# Patient Record
Sex: FEMALE | Race: WHITE | Employment: UNEMPLOYED | URBAN - NONMETROPOLITAN AREA
[De-identification: names, ages, dates, MRNs, and addresses within clinical notes are randomized per-mention and may not be internally consistent; named-entity substitution may affect disease eponyms.]

---

## 2017-01-10 ENCOUNTER — OFFICE VISIT (OUTPATIENT)
Dept: FAMILY MEDICINE CLINIC | Age: 67
End: 2017-01-10

## 2017-01-10 VITALS
BODY MASS INDEX: 24.38 KG/M2 | HEART RATE: 80 BPM | HEIGHT: 64 IN | RESPIRATION RATE: 16 BRPM | TEMPERATURE: 97.1 F | OXYGEN SATURATION: 99 % | WEIGHT: 142.8 LBS | SYSTOLIC BLOOD PRESSURE: 114 MMHG | DIASTOLIC BLOOD PRESSURE: 78 MMHG

## 2017-01-10 DIAGNOSIS — R10.11 RIGHT UPPER QUADRANT PAIN: Primary | ICD-10-CM

## 2017-01-10 PROCEDURE — 99214 OFFICE O/P EST MOD 30 MIN: CPT | Performed by: FAMILY MEDICINE

## 2017-01-10 RX ORDER — PROMETHAZINE HYDROCHLORIDE 25 MG/1
TABLET ORAL
Refills: 0 | Status: ON HOLD | COMMUNITY
Start: 2016-12-30 | End: 2017-01-17 | Stop reason: HOSPADM

## 2017-01-10 RX ORDER — OXYCODONE HYDROCHLORIDE 5 MG/1
TABLET ORAL
Status: ON HOLD | COMMUNITY
Start: 2016-12-21 | End: 2017-01-17 | Stop reason: HOSPADM

## 2017-01-12 ENCOUNTER — TELEPHONE (OUTPATIENT)
Dept: FAMILY MEDICINE CLINIC | Age: 67
End: 2017-01-12

## 2017-01-12 PROBLEM — K81.9 CHOLECYSTITIS: Status: ACTIVE | Noted: 2017-01-12

## 2017-01-19 ENCOUNTER — OFFICE VISIT (OUTPATIENT)
Dept: FAMILY MEDICINE CLINIC | Age: 67
End: 2017-01-19

## 2017-01-19 VITALS
RESPIRATION RATE: 14 BRPM | WEIGHT: 146 LBS | HEART RATE: 86 BPM | HEIGHT: 64 IN | DIASTOLIC BLOOD PRESSURE: 72 MMHG | TEMPERATURE: 96.9 F | BODY MASS INDEX: 24.92 KG/M2 | SYSTOLIC BLOOD PRESSURE: 116 MMHG

## 2017-01-19 DIAGNOSIS — I10 ESSENTIAL HYPERTENSION: ICD-10-CM

## 2017-01-19 DIAGNOSIS — C7B.00 METASTATIC CARCINOID TUMOR (HCC): ICD-10-CM

## 2017-01-19 DIAGNOSIS — K81.9 CHOLECYSTITIS: Primary | ICD-10-CM

## 2017-01-19 DIAGNOSIS — R73.09 ELEVATED GLUCOSE: ICD-10-CM

## 2017-01-19 PROCEDURE — 99214 OFFICE O/P EST MOD 30 MIN: CPT | Performed by: FAMILY MEDICINE

## 2017-01-27 ENCOUNTER — OFFICE VISIT (OUTPATIENT)
Dept: SURGERY | Age: 67
End: 2017-01-27

## 2017-01-27 VITALS
SYSTOLIC BLOOD PRESSURE: 118 MMHG | HEART RATE: 62 BPM | BODY MASS INDEX: 24.41 KG/M2 | HEIGHT: 64 IN | DIASTOLIC BLOOD PRESSURE: 78 MMHG | WEIGHT: 143 LBS | RESPIRATION RATE: 14 BRPM

## 2017-01-27 DIAGNOSIS — K81.9 CHOLECYSTITIS: Primary | ICD-10-CM

## 2017-01-27 PROCEDURE — 99024 POSTOP FOLLOW-UP VISIT: CPT | Performed by: SURGERY

## 2017-01-27 RX ORDER — ALLOPURINOL 300 MG/1
300 TABLET ORAL
COMMUNITY
Start: 2017-02-10 | End: 2017-02-15

## 2017-03-15 ENCOUNTER — TELEPHONE (OUTPATIENT)
Dept: FAMILY MEDICINE CLINIC | Age: 67
End: 2017-03-15

## 2017-03-16 ENCOUNTER — OFFICE VISIT (OUTPATIENT)
Dept: CARDIOLOGY | Age: 67
End: 2017-03-16
Payer: MEDICARE

## 2017-03-16 VITALS
HEART RATE: 70 BPM | BODY MASS INDEX: 23.73 KG/M2 | SYSTOLIC BLOOD PRESSURE: 132 MMHG | WEIGHT: 139 LBS | DIASTOLIC BLOOD PRESSURE: 88 MMHG | HEIGHT: 64 IN

## 2017-03-16 DIAGNOSIS — I47.1 SVT (SUPRAVENTRICULAR TACHYCARDIA) (HCC): Primary | ICD-10-CM

## 2017-03-16 PROCEDURE — G8420 CALC BMI NORM PARAMETERS: HCPCS | Performed by: INTERNAL MEDICINE

## 2017-03-16 PROCEDURE — 1123F ACP DISCUSS/DSCN MKR DOCD: CPT | Performed by: INTERNAL MEDICINE

## 2017-03-16 PROCEDURE — 4040F PNEUMOC VAC/ADMIN/RCVD: CPT | Performed by: INTERNAL MEDICINE

## 2017-03-16 PROCEDURE — 3017F COLORECTAL CA SCREEN DOC REV: CPT | Performed by: INTERNAL MEDICINE

## 2017-03-16 PROCEDURE — G8484 FLU IMMUNIZE NO ADMIN: HCPCS | Performed by: INTERNAL MEDICINE

## 2017-03-16 PROCEDURE — 99213 OFFICE O/P EST LOW 20 MIN: CPT | Performed by: INTERNAL MEDICINE

## 2017-03-16 PROCEDURE — 1036F TOBACCO NON-USER: CPT | Performed by: INTERNAL MEDICINE

## 2017-03-16 PROCEDURE — 3014F SCREEN MAMMO DOC REV: CPT | Performed by: INTERNAL MEDICINE

## 2017-03-16 PROCEDURE — G8399 PT W/DXA RESULTS DOCUMENT: HCPCS | Performed by: INTERNAL MEDICINE

## 2017-03-16 PROCEDURE — 1090F PRES/ABSN URINE INCON ASSESS: CPT | Performed by: INTERNAL MEDICINE

## 2017-03-16 PROCEDURE — G8427 DOCREV CUR MEDS BY ELIG CLIN: HCPCS | Performed by: INTERNAL MEDICINE

## 2017-05-24 ENCOUNTER — OFFICE VISIT (OUTPATIENT)
Dept: FAMILY MEDICINE CLINIC | Age: 67
End: 2017-05-24
Payer: MEDICARE

## 2017-05-24 VITALS
SYSTOLIC BLOOD PRESSURE: 120 MMHG | BODY MASS INDEX: 24.69 KG/M2 | HEIGHT: 64 IN | RESPIRATION RATE: 16 BRPM | HEART RATE: 60 BPM | DIASTOLIC BLOOD PRESSURE: 86 MMHG | WEIGHT: 144.6 LBS

## 2017-05-24 DIAGNOSIS — Z23 NEED FOR STREPTOCOCCUS PNEUMONIAE VACCINATION: ICD-10-CM

## 2017-05-24 DIAGNOSIS — R73.09 ELEVATED GLUCOSE: ICD-10-CM

## 2017-05-24 DIAGNOSIS — J31.0 RHINITIS, UNSPECIFIED TYPE: ICD-10-CM

## 2017-05-24 DIAGNOSIS — I10 ESSENTIAL HYPERTENSION: Primary | ICD-10-CM

## 2017-05-24 PROCEDURE — G8427 DOCREV CUR MEDS BY ELIG CLIN: HCPCS | Performed by: FAMILY MEDICINE

## 2017-05-24 PROCEDURE — 99214 OFFICE O/P EST MOD 30 MIN: CPT | Performed by: FAMILY MEDICINE

## 2017-05-24 PROCEDURE — 1036F TOBACCO NON-USER: CPT | Performed by: FAMILY MEDICINE

## 2017-05-24 PROCEDURE — 4040F PNEUMOC VAC/ADMIN/RCVD: CPT | Performed by: FAMILY MEDICINE

## 2017-05-24 PROCEDURE — 3017F COLORECTAL CA SCREEN DOC REV: CPT | Performed by: FAMILY MEDICINE

## 2017-05-24 PROCEDURE — G0009 ADMIN PNEUMOCOCCAL VACCINE: HCPCS | Performed by: FAMILY MEDICINE

## 2017-05-24 PROCEDURE — 3014F SCREEN MAMMO DOC REV: CPT | Performed by: FAMILY MEDICINE

## 2017-05-24 PROCEDURE — G8420 CALC BMI NORM PARAMETERS: HCPCS | Performed by: FAMILY MEDICINE

## 2017-05-24 PROCEDURE — 90670 PCV13 VACCINE IM: CPT | Performed by: FAMILY MEDICINE

## 2017-05-24 PROCEDURE — 1090F PRES/ABSN URINE INCON ASSESS: CPT | Performed by: FAMILY MEDICINE

## 2017-05-24 PROCEDURE — G8399 PT W/DXA RESULTS DOCUMENT: HCPCS | Performed by: FAMILY MEDICINE

## 2017-05-24 PROCEDURE — 1123F ACP DISCUSS/DSCN MKR DOCD: CPT | Performed by: FAMILY MEDICINE

## 2017-05-24 RX ORDER — FLUTICASONE PROPIONATE 50 MCG
1 SPRAY, SUSPENSION (ML) NASAL DAILY PRN
Qty: 3 BOTTLE | Refills: 1 | Status: SHIPPED | OUTPATIENT
Start: 2017-05-24 | End: 2020-07-09 | Stop reason: SDUPTHER

## 2017-05-24 RX ORDER — FLUTICASONE PROPIONATE 50 MCG
1 SPRAY, SUSPENSION (ML) NASAL DAILY PRN
COMMUNITY
End: 2017-05-24 | Stop reason: SDUPTHER

## 2017-09-21 ENCOUNTER — OFFICE VISIT (OUTPATIENT)
Dept: CARDIOLOGY | Age: 67
End: 2017-09-21
Payer: MEDICARE

## 2017-09-21 VITALS
SYSTOLIC BLOOD PRESSURE: 144 MMHG | DIASTOLIC BLOOD PRESSURE: 86 MMHG | BODY MASS INDEX: 25.47 KG/M2 | HEART RATE: 60 BPM | HEIGHT: 64 IN | WEIGHT: 149.2 LBS

## 2017-09-21 DIAGNOSIS — I10 ESSENTIAL HYPERTENSION: Primary | ICD-10-CM

## 2017-09-21 PROCEDURE — 3014F SCREEN MAMMO DOC REV: CPT | Performed by: INTERNAL MEDICINE

## 2017-09-21 PROCEDURE — G8427 DOCREV CUR MEDS BY ELIG CLIN: HCPCS | Performed by: INTERNAL MEDICINE

## 2017-09-21 PROCEDURE — 93000 ELECTROCARDIOGRAM COMPLETE: CPT | Performed by: INTERNAL MEDICINE

## 2017-09-21 PROCEDURE — G8419 CALC BMI OUT NRM PARAM NOF/U: HCPCS | Performed by: INTERNAL MEDICINE

## 2017-09-21 PROCEDURE — G8399 PT W/DXA RESULTS DOCUMENT: HCPCS | Performed by: INTERNAL MEDICINE

## 2017-09-21 PROCEDURE — 3017F COLORECTAL CA SCREEN DOC REV: CPT | Performed by: INTERNAL MEDICINE

## 2017-09-21 PROCEDURE — 1090F PRES/ABSN URINE INCON ASSESS: CPT | Performed by: INTERNAL MEDICINE

## 2017-09-21 PROCEDURE — 4040F PNEUMOC VAC/ADMIN/RCVD: CPT | Performed by: INTERNAL MEDICINE

## 2017-09-21 PROCEDURE — 1123F ACP DISCUSS/DSCN MKR DOCD: CPT | Performed by: INTERNAL MEDICINE

## 2017-09-21 PROCEDURE — 1036F TOBACCO NON-USER: CPT | Performed by: INTERNAL MEDICINE

## 2017-09-21 PROCEDURE — 99213 OFFICE O/P EST LOW 20 MIN: CPT | Performed by: INTERNAL MEDICINE

## 2017-09-21 RX ORDER — LISINOPRIL 20 MG/1
20 TABLET ORAL DAILY
COMMUNITY
End: 2017-11-29 | Stop reason: SDUPTHER

## 2017-11-17 ENCOUNTER — HOSPITAL ENCOUNTER (EMERGENCY)
Age: 67
Discharge: HOME | End: 2017-11-17
Attending: EMERGENCY MEDICINE
Payer: MEDICARE

## 2017-11-17 ENCOUNTER — APPOINTMENT (OUTPATIENT)
Dept: GENERAL RADIOLOGY | Age: 67
End: 2017-11-17
Payer: MEDICARE

## 2017-11-17 VITALS
SYSTOLIC BLOOD PRESSURE: 156 MMHG | BODY MASS INDEX: 27.32 KG/M2 | TEMPERATURE: 96.8 F | HEART RATE: 78 BPM | WEIGHT: 160 LBS | DIASTOLIC BLOOD PRESSURE: 96 MMHG | RESPIRATION RATE: 12 BRPM | OXYGEN SATURATION: 97 %

## 2017-11-17 DIAGNOSIS — S39.012A STRAIN OF LUMBAR REGION, INITIAL ENCOUNTER: Primary | ICD-10-CM

## 2017-11-17 PROCEDURE — 72100 X-RAY EXAM L-S SPINE 2/3 VWS: CPT

## 2017-11-17 PROCEDURE — 6370000000 HC RX 637 (ALT 250 FOR IP): Performed by: EMERGENCY MEDICINE

## 2017-11-17 PROCEDURE — 99283 EMERGENCY DEPT VISIT LOW MDM: CPT

## 2017-11-17 RX ORDER — CYCLOBENZAPRINE HCL 10 MG
10 TABLET ORAL ONCE
Status: COMPLETED | OUTPATIENT
Start: 2017-11-17 | End: 2017-11-17

## 2017-11-17 RX ORDER — HYDROCODONE BITARTRATE AND ACETAMINOPHEN 5; 325 MG/1; MG/1
1 TABLET ORAL EVERY 6 HOURS PRN
Qty: 15 TABLET | Refills: 0 | Status: SHIPPED | OUTPATIENT
Start: 2017-11-17 | End: 2017-11-24

## 2017-11-17 RX ORDER — CYCLOBENZAPRINE HCL 10 MG
10 TABLET ORAL 3 TIMES DAILY PRN
Qty: 30 TABLET | Refills: 0 | Status: SHIPPED | OUTPATIENT
Start: 2017-11-17 | End: 2018-03-27 | Stop reason: ALTCHOICE

## 2017-11-17 RX ADMIN — CYCLOBENZAPRINE HYDROCHLORIDE 10 MG: 10 TABLET, FILM COATED ORAL at 11:52

## 2017-11-17 ASSESSMENT — PAIN SCALES - GENERAL
PAINLEVEL_OUTOF10: 9
PAINLEVEL_OUTOF10: 7

## 2017-11-17 ASSESSMENT — PAIN DESCRIPTION - PROGRESSION: CLINICAL_PROGRESSION: NOT CHANGED

## 2017-11-17 ASSESSMENT — PAIN DESCRIPTION - ONSET: ONSET: PROGRESSIVE

## 2017-11-17 ASSESSMENT — PAIN DESCRIPTION - LOCATION: LOCATION: BACK

## 2017-11-17 ASSESSMENT — PAIN DESCRIPTION - PAIN TYPE: TYPE: ACUTE PAIN

## 2017-11-17 ASSESSMENT — PAIN DESCRIPTION - FREQUENCY: FREQUENCY: CONTINUOUS

## 2017-11-17 ASSESSMENT — PAIN DESCRIPTION - DESCRIPTORS: DESCRIPTORS: ACHING;CONSTANT

## 2017-11-17 ASSESSMENT — PAIN DESCRIPTION - ORIENTATION: ORIENTATION: LOWER

## 2017-11-17 NOTE — ED PROVIDER NOTES
bronchoscopy (2011); thoracotomy (Right, 2011); bronchoscopy (Right, 11/12/10); other surgical history (Right, 05); Breast cyst aspiration (Right, 05); Breast biopsy (Right, 05); Breast biopsy (Right, 05); knee surgery (Left, 2016); liver biopsy (2016); Cholecystectomy, laparoscopic (2017); and Upper gastrointestinal endoscopy (2017). CURRENT MEDICATIONS       Previous Medications    ACETAMINOPHEN (TYLENOL) 325 MG TABLET    Take 2 tablets by mouth every 4 hours as needed for Pain or Fever    FLUTICASONE (FLONASE) 50 MCG/ACT NASAL SPRAY    1 spray by Nasal route daily as needed for Rhinitis    LISINOPRIL (PRINIVIL;ZESTRIL) 20 MG TABLET    Take 20 mg by mouth daily       ALLERGIES     is allergic to effexor xr [venlafaxine hcl]. FAMILY HISTORY     indicated that her mother is alive. She indicated that her father is . She indicated that her sister is . She indicated that the status of her neg hx is unknown.      family history includes Cancer in her father and sister; Diabetes in her mother; Heart Disease in her mother; High Blood Pressure in her mother; High Cholesterol in her mother. SOCIAL HISTORY      reports that she has never smoked. She has never used smokeless tobacco. She reports that she does not drink alcohol or use drugs. PHYSICAL EXAM     INITIAL VITALS:  weight is 160 lb (72.6 kg). Her oral temperature is 96.8 °F (36 °C). Her blood pressure is 156/96 (abnormal) and her pulse is 78. Her respiration is 12 and oxygen saturation is 97%.      Constitutional: Alert, oriented x3, nontoxic, answering questions appropriately, acting properly for age, appears uncomfortable  HEENT: Extraocular muscles intact, mucus membranes moist,    Neck: Trachea midline,    Cardiovascular: Regular rhythm and rate no S3, S4, or murmurs   Respiratory: Clear to auscultation bilaterally no wheezes, rhonchi, rales, no respiratory distress Gastrointestinal: Soft, nontender, nondistended, positive bowel sounds. No rebound, rigidity, or guarding. Musculoskeletal: No extremity pain or swelling. Lumbar spine no midline tenderness to palpation there is moderate tenderness to the right sacroiliac joint. The right hip is higher and there is curvature of the spine secondary to muscle spasm along the paraspinal musculature. Neurologic: Moving all 4 extremities without difficulty there are no gross focal neurologic deficits   Skin: Warm and dry     DIFFERENTIAL DIAGNOSIS/ MDM:     No cauda equina syndrome. Suspect lumbar strain possible pinched nerve. X-ray. Flexeril. DIAGNOSTIC RESULTS     EKG: All EKG's are interpreted by the Emergency Department Physician who either signs or Co-signs this chart in the absence of a cardiologist.        Not indicated unless otherwise documented above    LABS:  No results found for this visit on 11/17/17. Not indicated unless otherwise documented above    RADIOLOGY:   I reviewed the radiologist interpretations:    XR LUMBAR SPINE (2-3 VIEWS)   Final Result   The nearly grade 2 anterolisthesis L5 on S1 with possible bilateral L5-S1   pars interarticularis fracture/ defect likely old. L4-L5 degenerative disc disease suspected. Large amount of fecal material in the transverse colon, suggestive of   constipation.                Not indicated unless otherwise documented above    EMERGENCY DEPARTMENT COURSE:     The patient was given the following medications:  Orders Placed This Encounter   Medications    cyclobenzaprine (FLEXERIL) tablet 10 mg    cyclobenzaprine (FLEXERIL) 10 MG tablet     Sig: Take 1 tablet by mouth 3 times daily as needed for Muscle spasms     Dispense:  30 tablet     Refill:  0        Vitals:    Vitals:    11/17/17 1120   BP: (!) 156/96   Pulse: 78   Resp: 12   Temp: 96.8 °F (36 °C)   TempSrc: Oral   SpO2: 97%   Weight: 160 lb (72.6 kg)     -------------------------  BP (!) 156/96 Pulse 78   Temp 96.8 °F (36 °C) (Oral)   Resp 12   Wt 160 lb (72.6 kg)   SpO2 97%   BMI 27.32 kg/m²     X-ray unremarkable there is some degenerative changes L4 and L5 And possible old fracture. No new compression fractures. Discharge with a prescription for Flexeril. Heat as needed follow-up with family physician for revaluation may need an MRI if deemed necessary. I have reviewed the disposition diagnosis with the patient and or their family/guardian. I have answered their questions and given discharge instructions. They voiced understanding of these instructions and did not have any further questions or complaints. CRITICAL CARE:    None    CONSULTS:    None    PROCEDURES:    None      OARRS Report if indicated             FINAL IMPRESSION      1.  Strain of lumbar region, initial encounter          DISPOSITION/PLAN   DISPOSITION Decision to Discharge      PATIENT REFERRED TO:  Alessia Kelley MD  [address] [place], [place] [phone]    Schedule an appointment as soon as possible for a visit in 1 week        DISCHARGE MEDICATIONS:  New Prescriptions    CYCLOBENZAPRINE (FLEXERIL) 10 MG TABLET    Take 1 tablet by mouth 3 times daily as needed for Muscle spasms       (Please note that portions of this note were completed with a voice recognition program.  Efforts were made to edit the dictations but occasionally words are mis-transcribed.)    Savanah Khalil, DO  Attending Emergency Physician       Savanah Khalil DO  11/17/17 [id number]

## 2017-11-29 ENCOUNTER — OFFICE VISIT (OUTPATIENT)
Dept: FAMILY MEDICINE CLINIC | Age: 67
End: 2017-11-29
Payer: MEDICARE

## 2017-11-29 VITALS
HEIGHT: 64 IN | RESPIRATION RATE: 16 BRPM | SYSTOLIC BLOOD PRESSURE: 124 MMHG | DIASTOLIC BLOOD PRESSURE: 78 MMHG | WEIGHT: 156 LBS | HEART RATE: 82 BPM | BODY MASS INDEX: 26.63 KG/M2

## 2017-11-29 DIAGNOSIS — Z23 NEED FOR INFLUENZA VACCINATION: ICD-10-CM

## 2017-11-29 DIAGNOSIS — R73.09 ELEVATED GLUCOSE: ICD-10-CM

## 2017-11-29 DIAGNOSIS — Z23 NEED FOR SHINGLES VACCINE: ICD-10-CM

## 2017-11-29 DIAGNOSIS — I10 ESSENTIAL HYPERTENSION: Primary | ICD-10-CM

## 2017-11-29 PROCEDURE — 3014F SCREEN MAMMO DOC REV: CPT | Performed by: FAMILY MEDICINE

## 2017-11-29 PROCEDURE — G8427 DOCREV CUR MEDS BY ELIG CLIN: HCPCS | Performed by: FAMILY MEDICINE

## 2017-11-29 PROCEDURE — G8419 CALC BMI OUT NRM PARAM NOF/U: HCPCS | Performed by: FAMILY MEDICINE

## 2017-11-29 PROCEDURE — 99214 OFFICE O/P EST MOD 30 MIN: CPT | Performed by: FAMILY MEDICINE

## 2017-11-29 PROCEDURE — 1090F PRES/ABSN URINE INCON ASSESS: CPT | Performed by: FAMILY MEDICINE

## 2017-11-29 PROCEDURE — 4040F PNEUMOC VAC/ADMIN/RCVD: CPT | Performed by: FAMILY MEDICINE

## 2017-11-29 PROCEDURE — G8399 PT W/DXA RESULTS DOCUMENT: HCPCS | Performed by: FAMILY MEDICINE

## 2017-11-29 PROCEDURE — 1123F ACP DISCUSS/DSCN MKR DOCD: CPT | Performed by: FAMILY MEDICINE

## 2017-11-29 PROCEDURE — 90662 IIV NO PRSV INCREASED AG IM: CPT | Performed by: FAMILY MEDICINE

## 2017-11-29 PROCEDURE — 1036F TOBACCO NON-USER: CPT | Performed by: FAMILY MEDICINE

## 2017-11-29 PROCEDURE — 3017F COLORECTAL CA SCREEN DOC REV: CPT | Performed by: FAMILY MEDICINE

## 2017-11-29 PROCEDURE — G0008 ADMIN INFLUENZA VIRUS VAC: HCPCS | Performed by: FAMILY MEDICINE

## 2017-11-29 PROCEDURE — G8484 FLU IMMUNIZE NO ADMIN: HCPCS | Performed by: FAMILY MEDICINE

## 2017-11-29 RX ORDER — LISINOPRIL 20 MG/1
20 TABLET ORAL DAILY
Qty: 90 TABLET | Refills: 1 | Status: SHIPPED | OUTPATIENT
Start: 2017-11-29 | End: 2018-06-22 | Stop reason: SDUPTHER

## 2017-11-29 RX ORDER — DILTIAZEM HYDROCHLORIDE 120 MG/1
120 CAPSULE, EXTENDED RELEASE ORAL DAILY
COMMUNITY
Start: 2017-11-14 | End: 2018-03-27 | Stop reason: SDUPTHER

## 2017-11-29 NOTE — PROGRESS NOTES
[address] [place], [address]                        Telephone [phone]             Fax [phone]     Chi Cuenca  1950  MRN:  [id number]  Date of visit:  11/29/17    Subjective:    Chi Cuenca is a 74 y.o.  female who presents to [facility] today (11/29/17) for follow up/evaluation of:  Hypertension (6 month follow up) and Hyperglycemia (6 month follow up)      She states that she has been feeling well overall. She was seen at [facility] [place] ER on 11/17/2017 for low back pain. She was prescribed Flexeril. She states that she continues to have some intermittent pain across the lower back, but the pain is not as severe as it was when she was at the ER. She denies radiation of the pain into her legs. She is scheduled for a visit at [place] for follow up of breast cancer and lung cancer. She admits that she is not exercising regularly.   She denies chest pain or shortness of breath with activity or at rest.       She has the following problem list:  Patient Active Problem List   Diagnosis    Essential hypertension    Osteoarthritis    Osteopenia    History of breast cancer    History of lung cancer    Elevated glucose    Primary osteoarthritis of left knee    Metastatic carcinoid tumor ([address]    Cholecystitis    Hepatic metastasis ([address]    RUQ pain        Current medications are:  Outpatient Prescriptions Marked as Taking for the 11/29/17 encounter (Office Visit) with Steve Stauffer MD   Medication Sig Dispense Refill    CARTIA  MG extended release capsule Take 120 mg by mouth daily       cyclobenzaprine (FLEXERIL) 10 MG tablet Take 1 tablet by mouth 3 times daily as needed for Muscle spasms 30 tablet 0    lisinopril (PRINIVIL;ZESTRIL) 20 MG tablet Take 20 mg by mouth daily      fluticasone (FLONASE) 50 MCG/ACT nasal spray 1 any changes. 4.  Routine health maintenance  Health maintenance was reviewed with the patient. Annual influenza vaccine was recommended and ordered. Pap test was recommended and declined. Shingrix was discussed. She was given a printed prescription for Shingrix. All other health maintenance, including Pneumovax, Prevnar-13, Tdap, and Zostavax, is up to date at this time.        (Please note that portions of this note were completed with a voice-recognition program. Efforts were made to edit the dictation but occasionally words are mis-transcribed.)

## 2017-11-29 NOTE — PROGRESS NOTES
Have you had an allergic reaction to the flu (influenza) shot? No  Are you allergic to eggs or any component of the flu vaccine? No  Do you have a history of Guillain-Hinkley Syndrome (GBS), which is paralysis after receiving the flu vaccine? No  Are you feeling well today? Yes  Flu vaccine given as ordered. Patient tolerated it well. No questions re: VIS information.

## 2018-03-27 ENCOUNTER — OFFICE VISIT (OUTPATIENT)
Dept: FAMILY MEDICINE CLINIC | Age: 68
End: 2018-03-27
Payer: MEDICARE

## 2018-03-27 VITALS
BODY MASS INDEX: 26.32 KG/M2 | DIASTOLIC BLOOD PRESSURE: 72 MMHG | OXYGEN SATURATION: 98 % | RESPIRATION RATE: 16 BRPM | HEART RATE: 66 BPM | WEIGHT: 154.2 LBS | HEIGHT: 64 IN | TEMPERATURE: 97.5 F | SYSTOLIC BLOOD PRESSURE: 124 MMHG

## 2018-03-27 DIAGNOSIS — J06.9 UPPER RESPIRATORY TRACT INFECTION, UNSPECIFIED TYPE: ICD-10-CM

## 2018-03-27 DIAGNOSIS — S90.852A FOREIGN BODY IN LEFT FOOT, INITIAL ENCOUNTER: ICD-10-CM

## 2018-03-27 DIAGNOSIS — R11.0 NAUSEA: Primary | ICD-10-CM

## 2018-03-27 PROCEDURE — 1090F PRES/ABSN URINE INCON ASSESS: CPT | Performed by: FAMILY MEDICINE

## 2018-03-27 PROCEDURE — 99213 OFFICE O/P EST LOW 20 MIN: CPT | Performed by: FAMILY MEDICINE

## 2018-03-27 PROCEDURE — 1123F ACP DISCUSS/DSCN MKR DOCD: CPT | Performed by: FAMILY MEDICINE

## 2018-03-27 PROCEDURE — 3017F COLORECTAL CA SCREEN DOC REV: CPT | Performed by: FAMILY MEDICINE

## 2018-03-27 PROCEDURE — G8399 PT W/DXA RESULTS DOCUMENT: HCPCS | Performed by: FAMILY MEDICINE

## 2018-03-27 PROCEDURE — G8482 FLU IMMUNIZE ORDER/ADMIN: HCPCS | Performed by: FAMILY MEDICINE

## 2018-03-27 PROCEDURE — 1036F TOBACCO NON-USER: CPT | Performed by: FAMILY MEDICINE

## 2018-03-27 PROCEDURE — 3014F SCREEN MAMMO DOC REV: CPT | Performed by: FAMILY MEDICINE

## 2018-03-27 PROCEDURE — 4040F PNEUMOC VAC/ADMIN/RCVD: CPT | Performed by: FAMILY MEDICINE

## 2018-03-27 PROCEDURE — G8419 CALC BMI OUT NRM PARAM NOF/U: HCPCS | Performed by: FAMILY MEDICINE

## 2018-03-27 PROCEDURE — G8427 DOCREV CUR MEDS BY ELIG CLIN: HCPCS | Performed by: FAMILY MEDICINE

## 2018-03-27 RX ORDER — ONDANSETRON 4 MG/1
TABLET, FILM COATED ORAL
Qty: 20 TABLET | Refills: 0 | Status: SHIPPED | OUTPATIENT
Start: 2018-03-27 | End: 2018-06-22 | Stop reason: ALTCHOICE

## 2018-03-27 RX ORDER — DILTIAZEM HYDROCHLORIDE 120 MG/1
120 CAPSULE, EXTENDED RELEASE ORAL DAILY
Qty: 90 CAPSULE | Refills: 3 | Status: SHIPPED | OUTPATIENT
Start: 2018-03-27 | End: 2019-02-01 | Stop reason: ALTCHOICE

## 2018-03-29 ENCOUNTER — OFFICE VISIT (OUTPATIENT)
Dept: PRIMARY CARE CLINIC | Age: 68
End: 2018-03-29
Payer: MEDICARE

## 2018-03-29 ENCOUNTER — OFFICE VISIT (OUTPATIENT)
Dept: CARDIOLOGY | Age: 68
End: 2018-03-29
Payer: MEDICARE

## 2018-03-29 VITALS
HEIGHT: 64 IN | WEIGHT: 156 LBS | RESPIRATION RATE: 18 BRPM | BODY MASS INDEX: 26.63 KG/M2 | DIASTOLIC BLOOD PRESSURE: 86 MMHG | HEART RATE: 71 BPM | SYSTOLIC BLOOD PRESSURE: 138 MMHG

## 2018-03-29 VITALS
BODY MASS INDEX: 28.56 KG/M2 | OXYGEN SATURATION: 98 % | SYSTOLIC BLOOD PRESSURE: 112 MMHG | HEIGHT: 62 IN | WEIGHT: 155.2 LBS | TEMPERATURE: 98 F | DIASTOLIC BLOOD PRESSURE: 74 MMHG | HEART RATE: 74 BPM

## 2018-03-29 DIAGNOSIS — I10 ESSENTIAL HYPERTENSION: Primary | ICD-10-CM

## 2018-03-29 DIAGNOSIS — J01.40 ACUTE PANSINUSITIS, RECURRENCE NOT SPECIFIED: Primary | ICD-10-CM

## 2018-03-29 DIAGNOSIS — I34.0 MITRAL VALVE INSUFFICIENCY, UNSPECIFIED ETIOLOGY: ICD-10-CM

## 2018-03-29 DIAGNOSIS — R01.1 SYSTOLIC MURMUR: ICD-10-CM

## 2018-03-29 PROCEDURE — G8482 FLU IMMUNIZE ORDER/ADMIN: HCPCS | Performed by: FAMILY MEDICINE

## 2018-03-29 PROCEDURE — G8399 PT W/DXA RESULTS DOCUMENT: HCPCS | Performed by: FAMILY MEDICINE

## 2018-03-29 PROCEDURE — G8427 DOCREV CUR MEDS BY ELIG CLIN: HCPCS | Performed by: FAMILY MEDICINE

## 2018-03-29 PROCEDURE — 1123F ACP DISCUSS/DSCN MKR DOCD: CPT | Performed by: INTERNAL MEDICINE

## 2018-03-29 PROCEDURE — G8427 DOCREV CUR MEDS BY ELIG CLIN: HCPCS | Performed by: INTERNAL MEDICINE

## 2018-03-29 PROCEDURE — 4040F PNEUMOC VAC/ADMIN/RCVD: CPT | Performed by: FAMILY MEDICINE

## 2018-03-29 PROCEDURE — 99213 OFFICE O/P EST LOW 20 MIN: CPT | Performed by: FAMILY MEDICINE

## 2018-03-29 PROCEDURE — G8419 CALC BMI OUT NRM PARAM NOF/U: HCPCS | Performed by: FAMILY MEDICINE

## 2018-03-29 PROCEDURE — G8399 PT W/DXA RESULTS DOCUMENT: HCPCS | Performed by: INTERNAL MEDICINE

## 2018-03-29 PROCEDURE — 1090F PRES/ABSN URINE INCON ASSESS: CPT | Performed by: INTERNAL MEDICINE

## 2018-03-29 PROCEDURE — 3014F SCREEN MAMMO DOC REV: CPT | Performed by: INTERNAL MEDICINE

## 2018-03-29 PROCEDURE — 4040F PNEUMOC VAC/ADMIN/RCVD: CPT | Performed by: INTERNAL MEDICINE

## 2018-03-29 PROCEDURE — G8482 FLU IMMUNIZE ORDER/ADMIN: HCPCS | Performed by: INTERNAL MEDICINE

## 2018-03-29 PROCEDURE — 3017F COLORECTAL CA SCREEN DOC REV: CPT | Performed by: INTERNAL MEDICINE

## 2018-03-29 PROCEDURE — 1090F PRES/ABSN URINE INCON ASSESS: CPT | Performed by: FAMILY MEDICINE

## 2018-03-29 PROCEDURE — 99213 OFFICE O/P EST LOW 20 MIN: CPT | Performed by: INTERNAL MEDICINE

## 2018-03-29 PROCEDURE — 93000 ELECTROCARDIOGRAM COMPLETE: CPT | Performed by: INTERNAL MEDICINE

## 2018-03-29 PROCEDURE — 1036F TOBACCO NON-USER: CPT | Performed by: FAMILY MEDICINE

## 2018-03-29 PROCEDURE — 1036F TOBACCO NON-USER: CPT | Performed by: INTERNAL MEDICINE

## 2018-03-29 PROCEDURE — 3014F SCREEN MAMMO DOC REV: CPT | Performed by: FAMILY MEDICINE

## 2018-03-29 PROCEDURE — G8419 CALC BMI OUT NRM PARAM NOF/U: HCPCS | Performed by: INTERNAL MEDICINE

## 2018-03-29 PROCEDURE — 1123F ACP DISCUSS/DSCN MKR DOCD: CPT | Performed by: FAMILY MEDICINE

## 2018-03-29 PROCEDURE — 3017F COLORECTAL CA SCREEN DOC REV: CPT | Performed by: FAMILY MEDICINE

## 2018-03-29 RX ORDER — AMOXICILLIN 875 MG/1
875 TABLET, COATED ORAL 2 TIMES DAILY
Qty: 20 TABLET | Refills: 0 | Status: SHIPPED | OUTPATIENT
Start: 2018-03-29 | End: 2018-04-08

## 2018-03-29 NOTE — PROGRESS NOTES
Cholecystitis    Hepatic metastasis ([address]    RUQ pain        She  reports that she has never smoked. She has never used smokeless tobacco.      Objective:    Vitals:    03/29/18 1033   BP: 112/74   Pulse: 74   Temp: 98 °F (36.7 °C)   SpO2: 98%     SpO2: 98 %       Body mass index is 28.39 kg/m². Overweight  female alert and cooperative. The tympanic membranes are clear bilaterally. Oropharynx has mild erythema. There is no exudate. There is moderate tenderness over the frontal and maxillary sinuses bilaterally. Neck is supple, with no lymphadenopathy. Chest is clear to auscultation, no wheezes, rales, or rhonchi. Heart sounds are regular rate and rhythm, no murmurs. The area on the left foot where the foreign body was removed on 3/27/2018 was examined. This was clean and dry. The erythema was decreased compared to the 3/27/2018 visit. Assessment & Plan:    Acute pansinusitis, recurrence not specified  - amoxicillin (AMOXIL) 875 MG tablet; Take 1 tablet by mouth 2 times daily for 10 days  Dispense: 20 tablet; Refill: 0    She was advised to follow up if symptoms worsen or do not resolve.        (Please note that portions of this note were completed with a voice-recognition program. Efforts were made to edit the dictation but occasionally words are mis-transcribed.)

## 2018-04-12 ENCOUNTER — HOSPITAL ENCOUNTER (OUTPATIENT)
Dept: NON INVASIVE DIAGNOSTICS | Age: 68
Discharge: HOME | End: 2018-04-12
Payer: MEDICARE

## 2018-04-12 DIAGNOSIS — I10 ESSENTIAL HYPERTENSION: ICD-10-CM

## 2018-04-12 DIAGNOSIS — R01.1 SYSTOLIC MURMUR: ICD-10-CM

## 2018-04-12 DIAGNOSIS — I34.0 MITRAL VALVE INSUFFICIENCY, UNSPECIFIED ETIOLOGY: ICD-10-CM

## 2018-04-12 LAB
LV EF: 55 %
LVEF MODALITY: NORMAL

## 2018-04-12 PROCEDURE — 93306 TTE W/DOPPLER COMPLETE: CPT

## 2018-05-22 ENCOUNTER — HOSPITAL ENCOUNTER (OUTPATIENT)
Dept: LAB | Age: 68
Setting detail: SPECIMEN
Discharge: HOME | End: 2018-05-22
Payer: MEDICARE

## 2018-05-22 ENCOUNTER — OFFICE VISIT (OUTPATIENT)
Dept: OPTOMETRY | Age: 68
End: 2018-05-22
Payer: COMMERCIAL

## 2018-05-22 DIAGNOSIS — I10 ESSENTIAL HYPERTENSION: ICD-10-CM

## 2018-05-22 DIAGNOSIS — R73.09 ELEVATED GLUCOSE: ICD-10-CM

## 2018-05-22 DIAGNOSIS — H52.4 ASTIGMATISM OF BOTH EYES WITH PRESBYOPIA: Primary | ICD-10-CM

## 2018-05-22 DIAGNOSIS — H52.203 ASTIGMATISM OF BOTH EYES WITH PRESBYOPIA: Primary | ICD-10-CM

## 2018-05-22 LAB
ALBUMIN SERPL-MCNC: 4.6 G/DL (ref 3.5–5.2)
ALBUMIN/GLOBULIN RATIO: 1.4 (ref 1–2.5)
ALP BLD-CCNC: 115 U/L (ref 35–104)
ALT SERPL-CCNC: 20 U/L (ref 5–33)
ANION GAP SERPL CALCULATED.3IONS-SCNC: 13 MMOL/L (ref 9–17)
AST SERPL-CCNC: 28 U/L
BILIRUB SERPL-MCNC: 0.6 MG/DL (ref 0.3–1.2)
BUN BLDV-MCNC: 15 MG/DL (ref 8–23)
BUN/CREAT BLD: 21 (ref 9–20)
CALCIUM SERPL-MCNC: 10.1 MG/DL (ref 8.6–10.4)
CHLORIDE BLD-SCNC: 103 MMOL/L (ref 98–107)
CHOLESTEROL/HDL RATIO: 2.2
CHOLESTEROL: 198 MG/DL
CO2: 27 MMOL/L (ref 20–31)
CREAT SERPL-MCNC: 0.71 MG/DL (ref 0.5–0.9)
ESTIMATED AVERAGE GLUCOSE: 88 MG/DL
GFR AFRICAN AMERICAN: >60 ML/MIN
GFR NON-AFRICAN AMERICAN: >60 ML/MIN
GFR SERPL CREATININE-BSD FRML MDRD: ABNORMAL ML/MIN/{1.73_M2}
GFR SERPL CREATININE-BSD FRML MDRD: ABNORMAL ML/MIN/{1.73_M2}
GLUCOSE BLD-MCNC: 98 MG/DL (ref 70–99)
HBA1C MFR BLD: 4.7 % (ref 4.8–5.9)
HDLC SERPL-MCNC: 90 MG/DL
LDL CHOLESTEROL: 84 MG/DL (ref 0–130)
POTASSIUM SERPL-SCNC: 4.2 MMOL/L (ref 3.7–5.3)
SODIUM BLD-SCNC: 143 MMOL/L (ref 135–144)
TOTAL PROTEIN: 7.9 G/DL (ref 6.4–8.3)
TRIGL SERPL-MCNC: 120 MG/DL
VLDLC SERPL CALC-MCNC: NORMAL MG/DL (ref 1–30)

## 2018-05-22 PROCEDURE — 36415 COLL VENOUS BLD VENIPUNCTURE: CPT

## 2018-05-22 PROCEDURE — 92014 COMPRE OPH EXAM EST PT 1/>: CPT | Performed by: OPTOMETRIST

## 2018-05-22 PROCEDURE — 83036 HEMOGLOBIN GLYCOSYLATED A1C: CPT

## 2018-05-22 PROCEDURE — 80061 LIPID PANEL: CPT

## 2018-05-22 PROCEDURE — 80053 COMPREHEN METABOLIC PANEL: CPT

## 2018-05-22 RX ORDER — BENOXINATE HCL/FLUORESCEIN SOD 0.4%-0.25%
1 DROPS OPHTHALMIC (EYE) ONCE
Status: COMPLETED | OUTPATIENT
Start: 2018-05-22 | End: 2018-05-22

## 2018-05-22 RX ADMIN — Medication 1 DROP: at 09:57

## 2018-05-22 ASSESSMENT — REFRACTION_WEARINGRX
OD_ADD: +2.25
OD_CYLINDER: -0.50
OD_SPHERE: -0.50
SPECS_TYPE: BIFOCAL
OD_AXIS: 104
OS_SPHERE: +0.75
OS_AXIS: 092
OS_ADD: +2.25
OS_CYLINDER: -0.75

## 2018-05-22 ASSESSMENT — REFRACTION_MANIFEST
OS_ADD: +2.25
OS_AXIS: 095
OD_ADD: +2.25
OD_SPHERE: PLANO
OS_CYLINDER: -0.75
OD_CYLINDER: -0.50
OD_AXIS: 095
OS_SPHERE: +1.00

## 2018-05-22 ASSESSMENT — TONOMETRY
IOP_METHOD: APPLANATION W FLURESS DROP
OS_IOP_MMHG: 16
OD_IOP_MMHG: 16

## 2018-05-22 ASSESSMENT — VISUAL ACUITY
OS_CC: 20/30
OD_CC+: -1
OD_CC: 20/25 OU
CORRECTION_TYPE: GLASSES
OS_CC+: -1
METHOD: SNELLEN - LINEAR

## 2018-05-22 ASSESSMENT — SLIT LAMP EXAM - LIDS
COMMENTS: NORMAL
COMMENTS: NORMAL

## 2018-06-05 ENCOUNTER — OFFICE VISIT (OUTPATIENT)
Dept: CARDIOLOGY | Age: 68
End: 2018-06-05
Payer: MEDICARE

## 2018-06-05 VITALS
SYSTOLIC BLOOD PRESSURE: 160 MMHG | HEIGHT: 64 IN | WEIGHT: 156 LBS | BODY MASS INDEX: 26.63 KG/M2 | DIASTOLIC BLOOD PRESSURE: 80 MMHG | HEART RATE: 68 BPM

## 2018-06-05 DIAGNOSIS — I10 ESSENTIAL HYPERTENSION: Primary | ICD-10-CM

## 2018-06-05 DIAGNOSIS — I47.1 SVT (SUPRAVENTRICULAR TACHYCARDIA) (HCC): ICD-10-CM

## 2018-06-05 DIAGNOSIS — R01.1 SYSTOLIC MURMUR: ICD-10-CM

## 2018-06-05 PROCEDURE — 99213 OFFICE O/P EST LOW 20 MIN: CPT | Performed by: INTERNAL MEDICINE

## 2018-06-22 ENCOUNTER — OFFICE VISIT (OUTPATIENT)
Dept: FAMILY MEDICINE CLINIC | Age: 68
End: 2018-06-22
Payer: MEDICARE

## 2018-06-22 VITALS
BODY MASS INDEX: 26.29 KG/M2 | HEIGHT: 64 IN | RESPIRATION RATE: 16 BRPM | WEIGHT: 154 LBS | SYSTOLIC BLOOD PRESSURE: 124 MMHG | DIASTOLIC BLOOD PRESSURE: 86 MMHG | HEART RATE: 82 BPM

## 2018-06-22 DIAGNOSIS — Z00.00 ROUTINE GENERAL MEDICAL EXAMINATION AT A HEALTH CARE FACILITY: Primary | ICD-10-CM

## 2018-06-22 DIAGNOSIS — I10 ESSENTIAL HYPERTENSION: ICD-10-CM

## 2018-06-22 DIAGNOSIS — Z23 NEED FOR VACCINATION AGAINST STREPTOCOCCUS PNEUMONIAE: ICD-10-CM

## 2018-06-22 DIAGNOSIS — Z23 NEED FOR SHINGLES VACCINE: ICD-10-CM

## 2018-06-22 PROCEDURE — 3017F COLORECTAL CA SCREEN DOC REV: CPT | Performed by: FAMILY MEDICINE

## 2018-06-22 PROCEDURE — 4040F PNEUMOC VAC/ADMIN/RCVD: CPT | Performed by: FAMILY MEDICINE

## 2018-06-22 PROCEDURE — G8419 CALC BMI OUT NRM PARAM NOF/U: HCPCS | Performed by: FAMILY MEDICINE

## 2018-06-22 PROCEDURE — 1123F ACP DISCUSS/DSCN MKR DOCD: CPT | Performed by: FAMILY MEDICINE

## 2018-06-22 PROCEDURE — 1090F PRES/ABSN URINE INCON ASSESS: CPT | Performed by: FAMILY MEDICINE

## 2018-06-22 PROCEDURE — G8427 DOCREV CUR MEDS BY ELIG CLIN: HCPCS | Performed by: FAMILY MEDICINE

## 2018-06-22 PROCEDURE — 1036F TOBACCO NON-USER: CPT | Performed by: FAMILY MEDICINE

## 2018-06-22 PROCEDURE — G8399 PT W/DXA RESULTS DOCUMENT: HCPCS | Performed by: FAMILY MEDICINE

## 2018-06-22 PROCEDURE — G0438 PPPS, INITIAL VISIT: HCPCS | Performed by: FAMILY MEDICINE

## 2018-06-22 PROCEDURE — 99213 OFFICE O/P EST LOW 20 MIN: CPT | Performed by: FAMILY MEDICINE

## 2018-06-22 RX ORDER — LISINOPRIL 20 MG/1
20 TABLET ORAL DAILY
Qty: 90 TABLET | Refills: 1 | Status: SHIPPED | OUTPATIENT
Start: 2018-06-22 | End: 2019-02-21 | Stop reason: DRUGHIGH

## 2018-06-22 ASSESSMENT — LIFESTYLE VARIABLES: HOW OFTEN DO YOU HAVE A DRINK CONTAINING ALCOHOL: 0

## 2018-06-22 ASSESSMENT — ANXIETY QUESTIONNAIRES: GAD7 TOTAL SCORE: 0

## 2018-06-22 ASSESSMENT — PATIENT HEALTH QUESTIONNAIRE - PHQ9: SUM OF ALL RESPONSES TO PHQ QUESTIONS 1-9: 0

## 2018-11-27 ENCOUNTER — APPOINTMENT (OUTPATIENT)
Dept: CT IMAGING | Age: 68
DRG: 436 | End: 2018-11-27
Payer: MEDICARE

## 2018-11-27 ENCOUNTER — HOSPITAL ENCOUNTER (INPATIENT)
Age: 68
LOS: 1 days | Discharge: HOME | DRG: 436 | End: 2018-11-28
Attending: SPECIALIST | Admitting: FAMILY MEDICINE
Payer: MEDICARE

## 2018-11-27 DIAGNOSIS — K85.80 OTHER ACUTE PANCREATITIS, UNSPECIFIED COMPLICATION STATUS: ICD-10-CM

## 2018-11-27 DIAGNOSIS — R10.12 LEFT UPPER QUADRANT PAIN: Primary | ICD-10-CM

## 2018-11-27 PROBLEM — R10.9 ABDOMINAL PAIN: Status: ACTIVE | Noted: 2018-11-27

## 2018-11-27 LAB
-: ABNORMAL
ABSOLUTE EOS #: 0.1 K/UL (ref 0–0.4)
ABSOLUTE IMMATURE GRANULOCYTE: NORMAL K/UL (ref 0–0.3)
ABSOLUTE LYMPH #: 1.1 K/UL (ref 1–4.8)
ABSOLUTE MONO #: 0.5 K/UL (ref 0.1–1.2)
ALBUMIN SERPL-MCNC: 4.7 G/DL (ref 3.5–5.2)
ALBUMIN/GLOBULIN RATIO: 1.4 (ref 1–2.5)
ALP BLD-CCNC: 108 U/L (ref 35–104)
ALT SERPL-CCNC: 9 U/L (ref 5–33)
AMORPHOUS: ABNORMAL
AMYLASE: 111 U/L (ref 28–100)
ANION GAP SERPL CALCULATED.3IONS-SCNC: 14 MMOL/L (ref 9–17)
AST SERPL-CCNC: 24 U/L
BACTERIA: ABNORMAL
BASOPHILS # BLD: 1 % (ref 0–1)
BASOPHILS ABSOLUTE: 0 K/UL (ref 0–0.2)
BILIRUB SERPL-MCNC: 0.75 MG/DL (ref 0.3–1.2)
BILIRUBIN URINE: NEGATIVE
BUN BLDV-MCNC: 16 MG/DL (ref 8–23)
BUN/CREAT BLD: 23 (ref 9–20)
CALCIUM SERPL-MCNC: 9.9 MG/DL (ref 8.6–10.4)
CASTS UA: ABNORMAL /LPF (ref 0–2)
CHLORIDE BLD-SCNC: 102 MMOL/L (ref 98–107)
CO2: 23 MMOL/L (ref 20–31)
COLOR: ABNORMAL
COMMENT UA: ABNORMAL
CREAT SERPL-MCNC: 0.7 MG/DL (ref 0.5–0.9)
CRYSTALS, UA: ABNORMAL /HPF
DIFFERENTIAL TYPE: NORMAL
EKG ATRIAL RATE: 81 BPM
EKG P AXIS: 30 DEGREES
EKG P-R INTERVAL: 160 MS
EKG Q-T INTERVAL: 398 MS
EKG QRS DURATION: 78 MS
EKG QTC CALCULATION (BAZETT): 462 MS
EKG R AXIS: -19 DEGREES
EKG T AXIS: 69 DEGREES
EKG VENTRICULAR RATE: 81 BPM
EOSINOPHILS RELATIVE PERCENT: 1 % (ref 1–7)
EPITHELIAL CELLS UA: ABNORMAL /HPF (ref 0–5)
GFR AFRICAN AMERICAN: >60 ML/MIN
GFR NON-AFRICAN AMERICAN: >60 ML/MIN
GFR SERPL CREATININE-BSD FRML MDRD: ABNORMAL ML/MIN/{1.73_M2}
GFR SERPL CREATININE-BSD FRML MDRD: ABNORMAL ML/MIN/{1.73_M2}
GLUCOSE BLD-MCNC: 106 MG/DL (ref 70–99)
GLUCOSE URINE: NEGATIVE
HCT VFR BLD CALC: 42.6 % (ref 36–46)
HEMOGLOBIN: 14.5 G/DL (ref 12–16)
IMMATURE GRANULOCYTES: NORMAL %
KETONES, URINE: ABNORMAL
LEUKOCYTE ESTERASE, URINE: ABNORMAL
LIPASE: 131 U/L (ref 13–60)
LYMPHOCYTES # BLD: 16 % (ref 16–46)
MCH RBC QN AUTO: 30.7 PG (ref 26–34)
MCHC RBC AUTO-ENTMCNC: 34 G/DL (ref 31–37)
MCV RBC AUTO: 90.4 FL (ref 80–100)
MONOCYTES # BLD: 8 % (ref 4–11)
MUCUS: ABNORMAL
NITRITE, URINE: POSITIVE
NRBC AUTOMATED: NORMAL PER 100 WBC
OTHER OBSERVATIONS UA: ABNORMAL
PDW BLD-RTO: 13.4 % (ref 11–14.5)
PH UA: 5.5 (ref 5–6)
PLATELET # BLD: 261 K/UL (ref 140–450)
PLATELET ESTIMATE: NORMAL
PMV BLD AUTO: 7.9 FL (ref 6–12)
POTASSIUM SERPL-SCNC: 3.8 MMOL/L (ref 3.7–5.3)
PROTEIN UA: NEGATIVE
RBC # BLD: 4.71 M/UL (ref 4–5.2)
RBC # BLD: NORMAL 10*6/UL
RBC UA: ABNORMAL /HPF (ref 0–4)
RENAL EPITHELIAL, UA: ABNORMAL /HPF
SEG NEUTROPHILS: 74 % (ref 43–77)
SEGMENTED NEUTROPHILS ABSOLUTE COUNT: 4.8 K/UL (ref 1.8–7.7)
SODIUM BLD-SCNC: 139 MMOL/L (ref 135–144)
SPECIFIC GRAVITY UA: 1.03 (ref 1.01–1.02)
TOTAL PROTEIN: 8.1 G/DL (ref 6.4–8.3)
TRICHOMONAS: ABNORMAL
TROPONIN INTERP: NORMAL
TROPONIN T: <0.03 NG/ML
TURBIDITY: ABNORMAL
URINE HGB: ABNORMAL
UROBILINOGEN, URINE: NORMAL
WBC # BLD: 6.5 K/UL (ref 3.5–11)
WBC # BLD: NORMAL 10*3/UL
WBC UA: ABNORMAL /HPF (ref 0–4)
YEAST: ABNORMAL

## 2018-11-27 PROCEDURE — 36415 COLL VENOUS BLD VENIPUNCTURE: CPT

## 2018-11-27 PROCEDURE — 82150 ASSAY OF AMYLASE: CPT

## 2018-11-27 PROCEDURE — 81001 URINALYSIS AUTO W/SCOPE: CPT

## 2018-11-27 PROCEDURE — 87086 URINE CULTURE/COLONY COUNT: CPT

## 2018-11-27 PROCEDURE — 87186 SC STD MICRODIL/AGAR DIL: CPT

## 2018-11-27 PROCEDURE — 94760 N-INVAS EAR/PLS OXIMETRY 1: CPT

## 2018-11-27 PROCEDURE — 80053 COMPREHEN METABOLIC PANEL: CPT

## 2018-11-27 PROCEDURE — S0028 INJECTION, FAMOTIDINE, 20 MG: HCPCS | Performed by: NURSE PRACTITIONER

## 2018-11-27 PROCEDURE — 96374 THER/PROPH/DIAG INJ IV PUSH: CPT

## 2018-11-27 PROCEDURE — 93005 ELECTROCARDIOGRAM TRACING: CPT

## 2018-11-27 PROCEDURE — 6360000002 HC RX W HCPCS: Performed by: NURSE PRACTITIONER

## 2018-11-27 PROCEDURE — 2500000003 HC RX 250 WO HCPCS: Performed by: NURSE PRACTITIONER

## 2018-11-27 PROCEDURE — 96375 TX/PRO/DX INJ NEW DRUG ADDON: CPT

## 2018-11-27 PROCEDURE — 2060000000 HC ICU INTERMEDIATE R&B

## 2018-11-27 PROCEDURE — 6360000002 HC RX W HCPCS: Performed by: EMERGENCY MEDICINE

## 2018-11-27 PROCEDURE — 96376 TX/PRO/DX INJ SAME DRUG ADON: CPT

## 2018-11-27 PROCEDURE — 83690 ASSAY OF LIPASE: CPT

## 2018-11-27 PROCEDURE — 6360000002 HC RX W HCPCS: Performed by: SPECIALIST

## 2018-11-27 PROCEDURE — 2580000003 HC RX 258: Performed by: NURSE PRACTITIONER

## 2018-11-27 PROCEDURE — 74177 CT ABD & PELVIS W/CONTRAST: CPT

## 2018-11-27 PROCEDURE — 84484 ASSAY OF TROPONIN QUANT: CPT

## 2018-11-27 PROCEDURE — 85025 COMPLETE CBC W/AUTO DIFF WBC: CPT

## 2018-11-27 PROCEDURE — 87088 URINE BACTERIA CULTURE: CPT

## 2018-11-27 PROCEDURE — 99285 EMERGENCY DEPT VISIT HI MDM: CPT

## 2018-11-27 PROCEDURE — 6360000004 HC RX CONTRAST MEDICATION: Performed by: SPECIALIST

## 2018-11-27 PROCEDURE — 2580000003 HC RX 258: Performed by: SPECIALIST

## 2018-11-27 RX ORDER — ONDANSETRON 2 MG/ML
4 INJECTION INTRAMUSCULAR; INTRAVENOUS ONCE
Status: COMPLETED | OUTPATIENT
Start: 2018-11-27 | End: 2018-11-27

## 2018-11-27 RX ORDER — 0.9 % SODIUM CHLORIDE 0.9 %
500 INTRAVENOUS SOLUTION INTRAVENOUS ONCE
Status: COMPLETED | OUTPATIENT
Start: 2018-11-27 | End: 2018-11-27

## 2018-11-27 RX ORDER — POTASSIUM CHLORIDE 7.45 MG/ML
10 INJECTION INTRAVENOUS PRN
Status: DISCONTINUED | OUTPATIENT
Start: 2018-11-27 | End: 2018-11-28 | Stop reason: HOSPADM

## 2018-11-27 RX ORDER — LISINOPRIL 20 MG/1
20 TABLET ORAL DAILY
Status: DISCONTINUED | OUTPATIENT
Start: 2018-11-28 | End: 2018-11-28 | Stop reason: HOSPADM

## 2018-11-27 RX ORDER — SODIUM CHLORIDE 9 MG/ML
INJECTION, SOLUTION INTRAVENOUS CONTINUOUS
Status: DISCONTINUED | OUTPATIENT
Start: 2018-11-27 | End: 2018-11-28 | Stop reason: HOSPADM

## 2018-11-27 RX ORDER — MAGNESIUM SULFATE 1 G/100ML
1 INJECTION INTRAVENOUS PRN
Status: DISCONTINUED | OUTPATIENT
Start: 2018-11-27 | End: 2018-11-28 | Stop reason: HOSPADM

## 2018-11-27 RX ORDER — ONDANSETRON 2 MG/ML
4 INJECTION INTRAMUSCULAR; INTRAVENOUS EVERY 6 HOURS PRN
Status: DISCONTINUED | OUTPATIENT
Start: 2018-11-27 | End: 2018-11-28 | Stop reason: HOSPADM

## 2018-11-27 RX ORDER — MORPHINE SULFATE 4 MG/ML
4 INJECTION, SOLUTION INTRAMUSCULAR; INTRAVENOUS ONCE
Status: COMPLETED | OUTPATIENT
Start: 2018-11-27 | End: 2018-11-27

## 2018-11-27 RX ORDER — SODIUM CHLORIDE 0.9 % (FLUSH) 0.9 %
10 SYRINGE (ML) INJECTION EVERY 12 HOURS SCHEDULED
Status: DISCONTINUED | OUTPATIENT
Start: 2018-11-27 | End: 2018-11-28 | Stop reason: HOSPADM

## 2018-11-27 RX ORDER — SODIUM CHLORIDE 9 MG/ML
INJECTION, SOLUTION INTRAVENOUS CONTINUOUS
Status: DISCONTINUED | OUTPATIENT
Start: 2018-11-27 | End: 2018-11-27

## 2018-11-27 RX ORDER — SODIUM CHLORIDE 0.9 % (FLUSH) 0.9 %
10 SYRINGE (ML) INJECTION PRN
Status: DISCONTINUED | OUTPATIENT
Start: 2018-11-27 | End: 2018-11-28 | Stop reason: HOSPADM

## 2018-11-27 RX ORDER — DILTIAZEM HYDROCHLORIDE 120 MG/1
120 CAPSULE, COATED, EXTENDED RELEASE ORAL DAILY
Status: DISCONTINUED | OUTPATIENT
Start: 2018-11-28 | End: 2018-11-28 | Stop reason: HOSPADM

## 2018-11-27 RX ORDER — FLUTICASONE PROPIONATE 50 MCG
1 SPRAY, SUSPENSION (ML) NASAL DAILY PRN
Status: DISCONTINUED | OUTPATIENT
Start: 2018-11-27 | End: 2018-11-28 | Stop reason: HOSPADM

## 2018-11-27 RX ORDER — ACETAMINOPHEN 325 MG/1
650 TABLET ORAL EVERY 4 HOURS PRN
Status: DISCONTINUED | OUTPATIENT
Start: 2018-11-27 | End: 2018-11-28 | Stop reason: HOSPADM

## 2018-11-27 RX ORDER — HYDROCODONE BITARTRATE AND ACETAMINOPHEN 5; 325 MG/1; MG/1
2 TABLET ORAL EVERY 4 HOURS PRN
Status: DISCONTINUED | OUTPATIENT
Start: 2018-11-27 | End: 2018-11-28 | Stop reason: HOSPADM

## 2018-11-27 RX ORDER — HYDROCODONE BITARTRATE AND ACETAMINOPHEN 5; 325 MG/1; MG/1
1 TABLET ORAL EVERY 4 HOURS PRN
Status: DISCONTINUED | OUTPATIENT
Start: 2018-11-27 | End: 2018-11-28 | Stop reason: HOSPADM

## 2018-11-27 RX ADMIN — MORPHINE SULFATE 4 MG: 4 INJECTION, SOLUTION INTRAMUSCULAR; INTRAVENOUS at 19:44

## 2018-11-27 RX ADMIN — SODIUM CHLORIDE: 9 INJECTION, SOLUTION INTRAVENOUS at 20:17

## 2018-11-27 RX ADMIN — IOPAMIDOL 100 ML: 755 INJECTION, SOLUTION INTRAVENOUS at 20:45

## 2018-11-27 RX ADMIN — FAMOTIDINE 20 MG: 10 INJECTION INTRAVENOUS at 23:01

## 2018-11-27 RX ADMIN — HYDROMORPHONE HYDROCHLORIDE 0.25 MG: 1 INJECTION, SOLUTION INTRAMUSCULAR; INTRAVENOUS; SUBCUTANEOUS at 23:06

## 2018-11-27 RX ADMIN — MORPHINE SULFATE 4 MG: 4 INJECTION, SOLUTION INTRAMUSCULAR; INTRAVENOUS at 20:22

## 2018-11-27 RX ADMIN — SODIUM CHLORIDE 500 ML: 9 INJECTION, SOLUTION INTRAVENOUS at 19:44

## 2018-11-27 RX ADMIN — ONDANSETRON 4 MG: 2 INJECTION INTRAMUSCULAR; INTRAVENOUS at 19:44

## 2018-11-27 RX ADMIN — SODIUM CHLORIDE: 9 INJECTION, SOLUTION INTRAVENOUS at 22:36

## 2018-11-27 ASSESSMENT — ENCOUNTER SYMPTOMS
WHEEZING: 0
BLOOD IN STOOL: 0
NAUSEA: 1
ABDOMINAL PAIN: 1
SHORTNESS OF BREATH: 0
COUGH: 0
VOMITING: 1

## 2018-11-27 ASSESSMENT — PAIN DESCRIPTION - DIRECTION: RADIATING_TOWARDS: MIDDLE OF BACK

## 2018-11-27 ASSESSMENT — PAIN DESCRIPTION - ONSET
ONSET: GRADUAL
ONSET: GRADUAL

## 2018-11-27 ASSESSMENT — PAIN SCALES - GENERAL
PAINLEVEL_OUTOF10: 6
PAINLEVEL_OUTOF10: 8
PAINLEVEL_OUTOF10: 4
PAINLEVEL_OUTOF10: 6

## 2018-11-27 ASSESSMENT — PAIN DESCRIPTION - PAIN TYPE
TYPE: ACUTE PAIN

## 2018-11-27 ASSESSMENT — PAIN DESCRIPTION - PROGRESSION
CLINICAL_PROGRESSION: GRADUALLY IMPROVING
CLINICAL_PROGRESSION: GRADUALLY IMPROVING
CLINICAL_PROGRESSION: NOT CHANGED

## 2018-11-27 ASSESSMENT — PAIN DESCRIPTION - LOCATION
LOCATION: ABDOMEN

## 2018-11-27 ASSESSMENT — PAIN DESCRIPTION - FREQUENCY
FREQUENCY: INTERMITTENT

## 2018-11-27 ASSESSMENT — PAIN DESCRIPTION - ORIENTATION
ORIENTATION: UPPER

## 2018-11-27 ASSESSMENT — PAIN DESCRIPTION - DESCRIPTORS
DESCRIPTORS: SHARP

## 2018-11-28 ENCOUNTER — TELEPHONE (OUTPATIENT)
Dept: FAMILY MEDICINE CLINIC | Age: 68
End: 2018-11-28

## 2018-11-28 VITALS
HEART RATE: 75 BPM | DIASTOLIC BLOOD PRESSURE: 85 MMHG | HEIGHT: 64 IN | WEIGHT: 156.6 LBS | SYSTOLIC BLOOD PRESSURE: 143 MMHG | BODY MASS INDEX: 26.73 KG/M2 | OXYGEN SATURATION: 94 % | TEMPERATURE: 97.8 F | RESPIRATION RATE: 14 BRPM

## 2018-11-28 LAB
ALBUMIN SERPL-MCNC: 4 G/DL (ref 3.5–5.2)
ALBUMIN/GLOBULIN RATIO: 1.4 (ref 1–2.5)
ALP BLD-CCNC: 90 U/L (ref 35–104)
ALT SERPL-CCNC: 15 U/L (ref 5–33)
AMYLASE: 113 U/L (ref 28–100)
ANION GAP SERPL CALCULATED.3IONS-SCNC: 14 MMOL/L (ref 9–17)
AST SERPL-CCNC: 24 U/L
BILIRUB SERPL-MCNC: 0.62 MG/DL (ref 0.3–1.2)
BUN BLDV-MCNC: 13 MG/DL (ref 8–23)
BUN/CREAT BLD: 22 (ref 9–20)
CALCIUM IONIZED: 1.24 MMOL/L (ref 1.13–1.33)
CALCIUM SERPL-MCNC: 8.9 MG/DL (ref 8.6–10.4)
CHLORIDE BLD-SCNC: 105 MMOL/L (ref 98–107)
CO2: 21 MMOL/L (ref 20–31)
CREAT SERPL-MCNC: 0.58 MG/DL (ref 0.5–0.9)
GFR AFRICAN AMERICAN: >60 ML/MIN
GFR NON-AFRICAN AMERICAN: >60 ML/MIN
GFR SERPL CREATININE-BSD FRML MDRD: ABNORMAL ML/MIN/{1.73_M2}
GFR SERPL CREATININE-BSD FRML MDRD: ABNORMAL ML/MIN/{1.73_M2}
GLUCOSE BLD-MCNC: 87 MG/DL (ref 70–99)
HCT VFR BLD CALC: 41.2 % (ref 36–46)
HEMOGLOBIN: 13.6 G/DL (ref 12–16)
INR BLD: 1
LIPASE: 158 U/L (ref 13–60)
MAGNESIUM: 2.2 MG/DL (ref 1.6–2.6)
MCH RBC QN AUTO: 30.3 PG (ref 26–34)
MCHC RBC AUTO-ENTMCNC: 33 G/DL (ref 31–37)
MCV RBC AUTO: 92 FL (ref 80–100)
NRBC AUTOMATED: NORMAL PER 100 WBC
PDW BLD-RTO: 13.5 % (ref 11–14.5)
PHOSPHORUS: 3.3 MG/DL (ref 2.6–4.5)
PLATELET # BLD: 220 K/UL (ref 140–450)
PMV BLD AUTO: 8.1 FL (ref 6–12)
POTASSIUM SERPL-SCNC: 3.8 MMOL/L (ref 3.7–5.3)
PROTHROMBIN TIME: 10.8 SEC (ref 9.4–11.3)
RBC # BLD: 4.48 M/UL (ref 4–5.2)
SODIUM BLD-SCNC: 140 MMOL/L (ref 135–144)
TOTAL PROTEIN: 6.8 G/DL (ref 6.4–8.3)
TRIGL SERPL-MCNC: 47 MG/DL
WBC # BLD: 6.1 K/UL (ref 3.5–11)

## 2018-11-28 PROCEDURE — 82150 ASSAY OF AMYLASE: CPT

## 2018-11-28 PROCEDURE — 6360000002 HC RX W HCPCS: Performed by: NURSE PRACTITIONER

## 2018-11-28 PROCEDURE — 36415 COLL VENOUS BLD VENIPUNCTURE: CPT

## 2018-11-28 PROCEDURE — 83735 ASSAY OF MAGNESIUM: CPT

## 2018-11-28 PROCEDURE — 84100 ASSAY OF PHOSPHORUS: CPT

## 2018-11-28 PROCEDURE — 83690 ASSAY OF LIPASE: CPT

## 2018-11-28 PROCEDURE — 80053 COMPREHEN METABOLIC PANEL: CPT

## 2018-11-28 PROCEDURE — 6370000000 HC RX 637 (ALT 250 FOR IP): Performed by: NURSE PRACTITIONER

## 2018-11-28 PROCEDURE — 94760 N-INVAS EAR/PLS OXIMETRY 1: CPT

## 2018-11-28 PROCEDURE — S0028 INJECTION, FAMOTIDINE, 20 MG: HCPCS | Performed by: NURSE PRACTITIONER

## 2018-11-28 PROCEDURE — 2500000003 HC RX 250 WO HCPCS: Performed by: NURSE PRACTITIONER

## 2018-11-28 PROCEDURE — 84478 ASSAY OF TRIGLYCERIDES: CPT

## 2018-11-28 PROCEDURE — 2580000003 HC RX 258: Performed by: NURSE PRACTITIONER

## 2018-11-28 PROCEDURE — 99238 HOSP IP/OBS DSCHRG MGMT 30/<: CPT | Performed by: INTERNAL MEDICINE

## 2018-11-28 PROCEDURE — 85610 PROTHROMBIN TIME: CPT

## 2018-11-28 PROCEDURE — 85027 COMPLETE CBC AUTOMATED: CPT

## 2018-11-28 PROCEDURE — 82330 ASSAY OF CALCIUM: CPT

## 2018-11-28 RX ORDER — ONDANSETRON 4 MG/1
4 TABLET, ORALLY DISINTEGRATING ORAL EVERY 4 HOURS PRN
Qty: 15 TABLET | Refills: 0 | Status: SHIPPED | OUTPATIENT
Start: 2018-11-28 | End: 2019-02-20

## 2018-11-28 RX ORDER — HYDROCODONE BITARTRATE AND ACETAMINOPHEN 5; 325 MG/1; MG/1
2 TABLET ORAL EVERY 6 HOURS PRN
Qty: 10 TABLET | Refills: 0 | Status: SHIPPED | OUTPATIENT
Start: 2018-11-28 | End: 2018-12-01

## 2018-11-28 RX ADMIN — ENOXAPARIN SODIUM 40 MG: 40 INJECTION SUBCUTANEOUS at 09:23

## 2018-11-28 RX ADMIN — DILTIAZEM HYDROCHLORIDE 120 MG: 120 CAPSULE, COATED, EXTENDED RELEASE ORAL at 09:23

## 2018-11-28 RX ADMIN — LISINOPRIL 20 MG: 20 TABLET ORAL at 09:23

## 2018-11-28 RX ADMIN — Medication 10 ML: at 09:24

## 2018-11-28 RX ADMIN — HYDROMORPHONE HYDROCHLORIDE 0.25 MG: 1 INJECTION, SOLUTION INTRAMUSCULAR; INTRAVENOUS; SUBCUTANEOUS at 05:35

## 2018-11-28 RX ADMIN — FAMOTIDINE 20 MG: 10 INJECTION INTRAVENOUS at 09:23

## 2018-11-28 RX ADMIN — ONDANSETRON 4 MG: 2 INJECTION INTRAMUSCULAR; INTRAVENOUS at 05:40

## 2018-11-28 ASSESSMENT — PAIN DESCRIPTION - PROGRESSION
CLINICAL_PROGRESSION: GRADUALLY IMPROVING
CLINICAL_PROGRESSION: GRADUALLY IMPROVING

## 2018-11-28 ASSESSMENT — PAIN SCALES - GENERAL
PAINLEVEL_OUTOF10: 0
PAINLEVEL_OUTOF10: 4

## 2018-11-28 NOTE — ED PROVIDER NOTES
Genitourinary: Negative for dysuria, flank pain, frequency and hematuria. All other systems reviewed and are negative. PAST MEDICAL HISTORY    has a past medical history of Atypical carcinoid lung tumor ([facility] Breast cancer ([facility] Hypertension; Left knee pain; Liver cancer ([facility] Lung cancer ([facility] Myopia with astigmatism and presbyopia; Osteoarthritis; Osteopenia; and Pancreatitis. SURGICAL HISTORY      has a past surgical history that includes Mastectomy (Right, ); Breast reconstruction (Right, ); Knee arthroscopy (Right, 2008); other surgical history (); Hysterectomy (2004); Dilation and curettage of uterus; Tubal ligation; lobectomy (Right, 2011); Total knee arthroplasty (Right, 3/17/2010); Breast reduction surgery (Left, ); bronchoscopy (2011); thoracotomy (Right, 2011); bronchoscopy (Right, 11/12/10); other surgical history (Right, 05); Breast cyst aspiration (Right, 05); Breast biopsy (Right, 05); Breast biopsy (Right, 05); knee surgery (Left, 2016); liver biopsy (2016); Cholecystectomy, laparoscopic (2017); and Upper gastrointestinal endoscopy (2017). CURRENT MEDICATIONS       Previous Medications    ACETAMINOPHEN (TYLENOL) 325 MG TABLET    Take 2 tablets by mouth every 4 hours as needed for Pain or Fever    CARTIA  MG EXTENDED RELEASE CAPSULE    Take 1 capsule by mouth daily    FLUTICASONE (FLONASE) 50 MCG/ACT NASAL SPRAY    1 spray by Nasal route daily as needed for Rhinitis    LISINOPRIL (PRINIVIL;ZESTRIL) 20 MG TABLET    Take 1 tablet by mouth daily       ALLERGIES     is allergic to effexor xr [venlafaxine hcl]. FAMILY HISTORY     indicated that her mother is alive. She indicated that her father is . She indicated that one of her two sisters is .       family history includes Cancer in her father and sister; Cataracts in her mother; Diabetes in her mother; Glaucoma in her sister;

## 2018-11-28 NOTE — DISCHARGE INSTR - DIET

## 2018-11-28 NOTE — PROGRESS NOTES
breast invasive ductal carcinoma--2005--ER-[+]--                SD-[10%]--HER-2/chey negative--4/26 LN--[+]---right                modified radical mastectomy--chemotherapy--                Adriamycin--Cytoxan--then Taxol--no XRT  PMH:  pancreatitis, osteoarthritis, osteopenia, left knee pain,              myopia-astigmatism  PSH:   see above, right breast reconstruction--nipple-areolar             reconstruction, right knee arthroscopy--2008, Bowman              procedure--2004, hysterectomy--supracervical--BSO--             2002, D&C--uterus, tubal ligation, left breast reduction--             2006, bronchoscopy--2010--right wedge resection,              right breast tissue expander--2005, right breast cyst              aspiration--2005, right breast biopsy--LN mapping--             biopsy--2005, left unicompartmental knee replacement--             6.20.2016, liver biopsy--11.4.2016--metastatic atypical              carcinoid, laparoscopic cholecystectomy---1.16.2017---Vargas    Allergies:        NKDA   Intolerances:  Effexor XR--venlafaxine--hypertension                    Plan:  1. Home--11.28.2018  2. New medications---[place]--Zofran  3. Home medications reviewed  4. Follow up Dr. Sonja Miles, [facility]  5. Follow up [facility]--[facility]--Vitaly ALVES  6.   See orders    Electronically signed by Irene Villatoro on 11/28/2018 at 12:07 PM    Hospitalist

## 2018-11-29 ENCOUNTER — CARE COORDINATION (OUTPATIENT)
Dept: CASE MANAGEMENT | Age: 68
End: 2018-11-29

## 2018-11-29 LAB
CULTURE: ABNORMAL
Lab: ABNORMAL
ORGANISM: ABNORMAL
SPECIMEN DESCRIPTION: ABNORMAL
STATUS: ABNORMAL

## 2018-11-29 NOTE — DISCHARGE SUMMARY
those set forth in the discharge  orders and no other medications should be taken. Any prior medications  not on the discharge orders are specifically discontinued. The patient has been advised of the financial relationship and ownership  interests between [address] [place] [address] physicians and  employees of [facility] and [address] [place] [address].         Daja Ramirez    D: 11/28/2018 17:14:30       T: 11/29/2018 2:03:43     [id number]  Job#: [id number]     Doc#: [id number]    CC:

## 2018-12-03 ENCOUNTER — OFFICE VISIT (OUTPATIENT)
Dept: FAMILY MEDICINE CLINIC | Age: 68
End: 2018-12-03
Payer: MEDICARE

## 2018-12-03 ENCOUNTER — CARE COORDINATION (OUTPATIENT)
Dept: CASE MANAGEMENT | Age: 68
End: 2018-12-03

## 2018-12-03 VITALS
HEART RATE: 82 BPM | RESPIRATION RATE: 16 BRPM | TEMPERATURE: 98 F | WEIGHT: 157 LBS | HEIGHT: 64 IN | BODY MASS INDEX: 26.8 KG/M2 | SYSTOLIC BLOOD PRESSURE: 118 MMHG | DIASTOLIC BLOOD PRESSURE: 84 MMHG

## 2018-12-03 DIAGNOSIS — R10.13 EPIGASTRIC PAIN: Primary | ICD-10-CM

## 2018-12-03 DIAGNOSIS — N39.0 BACTERIAL URINARY TRACT INFECTION: ICD-10-CM

## 2018-12-03 DIAGNOSIS — Z23 NEED FOR INFLUENZA VACCINATION: ICD-10-CM

## 2018-12-03 DIAGNOSIS — A49.9 BACTERIAL URINARY TRACT INFECTION: ICD-10-CM

## 2018-12-03 PROCEDURE — 1111F DSCHRG MED/CURRENT MED MERGE: CPT | Performed by: FAMILY MEDICINE

## 2018-12-03 PROCEDURE — 90662 IIV NO PRSV INCREASED AG IM: CPT | Performed by: FAMILY MEDICINE

## 2018-12-03 PROCEDURE — 99495 TRANSJ CARE MGMT MOD F2F 14D: CPT | Performed by: FAMILY MEDICINE

## 2018-12-03 PROCEDURE — G0008 ADMIN INFLUENZA VIRUS VAC: HCPCS | Performed by: FAMILY MEDICINE

## 2018-12-03 RX ORDER — CEFUROXIME AXETIL 500 MG/1
500 TABLET ORAL 2 TIMES DAILY
Refills: 0 | COMMUNITY
Start: 2018-11-30 | End: 2019-01-25

## 2018-12-03 RX ORDER — HYDROCODONE BITARTRATE AND ACETAMINOPHEN 5; 325 MG/1; MG/1
1 TABLET ORAL EVERY 4 HOURS PRN
Qty: 18 TABLET | Refills: 0 | Status: SHIPPED | OUTPATIENT
Start: 2018-12-03 | End: 2018-12-06

## 2018-12-03 ASSESSMENT — PATIENT HEALTH QUESTIONNAIRE - PHQ9
2. FEELING DOWN, DEPRESSED OR HOPELESS: 0
1. LITTLE INTEREST OR PLEASURE IN DOING THINGS: 0
SUM OF ALL RESPONSES TO PHQ QUESTIONS 1-9: 0
SUM OF ALL RESPONSES TO PHQ9 QUESTIONS 1 & 2: 0
SUM OF ALL RESPONSES TO PHQ QUESTIONS 1-9: 0

## 2018-12-03 NOTE — PROGRESS NOTES
Patient has script for shingles vaccine.
Transitional Care Office Visit    Date of Face-to-Face: 12/3/2018    Here for follow after hospitalization for: epigastric pain    Persons at visit: self    Medication changes during hospitalization:none. Procedures during hospitalization: CT scan abdomen and pelvis 11/27/18    Activity: activity as tolerated. Any medication changes since post-hospitalization phone call? Yes,was started on Ceftin 500mg bid for UTI 11/28/18. Finished script of Norco.    Any treatment changes since post-hospitalization phone call? No.    Other follow-up appointments scheduled:  None.
CURRENT OUTPATIENT MED LIST    2. Bacterial urinary tract infection  The urine culture grew E. coli that was susceptible to Cephalosporins. She was advised to finish the course of Ceftin. 3. Need for influenza vaccination  Influenza vaccine were recommended and ordered:  - INFLUENZA, HIGH DOSE, 65 YRS +, IM, PF, PREFILL SYR, 0.5ML (FLUZONE HD)        This document was produced, in part, using voice-recognition software. While efforts are made to avoid release of the document with an error, the software occasionally misinterprets dictation, which leads to errors that can alter the intended meaning. If such an error is found, please contact my office at [phone].        Medical Decision Making: moderate complexity

## 2019-01-04 ENCOUNTER — HOSPITAL ENCOUNTER (OUTPATIENT)
Dept: LAB | Age: 69
Discharge: HOME | End: 2019-01-04
Payer: MEDICARE

## 2019-01-04 LAB
INR BLD: 1
PARTIAL THROMBOPLASTIN TIME: 34.4 SEC (ref 27–35)
PROTHROMBIN TIME: 10.5 SEC (ref 9.4–11.3)

## 2019-01-04 PROCEDURE — 85610 PROTHROMBIN TIME: CPT

## 2019-01-04 PROCEDURE — 85730 THROMBOPLASTIN TIME PARTIAL: CPT

## 2019-01-04 PROCEDURE — 36415 COLL VENOUS BLD VENIPUNCTURE: CPT

## 2019-01-17 LAB
CREATININE: 0.6 MG/DL
POTASSIUM (K+): 4

## 2019-01-21 ENCOUNTER — TELEPHONE (OUTPATIENT)
Dept: FAMILY MEDICINE CLINIC | Age: 69
End: 2019-01-21

## 2019-01-24 RX ORDER — DOCUSATE SODIUM 100 MG/1
100 CAPSULE, LIQUID FILLED ORAL 3 TIMES DAILY
COMMUNITY
End: 2019-07-03

## 2019-01-24 RX ORDER — TRAMADOL HYDROCHLORIDE 50 MG/1
50 TABLET ORAL EVERY 4 HOURS PRN
COMMUNITY
End: 2019-02-20

## 2019-02-01 ENCOUNTER — TELEPHONE (OUTPATIENT)
Dept: FAMILY MEDICINE CLINIC | Age: 69
End: 2019-02-01
Payer: MEDICARE

## 2019-02-01 DIAGNOSIS — S22.42XD MULTIPLE FRACTURES OF RIBS OF LEFT SIDE WITH ROUTINE HEALING: Primary | ICD-10-CM

## 2019-02-01 DIAGNOSIS — S22.41XD MULTIPLE FRACTURES OF RIBS OF RIGHT SIDE WITH ROUTINE HEALING: ICD-10-CM

## 2019-02-01 DIAGNOSIS — S32.020D CLOSED WEDGE COMPRESSION FRACTURE OF SECOND LUMBAR VERTEBRA WITH ROUTINE HEALING, SUBSEQUENT ENCOUNTER: ICD-10-CM

## 2019-02-01 DIAGNOSIS — I82.401 ACUTE EMBOLISM AND THROMBOSIS OF DEEP VEIN OF RIGHT LOWER EXTREMITY (HCC): ICD-10-CM

## 2019-02-01 PROCEDURE — G0180 MD CERTIFICATION HHA PATIENT: HCPCS | Performed by: FAMILY MEDICINE

## 2019-02-01 RX ORDER — DILTIAZEM HYDROCHLORIDE 240 MG/1
240 CAPSULE, EXTENDED RELEASE ORAL DAILY
COMMUNITY
End: 2019-02-20 | Stop reason: SDUPTHER

## 2019-02-04 ENCOUNTER — TELEPHONE (OUTPATIENT)
Dept: CARDIOLOGY | Age: 69
End: 2019-02-04

## 2019-02-10 ENCOUNTER — APPOINTMENT (OUTPATIENT)
Dept: CT IMAGING | Age: 69
End: 2019-02-10
Payer: MEDICARE

## 2019-02-10 ENCOUNTER — HOSPITAL ENCOUNTER (EMERGENCY)
Age: 69
Discharge: HOME | End: 2019-02-10
Attending: EMERGENCY MEDICINE
Payer: MEDICARE

## 2019-02-10 VITALS
RESPIRATION RATE: 14 BRPM | HEART RATE: 98 BPM | OXYGEN SATURATION: 99 % | BODY MASS INDEX: 26.46 KG/M2 | WEIGHT: 155 LBS | HEIGHT: 64 IN | DIASTOLIC BLOOD PRESSURE: 82 MMHG | TEMPERATURE: 97.7 F | SYSTOLIC BLOOD PRESSURE: 134 MMHG

## 2019-02-10 DIAGNOSIS — R10.9 ABDOMINAL PAIN, UNSPECIFIED ABDOMINAL LOCATION: Primary | ICD-10-CM

## 2019-02-10 LAB
ABSOLUTE EOS #: 0 K/UL (ref 0–0.4)
ABSOLUTE IMMATURE GRANULOCYTE: ABNORMAL K/UL (ref 0–0.3)
ABSOLUTE LYMPH #: 1.1 K/UL (ref 1–4.8)
ABSOLUTE MONO #: 0.7 K/UL (ref 0.1–1.2)
ALBUMIN SERPL-MCNC: 4.2 G/DL (ref 3.5–5.2)
ALBUMIN/GLOBULIN RATIO: 1.3 (ref 1–2.5)
ALP BLD-CCNC: 161 U/L (ref 35–104)
ALT SERPL-CCNC: 13 U/L (ref 5–33)
AMYLASE: 83 U/L (ref 28–100)
ANION GAP SERPL CALCULATED.3IONS-SCNC: 17 MMOL/L (ref 9–17)
AST SERPL-CCNC: 31 U/L
BASOPHILS # BLD: 1 % (ref 0–1)
BASOPHILS ABSOLUTE: 0 K/UL (ref 0–0.2)
BILIRUB SERPL-MCNC: 0.56 MG/DL (ref 0.3–1.2)
BILIRUBIN DIRECT: 0.1 MG/DL
BILIRUBIN, INDIRECT: 0.46 MG/DL (ref 0–1)
BUN BLDV-MCNC: 18 MG/DL (ref 8–23)
BUN/CREAT BLD: 40 (ref 9–20)
CALCIUM SERPL-MCNC: 9.9 MG/DL (ref 8.6–10.4)
CHLORIDE BLD-SCNC: 105 MMOL/L (ref 98–107)
CO2: 20 MMOL/L (ref 20–31)
CREAT SERPL-MCNC: 0.45 MG/DL (ref 0.5–0.9)
DIFFERENTIAL TYPE: ABNORMAL
EOSINOPHILS RELATIVE PERCENT: 0 % (ref 1–7)
GFR AFRICAN AMERICAN: >60 ML/MIN
GFR NON-AFRICAN AMERICAN: >60 ML/MIN
GFR SERPL CREATININE-BSD FRML MDRD: ABNORMAL ML/MIN/{1.73_M2}
GFR SERPL CREATININE-BSD FRML MDRD: ABNORMAL ML/MIN/{1.73_M2}
GLOBULIN: 3.2 G/DL (ref 1.5–3.8)
GLUCOSE BLD-MCNC: 98 MG/DL (ref 70–99)
HCT VFR BLD CALC: 41.6 % (ref 36–46)
HEMOGLOBIN: 13.2 G/DL (ref 12–16)
IMMATURE GRANULOCYTES: ABNORMAL %
LACTIC ACID: 1.3 MMOL/L (ref 0.5–2.2)
LIPASE: 50 U/L (ref 13–60)
LYMPHOCYTES # BLD: 12 % (ref 16–46)
MCH RBC QN AUTO: 27.8 PG (ref 26–34)
MCHC RBC AUTO-ENTMCNC: 31.7 G/DL (ref 31–37)
MCV RBC AUTO: 87.5 FL (ref 80–100)
MONOCYTES # BLD: 8 % (ref 4–11)
NRBC AUTOMATED: ABNORMAL PER 100 WBC
PDW BLD-RTO: 16.1 % (ref 11–14.5)
PLATELET # BLD: 249 K/UL (ref 140–450)
PLATELET ESTIMATE: ABNORMAL
PMV BLD AUTO: 8.1 FL (ref 6–12)
POTASSIUM SERPL-SCNC: 4.7 MMOL/L (ref 3.7–5.3)
RBC # BLD: 4.76 M/UL (ref 4–5.2)
RBC # BLD: ABNORMAL 10*6/UL
SEG NEUTROPHILS: 79 % (ref 43–77)
SEGMENTED NEUTROPHILS ABSOLUTE COUNT: 7.1 K/UL (ref 1.8–7.7)
SODIUM BLD-SCNC: 142 MMOL/L (ref 135–144)
TOTAL PROTEIN: 7.4 G/DL (ref 6.4–8.3)
WBC # BLD: 9 K/UL (ref 3.5–11)
WBC # BLD: ABNORMAL 10*3/UL

## 2019-02-10 PROCEDURE — 6370000000 HC RX 637 (ALT 250 FOR IP)

## 2019-02-10 PROCEDURE — 80048 BASIC METABOLIC PNL TOTAL CA: CPT

## 2019-02-10 PROCEDURE — 96374 THER/PROPH/DIAG INJ IV PUSH: CPT

## 2019-02-10 PROCEDURE — 83690 ASSAY OF LIPASE: CPT

## 2019-02-10 PROCEDURE — 82150 ASSAY OF AMYLASE: CPT

## 2019-02-10 PROCEDURE — 80076 HEPATIC FUNCTION PANEL: CPT

## 2019-02-10 PROCEDURE — 6360000004 HC RX CONTRAST MEDICATION: Performed by: EMERGENCY MEDICINE

## 2019-02-10 PROCEDURE — 96375 TX/PRO/DX INJ NEW DRUG ADDON: CPT

## 2019-02-10 PROCEDURE — 6360000002 HC RX W HCPCS: Performed by: EMERGENCY MEDICINE

## 2019-02-10 PROCEDURE — 2580000003 HC RX 258: Performed by: EMERGENCY MEDICINE

## 2019-02-10 PROCEDURE — 93005 ELECTROCARDIOGRAM TRACING: CPT

## 2019-02-10 PROCEDURE — 85025 COMPLETE CBC W/AUTO DIFF WBC: CPT

## 2019-02-10 PROCEDURE — 74177 CT ABD & PELVIS W/CONTRAST: CPT

## 2019-02-10 PROCEDURE — 99284 EMERGENCY DEPT VISIT MOD MDM: CPT

## 2019-02-10 PROCEDURE — 83605 ASSAY OF LACTIC ACID: CPT

## 2019-02-10 RX ORDER — ONDANSETRON 2 MG/ML
4 INJECTION INTRAMUSCULAR; INTRAVENOUS ONCE
Status: COMPLETED | OUTPATIENT
Start: 2019-02-10 | End: 2019-02-10

## 2019-02-10 RX ORDER — MORPHINE SULFATE 4 MG/ML
4 INJECTION, SOLUTION INTRAMUSCULAR; INTRAVENOUS ONCE
Status: COMPLETED | OUTPATIENT
Start: 2019-02-10 | End: 2019-02-10

## 2019-02-10 RX ORDER — ONDANSETRON 4 MG/1
4 TABLET, FILM COATED ORAL EVERY 8 HOURS PRN
Qty: 20 TABLET | Refills: 0 | Status: SHIPPED | OUTPATIENT
Start: 2019-02-10

## 2019-02-10 RX ORDER — HYDROCODONE BITARTRATE AND ACETAMINOPHEN 5; 325 MG/1; MG/1
TABLET ORAL
Status: COMPLETED
Start: 2019-02-10 | End: 2019-02-10

## 2019-02-10 RX ORDER — 0.9 % SODIUM CHLORIDE 0.9 %
500 INTRAVENOUS SOLUTION INTRAVENOUS ONCE
Status: COMPLETED | OUTPATIENT
Start: 2019-02-10 | End: 2019-02-10

## 2019-02-10 RX ORDER — HYDROCODONE BITARTRATE AND ACETAMINOPHEN 5; 325 MG/1; MG/1
1 TABLET ORAL EVERY 6 HOURS PRN
Qty: 15 TABLET | Refills: 0 | Status: SHIPPED | OUTPATIENT
Start: 2019-02-10 | End: 2019-02-17

## 2019-02-10 RX ADMIN — IOPAMIDOL 100 ML: 755 INJECTION, SOLUTION INTRAVENOUS at 18:13

## 2019-02-10 RX ADMIN — MORPHINE SULFATE 4 MG: 4 INJECTION INTRAVENOUS at 16:05

## 2019-02-10 RX ADMIN — ONDANSETRON 4 MG: 2 INJECTION INTRAMUSCULAR; INTRAVENOUS at 16:05

## 2019-02-10 RX ADMIN — SODIUM CHLORIDE 500 ML: 9 INJECTION, SOLUTION INTRAVENOUS at 16:02

## 2019-02-10 RX ADMIN — IOHEXOL 50 ML: 240 INJECTION, SOLUTION INTRATHECAL; INTRAVASCULAR; INTRAVENOUS; ORAL at 18:13

## 2019-02-10 RX ADMIN — HYDROCODONE BITARTRATE AND ACETAMINOPHEN: 5; 325 TABLET ORAL at 20:05

## 2019-02-10 ASSESSMENT — PAIN DESCRIPTION - ORIENTATION: ORIENTATION: LOWER

## 2019-02-10 ASSESSMENT — PAIN SCALES - GENERAL
PAINLEVEL_OUTOF10: 4
PAINLEVEL_OUTOF10: 7

## 2019-02-10 ASSESSMENT — PAIN DESCRIPTION - LOCATION: LOCATION: ABDOMEN

## 2019-02-10 ASSESSMENT — PAIN DESCRIPTION - PAIN TYPE: TYPE: ACUTE PAIN

## 2019-02-10 ASSESSMENT — PAIN DESCRIPTION - DESCRIPTORS: DESCRIPTORS: CRAMPING

## 2019-02-11 LAB
EKG ATRIAL RATE: 92 BPM
EKG P AXIS: 31 DEGREES
EKG P-R INTERVAL: 148 MS
EKG Q-T INTERVAL: 368 MS
EKG QRS DURATION: 82 MS
EKG QTC CALCULATION (BAZETT): 455 MS
EKG R AXIS: -31 DEGREES
EKG T AXIS: 66 DEGREES
EKG VENTRICULAR RATE: 92 BPM

## 2019-02-20 ENCOUNTER — OFFICE VISIT (OUTPATIENT)
Dept: FAMILY MEDICINE CLINIC | Age: 69
End: 2019-02-20
Payer: MEDICARE

## 2019-02-20 VITALS
HEIGHT: 64 IN | DIASTOLIC BLOOD PRESSURE: 80 MMHG | WEIGHT: 140.6 LBS | BODY MASS INDEX: 24.01 KG/M2 | RESPIRATION RATE: 16 BRPM | HEART RATE: 86 BPM | TEMPERATURE: 97.4 F | OXYGEN SATURATION: 99 % | SYSTOLIC BLOOD PRESSURE: 124 MMHG

## 2019-02-20 DIAGNOSIS — S32.009D CLOSED FRACTURE OF LUMBAR VERTEBRA WITH ROUTINE HEALING, UNSPECIFIED FRACTURE MORPHOLOGY, UNSPECIFIED LUMBAR VERTEBRAL LEVEL, SUBSEQUENT ENCOUNTER: ICD-10-CM

## 2019-02-20 DIAGNOSIS — I48.91 ATRIAL FIBRILLATION, UNSPECIFIED TYPE (HCC): ICD-10-CM

## 2019-02-20 DIAGNOSIS — Z87.828 HISTORY OF MOTOR VEHICLE ACCIDENT: Primary | ICD-10-CM

## 2019-02-20 DIAGNOSIS — S36.113D LACERATION OF LIVER, SUBSEQUENT ENCOUNTER: ICD-10-CM

## 2019-02-20 DIAGNOSIS — S27.0XXD TRAUMATIC PNEUMOTHORAX, SUBSEQUENT ENCOUNTER: ICD-10-CM

## 2019-02-20 PROCEDURE — 4040F PNEUMOC VAC/ADMIN/RCVD: CPT | Performed by: FAMILY MEDICINE

## 2019-02-20 PROCEDURE — 3017F COLORECTAL CA SCREEN DOC REV: CPT | Performed by: FAMILY MEDICINE

## 2019-02-20 PROCEDURE — 1090F PRES/ABSN URINE INCON ASSESS: CPT | Performed by: FAMILY MEDICINE

## 2019-02-20 PROCEDURE — 1123F ACP DISCUSS/DSCN MKR DOCD: CPT | Performed by: FAMILY MEDICINE

## 2019-02-20 PROCEDURE — G8482 FLU IMMUNIZE ORDER/ADMIN: HCPCS | Performed by: FAMILY MEDICINE

## 2019-02-20 PROCEDURE — 99214 OFFICE O/P EST MOD 30 MIN: CPT | Performed by: FAMILY MEDICINE

## 2019-02-20 PROCEDURE — 1036F TOBACCO NON-USER: CPT | Performed by: FAMILY MEDICINE

## 2019-02-20 PROCEDURE — 3014F SCREEN MAMMO DOC REV: CPT | Performed by: FAMILY MEDICINE

## 2019-02-20 PROCEDURE — G8427 DOCREV CUR MEDS BY ELIG CLIN: HCPCS | Performed by: FAMILY MEDICINE

## 2019-02-20 PROCEDURE — G8420 CALC BMI NORM PARAMETERS: HCPCS | Performed by: FAMILY MEDICINE

## 2019-02-20 PROCEDURE — G8399 PT W/DXA RESULTS DOCUMENT: HCPCS | Performed by: FAMILY MEDICINE

## 2019-02-20 PROCEDURE — 1101F PT FALLS ASSESS-DOCD LE1/YR: CPT | Performed by: FAMILY MEDICINE

## 2019-02-20 ASSESSMENT — PATIENT HEALTH QUESTIONNAIRE - PHQ9
SUM OF ALL RESPONSES TO PHQ QUESTIONS 1-9: 0
SUM OF ALL RESPONSES TO PHQ QUESTIONS 1-9: 0
1. LITTLE INTEREST OR PLEASURE IN DOING THINGS: 0
SUM OF ALL RESPONSES TO PHQ9 QUESTIONS 1 & 2: 0
2. FEELING DOWN, DEPRESSED OR HOPELESS: 0

## 2019-02-21 ENCOUNTER — TELEPHONE (OUTPATIENT)
Dept: FAMILY MEDICINE CLINIC | Age: 69
End: 2019-02-21

## 2019-02-21 DIAGNOSIS — I10 ESSENTIAL HYPERTENSION: Primary | ICD-10-CM

## 2019-02-21 RX ORDER — DILTIAZEM HYDROCHLORIDE 240 MG/1
240 CAPSULE, EXTENDED RELEASE ORAL DAILY
Qty: 90 CAPSULE | Refills: 3 | Status: SHIPPED | OUTPATIENT
Start: 2019-02-21 | End: 2020-01-09 | Stop reason: SDUPTHER

## 2019-02-21 RX ORDER — LISINOPRIL 30 MG/1
30 TABLET ORAL DAILY
COMMUNITY
End: 2019-02-21 | Stop reason: SDUPTHER

## 2019-02-21 RX ORDER — LISINOPRIL 30 MG/1
30 TABLET ORAL DAILY
Qty: 30 TABLET | Refills: 3 | Status: SHIPPED | OUTPATIENT
Start: 2019-02-21 | End: 2019-06-20 | Stop reason: SDUPTHER

## 2019-02-25 ENCOUNTER — OFFICE VISIT (OUTPATIENT)
Dept: CARDIOLOGY | Age: 69
End: 2019-02-25
Payer: MEDICARE

## 2019-02-25 VITALS
SYSTOLIC BLOOD PRESSURE: 124 MMHG | DIASTOLIC BLOOD PRESSURE: 76 MMHG | BODY MASS INDEX: 23.9 KG/M2 | HEIGHT: 64 IN | HEART RATE: 72 BPM | WEIGHT: 140 LBS

## 2019-02-25 DIAGNOSIS — I47.1 PAROXYSMAL SUPRAVENTRICULAR TACHYCARDIA (HCC): ICD-10-CM

## 2019-02-25 DIAGNOSIS — I10 ESSENTIAL HYPERTENSION: Primary | ICD-10-CM

## 2019-02-25 DIAGNOSIS — R07.9 CHEST PAIN, UNSPECIFIED TYPE: ICD-10-CM

## 2019-02-25 PROBLEM — I47.10 PAROXYSMAL SUPRAVENTRICULAR TACHYCARDIA: Status: ACTIVE | Noted: 2019-02-25

## 2019-02-25 PROCEDURE — 4040F PNEUMOC VAC/ADMIN/RCVD: CPT | Performed by: INTERNAL MEDICINE

## 2019-02-25 PROCEDURE — G8482 FLU IMMUNIZE ORDER/ADMIN: HCPCS | Performed by: INTERNAL MEDICINE

## 2019-02-25 PROCEDURE — 1036F TOBACCO NON-USER: CPT | Performed by: INTERNAL MEDICINE

## 2019-02-25 PROCEDURE — 93000 ELECTROCARDIOGRAM COMPLETE: CPT | Performed by: INTERNAL MEDICINE

## 2019-02-25 PROCEDURE — 3017F COLORECTAL CA SCREEN DOC REV: CPT | Performed by: INTERNAL MEDICINE

## 2019-02-25 PROCEDURE — 3014F SCREEN MAMMO DOC REV: CPT | Performed by: INTERNAL MEDICINE

## 2019-02-25 PROCEDURE — 1101F PT FALLS ASSESS-DOCD LE1/YR: CPT | Performed by: INTERNAL MEDICINE

## 2019-02-25 PROCEDURE — 1090F PRES/ABSN URINE INCON ASSESS: CPT | Performed by: INTERNAL MEDICINE

## 2019-02-25 PROCEDURE — G8420 CALC BMI NORM PARAMETERS: HCPCS | Performed by: INTERNAL MEDICINE

## 2019-02-25 PROCEDURE — 1123F ACP DISCUSS/DSCN MKR DOCD: CPT | Performed by: INTERNAL MEDICINE

## 2019-02-25 PROCEDURE — 99214 OFFICE O/P EST MOD 30 MIN: CPT | Performed by: INTERNAL MEDICINE

## 2019-02-25 PROCEDURE — G8399 PT W/DXA RESULTS DOCUMENT: HCPCS | Performed by: INTERNAL MEDICINE

## 2019-02-25 PROCEDURE — G8427 DOCREV CUR MEDS BY ELIG CLIN: HCPCS | Performed by: INTERNAL MEDICINE

## 2019-03-04 ENCOUNTER — HOSPITAL ENCOUNTER (OUTPATIENT)
Dept: NON INVASIVE DIAGNOSTICS | Age: 69
Discharge: HOME | End: 2019-03-04
Payer: MEDICARE

## 2019-03-04 DIAGNOSIS — I10 ESSENTIAL HYPERTENSION: ICD-10-CM

## 2019-03-04 DIAGNOSIS — I47.1 PAROXYSMAL SUPRAVENTRICULAR TACHYCARDIA (HCC): ICD-10-CM

## 2019-03-04 DIAGNOSIS — R07.9 CHEST PAIN, UNSPECIFIED TYPE: ICD-10-CM

## 2019-03-04 LAB
LV EF: 55 %
LVEF MODALITY: NORMAL

## 2019-03-04 PROCEDURE — 93306 TTE W/DOPPLER COMPLETE: CPT

## 2019-03-12 ENCOUNTER — TELEPHONE (OUTPATIENT)
Dept: CARDIOLOGY | Age: 69
End: 2019-03-12

## 2019-03-20 ENCOUNTER — TELEPHONE (OUTPATIENT)
Dept: CARDIOLOGY | Age: 69
End: 2019-03-20

## 2019-04-08 ENCOUNTER — HOSPITAL ENCOUNTER (OUTPATIENT)
Dept: INFUSION THERAPY | Age: 69
Discharge: HOME | End: 2019-04-08
Payer: MEDICARE

## 2019-04-08 ENCOUNTER — HOSPITAL ENCOUNTER (OUTPATIENT)
Dept: LAB | Age: 69
Discharge: HOME | End: 2019-04-08
Payer: MEDICARE

## 2019-04-08 DIAGNOSIS — C7B.00 METASTATIC CARCINOID TUMOR (HCC): Primary | ICD-10-CM

## 2019-04-08 LAB
ABSOLUTE EOS #: 0.1 K/UL (ref 0–0.4)
ABSOLUTE IMMATURE GRANULOCYTE: ABNORMAL K/UL (ref 0–0.3)
ABSOLUTE LYMPH #: 0.9 K/UL (ref 1–4.8)
ABSOLUTE MONO #: 0.5 K/UL (ref 0.1–1.2)
ALBUMIN SERPL-MCNC: 4.5 G/DL (ref 3.5–5.2)
ALBUMIN/GLOBULIN RATIO: 1.4 (ref 1–2.5)
ALP BLD-CCNC: 110 U/L (ref 35–104)
ALT SERPL-CCNC: 17 U/L (ref 5–33)
AMYLASE: 84 U/L (ref 28–100)
ANION GAP SERPL CALCULATED.3IONS-SCNC: 11 MMOL/L (ref 9–17)
AST SERPL-CCNC: 28 U/L
BASOPHILS # BLD: 1 % (ref 0–1)
BASOPHILS ABSOLUTE: 0 K/UL (ref 0–0.2)
BILIRUB SERPL-MCNC: 0.72 MG/DL (ref 0.3–1.2)
BUN BLDV-MCNC: 17 MG/DL (ref 8–23)
BUN/CREAT BLD: 24 (ref 9–20)
CALCIUM SERPL-MCNC: 10 MG/DL (ref 8.6–10.4)
CHLORIDE BLD-SCNC: 102 MMOL/L (ref 98–107)
CO2: 26 MMOL/L (ref 20–31)
CREAT SERPL-MCNC: 0.71 MG/DL (ref 0.5–0.9)
DIFFERENTIAL TYPE: ABNORMAL
EOSINOPHILS RELATIVE PERCENT: 2 % (ref 1–7)
GFR AFRICAN AMERICAN: >60 ML/MIN
GFR NON-AFRICAN AMERICAN: >60 ML/MIN
GFR SERPL CREATININE-BSD FRML MDRD: ABNORMAL ML/MIN/{1.73_M2}
GFR SERPL CREATININE-BSD FRML MDRD: ABNORMAL ML/MIN/{1.73_M2}
GLUCOSE BLD-MCNC: 99 MG/DL (ref 70–99)
HCT VFR BLD CALC: 40.5 % (ref 36–46)
HEMOGLOBIN: 13.2 G/DL (ref 12–16)
IMMATURE GRANULOCYTES: ABNORMAL %
LACTATE DEHYDROGENASE: 174 U/L (ref 135–214)
LIPASE: 64 U/L (ref 13–60)
LYMPHOCYTES # BLD: 18 % (ref 16–46)
MCH RBC QN AUTO: 26.9 PG (ref 26–34)
MCHC RBC AUTO-ENTMCNC: 32.7 G/DL (ref 31–37)
MCV RBC AUTO: 82.2 FL (ref 80–100)
MONOCYTES # BLD: 10 % (ref 4–11)
NRBC AUTOMATED: ABNORMAL PER 100 WBC
PDW BLD-RTO: 15.5 % (ref 11–14.5)
PLATELET # BLD: 278 K/UL (ref 140–450)
PLATELET ESTIMATE: ABNORMAL
PMV BLD AUTO: 7.7 FL (ref 6–12)
POTASSIUM SERPL-SCNC: 4 MMOL/L (ref 3.7–5.3)
RBC # BLD: 4.93 M/UL (ref 4–5.2)
RBC # BLD: ABNORMAL 10*6/UL
SEG NEUTROPHILS: 69 % (ref 43–77)
SEGMENTED NEUTROPHILS ABSOLUTE COUNT: 3.6 K/UL (ref 1.8–7.7)
SODIUM BLD-SCNC: 139 MMOL/L (ref 135–144)
TOTAL PROTEIN: 7.8 G/DL (ref 6.4–8.3)
WBC # BLD: 5.3 K/UL (ref 3.5–11)
WBC # BLD: ABNORMAL 10*3/UL

## 2019-04-08 PROCEDURE — 2580000003 HC RX 258: Performed by: INTERNAL MEDICINE

## 2019-04-08 PROCEDURE — 96523 IRRIG DRUG DELIVERY DEVICE: CPT

## 2019-04-08 PROCEDURE — 83615 LACTATE (LD) (LDH) ENZYME: CPT

## 2019-04-08 PROCEDURE — 85025 COMPLETE CBC W/AUTO DIFF WBC: CPT

## 2019-04-08 PROCEDURE — 80053 COMPREHEN METABOLIC PANEL: CPT

## 2019-04-08 PROCEDURE — 82150 ASSAY OF AMYLASE: CPT

## 2019-04-08 PROCEDURE — 83690 ASSAY OF LIPASE: CPT

## 2019-04-08 PROCEDURE — 6360000002 HC RX W HCPCS: Performed by: INTERNAL MEDICINE

## 2019-04-08 RX ORDER — HEPARIN SODIUM (PORCINE) LOCK FLUSH IV SOLN 100 UNIT/ML 100 UNIT/ML
500 SOLUTION INTRAVENOUS PRN
Status: DISCONTINUED | OUTPATIENT
Start: 2019-04-08 | End: 2019-04-09 | Stop reason: HOSPADM

## 2019-04-08 RX ORDER — HEPARIN SODIUM (PORCINE) LOCK FLUSH IV SOLN 100 UNIT/ML 100 UNIT/ML
500 SOLUTION INTRAVENOUS PRN
Status: CANCELLED | OUTPATIENT
Start: 2019-04-08

## 2019-04-08 RX ORDER — SODIUM CHLORIDE 0.9 % (FLUSH) 0.9 %
10 SYRINGE (ML) INJECTION PRN
Status: DISCONTINUED | OUTPATIENT
Start: 2019-04-08 | End: 2019-04-09 | Stop reason: HOSPADM

## 2019-04-08 RX ORDER — SODIUM CHLORIDE 0.9 % (FLUSH) 0.9 %
10 SYRINGE (ML) INJECTION PRN
Status: CANCELLED | OUTPATIENT
Start: 2019-04-08

## 2019-04-08 RX ADMIN — HEPARIN SODIUM (PORCINE) LOCK FLUSH IV SOLN 100 UNIT/ML 500 UNITS: 100 SOLUTION at 16:04

## 2019-04-08 RX ADMIN — Medication 10 ML: at 16:04

## 2019-04-08 NOTE — PROGRESS NOTES
Patient ambulatory with steady gait to chair. VAD accessed with good blood return. Labs drawn and sent to lab. VAD flushed with saline and Heparin and DC'd. No bleeding at site, band aide placed over site. Patient [address] home.

## 2019-04-24 ENCOUNTER — HOSPITAL ENCOUNTER (OUTPATIENT)
Age: 69
Setting detail: SPECIMEN
Discharge: HOME | End: 2019-04-24
Payer: MEDICARE

## 2019-04-24 ENCOUNTER — HOSPITAL ENCOUNTER (OUTPATIENT)
Dept: INFUSION THERAPY | Age: 69
Discharge: HOME | End: 2019-04-24
Payer: MEDICARE

## 2019-04-24 DIAGNOSIS — C7B.00 METASTATIC CARCINOID TUMOR (HCC): Primary | ICD-10-CM

## 2019-04-24 LAB
ABSOLUTE EOS #: 0.14 K/UL (ref 0–0.4)
ABSOLUTE IMMATURE GRANULOCYTE: ABNORMAL K/UL (ref 0–0.3)
ABSOLUTE LYMPH #: 0.92 K/UL (ref 1–4.8)
ABSOLUTE MONO #: 0.17 K/UL (ref 0.1–1.2)
BASOPHILS # BLD: 0 % (ref 0–1)
BASOPHILS ABSOLUTE: 0 K/UL (ref 0–0.2)
DIFFERENTIAL TYPE: ABNORMAL
EOSINOPHILS RELATIVE PERCENT: 4 % (ref 1–7)
HCT VFR BLD CALC: 33.1 % (ref 36–46)
HEMOGLOBIN: 11 G/DL (ref 12–16)
IMMATURE GRANULOCYTES: ABNORMAL %
LYMPHOCYTES # BLD: 27 % (ref 16–46)
MCH RBC QN AUTO: 27.3 PG (ref 26–34)
MCHC RBC AUTO-ENTMCNC: 33.2 G/DL (ref 31–37)
MCV RBC AUTO: 82.1 FL (ref 80–100)
MONOCYTES # BLD: 5 % (ref 4–11)
MORPHOLOGY: ABNORMAL
MORPHOLOGY: ABNORMAL
NRBC AUTOMATED: ABNORMAL PER 100 WBC
PDW BLD-RTO: 15.8 % (ref 11–14.5)
PLATELET # BLD: 38 K/UL (ref 140–450)
PLATELET ESTIMATE: ABNORMAL
PMV BLD AUTO: 7.8 FL (ref 6–12)
RBC # BLD: 4.03 M/UL (ref 4–5.2)
RBC # BLD: ABNORMAL 10*6/UL
SEG NEUTROPHILS: 64 % (ref 43–77)
SEGMENTED NEUTROPHILS ABSOLUTE COUNT: 2.17 K/UL (ref 1.8–7.7)
WBC # BLD: 3.4 K/UL (ref 3.5–11)
WBC # BLD: ABNORMAL 10*3/UL

## 2019-04-24 PROCEDURE — 6360000002 HC RX W HCPCS: Performed by: INTERNAL MEDICINE

## 2019-04-24 PROCEDURE — 2580000003 HC RX 258: Performed by: INTERNAL MEDICINE

## 2019-04-24 PROCEDURE — 36591 DRAW BLOOD OFF VENOUS DEVICE: CPT

## 2019-04-24 PROCEDURE — 85025 COMPLETE CBC W/AUTO DIFF WBC: CPT

## 2019-04-24 RX ORDER — SODIUM CHLORIDE 0.9 % (FLUSH) 0.9 %
10 SYRINGE (ML) INJECTION PRN
Status: CANCELLED | OUTPATIENT
Start: 2019-04-24

## 2019-04-24 RX ORDER — HEPARIN SODIUM (PORCINE) LOCK FLUSH IV SOLN 100 UNIT/ML 100 UNIT/ML
500 SOLUTION INTRAVENOUS PRN
Status: DISCONTINUED | OUTPATIENT
Start: 2019-04-24 | End: 2019-04-25 | Stop reason: HOSPADM

## 2019-04-24 RX ORDER — HEPARIN SODIUM (PORCINE) LOCK FLUSH IV SOLN 100 UNIT/ML 100 UNIT/ML
500 SOLUTION INTRAVENOUS PRN
Status: CANCELLED | OUTPATIENT
Start: 2019-04-24

## 2019-04-24 RX ORDER — SODIUM CHLORIDE 0.9 % (FLUSH) 0.9 %
10 SYRINGE (ML) INJECTION PRN
Status: DISCONTINUED | OUTPATIENT
Start: 2019-04-24 | End: 2019-04-25 | Stop reason: HOSPADM

## 2019-04-24 RX ADMIN — Medication 10 ML: at 14:21

## 2019-04-24 RX ADMIN — Medication 10 ML: at 14:20

## 2019-04-24 RX ADMIN — HEPARIN SODIUM (PORCINE) LOCK FLUSH IV SOLN 100 UNIT/ML 500 UNITS: 100 SOLUTION at 14:21

## 2019-04-24 NOTE — PROGRESS NOTES
VAD accessed per protocol using 20 gauge 3/4\" hong needle. Flushes easily. Labs drawn. Flushed with 10 ml normal saline and 5 ml Heplock solution. Hong needle dc'd. Bandaid to site. Patient tolerated procedure with out difficulty. Patient discharged to home.

## 2019-04-29 ENCOUNTER — HOSPITAL ENCOUNTER (OUTPATIENT)
Dept: INFUSION THERAPY | Age: 69
Discharge: HOME | End: 2019-04-29
Payer: MEDICARE

## 2019-04-29 ENCOUNTER — HOSPITAL ENCOUNTER (OUTPATIENT)
Age: 69
Setting detail: SPECIMEN
Discharge: HOME | End: 2019-04-29
Payer: MEDICARE

## 2019-04-29 DIAGNOSIS — C7B.00 METASTATIC CARCINOID TUMOR (HCC): Primary | ICD-10-CM

## 2019-04-29 LAB
ABSOLUTE EOS #: 0 K/UL (ref 0–0.4)
ABSOLUTE IMMATURE GRANULOCYTE: ABNORMAL K/UL (ref 0–0.3)
ABSOLUTE LYMPH #: 0.7 K/UL (ref 1–4.8)
ABSOLUTE MONO #: 0.1 K/UL (ref 0.1–1.2)
BASOPHILS # BLD: 0 % (ref 0–1)
BASOPHILS ABSOLUTE: 0 K/UL (ref 0–0.2)
DIFFERENTIAL TYPE: ABNORMAL
EOSINOPHILS RELATIVE PERCENT: 3 % (ref 1–7)
HCT VFR BLD CALC: 29.7 % (ref 36–46)
HEMOGLOBIN: 9.8 G/DL (ref 12–16)
IMMATURE GRANULOCYTES: ABNORMAL %
LYMPHOCYTES # BLD: 51 % (ref 16–46)
MCH RBC QN AUTO: 27.1 PG (ref 26–34)
MCHC RBC AUTO-ENTMCNC: 33 G/DL (ref 31–37)
MCV RBC AUTO: 82.3 FL (ref 80–100)
MONOCYTES # BLD: 5 % (ref 4–11)
NRBC AUTOMATED: ABNORMAL PER 100 WBC
PDW BLD-RTO: 15.5 % (ref 11–14.5)
PLATELET # BLD: 28 K/UL (ref 140–450)
PLATELET ESTIMATE: ABNORMAL
PMV BLD AUTO: 8.5 FL (ref 6–12)
RBC # BLD: 3.61 M/UL (ref 4–5.2)
RBC # BLD: ABNORMAL 10*6/UL
SEG NEUTROPHILS: 41 % (ref 43–77)
SEGMENTED NEUTROPHILS ABSOLUTE COUNT: 0.6 K/UL (ref 1.8–7.7)
WBC # BLD: 1.4 K/UL (ref 3.5–11)
WBC # BLD: ABNORMAL 10*3/UL

## 2019-04-29 PROCEDURE — 36591 DRAW BLOOD OFF VENOUS DEVICE: CPT

## 2019-04-29 PROCEDURE — 85025 COMPLETE CBC W/AUTO DIFF WBC: CPT

## 2019-04-29 PROCEDURE — 2580000003 HC RX 258: Performed by: INTERNAL MEDICINE

## 2019-04-29 PROCEDURE — 6360000002 HC RX W HCPCS: Performed by: INTERNAL MEDICINE

## 2019-04-29 RX ORDER — HEPARIN SODIUM (PORCINE) LOCK FLUSH IV SOLN 100 UNIT/ML 100 UNIT/ML
500 SOLUTION INTRAVENOUS PRN
Status: CANCELLED | OUTPATIENT
Start: 2019-04-29

## 2019-04-29 RX ORDER — SODIUM CHLORIDE 0.9 % (FLUSH) 0.9 %
10 SYRINGE (ML) INJECTION PRN
Status: DISCONTINUED | OUTPATIENT
Start: 2019-04-29 | End: 2019-04-30 | Stop reason: HOSPADM

## 2019-04-29 RX ORDER — SODIUM CHLORIDE 0.9 % (FLUSH) 0.9 %
10 SYRINGE (ML) INJECTION PRN
Status: CANCELLED | OUTPATIENT
Start: 2019-04-29

## 2019-04-29 RX ORDER — HEPARIN SODIUM (PORCINE) LOCK FLUSH IV SOLN 100 UNIT/ML 100 UNIT/ML
500 SOLUTION INTRAVENOUS PRN
Status: DISCONTINUED | OUTPATIENT
Start: 2019-04-29 | End: 2019-04-30 | Stop reason: HOSPADM

## 2019-04-29 RX ADMIN — HEPARIN SODIUM (PORCINE) LOCK FLUSH IV SOLN 100 UNIT/ML 500 UNITS: 100 SOLUTION at 10:30

## 2019-04-29 RX ADMIN — Medication 10 ML: at 10:30

## 2019-05-06 ENCOUNTER — HOSPITAL ENCOUNTER (OUTPATIENT)
Dept: INFUSION THERAPY | Age: 69
Discharge: HOME | End: 2019-05-06
Payer: MEDICARE

## 2019-05-06 ENCOUNTER — HOSPITAL ENCOUNTER (OUTPATIENT)
Age: 69
Setting detail: SPECIMEN
Discharge: HOME | End: 2019-05-06
Payer: MEDICARE

## 2019-05-06 DIAGNOSIS — C7B.00 METASTATIC CARCINOID TUMOR (HCC): Primary | ICD-10-CM

## 2019-05-06 LAB
ABSOLUTE EOS #: 0.03 K/UL (ref 0–0.4)
ABSOLUTE IMMATURE GRANULOCYTE: ABNORMAL K/UL (ref 0–0.3)
ABSOLUTE LYMPH #: 0.57 K/UL (ref 1–4.8)
ABSOLUTE MONO #: 0.36 K/UL (ref 0.1–1.2)
BASOPHILS # BLD: 0 % (ref 0–1)
BASOPHILS ABSOLUTE: 0 K/UL (ref 0–0.2)
CELLS COUNTED: 50
DIFFERENTIAL TYPE: ABNORMAL
EOSINOPHILS RELATIVE PERCENT: 2 % (ref 1–7)
HCT VFR BLD CALC: 32.2 % (ref 36–46)
HEMOGLOBIN: 10.5 G/DL (ref 12–16)
IMMATURE GRANULOCYTES: ABNORMAL %
LYMPHOCYTES # BLD: 44 % (ref 16–46)
MCH RBC QN AUTO: 27.5 PG (ref 26–34)
MCHC RBC AUTO-ENTMCNC: 32.5 G/DL (ref 31–37)
MCV RBC AUTO: 84.5 FL (ref 80–100)
MONOCYTES # BLD: 28 % (ref 4–11)
MORPHOLOGY: ABNORMAL
NRBC AUTOMATED: ABNORMAL PER 100 WBC
PDW BLD-RTO: 16 % (ref 11–14.5)
PLATELET # BLD: 301 K/UL (ref 140–450)
PLATELET ESTIMATE: ABNORMAL
PMV BLD AUTO: 6.7 FL (ref 6–12)
RBC # BLD: 3.81 M/UL (ref 4–5.2)
RBC # BLD: ABNORMAL 10*6/UL
SEG NEUTROPHILS: 26 % (ref 43–77)
SEGMENTED NEUTROPHILS ABSOLUTE COUNT: 0.34 K/UL (ref 1.8–7.7)
WBC # BLD: 1.3 K/UL (ref 3.5–11)
WBC # BLD: ABNORMAL 10*3/UL

## 2019-05-06 PROCEDURE — 6360000002 HC RX W HCPCS: Performed by: INTERNAL MEDICINE

## 2019-05-06 PROCEDURE — 85025 COMPLETE CBC W/AUTO DIFF WBC: CPT

## 2019-05-06 PROCEDURE — 96523 IRRIG DRUG DELIVERY DEVICE: CPT

## 2019-05-06 PROCEDURE — 2580000003 HC RX 258: Performed by: INTERNAL MEDICINE

## 2019-05-06 RX ORDER — HEPARIN SODIUM (PORCINE) LOCK FLUSH IV SOLN 100 UNIT/ML 100 UNIT/ML
500 SOLUTION INTRAVENOUS PRN
Status: CANCELLED | OUTPATIENT
Start: 2019-05-06

## 2019-05-06 RX ORDER — HEPARIN SODIUM (PORCINE) LOCK FLUSH IV SOLN 100 UNIT/ML 100 UNIT/ML
500 SOLUTION INTRAVENOUS PRN
Status: DISCONTINUED | OUTPATIENT
Start: 2019-05-06 | End: 2019-05-07 | Stop reason: HOSPADM

## 2019-05-06 RX ORDER — SODIUM CHLORIDE 0.9 % (FLUSH) 0.9 %
10 SYRINGE (ML) INJECTION PRN
Status: CANCELLED | OUTPATIENT
Start: 2019-05-06

## 2019-05-06 RX ORDER — SODIUM CHLORIDE 0.9 % (FLUSH) 0.9 %
10 SYRINGE (ML) INJECTION PRN
Status: DISCONTINUED | OUTPATIENT
Start: 2019-05-06 | End: 2019-05-07 | Stop reason: HOSPADM

## 2019-05-06 RX ADMIN — Medication 10 ML: at 08:58

## 2019-05-06 RX ADMIN — HEPARIN SODIUM (PORCINE) LOCK FLUSH IV SOLN 100 UNIT/ML 500 UNITS: 100 SOLUTION at 08:58

## 2019-05-06 NOTE — PROGRESS NOTES
VAD accessed per protocol with 3/4 inch 20 gauge hong needle. Flushed easily with saline 10 ml. Good blood return. Labs drawn. Flushed port with Saline 10 ml and 5 ml of heparin flush. D/C'd hong needle and band aide applied. Patient stable and discharged to home.

## 2019-05-09 ENCOUNTER — HOSPITAL ENCOUNTER (OUTPATIENT)
Age: 69
Setting detail: SPECIMEN
Discharge: HOME | End: 2019-05-09
Payer: MEDICARE

## 2019-05-09 ENCOUNTER — HOSPITAL ENCOUNTER (OUTPATIENT)
Dept: INFUSION THERAPY | Age: 69
Discharge: HOME | End: 2019-05-09
Payer: MEDICARE

## 2019-05-09 DIAGNOSIS — C7B.00 METASTATIC CARCINOID TUMOR (HCC): Primary | ICD-10-CM

## 2019-05-09 LAB
ABSOLUTE EOS #: 0 K/UL (ref 0–0.4)
ABSOLUTE IMMATURE GRANULOCYTE: ABNORMAL K/UL (ref 0–0.3)
ABSOLUTE LYMPH #: 0.73 K/UL (ref 1–4.8)
ABSOLUTE MONO #: 0.42 K/UL (ref 0.1–1.2)
BASOPHILS # BLD: 1 % (ref 0–1)
BASOPHILS ABSOLUTE: 0.02 K/UL (ref 0–0.2)
DIFFERENTIAL TYPE: ABNORMAL
EOSINOPHILS RELATIVE PERCENT: 0 % (ref 1–7)
HCT VFR BLD CALC: 31.5 % (ref 36–46)
HEMOGLOBIN: 10.5 G/DL (ref 12–16)
IMMATURE GRANULOCYTES: ABNORMAL %
LYMPHOCYTES # BLD: 46 % (ref 16–46)
MCH RBC QN AUTO: 28.2 PG (ref 26–34)
MCHC RBC AUTO-ENTMCNC: 33.3 G/DL (ref 31–37)
MCV RBC AUTO: 84.6 FL (ref 80–100)
MONOCYTES # BLD: 26 % (ref 4–11)
MORPHOLOGY: ABNORMAL
NRBC AUTOMATED: ABNORMAL PER 100 WBC
PDW BLD-RTO: 19.6 % (ref 11–14.5)
PLATELET # BLD: 326 K/UL (ref 140–450)
PLATELET ESTIMATE: ABNORMAL
PMV BLD AUTO: 6.4 FL (ref 6–12)
RBC # BLD: 3.72 M/UL (ref 4–5.2)
RBC # BLD: ABNORMAL 10*6/UL
SEG NEUTROPHILS: 27 % (ref 43–77)
SEGMENTED NEUTROPHILS ABSOLUTE COUNT: 0.43 K/UL (ref 1.8–7.7)
WBC # BLD: 1.6 K/UL (ref 3.5–11)
WBC # BLD: ABNORMAL 10*3/UL

## 2019-05-09 PROCEDURE — 2580000003 HC RX 258: Performed by: INTERNAL MEDICINE

## 2019-05-09 PROCEDURE — 6360000002 HC RX W HCPCS: Performed by: INTERNAL MEDICINE

## 2019-05-09 PROCEDURE — 85025 COMPLETE CBC W/AUTO DIFF WBC: CPT

## 2019-05-09 PROCEDURE — 36591 DRAW BLOOD OFF VENOUS DEVICE: CPT

## 2019-05-09 RX ORDER — SODIUM CHLORIDE 0.9 % (FLUSH) 0.9 %
10 SYRINGE (ML) INJECTION PRN
Status: CANCELLED | OUTPATIENT
Start: 2019-05-09

## 2019-05-09 RX ORDER — HEPARIN SODIUM (PORCINE) LOCK FLUSH IV SOLN 100 UNIT/ML 100 UNIT/ML
500 SOLUTION INTRAVENOUS PRN
Status: DISCONTINUED | OUTPATIENT
Start: 2019-05-09 | End: 2019-05-10 | Stop reason: HOSPADM

## 2019-05-09 RX ORDER — SODIUM CHLORIDE 0.9 % (FLUSH) 0.9 %
10 SYRINGE (ML) INJECTION PRN
Status: DISCONTINUED | OUTPATIENT
Start: 2019-05-09 | End: 2019-05-10 | Stop reason: HOSPADM

## 2019-05-09 RX ORDER — HEPARIN SODIUM (PORCINE) LOCK FLUSH IV SOLN 100 UNIT/ML 100 UNIT/ML
500 SOLUTION INTRAVENOUS PRN
Status: CANCELLED | OUTPATIENT
Start: 2019-05-09

## 2019-05-09 RX ADMIN — HEPARIN SODIUM (PORCINE) LOCK FLUSH IV SOLN 100 UNIT/ML 500 UNITS: 100 SOLUTION at 09:02

## 2019-05-09 RX ADMIN — Medication 10 ML: at 09:02

## 2019-05-20 ENCOUNTER — HOSPITAL ENCOUNTER (OUTPATIENT)
Dept: INFUSION THERAPY | Age: 69
Discharge: HOME | End: 2019-05-20
Payer: MEDICARE

## 2019-05-20 ENCOUNTER — HOSPITAL ENCOUNTER (OUTPATIENT)
Age: 69
Setting detail: SPECIMEN
Discharge: HOME | End: 2019-05-20
Payer: MEDICARE

## 2019-05-20 DIAGNOSIS — C7B.00 METASTATIC CARCINOID TUMOR (HCC): Primary | ICD-10-CM

## 2019-05-20 LAB
ABSOLUTE EOS #: 0 K/UL (ref 0–0.4)
ABSOLUTE IMMATURE GRANULOCYTE: ABNORMAL K/UL (ref 0–0.3)
ABSOLUTE LYMPH #: 1 K/UL (ref 1–4.8)
ABSOLUTE MONO #: 0.5 K/UL (ref 0.1–1.2)
BASOPHILS # BLD: 1 % (ref 0–1)
BASOPHILS ABSOLUTE: 0 K/UL (ref 0–0.2)
DIFFERENTIAL TYPE: ABNORMAL
EOSINOPHILS RELATIVE PERCENT: 1 % (ref 1–7)
HCT VFR BLD CALC: 34.8 % (ref 36–46)
HEMOGLOBIN: 11.3 G/DL (ref 12–16)
IMMATURE GRANULOCYTES: ABNORMAL %
LYMPHOCYTES # BLD: 21 % (ref 16–46)
MCH RBC QN AUTO: 28.1 PG (ref 26–34)
MCHC RBC AUTO-ENTMCNC: 32.4 G/DL (ref 31–37)
MCV RBC AUTO: 86.7 FL (ref 80–100)
MONOCYTES # BLD: 12 % (ref 4–11)
NRBC AUTOMATED: ABNORMAL PER 100 WBC
PDW BLD-RTO: 21.5 % (ref 11–14.5)
PLATELET # BLD: 259 K/UL (ref 140–450)
PLATELET ESTIMATE: ABNORMAL
PMV BLD AUTO: 7.3 FL (ref 6–12)
RBC # BLD: 4.01 M/UL (ref 4–5.2)
RBC # BLD: ABNORMAL 10*6/UL
SEG NEUTROPHILS: 65 % (ref 43–77)
SEGMENTED NEUTROPHILS ABSOLUTE COUNT: 3.1 K/UL (ref 1.8–7.7)
WBC # BLD: 4.7 K/UL (ref 3.5–11)
WBC # BLD: ABNORMAL 10*3/UL

## 2019-05-20 PROCEDURE — 6360000002 HC RX W HCPCS: Performed by: INTERNAL MEDICINE

## 2019-05-20 PROCEDURE — 36591 DRAW BLOOD OFF VENOUS DEVICE: CPT

## 2019-05-20 PROCEDURE — 2580000003 HC RX 258: Performed by: INTERNAL MEDICINE

## 2019-05-20 PROCEDURE — 85025 COMPLETE CBC W/AUTO DIFF WBC: CPT

## 2019-05-20 RX ORDER — HEPARIN SODIUM (PORCINE) LOCK FLUSH IV SOLN 100 UNIT/ML 100 UNIT/ML
500 SOLUTION INTRAVENOUS PRN
Status: CANCELLED | OUTPATIENT
Start: 2019-05-20

## 2019-05-20 RX ORDER — SODIUM CHLORIDE 0.9 % (FLUSH) 0.9 %
10 SYRINGE (ML) INJECTION PRN
Status: DISCONTINUED | OUTPATIENT
Start: 2019-05-20 | End: 2019-05-21 | Stop reason: HOSPADM

## 2019-05-20 RX ORDER — SODIUM CHLORIDE 0.9 % (FLUSH) 0.9 %
10 SYRINGE (ML) INJECTION PRN
Status: CANCELLED | OUTPATIENT
Start: 2019-05-20

## 2019-05-20 RX ORDER — HEPARIN SODIUM (PORCINE) LOCK FLUSH IV SOLN 100 UNIT/ML 100 UNIT/ML
500 SOLUTION INTRAVENOUS PRN
Status: DISCONTINUED | OUTPATIENT
Start: 2019-05-20 | End: 2019-05-21 | Stop reason: HOSPADM

## 2019-05-20 RX ADMIN — HEPARIN SODIUM (PORCINE) LOCK FLUSH IV SOLN 100 UNIT/ML 500 UNITS: 100 SOLUTION at 09:11

## 2019-05-20 RX ADMIN — Medication 10 ML: at 09:11

## 2019-06-05 ENCOUNTER — HOSPITAL ENCOUNTER (OUTPATIENT)
Age: 69
Setting detail: SPECIMEN
Discharge: HOME | End: 2019-06-05
Payer: MEDICARE

## 2019-06-05 ENCOUNTER — HOSPITAL ENCOUNTER (OUTPATIENT)
Dept: INFUSION THERAPY | Age: 69
Discharge: HOME | End: 2019-06-05
Payer: MEDICARE

## 2019-06-05 DIAGNOSIS — C7B.00 METASTATIC CARCINOID TUMOR (HCC): Primary | ICD-10-CM

## 2019-06-05 LAB
ABSOLUTE EOS #: 0.4 K/UL (ref 0–0.4)
ABSOLUTE IMMATURE GRANULOCYTE: ABNORMAL K/UL (ref 0–0.3)
ABSOLUTE LYMPH #: 1 K/UL (ref 1–4.8)
ABSOLUTE MONO #: 0.5 K/UL (ref 0.1–1.2)
BASOPHILS # BLD: 0 % (ref 0–1)
BASOPHILS ABSOLUTE: 0 K/UL (ref 0–0.2)
DIFFERENTIAL TYPE: ABNORMAL
EOSINOPHILS RELATIVE PERCENT: 7 % (ref 1–7)
HCT VFR BLD CALC: 35.5 % (ref 36–46)
HEMOGLOBIN: 11.8 G/DL (ref 12–16)
IMMATURE GRANULOCYTES: ABNORMAL %
LYMPHOCYTES # BLD: 17 % (ref 16–46)
MCH RBC QN AUTO: 29.9 PG (ref 26–34)
MCHC RBC AUTO-ENTMCNC: 33.2 G/DL (ref 31–37)
MCV RBC AUTO: 90.1 FL (ref 80–100)
MONOCYTES # BLD: 9 % (ref 4–11)
NRBC AUTOMATED: ABNORMAL PER 100 WBC
PDW BLD-RTO: 20.3 % (ref 11–14.5)
PLATELET # BLD: 177 K/UL (ref 140–450)
PLATELET ESTIMATE: ABNORMAL
PMV BLD AUTO: 7.2 FL (ref 6–12)
RBC # BLD: 3.94 M/UL (ref 4–5.2)
RBC # BLD: ABNORMAL 10*6/UL
SEG NEUTROPHILS: 67 % (ref 43–77)
SEGMENTED NEUTROPHILS ABSOLUTE COUNT: 3.9 K/UL (ref 1.8–7.7)
WBC # BLD: 5.9 K/UL (ref 3.5–11)
WBC # BLD: ABNORMAL 10*3/UL

## 2019-06-05 PROCEDURE — 96523 IRRIG DRUG DELIVERY DEVICE: CPT

## 2019-06-05 PROCEDURE — 36591 DRAW BLOOD OFF VENOUS DEVICE: CPT

## 2019-06-05 PROCEDURE — 85025 COMPLETE CBC W/AUTO DIFF WBC: CPT

## 2019-06-05 PROCEDURE — 6360000002 HC RX W HCPCS: Performed by: INTERNAL MEDICINE

## 2019-06-05 PROCEDURE — 2580000003 HC RX 258: Performed by: INTERNAL MEDICINE

## 2019-06-05 RX ORDER — HEPARIN SODIUM (PORCINE) LOCK FLUSH IV SOLN 100 UNIT/ML 100 UNIT/ML
500 SOLUTION INTRAVENOUS PRN
Status: DISCONTINUED | OUTPATIENT
Start: 2019-06-05 | End: 2019-06-06 | Stop reason: HOSPADM

## 2019-06-05 RX ORDER — SODIUM CHLORIDE 0.9 % (FLUSH) 0.9 %
10 SYRINGE (ML) INJECTION PRN
Status: CANCELLED | OUTPATIENT
Start: 2019-06-05

## 2019-06-05 RX ORDER — SODIUM CHLORIDE 0.9 % (FLUSH) 0.9 %
10 SYRINGE (ML) INJECTION PRN
Status: DISCONTINUED | OUTPATIENT
Start: 2019-06-05 | End: 2019-06-06 | Stop reason: HOSPADM

## 2019-06-05 RX ORDER — HEPARIN SODIUM (PORCINE) LOCK FLUSH IV SOLN 100 UNIT/ML 100 UNIT/ML
500 SOLUTION INTRAVENOUS PRN
Status: CANCELLED | OUTPATIENT
Start: 2019-06-05

## 2019-06-05 RX ADMIN — HEPARIN SODIUM (PORCINE) LOCK FLUSH IV SOLN 100 UNIT/ML 500 UNITS: 100 SOLUTION at 12:27

## 2019-06-05 RX ADMIN — Medication 10 ML: at 12:27

## 2019-06-06 ENCOUNTER — OFFICE VISIT (OUTPATIENT)
Dept: OPTOMETRY | Age: 69
End: 2019-06-06
Payer: COMMERCIAL

## 2019-06-06 DIAGNOSIS — H52.4 ASTIGMATISM OF BOTH EYES WITH PRESBYOPIA: Primary | ICD-10-CM

## 2019-06-06 DIAGNOSIS — H52.203 ASTIGMATISM OF BOTH EYES WITH PRESBYOPIA: Primary | ICD-10-CM

## 2019-06-06 DIAGNOSIS — H25.13 NUCLEAR SCLEROSIS OF BOTH EYES: ICD-10-CM

## 2019-06-06 PROCEDURE — 92014 COMPRE OPH EXAM EST PT 1/>: CPT | Performed by: OPTOMETRIST

## 2019-06-06 ASSESSMENT — REFRACTION_MANIFEST
OD_SPHERE: PLANO
OS_SPHERE: +1.00
OD_CYLINDER: -0.50
OS_CYLINDER: -0.50
OS_AXIS: 090
OD_ADD: +2.25
OS_ADD: +2.25
OD_AXIS: 094

## 2019-06-06 ASSESSMENT — VISUAL ACUITY
OD_CC: 20/20 OU
OS_CC: 20/25
OD_CC+: -2
METHOD: SNELLEN - LINEAR
CORRECTION_TYPE: GLASSES

## 2019-06-06 ASSESSMENT — REFRACTION_WEARINGRX
OS_SPHERE: +1.00
OD_SPHERE: PLANO
OS_ADD: +2.25
OD_AXIS: 095
OD_ADD: +2.25
OS_AXIS: 095
OS_CYLINDER: -0.75
SPECS_TYPE: BIFOCAL
OD_CYLINDER: -0.50

## 2019-06-06 ASSESSMENT — KERATOMETRY
OD_K1POWER_DIOPTERS: 45.00
OS_K1POWER_DIOPTERS: 45.25
OS_K2POWER_DIOPTERS: 45.75
OD_K2POWER_DIOPTERS: 45.50
OS_AXISANGLE2_DEGREES: 171
OD_AXISANGLE2_DEGREES: 169
OS_AXISANGLE_DEGREES: 081
OD_AXISANGLE_DEGREES: 079

## 2019-06-06 ASSESSMENT — SLIT LAMP EXAM - LIDS
COMMENTS: NORMAL
COMMENTS: NORMAL

## 2019-06-06 NOTE — PROGRESS NOTES
strain: None    Food insecurity:     Worry: None     Inability: None    Transportation needs:     Medical: None     Non-medical: None   Tobacco Use    Smoking status: Never Smoker    Smokeless tobacco: Never Used   Substance and Sexual Activity    Alcohol use: No     Alcohol/week: 0.0 oz    Drug use: No    Sexual activity: None   Lifestyle    Physical activity:     Days per week: None     Minutes per session: None    Stress: None   Relationships    Social connections:     Talks on phone: None     Gets together: None     Attends [employer] service: None     Active member of club or organization: None     Attends meetings of clubs or organizations: None     Relationship status: None    Intimate partner violence:     Fear of current or ex partner: None     Emotionally abused: None     Physically abused: None     Forced sexual activity: None   Other Topics Concern    None   Social History Narrative    None     Past Medical History:   Diagnosis Date    [facility] 2/2011    right upper lobe, resected at the [address] [place] [address] Breast cancer [facility]) 2005    s/p right mastectomy and chemotherapy    Hypertension     Left knee pain     Internal derangement versus degenerative changes. (70% better after Celestone injection on 3-). Davie Waite PA-C.     Liver cancer ([facility]     Lung cancer ([facility]     Myopia with astigmatism and presbyopia     Osteoarthritis     Osteopenia     took Fosamax from [id number]    Pancreatitis            Main Ophthalmology Exam     External Exam       Right Left    External Normal Normal          Slit Lamp Exam       Right Left    Lids/Lashes Normal Normal    Conjunctiva/Sclera White and quiet White and quiet    Cornea Clear Clear    Anterior Chamber Deep and quiet Deep and quiet    Iris Round and reactive Round and reactive    Lens Trace Nuclear sclerosis Trace Nuclear sclerosis    Vitreous Normal Central vitreous floaters Fundus Exam       Right Left    Disc Normal Normal    C/D Ratio 0.2 0.2    Macula Normal Normal    Vessels Normal Normal                    Not recorded         Not recorded         Not recorded          Visual Acuity (Snellen - Linear)       Right Left    Dist cc 20/30 -2 20/25    Near cc 20/20 OU     Correction:  Glasses          Pupils     Pupils       Pupils    Right PERRL    Left PERRL              Neuro/Psych     Neuro/Psych     Oriented x3:  Yes    Mood/Affect:  Normal              Keratometry     Keratometry       K1 Axis K2 Axis    Right 45.00 169 45.50 079    Left 45.25 171 45.75 081                  Ophthalmology Exam     Wearing Rx       Sphere Cylinder Axis Add    Right [place] -0.50 095 +2.25    Left +1.00 -0.75 095 +2.25    Age:  1yr    Type:  Bifocal              Manifest Refraction     Manifest Refraction       Sphere Cylinder [place] Dist [facility] Add [facility]    Right [place] -0.50 094 20/20 +2.25     Left +1.00 -0.50 090 20/20 +2.25 20/20ou          Manifest Refraction #2 (Auto)       Sphere Cylinder [place] Dist VA Add [facility]    Right +0.00 -0.25 094       Left +1.00 -0.50 096                  Final Rx       Sphere Cylinder Axis Add    Right [place] -0.50 094 +2.25    Left +1.00 -0.50 090 +2.25    Type:  Bifocal    Expiration Date:  6/6/2021            1. Astigmatism of both eyes with presbyopia    2.  Nuclear sclerosis of both eyes; mild            Patient Instructions   New glasses recommended      Return in about 1 year (around 6/6/2020) for complete eye exam.

## 2019-06-17 ENCOUNTER — HOSPITAL ENCOUNTER (OUTPATIENT)
Dept: INFUSION THERAPY | Age: 69
Discharge: HOME | End: 2019-06-17
Payer: MEDICARE

## 2019-06-17 ENCOUNTER — HOSPITAL ENCOUNTER (OUTPATIENT)
Age: 69
Setting detail: SPECIMEN
Discharge: HOME | End: 2019-06-17
Payer: MEDICARE

## 2019-06-17 DIAGNOSIS — C7B.00 METASTATIC CARCINOID TUMOR (HCC): Primary | ICD-10-CM

## 2019-06-17 LAB
ABSOLUTE EOS #: 0.3 K/UL (ref 0–0.4)
ABSOLUTE IMMATURE GRANULOCYTE: ABNORMAL K/UL (ref 0–0.3)
ABSOLUTE LYMPH #: 1.2 K/UL (ref 1–4.8)
ABSOLUTE MONO #: 0.6 K/UL (ref 0.1–1.2)
BASOPHILS # BLD: 1 % (ref 0–1)
BASOPHILS ABSOLUTE: 0 K/UL (ref 0–0.2)
DIFFERENTIAL TYPE: ABNORMAL
EOSINOPHILS RELATIVE PERCENT: 6 % (ref 1–7)
HCT VFR BLD CALC: 35 % (ref 36–46)
HEMOGLOBIN: 11.9 G/DL (ref 12–16)
IMMATURE GRANULOCYTES: ABNORMAL %
LYMPHOCYTES # BLD: 22 % (ref 16–46)
MCH RBC QN AUTO: 30.9 PG (ref 26–34)
MCHC RBC AUTO-ENTMCNC: 34 G/DL (ref 31–37)
MCV RBC AUTO: 90.7 FL (ref 80–100)
MONOCYTES # BLD: 10 % (ref 4–11)
NRBC AUTOMATED: ABNORMAL PER 100 WBC
PDW BLD-RTO: 19.7 % (ref 11–14.5)
PLATELET # BLD: 231 K/UL (ref 140–450)
PLATELET ESTIMATE: ABNORMAL
PMV BLD AUTO: 6.5 FL (ref 6–12)
RBC # BLD: 3.86 M/UL (ref 4–5.2)
RBC # BLD: ABNORMAL 10*6/UL
SEG NEUTROPHILS: 61 % (ref 43–77)
SEGMENTED NEUTROPHILS ABSOLUTE COUNT: 3.5 K/UL (ref 1.8–7.7)
WBC # BLD: 5.6 K/UL (ref 3.5–11)
WBC # BLD: ABNORMAL 10*3/UL

## 2019-06-17 PROCEDURE — 85025 COMPLETE CBC W/AUTO DIFF WBC: CPT

## 2019-06-17 PROCEDURE — 36591 DRAW BLOOD OFF VENOUS DEVICE: CPT

## 2019-06-17 PROCEDURE — 2580000003 HC RX 258: Performed by: INTERNAL MEDICINE

## 2019-06-17 PROCEDURE — 6360000002 HC RX W HCPCS: Performed by: INTERNAL MEDICINE

## 2019-06-17 RX ORDER — HEPARIN SODIUM (PORCINE) LOCK FLUSH IV SOLN 100 UNIT/ML 100 UNIT/ML
500 SOLUTION INTRAVENOUS PRN
Status: CANCELLED | OUTPATIENT
Start: 2019-06-17

## 2019-06-17 RX ORDER — HEPARIN SODIUM (PORCINE) LOCK FLUSH IV SOLN 100 UNIT/ML 100 UNIT/ML
500 SOLUTION INTRAVENOUS PRN
Status: DISCONTINUED | OUTPATIENT
Start: 2019-06-17 | End: 2019-06-18 | Stop reason: HOSPADM

## 2019-06-17 RX ORDER — SODIUM CHLORIDE 0.9 % (FLUSH) 0.9 %
10 SYRINGE (ML) INJECTION PRN
Status: DISCONTINUED | OUTPATIENT
Start: 2019-06-17 | End: 2019-06-18 | Stop reason: HOSPADM

## 2019-06-17 RX ORDER — SODIUM CHLORIDE 0.9 % (FLUSH) 0.9 %
10 SYRINGE (ML) INJECTION PRN
Status: CANCELLED | OUTPATIENT
Start: 2019-06-17

## 2019-06-17 RX ADMIN — HEPARIN SODIUM (PORCINE) LOCK FLUSH IV SOLN 100 UNIT/ML 500 UNITS: 100 SOLUTION at 08:08

## 2019-06-17 RX ADMIN — Medication 10 ML: at 08:08

## 2019-06-17 NOTE — PROGRESS NOTES
VAD accessed per protocol with 3/4 \" hong needle. Flushed easily with saline. Blood return noted. Blood drawn. Flushed with 10 ml of NSS and 5 ml of Heparin flush. VAD D/C'd and Band-Aid to site. Patient stable and discharged to home.

## 2019-06-20 DIAGNOSIS — I10 ESSENTIAL HYPERTENSION: ICD-10-CM

## 2019-06-21 RX ORDER — LISINOPRIL 30 MG/1
TABLET ORAL
Qty: 30 TABLET | Refills: 0 | Status: SHIPPED | OUTPATIENT
Start: 2019-06-21 | End: 2019-07-03 | Stop reason: SDUPTHER

## 2019-06-24 ENCOUNTER — HOSPITAL ENCOUNTER (OUTPATIENT)
Age: 69
Setting detail: SPECIMEN
Discharge: HOME | End: 2019-06-24
Payer: MEDICARE

## 2019-06-24 ENCOUNTER — HOSPITAL ENCOUNTER (OUTPATIENT)
Dept: INFUSION THERAPY | Age: 69
Discharge: HOME | End: 2019-06-24
Payer: MEDICARE

## 2019-06-24 DIAGNOSIS — C7B.00 METASTATIC CARCINOID TUMOR (HCC): Primary | ICD-10-CM

## 2019-06-24 LAB
ABSOLUTE EOS #: 0.1 K/UL (ref 0–0.4)
ABSOLUTE IMMATURE GRANULOCYTE: ABNORMAL K/UL (ref 0–0.3)
ABSOLUTE LYMPH #: 0.8 K/UL (ref 1–4.8)
ABSOLUTE MONO #: 0.6 K/UL (ref 0.1–1.2)
BASOPHILS # BLD: 1 % (ref 0–1)
BASOPHILS ABSOLUTE: 0 K/UL (ref 0–0.2)
DIFFERENTIAL TYPE: ABNORMAL
EOSINOPHILS RELATIVE PERCENT: 1 % (ref 1–7)
HCT VFR BLD CALC: 37.9 % (ref 36–46)
HEMOGLOBIN: 12.6 G/DL (ref 12–16)
IMMATURE GRANULOCYTES: ABNORMAL %
LYMPHOCYTES # BLD: 11 % (ref 16–46)
MCH RBC QN AUTO: 30.5 PG (ref 26–34)
MCHC RBC AUTO-ENTMCNC: 33.2 G/DL (ref 31–37)
MCV RBC AUTO: 91.9 FL (ref 80–100)
MONOCYTES # BLD: 8 % (ref 4–11)
NRBC AUTOMATED: ABNORMAL PER 100 WBC
PDW BLD-RTO: 18.1 % (ref 11–14.5)
PLATELET # BLD: 367 K/UL (ref 140–450)
PLATELET ESTIMATE: ABNORMAL
PMV BLD AUTO: 7.2 FL (ref 6–12)
RBC # BLD: 4.12 M/UL (ref 4–5.2)
RBC # BLD: ABNORMAL 10*6/UL
SEG NEUTROPHILS: 79 % (ref 43–77)
SEGMENTED NEUTROPHILS ABSOLUTE COUNT: 5.8 K/UL (ref 1.8–7.7)
WBC # BLD: 7.3 K/UL (ref 3.5–11)
WBC # BLD: ABNORMAL 10*3/UL

## 2019-06-24 PROCEDURE — 36591 DRAW BLOOD OFF VENOUS DEVICE: CPT

## 2019-06-24 PROCEDURE — 96523 IRRIG DRUG DELIVERY DEVICE: CPT

## 2019-06-24 PROCEDURE — 85025 COMPLETE CBC W/AUTO DIFF WBC: CPT

## 2019-06-24 PROCEDURE — 6360000002 HC RX W HCPCS: Performed by: INTERNAL MEDICINE

## 2019-06-24 PROCEDURE — 2580000003 HC RX 258: Performed by: INTERNAL MEDICINE

## 2019-06-24 RX ORDER — SODIUM CHLORIDE 0.9 % (FLUSH) 0.9 %
10 SYRINGE (ML) INJECTION PRN
Status: CANCELLED | OUTPATIENT
Start: 2019-06-24

## 2019-06-24 RX ORDER — HEPARIN SODIUM (PORCINE) LOCK FLUSH IV SOLN 100 UNIT/ML 100 UNIT/ML
500 SOLUTION INTRAVENOUS PRN
Status: DISCONTINUED | OUTPATIENT
Start: 2019-06-24 | End: 2019-06-25 | Stop reason: HOSPADM

## 2019-06-24 RX ORDER — SODIUM CHLORIDE 0.9 % (FLUSH) 0.9 %
10 SYRINGE (ML) INJECTION PRN
Status: DISCONTINUED | OUTPATIENT
Start: 2019-06-24 | End: 2019-06-25 | Stop reason: HOSPADM

## 2019-06-24 RX ORDER — HEPARIN SODIUM (PORCINE) LOCK FLUSH IV SOLN 100 UNIT/ML 100 UNIT/ML
500 SOLUTION INTRAVENOUS PRN
Status: CANCELLED | OUTPATIENT
Start: 2019-06-24

## 2019-06-24 RX ADMIN — HEPARIN SODIUM (PORCINE) LOCK FLUSH IV SOLN 100 UNIT/ML 500 UNITS: 100 SOLUTION at 10:38

## 2019-06-24 RX ADMIN — Medication 10 ML: at 10:38

## 2019-07-01 ENCOUNTER — HOSPITAL ENCOUNTER (OUTPATIENT)
Dept: INFUSION THERAPY | Age: 69
Discharge: HOME | End: 2019-07-01
Payer: MEDICARE

## 2019-07-01 ENCOUNTER — HOSPITAL ENCOUNTER (OUTPATIENT)
Age: 69
Setting detail: SPECIMEN
Discharge: HOME | End: 2019-07-01
Payer: MEDICARE

## 2019-07-01 DIAGNOSIS — C7B.00 METASTATIC CARCINOID TUMOR (HCC): Primary | ICD-10-CM

## 2019-07-01 LAB
ABSOLUTE EOS #: 0.1 K/UL (ref 0–0.4)
ABSOLUTE IMMATURE GRANULOCYTE: ABNORMAL K/UL (ref 0–0.3)
ABSOLUTE LYMPH #: 1 K/UL (ref 1–4.8)
ABSOLUTE MONO #: 0.5 K/UL (ref 0.1–1.2)
BASOPHILS # BLD: 0 % (ref 0–1)
BASOPHILS ABSOLUTE: 0 K/UL (ref 0–0.2)
DIFFERENTIAL TYPE: ABNORMAL
EOSINOPHILS RELATIVE PERCENT: 3 % (ref 1–7)
HCT VFR BLD CALC: 34.7 % (ref 36–46)
HEMOGLOBIN: 11.7 G/DL (ref 12–16)
IMMATURE GRANULOCYTES: ABNORMAL %
LYMPHOCYTES # BLD: 22 % (ref 16–46)
MCH RBC QN AUTO: 31.1 PG (ref 26–34)
MCHC RBC AUTO-ENTMCNC: 33.8 G/DL (ref 31–37)
MCV RBC AUTO: 92.1 FL (ref 80–100)
MONOCYTES # BLD: 10 % (ref 4–11)
NRBC AUTOMATED: ABNORMAL PER 100 WBC
PDW BLD-RTO: 17.2 % (ref 11–14.5)
PLATELET # BLD: 251 K/UL (ref 140–450)
PLATELET ESTIMATE: ABNORMAL
PMV BLD AUTO: 7.4 FL (ref 6–12)
RBC # BLD: 3.76 M/UL (ref 4–5.2)
RBC # BLD: ABNORMAL 10*6/UL
SEG NEUTROPHILS: 65 % (ref 43–77)
SEGMENTED NEUTROPHILS ABSOLUTE COUNT: 3 K/UL (ref 1.8–7.7)
WBC # BLD: 4.6 K/UL (ref 3.5–11)
WBC # BLD: ABNORMAL 10*3/UL

## 2019-07-01 PROCEDURE — 2580000003 HC RX 258: Performed by: INTERNAL MEDICINE

## 2019-07-01 PROCEDURE — 6360000002 HC RX W HCPCS: Performed by: INTERNAL MEDICINE

## 2019-07-01 PROCEDURE — 85025 COMPLETE CBC W/AUTO DIFF WBC: CPT

## 2019-07-01 PROCEDURE — 36591 DRAW BLOOD OFF VENOUS DEVICE: CPT

## 2019-07-01 RX ORDER — HEPARIN SODIUM (PORCINE) LOCK FLUSH IV SOLN 100 UNIT/ML 100 UNIT/ML
500 SOLUTION INTRAVENOUS PRN
Status: DISCONTINUED | OUTPATIENT
Start: 2019-07-01 | End: 2019-07-02 | Stop reason: HOSPADM

## 2019-07-01 RX ORDER — SODIUM CHLORIDE 0.9 % (FLUSH) 0.9 %
10 SYRINGE (ML) INJECTION PRN
Status: DISCONTINUED | OUTPATIENT
Start: 2019-07-01 | End: 2019-07-02 | Stop reason: HOSPADM

## 2019-07-01 RX ORDER — SODIUM CHLORIDE 0.9 % (FLUSH) 0.9 %
10 SYRINGE (ML) INJECTION PRN
Status: CANCELLED | OUTPATIENT
Start: 2019-07-01

## 2019-07-01 RX ORDER — HEPARIN SODIUM (PORCINE) LOCK FLUSH IV SOLN 100 UNIT/ML 100 UNIT/ML
500 SOLUTION INTRAVENOUS PRN
Status: CANCELLED | OUTPATIENT
Start: 2019-07-01

## 2019-07-01 RX ADMIN — Medication 10 ML: at 07:59

## 2019-07-01 RX ADMIN — HEPARIN SODIUM (PORCINE) LOCK FLUSH IV SOLN 100 UNIT/ML 500 UNITS: 100 SOLUTION at 07:59

## 2019-07-03 ENCOUNTER — OFFICE VISIT (OUTPATIENT)
Dept: FAMILY MEDICINE CLINIC | Age: 69
End: 2019-07-03
Payer: MEDICARE

## 2019-07-03 VITALS
RESPIRATION RATE: 16 BRPM | HEART RATE: 80 BPM | WEIGHT: 134.4 LBS | SYSTOLIC BLOOD PRESSURE: 128 MMHG | BODY MASS INDEX: 22.94 KG/M2 | DIASTOLIC BLOOD PRESSURE: 78 MMHG | HEIGHT: 64 IN

## 2019-07-03 DIAGNOSIS — Z23 NEED FOR PNEUMOCOCCAL VACCINATION: ICD-10-CM

## 2019-07-03 DIAGNOSIS — Z23 NEED FOR SHINGLES VACCINE: ICD-10-CM

## 2019-07-03 DIAGNOSIS — Z00.00 ROUTINE GENERAL MEDICAL EXAMINATION AT A HEALTH CARE FACILITY: Primary | ICD-10-CM

## 2019-07-03 DIAGNOSIS — I10 ESSENTIAL HYPERTENSION: ICD-10-CM

## 2019-07-03 DIAGNOSIS — M70.32 BURSITIS OF BOTH ELBOWS, UNSPECIFIED BURSA: ICD-10-CM

## 2019-07-03 DIAGNOSIS — R73.09 ELEVATED GLUCOSE: ICD-10-CM

## 2019-07-03 DIAGNOSIS — M85.80 OSTEOPENIA, UNSPECIFIED LOCATION: ICD-10-CM

## 2019-07-03 DIAGNOSIS — M70.31 BURSITIS OF BOTH ELBOWS, UNSPECIFIED BURSA: ICD-10-CM

## 2019-07-03 PROCEDURE — 1123F ACP DISCUSS/DSCN MKR DOCD: CPT | Performed by: FAMILY MEDICINE

## 2019-07-03 PROCEDURE — 3014F SCREEN MAMMO DOC REV: CPT | Performed by: FAMILY MEDICINE

## 2019-07-03 PROCEDURE — G8420 CALC BMI NORM PARAMETERS: HCPCS | Performed by: FAMILY MEDICINE

## 2019-07-03 PROCEDURE — 4040F PNEUMOC VAC/ADMIN/RCVD: CPT | Performed by: FAMILY MEDICINE

## 2019-07-03 PROCEDURE — 1090F PRES/ABSN URINE INCON ASSESS: CPT | Performed by: FAMILY MEDICINE

## 2019-07-03 PROCEDURE — 3017F COLORECTAL CA SCREEN DOC REV: CPT | Performed by: FAMILY MEDICINE

## 2019-07-03 PROCEDURE — 90732 PPSV23 VACC 2 YRS+ SUBQ/IM: CPT | Performed by: FAMILY MEDICINE

## 2019-07-03 PROCEDURE — 99214 OFFICE O/P EST MOD 30 MIN: CPT | Performed by: FAMILY MEDICINE

## 2019-07-03 PROCEDURE — G0439 PPPS, SUBSEQ VISIT: HCPCS | Performed by: FAMILY MEDICINE

## 2019-07-03 PROCEDURE — G0009 ADMIN PNEUMOCOCCAL VACCINE: HCPCS | Performed by: FAMILY MEDICINE

## 2019-07-03 PROCEDURE — G8399 PT W/DXA RESULTS DOCUMENT: HCPCS | Performed by: FAMILY MEDICINE

## 2019-07-03 PROCEDURE — G8427 DOCREV CUR MEDS BY ELIG CLIN: HCPCS | Performed by: FAMILY MEDICINE

## 2019-07-03 PROCEDURE — 1036F TOBACCO NON-USER: CPT | Performed by: FAMILY MEDICINE

## 2019-07-03 RX ORDER — LISINOPRIL 30 MG/1
TABLET ORAL
Qty: 90 TABLET | Refills: 1 | Status: SHIPPED | OUTPATIENT
Start: 2019-07-03 | End: 2020-01-07 | Stop reason: SDUPTHER

## 2019-07-03 ASSESSMENT — LIFESTYLE VARIABLES: HOW OFTEN DO YOU HAVE A DRINK CONTAINING ALCOHOL: 0

## 2019-07-03 ASSESSMENT — ANXIETY QUESTIONNAIRES: GAD7 TOTAL SCORE: 0

## 2019-07-03 ASSESSMENT — PATIENT HEALTH QUESTIONNAIRE - PHQ9
SUM OF ALL RESPONSES TO PHQ QUESTIONS 1-9: 0
SUM OF ALL RESPONSES TO PHQ QUESTIONS 1-9: 0

## 2019-07-03 NOTE — PROGRESS NOTES
with astigmatism and presbyopia     Osteoarthritis     Osteopenia     took Fosamax from [id number]    Pancreatitis      Past Surgical History:   Procedure Laterality Date    ABDOMINAL EXPLORATION SURGERY  01/07/2019    resection and anastomosis of small bowel repair mesenteric tear with Dr Lee Ann Partida at [address] Right 7/25/05    lymph node mapping and biopsy     BREAST BIOPSY Right 7/7/05    excisional biopsy of mass of right breast     BREAST CYST ASPIRATION Right 7/25/05    BREAST RECONSTRUCTION Right 2006    nipple areolar reconstruction right breast    BREAST REDUCTION SURGERY Left 2006    BRONCHOSCOPY  02/11/2011    BRONCHOSCOPY Right 11/12/10    video assisted thoracoscopic surgery right wedge resection    CHOLECYSTECTOMY, LAPAROSCOPIC  01/16/2017    Dr. Jose Elias Alfredo, [address]  01/12/2004    supracervical, with bilateral salpingo-oophorectomy, Dr. Twan Salvador, [facility] ARTHROSCOPY Right 7/1/2008    KNEE SURGERY Left 06/20/2016    unicompartmental knee replacement, Dr. Og Mendoza, [address]  11/04/2016    ultrasound-guided liver biopsy (metastatic atypical carcinoid), [facility] Right 2/11/2011    right upper lobectomy, mediastinal lymph node dissection for atypical carcinoid of the right upper lobe    MASTECTOMY Right 2005    breast cancer    OTHER SURGICAL HISTORY  2004    Bowman procedure    OTHER SURGICAL HISTORY Right 7/25/05    placement of tissue expander    SMALL INTESTINE SURGERY      THORACOTOMY Right 02/11/2011    redo    TOTAL KNEE ARTHROPLASTY Right 3/17/2010    Dr ANTONIO Nguyen Sol ENDOSCOPY  01/14/2017    mild esophagitis, biopsy normal, Dr. Jose Elias Alfredo, [facility]     Family History   Problem Relation Age of Onset    Heart Disease Mother    Heriberto Platt

## 2019-07-03 NOTE — PROGRESS NOTES
[address], [address] [place] [address] Drive                        Telephone [phone]             Fax [phone]     Linda Abreu  1950  MRN:  [id number]  Date of visit:  7/3/2019    Subjective:    Linda Abreu is a 81 y.o.  female who presents to [facility] today (7/3/2019) for follow up/evaluation of:  Medicare AWV; Hypertension (follow up visit); and Other (swelling of both elbows,first noticed 1 month ago,non tender)      She states that she has been feeling well. She has been tolerating her medications well. She continues to see her oncologists at [facility] at [facility]. She has been getting chemotherapy here at [facility].  She has had some swelling of her elbows for the past couple of weeks. She denies pain. She denies injury. She denies redness.        She has the following problem list:  Patient Active Problem List   Diagnosis    Essential hypertension    Osteoarthritis    Osteopenia    History of breast cancer    History of lung cancer    Elevated glucose    Primary osteoarthritis of left knee    Metastatic carcinoid tumor ([address]    Cholecystitis    Hepatic metastasis ([address]    RUQ pain    Abdominal pain    Paroxysmal supraventricular tachycardia ([address]        Current medications are:  Outpatient Medications Marked as Taking for the 7/3/19 encounter (Office Visit) with Gildardo Obrien MD   Medication Sig Dispense Refill    lisinopril (PRINIVIL;ZESTRIL) 30 MG tablet take 1 tablet by mouth once daily 30 tablet 0    apixaban (ELIQUIS) 5 MG TABS tablet Take 1 tablet by mouth 2 times daily 90 tablet 3    diltiazem (TIAZAC) 240 MG extended release capsule Take 1 capsule by mouth daily 90 capsule 3    ondansetron (ZOFRAN) 4 MG tablet Take 1 tablet by mouth every 8 hours as needed for Nausea 20 tablet 0    fluticasone (FLONASE) 50  Absolute Immature Granulocyte 07/01/2019 NOT REPORTED  0.00 - 0.30 k/uL Final    WBC Morphology 07/01/2019 NOT REPORTED   Final    RBC Morphology 07/01/2019 NOT REPORTED   Final    Platelet Estimate 87/88/4115 NOT REPORTED   Final    Seg Neutrophils 07/01/2019 65  43 - 77 % Final    Lymphocytes 07/01/2019 22  16 - 46 % Final    Monocytes 07/01/2019 10  4 - 11 % Final    Eosinophils % 07/01/2019 3  1 - 7 % Final    Basophils 07/01/2019 0  0 - 1 % Final    Segs Absolute 07/01/2019 3.00  1.8 - 7.7 k/uL Final    Absolute Lymph # 07/01/2019 1.00  1.0 - 4.8 k/uL Final    Absolute Mono # 07/01/2019 0.50  0.1 - 1.2 k/uL Final    Absolute Eos # 07/01/2019 0.10  0.0 - 0.4 k/uL Final    Basophils # 07/01/2019 0.00  0.0 - 0.2 k/uL Final          Assessment and Plan:    1. Routine general medical examination at a health care facility  See separate progress note for Medicare Wellness Visit. 2. Essential hypertension  Her blood pressure is well-controlled. (BP: 128/78)   She was advised to continue current medications. Lisinopril was refilled:   - lisinopril (PRINIVIL;ZESTRIL) 30 MG tablet; take 1 tablet by mouth once daily  Dispense: 90 tablet; Refill: 1    Labs were ordered to be done next week:  - Lipid, Fasting; Future  - Comprehensive Metabolic Panel; Future    3. Elevated glucose  - Hemoglobin A1C; Future was ordered to be done next week. 4. Osteopenia, unspecified location  She is due for a DEXA scan:  - DEXA AXIAL SKELETON W VERTEBRAL FX ASST; Future    5. Bursitis of both elbows  She was advised to use a cushion under her elbows. She was advised to report increased swelling or pain. 6.  Routine health maintenance  Health maintenance was reviewed with the patient. Shingrix was recommended, and a printed prescription for Shingrix was given to the patient. Pneumovax was recommended and ordered. All other health maintenance, including Tdap and Prevnar-13, is up to date at this time.

## 2019-07-05 ENCOUNTER — TELEPHONE (OUTPATIENT)
Dept: BONE DENSITY | Age: 69
End: 2019-07-05

## 2019-07-05 NOTE — TELEPHONE ENCOUNTER
I called the patient to schedule her dexa scan. It is scheduled for 7/11. . The diagnoses code it throwing and ABN.  Please place new order with a new diagnoses code please

## 2019-07-08 DIAGNOSIS — M89.9 DISORDER OF BONE: Primary | ICD-10-CM

## 2019-07-08 DIAGNOSIS — M85.80 OSTEOPENIA, UNSPECIFIED LOCATION: ICD-10-CM

## 2019-07-10 ENCOUNTER — HOSPITAL ENCOUNTER (OUTPATIENT)
Dept: INFUSION THERAPY | Age: 69
Discharge: HOME | End: 2019-07-10
Payer: MEDICARE

## 2019-07-10 ENCOUNTER — HOSPITAL ENCOUNTER (OUTPATIENT)
Age: 69
Setting detail: SPECIMEN
Discharge: HOME | End: 2019-07-10
Payer: MEDICARE

## 2019-07-10 DIAGNOSIS — I10 ESSENTIAL HYPERTENSION: ICD-10-CM

## 2019-07-10 DIAGNOSIS — C7B.00 METASTATIC CARCINOID TUMOR (HCC): Primary | ICD-10-CM

## 2019-07-10 DIAGNOSIS — R73.09 ELEVATED GLUCOSE: ICD-10-CM

## 2019-07-10 LAB
ABSOLUTE EOS #: 0.18 K/UL (ref 0–0.4)
ABSOLUTE IMMATURE GRANULOCYTE: ABNORMAL K/UL (ref 0–0.3)
ABSOLUTE LYMPH #: 1.04 K/UL (ref 1–4.8)
ABSOLUTE MONO #: 0.79 K/UL (ref 0.1–1.2)
ALBUMIN SERPL-MCNC: 4.1 G/DL (ref 3.5–5.2)
ALBUMIN/GLOBULIN RATIO: 1.3 (ref 1–2.5)
ALP BLD-CCNC: 101 U/L (ref 35–104)
ALT SERPL-CCNC: 11 U/L (ref 5–33)
ANION GAP SERPL CALCULATED.3IONS-SCNC: 11 MMOL/L (ref 9–17)
AST SERPL-CCNC: 17 U/L
BASOPHILS # BLD: 0 % (ref 0–1)
BASOPHILS ABSOLUTE: 0 K/UL (ref 0–0.2)
BILIRUB SERPL-MCNC: 0.38 MG/DL (ref 0.3–1.2)
BUN BLDV-MCNC: 19 MG/DL (ref 8–23)
BUN/CREAT BLD: 31 (ref 9–20)
CALCIUM SERPL-MCNC: 9.8 MG/DL (ref 8.6–10.4)
CHLORIDE BLD-SCNC: 107 MMOL/L (ref 98–107)
CHOLESTEROL, FASTING: 142 MG/DL
CHOLESTEROL/HDL RATIO: 2.4
CO2: 25 MMOL/L (ref 20–31)
CREAT SERPL-MCNC: 0.62 MG/DL (ref 0.5–0.9)
DIFFERENTIAL TYPE: ABNORMAL
EOSINOPHILS RELATIVE PERCENT: 3 % (ref 1–7)
ESTIMATED AVERAGE GLUCOSE: 91 MG/DL
GFR AFRICAN AMERICAN: >60 ML/MIN
GFR NON-AFRICAN AMERICAN: >60 ML/MIN
GFR SERPL CREATININE-BSD FRML MDRD: ABNORMAL ML/MIN/{1.73_M2}
GFR SERPL CREATININE-BSD FRML MDRD: ABNORMAL ML/MIN/{1.73_M2}
GLUCOSE BLD-MCNC: 100 MG/DL (ref 70–99)
HBA1C MFR BLD: 4.8 % (ref 4.8–5.9)
HCT VFR BLD CALC: 35.4 % (ref 36–46)
HDLC SERPL-MCNC: 59 MG/DL
HEMOGLOBIN: 11.8 G/DL (ref 12–16)
IMMATURE GRANULOCYTES: ABNORMAL %
LDL CHOLESTEROL: 72 MG/DL (ref 0–130)
LYMPHOCYTES # BLD: 17 % (ref 16–46)
MCH RBC QN AUTO: 30.4 PG (ref 26–34)
MCHC RBC AUTO-ENTMCNC: 33.4 G/DL (ref 31–37)
MCV RBC AUTO: 90.9 FL (ref 80–100)
MONOCYTES # BLD: 13 % (ref 4–11)
MORPHOLOGY: ABNORMAL
MORPHOLOGY: ABNORMAL
NRBC AUTOMATED: ABNORMAL PER 100 WBC
PDW BLD-RTO: 16.1 % (ref 11–14.5)
PLATELET # BLD: 140 K/UL (ref 140–450)
PLATELET ESTIMATE: ABNORMAL
PMV BLD AUTO: 7.3 FL (ref 6–12)
POTASSIUM SERPL-SCNC: 4.4 MMOL/L (ref 3.7–5.3)
RBC # BLD: 3.89 M/UL (ref 4–5.2)
RBC # BLD: ABNORMAL 10*6/UL
SEG NEUTROPHILS: 67 % (ref 43–77)
SEGMENTED NEUTROPHILS ABSOLUTE COUNT: 4.09 K/UL (ref 1.8–7.7)
SODIUM BLD-SCNC: 143 MMOL/L (ref 135–144)
TOTAL PROTEIN: 7.2 G/DL (ref 6.4–8.3)
TRIGLYCERIDE, FASTING: 55 MG/DL
VLDLC SERPL CALC-MCNC: NORMAL MG/DL (ref 1–30)
WBC # BLD: 6.1 K/UL (ref 3.5–11)
WBC # BLD: ABNORMAL 10*3/UL

## 2019-07-10 PROCEDURE — 36591 DRAW BLOOD OFF VENOUS DEVICE: CPT

## 2019-07-10 PROCEDURE — 85025 COMPLETE CBC W/AUTO DIFF WBC: CPT

## 2019-07-10 PROCEDURE — 83036 HEMOGLOBIN GLYCOSYLATED A1C: CPT

## 2019-07-10 PROCEDURE — 2580000003 HC RX 258: Performed by: INTERNAL MEDICINE

## 2019-07-10 PROCEDURE — 80061 LIPID PANEL: CPT

## 2019-07-10 PROCEDURE — 6360000002 HC RX W HCPCS: Performed by: INTERNAL MEDICINE

## 2019-07-10 PROCEDURE — 80053 COMPREHEN METABOLIC PANEL: CPT

## 2019-07-10 RX ORDER — SODIUM CHLORIDE 0.9 % (FLUSH) 0.9 %
10 SYRINGE (ML) INJECTION PRN
Status: DISCONTINUED | OUTPATIENT
Start: 2019-07-10 | End: 2019-07-11 | Stop reason: HOSPADM

## 2019-07-10 RX ORDER — HEPARIN SODIUM (PORCINE) LOCK FLUSH IV SOLN 100 UNIT/ML 100 UNIT/ML
500 SOLUTION INTRAVENOUS PRN
Status: DISCONTINUED | OUTPATIENT
Start: 2019-07-10 | End: 2019-07-11 | Stop reason: HOSPADM

## 2019-07-10 RX ORDER — HEPARIN SODIUM (PORCINE) LOCK FLUSH IV SOLN 100 UNIT/ML 100 UNIT/ML
500 SOLUTION INTRAVENOUS PRN
Status: CANCELLED | OUTPATIENT
Start: 2019-07-10

## 2019-07-10 RX ORDER — SODIUM CHLORIDE 0.9 % (FLUSH) 0.9 %
10 SYRINGE (ML) INJECTION PRN
Status: CANCELLED | OUTPATIENT
Start: 2019-07-10

## 2019-07-10 RX ADMIN — Medication 10 ML: at 08:15

## 2019-07-10 RX ADMIN — HEPARIN SODIUM (PORCINE) LOCK FLUSH IV SOLN 100 UNIT/ML 500 UNITS: 100 SOLUTION at 08:15

## 2019-07-10 NOTE — PROGRESS NOTES
VAD accessed per protocol with 3/4 inch 20 gauge hong needle. Flushes easy with good blood return. Labs Drawn. Flushed with 10 ml of normal saline and 5 ml of heparin flush. D/C'd 3/4 inch hong needle and band aide applied. Patient stable and discharged to home.

## 2019-07-11 ENCOUNTER — HOSPITAL ENCOUNTER (OUTPATIENT)
Dept: BONE DENSITY | Age: 69
Discharge: HOME | End: 2019-07-13
Payer: MEDICARE

## 2019-07-11 DIAGNOSIS — I10 ESSENTIAL HYPERTENSION: Primary | ICD-10-CM

## 2019-07-11 DIAGNOSIS — R73.09 ELEVATED GLUCOSE: ICD-10-CM

## 2019-07-11 DIAGNOSIS — M85.80 OSTEOPENIA, UNSPECIFIED LOCATION: ICD-10-CM

## 2019-07-11 DIAGNOSIS — M89.9 DISORDER OF BONE: ICD-10-CM

## 2019-07-11 PROCEDURE — 77085 DXA BONE DENSITY AXL VRT FX: CPT

## 2019-07-25 LAB
A/G RATIO: NORMAL
ALBUMIN SERPL-MCNC: 4.1 G/DL
ALP BLD-CCNC: NORMAL U/L
ALT SERPL-CCNC: 13 U/L
AST SERPL-CCNC: 21 U/L
BASOPHILS ABSOLUTE: 0.04 /ΜL
BASOPHILS RELATIVE PERCENT: 0.4 %
BILIRUB SERPL-MCNC: 0.4 MG/DL (ref 0.1–1.4)
BILIRUBIN DIRECT: 0.1 MG/DL
BILIRUBIN, INDIRECT: NORMAL
BUN BLDV-MCNC: 15 MG/DL
CALCIUM SERPL-MCNC: 9.6 MG/DL
CHLORIDE BLD-SCNC: 107 MMOL/L
CO2: 25 MMOL/L
CREAT SERPL-MCNC: 0.74 MG/DL
EOSINOPHILS ABSOLUTE: 0.1 /ΜL
EOSINOPHILS RELATIVE PERCENT: 2.1 %
GFR CALCULATED: >60
GLOBULIN: NORMAL
GLUCOSE BLD-MCNC: 122 MG/DL
HCT VFR BLD CALC: 33.2 % (ref 36–46)
HEMOGLOBIN: 10.8 G/DL (ref 12–16)
LYMPHOCYTES ABSOLUTE: 0.63 /ΜL
LYMPHOCYTES RELATIVE PERCENT: 13.3 %
MCH RBC QN AUTO: 30.5 PG
MCHC RBC AUTO-ENTMCNC: 32.5 G/DL
MCV RBC AUTO: 92.7 FL
MONOCYTES ABSOLUTE: 0.41 /ΜL
MONOCYTES RELATIVE PERCENT: 8.6 %
NEUTROPHILS ABSOLUTE: ABNORMAL /ΜL
NEUTROPHILS RELATIVE PERCENT: ABNORMAL %
PLATELET # BLD: 200 K/ΜL
PMV BLD AUTO: 8.3 FL
POTASSIUM SERPL-SCNC: 3.6 MMOL/L
PROTEIN TOTAL: 6.9 G/DL
RBC # BLD: ABNORMAL 10^6/ΜL
SODIUM BLD-SCNC: 140 MMOL/L
WBC # BLD: 4.74 10^3/ML

## 2019-07-29 ENCOUNTER — HOSPITAL ENCOUNTER (OUTPATIENT)
Dept: INFUSION THERAPY | Age: 69
Discharge: HOME | End: 2019-07-29
Payer: MEDICARE

## 2019-07-29 ENCOUNTER — HOSPITAL ENCOUNTER (OUTPATIENT)
Age: 69
Setting detail: SPECIMEN
Discharge: HOME | End: 2019-07-29
Payer: MEDICARE

## 2019-07-29 DIAGNOSIS — C7B.00 METASTATIC CARCINOID TUMOR (HCC): Primary | ICD-10-CM

## 2019-07-29 LAB
ABSOLUTE EOS #: 0.1 K/UL (ref 0–0.4)
ABSOLUTE IMMATURE GRANULOCYTE: ABNORMAL K/UL (ref 0–0.3)
ABSOLUTE LYMPH #: 0.8 K/UL (ref 1–4.8)
ABSOLUTE MONO #: 0.5 K/UL (ref 0.1–1.2)
BASOPHILS # BLD: 1 % (ref 0–1)
BASOPHILS ABSOLUTE: 0 K/UL (ref 0–0.2)
DIFFERENTIAL TYPE: ABNORMAL
EOSINOPHILS RELATIVE PERCENT: 3 % (ref 1–7)
HCT VFR BLD CALC: 33.9 % (ref 36–46)
HEMOGLOBIN: 11.5 G/DL (ref 12–16)
IMMATURE GRANULOCYTES: ABNORMAL %
LYMPHOCYTES # BLD: 19 % (ref 16–46)
MCH RBC QN AUTO: 30.8 PG (ref 26–34)
MCHC RBC AUTO-ENTMCNC: 34.1 G/DL (ref 31–37)
MCV RBC AUTO: 90.5 FL (ref 80–100)
MONOCYTES # BLD: 12 % (ref 4–11)
NRBC AUTOMATED: ABNORMAL PER 100 WBC
PDW BLD-RTO: 15.4 % (ref 11–14.5)
PLATELET # BLD: 256 K/UL (ref 140–450)
PLATELET ESTIMATE: ABNORMAL
PMV BLD AUTO: 7.1 FL (ref 6–12)
RBC # BLD: 3.74 M/UL (ref 4–5.2)
RBC # BLD: ABNORMAL 10*6/UL
SEG NEUTROPHILS: 65 % (ref 43–77)
SEGMENTED NEUTROPHILS ABSOLUTE COUNT: 2.7 K/UL (ref 1.8–7.7)
WBC # BLD: 4.2 K/UL (ref 3.5–11)
WBC # BLD: ABNORMAL 10*3/UL

## 2019-07-29 PROCEDURE — 6360000002 HC RX W HCPCS: Performed by: INTERNAL MEDICINE

## 2019-07-29 PROCEDURE — 85025 COMPLETE CBC W/AUTO DIFF WBC: CPT

## 2019-07-29 PROCEDURE — 36591 DRAW BLOOD OFF VENOUS DEVICE: CPT

## 2019-07-29 PROCEDURE — 2580000003 HC RX 258: Performed by: INTERNAL MEDICINE

## 2019-07-29 PROCEDURE — 96523 IRRIG DRUG DELIVERY DEVICE: CPT

## 2019-07-29 RX ORDER — SODIUM CHLORIDE 0.9 % (FLUSH) 0.9 %
10 SYRINGE (ML) INJECTION PRN
Status: CANCELLED | OUTPATIENT
Start: 2019-07-29

## 2019-07-29 RX ORDER — HEPARIN SODIUM (PORCINE) LOCK FLUSH IV SOLN 100 UNIT/ML 100 UNIT/ML
500 SOLUTION INTRAVENOUS PRN
Status: DISCONTINUED | OUTPATIENT
Start: 2019-07-29 | End: 2019-07-30 | Stop reason: HOSPADM

## 2019-07-29 RX ORDER — HEPARIN SODIUM (PORCINE) LOCK FLUSH IV SOLN 100 UNIT/ML 100 UNIT/ML
500 SOLUTION INTRAVENOUS PRN
Status: CANCELLED | OUTPATIENT
Start: 2019-07-29

## 2019-07-29 RX ORDER — SODIUM CHLORIDE 0.9 % (FLUSH) 0.9 %
10 SYRINGE (ML) INJECTION PRN
Status: DISCONTINUED | OUTPATIENT
Start: 2019-07-29 | End: 2019-07-30 | Stop reason: HOSPADM

## 2019-07-29 RX ADMIN — Medication 10 ML: at 08:29

## 2019-07-29 RX ADMIN — HEPARIN SODIUM (PORCINE) LOCK FLUSH IV SOLN 100 UNIT/ML 500 UNITS: 100 SOLUTION at 08:29

## 2019-07-29 NOTE — PROGRESS NOTES
VAD accessed per protocol with 3/4 \" hong needle. Flushed easily with saline. Blood return noted. Flushed with 10 ml of NSS and 5 ml of Heparin flush. VAD D/C'd and Band-Aid to site. Patient stable and discharged to home.

## 2019-08-05 ENCOUNTER — HOSPITAL ENCOUNTER (OUTPATIENT)
Age: 69
Setting detail: SPECIMEN
Discharge: HOME | End: 2019-08-05
Payer: MEDICARE

## 2019-08-05 ENCOUNTER — HOSPITAL ENCOUNTER (OUTPATIENT)
Dept: INFUSION THERAPY | Age: 69
Discharge: HOME | End: 2019-08-05
Payer: MEDICARE

## 2019-08-05 DIAGNOSIS — C7B.00 METASTATIC CARCINOID TUMOR (HCC): Primary | ICD-10-CM

## 2019-08-05 LAB
ABSOLUTE EOS #: 0.1 K/UL (ref 0–0.4)
ABSOLUTE IMMATURE GRANULOCYTE: ABNORMAL K/UL (ref 0–0.3)
ABSOLUTE LYMPH #: 1 K/UL (ref 1–4.8)
ABSOLUTE MONO #: 0.5 K/UL (ref 0.1–1.2)
BASOPHILS # BLD: 1 % (ref 0–1)
BASOPHILS ABSOLUTE: 0 K/UL (ref 0–0.2)
DIFFERENTIAL TYPE: ABNORMAL
EOSINOPHILS RELATIVE PERCENT: 3 % (ref 1–7)
HCT VFR BLD CALC: 35.1 % (ref 36–46)
HEMOGLOBIN: 11.8 G/DL (ref 12–16)
IMMATURE GRANULOCYTES: ABNORMAL %
LYMPHOCYTES # BLD: 23 % (ref 16–46)
MCH RBC QN AUTO: 30.2 PG (ref 26–34)
MCHC RBC AUTO-ENTMCNC: 33.5 G/DL (ref 31–37)
MCV RBC AUTO: 90.1 FL (ref 80–100)
MONOCYTES # BLD: 11 % (ref 4–11)
NRBC AUTOMATED: ABNORMAL PER 100 WBC
PDW BLD-RTO: 15.6 % (ref 11–14.5)
PLATELET # BLD: 214 K/UL (ref 140–450)
PLATELET ESTIMATE: ABNORMAL
PMV BLD AUTO: 7.2 FL (ref 6–12)
RBC # BLD: 3.9 M/UL (ref 4–5.2)
RBC # BLD: ABNORMAL 10*6/UL
SEG NEUTROPHILS: 62 % (ref 43–77)
SEGMENTED NEUTROPHILS ABSOLUTE COUNT: 2.8 K/UL (ref 1.8–7.7)
WBC # BLD: 4.4 K/UL (ref 3.5–11)
WBC # BLD: ABNORMAL 10*3/UL

## 2019-08-05 PROCEDURE — 85025 COMPLETE CBC W/AUTO DIFF WBC: CPT

## 2019-08-05 PROCEDURE — 36591 DRAW BLOOD OFF VENOUS DEVICE: CPT

## 2019-08-05 PROCEDURE — 2580000003 HC RX 258: Performed by: INTERNAL MEDICINE

## 2019-08-05 PROCEDURE — 6360000002 HC RX W HCPCS: Performed by: INTERNAL MEDICINE

## 2019-08-05 RX ORDER — HEPARIN SODIUM (PORCINE) LOCK FLUSH IV SOLN 100 UNIT/ML 100 UNIT/ML
500 SOLUTION INTRAVENOUS PRN
Status: CANCELLED | OUTPATIENT
Start: 2019-08-05

## 2019-08-05 RX ORDER — SODIUM CHLORIDE 0.9 % (FLUSH) 0.9 %
10 SYRINGE (ML) INJECTION PRN
Status: DISCONTINUED | OUTPATIENT
Start: 2019-08-05 | End: 2019-08-06 | Stop reason: HOSPADM

## 2019-08-05 RX ORDER — HEPARIN SODIUM (PORCINE) LOCK FLUSH IV SOLN 100 UNIT/ML 100 UNIT/ML
500 SOLUTION INTRAVENOUS PRN
Status: DISCONTINUED | OUTPATIENT
Start: 2019-08-05 | End: 2019-08-06 | Stop reason: HOSPADM

## 2019-08-05 RX ORDER — SODIUM CHLORIDE 0.9 % (FLUSH) 0.9 %
10 SYRINGE (ML) INJECTION PRN
Status: CANCELLED | OUTPATIENT
Start: 2019-08-05

## 2019-08-05 RX ADMIN — Medication 10 ML: at 08:38

## 2019-08-05 RX ADMIN — HEPARIN SODIUM (PORCINE) LOCK FLUSH IV SOLN 100 UNIT/ML 500 UNITS: 100 SOLUTION at 08:38

## 2019-08-05 NOTE — PROGRESS NOTES
Pt. To infusion room for port draw. VAD accessed with 3/4\" hong needle under sterile technique. Flushed with 10 ml of NS, good blood return noted and 10 ml of blood drawn off to discard. Obtain 5 ml of blood to send to lab. Flushed with 10 ml of NS and then 5 ml of Heperin. Port de-accessed at this time. Pt. Tolerated well and ambulated out of infusion room without any concerns at this time. Pt. Stated she will call to schedule her next appointment.

## 2019-08-13 ENCOUNTER — HOSPITAL ENCOUNTER (OUTPATIENT)
Age: 69
Setting detail: SPECIMEN
Discharge: HOME | End: 2019-08-13
Payer: MEDICARE

## 2019-08-13 ENCOUNTER — HOSPITAL ENCOUNTER (OUTPATIENT)
Dept: INFUSION THERAPY | Age: 69
Discharge: HOME | End: 2019-08-13
Payer: MEDICARE

## 2019-08-13 DIAGNOSIS — C7B.00 METASTATIC CARCINOID TUMOR (HCC): Primary | ICD-10-CM

## 2019-08-13 LAB
ABSOLUTE EOS #: 0.1 K/UL (ref 0–0.4)
ABSOLUTE IMMATURE GRANULOCYTE: ABNORMAL K/UL (ref 0–0.3)
ABSOLUTE LYMPH #: 0.8 K/UL (ref 1–4.8)
ABSOLUTE MONO #: 0.4 K/UL (ref 0.1–1.2)
BASOPHILS # BLD: 0 % (ref 0–1)
BASOPHILS ABSOLUTE: 0 K/UL (ref 0–0.2)
DIFFERENTIAL TYPE: ABNORMAL
EOSINOPHILS RELATIVE PERCENT: 3 % (ref 1–7)
HCT VFR BLD CALC: 35.5 % (ref 36–46)
HEMOGLOBIN: 11.8 G/DL (ref 12–16)
IMMATURE GRANULOCYTES: ABNORMAL %
LYMPHOCYTES # BLD: 18 % (ref 16–46)
MCH RBC QN AUTO: 30.2 PG (ref 26–34)
MCHC RBC AUTO-ENTMCNC: 33.3 G/DL (ref 31–37)
MCV RBC AUTO: 90.6 FL (ref 80–100)
MONOCYTES # BLD: 9 % (ref 4–11)
NRBC AUTOMATED: ABNORMAL PER 100 WBC
PDW BLD-RTO: 15.4 % (ref 11–14.5)
PLATELET # BLD: 176 K/UL (ref 140–450)
PLATELET ESTIMATE: ABNORMAL
PMV BLD AUTO: 7.4 FL (ref 6–12)
RBC # BLD: 3.92 M/UL (ref 4–5.2)
RBC # BLD: ABNORMAL 10*6/UL
SEG NEUTROPHILS: 70 % (ref 43–77)
SEGMENTED NEUTROPHILS ABSOLUTE COUNT: 3.1 K/UL (ref 1.8–7.7)
WBC # BLD: 4.4 K/UL (ref 3.5–11)
WBC # BLD: ABNORMAL 10*3/UL

## 2019-08-13 PROCEDURE — 2580000003 HC RX 258: Performed by: INTERNAL MEDICINE

## 2019-08-13 PROCEDURE — 85025 COMPLETE CBC W/AUTO DIFF WBC: CPT

## 2019-08-13 PROCEDURE — 6360000002 HC RX W HCPCS: Performed by: INTERNAL MEDICINE

## 2019-08-13 PROCEDURE — 36591 DRAW BLOOD OFF VENOUS DEVICE: CPT

## 2019-08-13 RX ORDER — HEPARIN SODIUM (PORCINE) LOCK FLUSH IV SOLN 100 UNIT/ML 100 UNIT/ML
500 SOLUTION INTRAVENOUS PRN
Status: CANCELLED | OUTPATIENT
Start: 2019-08-13

## 2019-08-13 RX ORDER — SODIUM CHLORIDE 0.9 % (FLUSH) 0.9 %
10 SYRINGE (ML) INJECTION PRN
Status: CANCELLED | OUTPATIENT
Start: 2019-08-13

## 2019-08-13 RX ORDER — HEPARIN SODIUM (PORCINE) LOCK FLUSH IV SOLN 100 UNIT/ML 100 UNIT/ML
500 SOLUTION INTRAVENOUS PRN
Status: DISCONTINUED | OUTPATIENT
Start: 2019-08-13 | End: 2019-08-14 | Stop reason: HOSPADM

## 2019-08-13 RX ORDER — SODIUM CHLORIDE 0.9 % (FLUSH) 0.9 %
10 SYRINGE (ML) INJECTION PRN
Status: DISCONTINUED | OUTPATIENT
Start: 2019-08-13 | End: 2019-08-14 | Stop reason: HOSPADM

## 2019-08-13 RX ADMIN — Medication 10 ML: at 08:35

## 2019-08-13 RX ADMIN — HEPARIN SODIUM (PORCINE) LOCK FLUSH IV SOLN 100 UNIT/ML 500 UNITS: 100 SOLUTION at 08:35

## 2019-08-13 RX ADMIN — Medication 10 ML: at 08:34

## 2019-08-22 ENCOUNTER — HOSPITAL ENCOUNTER (OUTPATIENT)
Dept: INFUSION THERAPY | Age: 69
Discharge: HOME | End: 2019-08-22
Payer: MEDICARE

## 2019-08-22 ENCOUNTER — HOSPITAL ENCOUNTER (OUTPATIENT)
Age: 69
Setting detail: SPECIMEN
Discharge: HOME | End: 2019-08-22
Payer: MEDICARE

## 2019-08-22 DIAGNOSIS — C7B.00 METASTATIC CARCINOID TUMOR (HCC): Primary | ICD-10-CM

## 2019-08-22 LAB
ABSOLUTE EOS #: 0.1 K/UL (ref 0–0.4)
ABSOLUTE IMMATURE GRANULOCYTE: ABNORMAL K/UL (ref 0–0.3)
ABSOLUTE LYMPH #: 0.8 K/UL (ref 1–4.8)
ABSOLUTE MONO #: 0.5 K/UL (ref 0.1–1.2)
BASOPHILS # BLD: 0 % (ref 0–1)
BASOPHILS ABSOLUTE: 0 K/UL (ref 0–0.2)
DIFFERENTIAL TYPE: ABNORMAL
EOSINOPHILS RELATIVE PERCENT: 3 % (ref 1–7)
HCT VFR BLD CALC: 35.9 % (ref 36–46)
HEMOGLOBIN: 12.1 G/DL (ref 12–16)
IMMATURE GRANULOCYTES: ABNORMAL %
LYMPHOCYTES # BLD: 16 % (ref 16–46)
MCH RBC QN AUTO: 30.7 PG (ref 26–34)
MCHC RBC AUTO-ENTMCNC: 33.7 G/DL (ref 31–37)
MCV RBC AUTO: 91 FL (ref 80–100)
MONOCYTES # BLD: 10 % (ref 4–11)
NRBC AUTOMATED: ABNORMAL PER 100 WBC
PDW BLD-RTO: 16.5 % (ref 11–14.5)
PLATELET # BLD: 120 K/UL (ref 140–450)
PLATELET ESTIMATE: ABNORMAL
PMV BLD AUTO: 7.2 FL (ref 6–12)
RBC # BLD: 3.95 M/UL (ref 4–5.2)
RBC # BLD: ABNORMAL 10*6/UL
SEG NEUTROPHILS: 71 % (ref 43–77)
SEGMENTED NEUTROPHILS ABSOLUTE COUNT: 3.8 K/UL (ref 1.8–7.7)
WBC # BLD: 5.3 K/UL (ref 3.5–11)
WBC # BLD: ABNORMAL 10*3/UL

## 2019-08-22 PROCEDURE — 6360000002 HC RX W HCPCS: Performed by: INTERNAL MEDICINE

## 2019-08-22 PROCEDURE — 96523 IRRIG DRUG DELIVERY DEVICE: CPT

## 2019-08-22 PROCEDURE — 85025 COMPLETE CBC W/AUTO DIFF WBC: CPT

## 2019-08-22 PROCEDURE — 2580000003 HC RX 258: Performed by: INTERNAL MEDICINE

## 2019-08-22 RX ORDER — HEPARIN SODIUM (PORCINE) LOCK FLUSH IV SOLN 100 UNIT/ML 100 UNIT/ML
500 SOLUTION INTRAVENOUS PRN
Status: DISCONTINUED | OUTPATIENT
Start: 2019-08-22 | End: 2019-08-23 | Stop reason: HOSPADM

## 2019-08-22 RX ORDER — SODIUM CHLORIDE 0.9 % (FLUSH) 0.9 %
10 SYRINGE (ML) INJECTION PRN
Status: CANCELLED | OUTPATIENT
Start: 2019-08-22

## 2019-08-22 RX ORDER — SODIUM CHLORIDE 0.9 % (FLUSH) 0.9 %
10 SYRINGE (ML) INJECTION PRN
Status: DISCONTINUED | OUTPATIENT
Start: 2019-08-22 | End: 2019-08-23 | Stop reason: HOSPADM

## 2019-08-22 RX ORDER — HEPARIN SODIUM (PORCINE) LOCK FLUSH IV SOLN 100 UNIT/ML 100 UNIT/ML
500 SOLUTION INTRAVENOUS PRN
Status: CANCELLED | OUTPATIENT
Start: 2019-08-22

## 2019-08-22 RX ADMIN — HEPARIN SODIUM (PORCINE) LOCK FLUSH IV SOLN 100 UNIT/ML 500 UNITS: 100 SOLUTION at 07:40

## 2019-08-22 RX ADMIN — Medication 10 ML: at 07:40

## 2019-08-28 ENCOUNTER — HOSPITAL ENCOUNTER (OUTPATIENT)
Age: 69
Setting detail: SPECIMEN
Discharge: HOME | End: 2019-08-28
Payer: MEDICARE

## 2019-08-28 ENCOUNTER — HOSPITAL ENCOUNTER (OUTPATIENT)
Dept: INFUSION THERAPY | Age: 69
Discharge: HOME | End: 2019-08-28
Payer: MEDICARE

## 2019-08-28 DIAGNOSIS — C7B.00 METASTATIC CARCINOID TUMOR (HCC): Primary | ICD-10-CM

## 2019-08-28 LAB
ABSOLUTE EOS #: 0.1 K/UL (ref 0–0.4)
ABSOLUTE IMMATURE GRANULOCYTE: ABNORMAL K/UL (ref 0–0.3)
ABSOLUTE LYMPH #: 1 K/UL (ref 1–4.8)
ABSOLUTE MONO #: 0.6 K/UL (ref 0.1–1.2)
BASOPHILS # BLD: 1 % (ref 0–1)
BASOPHILS ABSOLUTE: 0 K/UL (ref 0–0.2)
DIFFERENTIAL TYPE: ABNORMAL
EOSINOPHILS RELATIVE PERCENT: 2 % (ref 1–7)
HCT VFR BLD CALC: 35.7 % (ref 36–46)
HEMOGLOBIN: 12 G/DL (ref 12–16)
IMMATURE GRANULOCYTES: ABNORMAL %
LYMPHOCYTES # BLD: 16 % (ref 16–46)
MCH RBC QN AUTO: 30.5 PG (ref 26–34)
MCHC RBC AUTO-ENTMCNC: 33.7 G/DL (ref 31–37)
MCV RBC AUTO: 90.5 FL (ref 80–100)
MONOCYTES # BLD: 9 % (ref 4–11)
NRBC AUTOMATED: ABNORMAL PER 100 WBC
PDW BLD-RTO: 16 % (ref 11–14.5)
PLATELET # BLD: 234 K/UL (ref 140–450)
PLATELET ESTIMATE: ABNORMAL
PMV BLD AUTO: 6.7 FL (ref 6–12)
RBC # BLD: 3.95 M/UL (ref 4–5.2)
RBC # BLD: ABNORMAL 10*6/UL
SEG NEUTROPHILS: 72 % (ref 43–77)
SEGMENTED NEUTROPHILS ABSOLUTE COUNT: 4.3 K/UL (ref 1.8–7.7)
WBC # BLD: 6 K/UL (ref 3.5–11)
WBC # BLD: ABNORMAL 10*3/UL

## 2019-08-28 PROCEDURE — 36591 DRAW BLOOD OFF VENOUS DEVICE: CPT

## 2019-08-28 PROCEDURE — 85025 COMPLETE CBC W/AUTO DIFF WBC: CPT

## 2019-08-28 PROCEDURE — 2580000003 HC RX 258: Performed by: INTERNAL MEDICINE

## 2019-08-28 PROCEDURE — 6360000002 HC RX W HCPCS: Performed by: INTERNAL MEDICINE

## 2019-08-28 RX ORDER — SODIUM CHLORIDE 0.9 % (FLUSH) 0.9 %
10 SYRINGE (ML) INJECTION PRN
Status: CANCELLED | OUTPATIENT
Start: 2019-08-28

## 2019-08-28 RX ORDER — HEPARIN SODIUM (PORCINE) LOCK FLUSH IV SOLN 100 UNIT/ML 100 UNIT/ML
500 SOLUTION INTRAVENOUS PRN
Status: CANCELLED | OUTPATIENT
Start: 2019-08-28

## 2019-08-28 RX ORDER — HEPARIN SODIUM (PORCINE) LOCK FLUSH IV SOLN 100 UNIT/ML 100 UNIT/ML
500 SOLUTION INTRAVENOUS PRN
Status: DISCONTINUED | OUTPATIENT
Start: 2019-08-28 | End: 2019-08-29 | Stop reason: HOSPADM

## 2019-08-28 RX ORDER — SODIUM CHLORIDE 0.9 % (FLUSH) 0.9 %
10 SYRINGE (ML) INJECTION PRN
Status: DISCONTINUED | OUTPATIENT
Start: 2019-08-28 | End: 2019-08-29 | Stop reason: HOSPADM

## 2019-08-28 RX ADMIN — Medication 10 ML: at 08:48

## 2019-08-28 RX ADMIN — HEPARIN SODIUM (PORCINE) LOCK FLUSH IV SOLN 100 UNIT/ML 500 UNITS: 100 SOLUTION at 08:48

## 2019-09-10 ENCOUNTER — HOSPITAL ENCOUNTER (OUTPATIENT)
Age: 69
Setting detail: SPECIMEN
Discharge: HOME | End: 2019-09-10
Payer: MEDICARE

## 2019-09-10 ENCOUNTER — HOSPITAL ENCOUNTER (OUTPATIENT)
Dept: INFUSION THERAPY | Age: 69
Discharge: HOME | End: 2019-09-10
Payer: MEDICARE

## 2019-09-10 DIAGNOSIS — C7B.00 METASTATIC CARCINOID TUMOR (HCC): Primary | ICD-10-CM

## 2019-09-10 LAB
ABSOLUTE EOS #: 0.2 K/UL (ref 0–0.4)
ABSOLUTE IMMATURE GRANULOCYTE: ABNORMAL K/UL (ref 0–0.3)
ABSOLUTE LYMPH #: 1 K/UL (ref 1–4.8)
ABSOLUTE MONO #: 0.6 K/UL (ref 0.1–1.2)
BASOPHILS # BLD: 1 % (ref 0–1)
BASOPHILS ABSOLUTE: 0 K/UL (ref 0–0.2)
DIFFERENTIAL TYPE: ABNORMAL
EOSINOPHILS RELATIVE PERCENT: 4 % (ref 1–7)
HCT VFR BLD CALC: 35 % (ref 36–46)
HEMOGLOBIN: 11.7 G/DL (ref 12–16)
IMMATURE GRANULOCYTES: ABNORMAL %
LYMPHOCYTES # BLD: 18 % (ref 16–46)
MCH RBC QN AUTO: 30.1 PG (ref 26–34)
MCHC RBC AUTO-ENTMCNC: 33.6 G/DL (ref 31–37)
MCV RBC AUTO: 89.7 FL (ref 80–100)
MONOCYTES # BLD: 11 % (ref 4–11)
NRBC AUTOMATED: ABNORMAL PER 100 WBC
PDW BLD-RTO: 15.5 % (ref 11–14.5)
PLATELET # BLD: 222 K/UL (ref 140–450)
PLATELET ESTIMATE: ABNORMAL
PMV BLD AUTO: 7.3 FL (ref 6–12)
RBC # BLD: 3.9 M/UL (ref 4–5.2)
RBC # BLD: ABNORMAL 10*6/UL
SEG NEUTROPHILS: 66 % (ref 43–77)
SEGMENTED NEUTROPHILS ABSOLUTE COUNT: 3.8 K/UL (ref 1.8–7.7)
WBC # BLD: 5.6 K/UL (ref 3.5–11)
WBC # BLD: ABNORMAL 10*3/UL

## 2019-09-10 PROCEDURE — 85025 COMPLETE CBC W/AUTO DIFF WBC: CPT

## 2019-09-10 PROCEDURE — 36591 DRAW BLOOD OFF VENOUS DEVICE: CPT

## 2019-09-10 PROCEDURE — 96523 IRRIG DRUG DELIVERY DEVICE: CPT

## 2019-09-10 RX ORDER — HEPARIN SODIUM (PORCINE) LOCK FLUSH IV SOLN 100 UNIT/ML 100 UNIT/ML
500 SOLUTION INTRAVENOUS PRN
Status: DISCONTINUED | OUTPATIENT
Start: 2019-09-10 | End: 2019-09-11 | Stop reason: HOSPADM

## 2019-09-10 RX ORDER — SODIUM CHLORIDE 0.9 % (FLUSH) 0.9 %
10 SYRINGE (ML) INJECTION PRN
Status: CANCELLED | OUTPATIENT
Start: 2019-09-10

## 2019-09-10 RX ORDER — HEPARIN SODIUM (PORCINE) LOCK FLUSH IV SOLN 100 UNIT/ML 100 UNIT/ML
500 SOLUTION INTRAVENOUS PRN
Status: CANCELLED | OUTPATIENT
Start: 2019-09-10

## 2019-09-10 RX ORDER — SODIUM CHLORIDE 0.9 % (FLUSH) 0.9 %
10 SYRINGE (ML) INJECTION PRN
Status: DISCONTINUED | OUTPATIENT
Start: 2019-09-10 | End: 2019-09-11 | Stop reason: HOSPADM

## 2019-09-10 NOTE — PROGRESS NOTES
VAD accessed per protocol with 3/4 inch 20 gauge hong needle. Flushes easy with good blood return. Labs Drawn. Flushed with 10 ml of normal saline and 5 ml of heparin flush. D/C'd 3/4 inch hong needle and band aide applied. Stable discharged to home.

## 2019-09-17 ENCOUNTER — HOSPITAL ENCOUNTER (OUTPATIENT)
Age: 69
Setting detail: SPECIMEN
Discharge: HOME | End: 2019-09-17
Payer: MEDICARE

## 2019-09-17 ENCOUNTER — HOSPITAL ENCOUNTER (OUTPATIENT)
Dept: INFUSION THERAPY | Age: 69
Discharge: HOME | End: 2019-09-17
Payer: MEDICARE

## 2019-09-17 DIAGNOSIS — C7B.00 METASTATIC CARCINOID TUMOR (HCC): Primary | ICD-10-CM

## 2019-09-17 LAB
ABSOLUTE EOS #: 0.2 K/UL (ref 0–0.4)
ABSOLUTE IMMATURE GRANULOCYTE: ABNORMAL K/UL (ref 0–0.3)
ABSOLUTE LYMPH #: 0.8 K/UL (ref 1–4.8)
ABSOLUTE MONO #: 0.6 K/UL (ref 0.1–1.2)
BASOPHILS # BLD: 1 % (ref 0–1)
BASOPHILS ABSOLUTE: 0 K/UL (ref 0–0.2)
DIFFERENTIAL TYPE: ABNORMAL
EOSINOPHILS RELATIVE PERCENT: 4 % (ref 1–7)
HCT VFR BLD CALC: 33.6 % (ref 36–46)
HEMOGLOBIN: 11.3 G/DL (ref 12–16)
IMMATURE GRANULOCYTES: ABNORMAL %
LYMPHOCYTES # BLD: 16 % (ref 16–46)
MCH RBC QN AUTO: 30.5 PG (ref 26–34)
MCHC RBC AUTO-ENTMCNC: 33.7 G/DL (ref 31–37)
MCV RBC AUTO: 90.6 FL (ref 80–100)
MONOCYTES # BLD: 12 % (ref 4–11)
NRBC AUTOMATED: ABNORMAL PER 100 WBC
PDW BLD-RTO: 16 % (ref 11–14.5)
PLATELET # BLD: 189 K/UL (ref 140–450)
PLATELET ESTIMATE: ABNORMAL
PMV BLD AUTO: 7.2 FL (ref 6–12)
RBC # BLD: 3.71 M/UL (ref 4–5.2)
RBC # BLD: ABNORMAL 10*6/UL
SEG NEUTROPHILS: 67 % (ref 43–77)
SEGMENTED NEUTROPHILS ABSOLUTE COUNT: 3.5 K/UL (ref 1.8–7.7)
WBC # BLD: 5.1 K/UL (ref 3.5–11)
WBC # BLD: ABNORMAL 10*3/UL

## 2019-09-17 PROCEDURE — 6360000002 HC RX W HCPCS: Performed by: INTERNAL MEDICINE

## 2019-09-17 PROCEDURE — 85025 COMPLETE CBC W/AUTO DIFF WBC: CPT

## 2019-09-17 PROCEDURE — 36591 DRAW BLOOD OFF VENOUS DEVICE: CPT

## 2019-09-17 PROCEDURE — 2580000003 HC RX 258: Performed by: INTERNAL MEDICINE

## 2019-09-17 RX ORDER — SODIUM CHLORIDE 0.9 % (FLUSH) 0.9 %
10 SYRINGE (ML) INJECTION PRN
Status: DISCONTINUED | OUTPATIENT
Start: 2019-09-17 | End: 2019-09-18 | Stop reason: HOSPADM

## 2019-09-17 RX ORDER — SODIUM CHLORIDE 0.9 % (FLUSH) 0.9 %
10 SYRINGE (ML) INJECTION PRN
Status: CANCELLED | OUTPATIENT
Start: 2019-09-17

## 2019-09-17 RX ORDER — HEPARIN SODIUM (PORCINE) LOCK FLUSH IV SOLN 100 UNIT/ML 100 UNIT/ML
500 SOLUTION INTRAVENOUS PRN
Status: DISCONTINUED | OUTPATIENT
Start: 2019-09-17 | End: 2019-09-18 | Stop reason: HOSPADM

## 2019-09-17 RX ORDER — HEPARIN SODIUM (PORCINE) LOCK FLUSH IV SOLN 100 UNIT/ML 100 UNIT/ML
500 SOLUTION INTRAVENOUS PRN
Status: CANCELLED | OUTPATIENT
Start: 2019-09-17

## 2019-09-17 RX ADMIN — HEPARIN SODIUM (PORCINE) LOCK FLUSH IV SOLN 100 UNIT/ML 500 UNITS: 100 SOLUTION at 08:36

## 2019-09-17 RX ADMIN — Medication 10 ML: at 08:36

## 2019-09-17 NOTE — PROGRESS NOTES
VAD accessed per protocol using 20 gauge 3/4\" hong needle. Flushes easily. Labs drawn. Flushed with 10 ml normal saline and 5 ml Heplock solution. Hong needle dc'd. Bandaid to site. Patient tolerated procedure with out difficulty and discharged to home.

## 2019-09-26 RX ORDER — APIXABAN 5 MG/1
TABLET, FILM COATED ORAL
Qty: 90 TABLET | Refills: 1 | Status: SHIPPED | OUTPATIENT
Start: 2019-09-26 | End: 2020-01-09 | Stop reason: SDUPTHER

## 2019-10-09 ENCOUNTER — HOSPITAL ENCOUNTER (OUTPATIENT)
Dept: INFUSION THERAPY | Age: 69
Discharge: HOME | End: 2019-10-09
Payer: MEDICARE

## 2019-10-09 ENCOUNTER — HOSPITAL ENCOUNTER (OUTPATIENT)
Age: 69
Setting detail: SPECIMEN
Discharge: HOME | End: 2019-10-09
Payer: MEDICARE

## 2019-10-09 DIAGNOSIS — C7B.00 METASTATIC CARCINOID TUMOR (HCC): Primary | ICD-10-CM

## 2019-10-09 LAB
ABSOLUTE EOS #: 0.1 K/UL (ref 0–0.4)
ABSOLUTE IMMATURE GRANULOCYTE: ABNORMAL K/UL (ref 0–0.3)
ABSOLUTE LYMPH #: 0.9 K/UL (ref 1–4.8)
ABSOLUTE MONO #: 0.6 K/UL (ref 0.1–1.2)
BASOPHILS # BLD: 1 % (ref 0–1)
BASOPHILS ABSOLUTE: 0 K/UL (ref 0–0.2)
DIFFERENTIAL TYPE: ABNORMAL
EOSINOPHILS RELATIVE PERCENT: 2 % (ref 1–7)
HCT VFR BLD CALC: 36.6 % (ref 36–46)
HEMOGLOBIN: 12.2 G/DL (ref 12–16)
IMMATURE GRANULOCYTES: ABNORMAL %
LYMPHOCYTES # BLD: 16 % (ref 16–46)
MCH RBC QN AUTO: 30 PG (ref 26–34)
MCHC RBC AUTO-ENTMCNC: 33.4 G/DL (ref 31–37)
MCV RBC AUTO: 89.9 FL (ref 80–100)
MONOCYTES # BLD: 10 % (ref 4–11)
NRBC AUTOMATED: ABNORMAL PER 100 WBC
PDW BLD-RTO: 16.4 % (ref 11–14.5)
PLATELET # BLD: 201 K/UL (ref 140–450)
PLATELET ESTIMATE: ABNORMAL
PMV BLD AUTO: 7 FL (ref 6–12)
RBC # BLD: 4.06 M/UL (ref 4–5.2)
RBC # BLD: ABNORMAL 10*6/UL
SEG NEUTROPHILS: 71 % (ref 43–77)
SEGMENTED NEUTROPHILS ABSOLUTE COUNT: 3.8 K/UL (ref 1.8–7.7)
WBC # BLD: 5.4 K/UL (ref 3.5–11)
WBC # BLD: ABNORMAL 10*3/UL

## 2019-10-09 PROCEDURE — 6360000002 HC RX W HCPCS: Performed by: INTERNAL MEDICINE

## 2019-10-09 PROCEDURE — 2580000003 HC RX 258: Performed by: INTERNAL MEDICINE

## 2019-10-09 PROCEDURE — 85025 COMPLETE CBC W/AUTO DIFF WBC: CPT

## 2019-10-09 PROCEDURE — 96523 IRRIG DRUG DELIVERY DEVICE: CPT

## 2019-10-09 RX ORDER — HEPARIN SODIUM (PORCINE) LOCK FLUSH IV SOLN 100 UNIT/ML 100 UNIT/ML
500 SOLUTION INTRAVENOUS PRN
Status: DISCONTINUED | OUTPATIENT
Start: 2019-10-09 | End: 2019-10-10 | Stop reason: HOSPADM

## 2019-10-09 RX ORDER — HEPARIN SODIUM (PORCINE) LOCK FLUSH IV SOLN 100 UNIT/ML 100 UNIT/ML
500 SOLUTION INTRAVENOUS PRN
Status: CANCELLED | OUTPATIENT
Start: 2019-10-09

## 2019-10-09 RX ORDER — SODIUM CHLORIDE 0.9 % (FLUSH) 0.9 %
10 SYRINGE (ML) INJECTION PRN
Status: CANCELLED | OUTPATIENT
Start: 2019-10-09

## 2019-10-09 RX ORDER — SODIUM CHLORIDE 0.9 % (FLUSH) 0.9 %
10 SYRINGE (ML) INJECTION PRN
Status: DISCONTINUED | OUTPATIENT
Start: 2019-10-09 | End: 2019-10-10 | Stop reason: HOSPADM

## 2019-10-09 RX ADMIN — HEPARIN SODIUM (PORCINE) LOCK FLUSH IV SOLN 100 UNIT/ML 500 UNITS: 100 SOLUTION at 08:15

## 2019-10-09 RX ADMIN — Medication 10 ML: at 08:15

## 2019-10-23 ENCOUNTER — HOSPITAL ENCOUNTER (OUTPATIENT)
Age: 69
Setting detail: SPECIMEN
Discharge: HOME | End: 2019-10-23
Payer: MEDICARE

## 2019-10-23 ENCOUNTER — HOSPITAL ENCOUNTER (OUTPATIENT)
Dept: INFUSION THERAPY | Age: 69
Discharge: HOME | End: 2019-10-23
Payer: MEDICARE

## 2019-10-23 DIAGNOSIS — C7B.00 METASTATIC CARCINOID TUMOR (HCC): Primary | ICD-10-CM

## 2019-10-23 LAB
ABSOLUTE EOS #: 0.1 K/UL (ref 0–0.4)
ABSOLUTE IMMATURE GRANULOCYTE: ABNORMAL K/UL (ref 0–0.3)
ABSOLUTE LYMPH #: 1.4 K/UL (ref 1–4.8)
ABSOLUTE MONO #: 0.8 K/UL (ref 0.1–1.2)
BASOPHILS # BLD: 0 % (ref 0–1)
BASOPHILS ABSOLUTE: 0 K/UL (ref 0–0.2)
DIFFERENTIAL TYPE: ABNORMAL
EOSINOPHILS RELATIVE PERCENT: 2 % (ref 1–7)
HCT VFR BLD CALC: 33.3 % (ref 36–46)
HEMOGLOBIN: 11.2 G/DL (ref 12–16)
IMMATURE GRANULOCYTES: ABNORMAL %
LYMPHOCYTES # BLD: 24 % (ref 16–46)
MCH RBC QN AUTO: 30 PG (ref 26–34)
MCHC RBC AUTO-ENTMCNC: 33.6 G/DL (ref 31–37)
MCV RBC AUTO: 89.3 FL (ref 80–100)
MONOCYTES # BLD: 14 % (ref 4–11)
NRBC AUTOMATED: ABNORMAL PER 100 WBC
PDW BLD-RTO: 16.6 % (ref 11–14.5)
PLATELET # BLD: 210 K/UL (ref 140–450)
PLATELET ESTIMATE: ABNORMAL
PMV BLD AUTO: 7.5 FL (ref 6–12)
RBC # BLD: 3.73 M/UL (ref 4–5.2)
RBC # BLD: ABNORMAL 10*6/UL
SEG NEUTROPHILS: 60 % (ref 43–77)
SEGMENTED NEUTROPHILS ABSOLUTE COUNT: 3.7 K/UL (ref 1.8–7.7)
WBC # BLD: 6.1 K/UL (ref 3.5–11)
WBC # BLD: ABNORMAL 10*3/UL

## 2019-10-23 PROCEDURE — 6360000002 HC RX W HCPCS: Performed by: INTERNAL MEDICINE

## 2019-10-23 PROCEDURE — 2580000003 HC RX 258: Performed by: INTERNAL MEDICINE

## 2019-10-23 PROCEDURE — 85025 COMPLETE CBC W/AUTO DIFF WBC: CPT

## 2019-10-23 PROCEDURE — 36591 DRAW BLOOD OFF VENOUS DEVICE: CPT

## 2019-10-23 RX ORDER — HEPARIN SODIUM (PORCINE) LOCK FLUSH IV SOLN 100 UNIT/ML 100 UNIT/ML
500 SOLUTION INTRAVENOUS PRN
Status: CANCELLED | OUTPATIENT
Start: 2019-10-23

## 2019-10-23 RX ORDER — SODIUM CHLORIDE 0.9 % (FLUSH) 0.9 %
10 SYRINGE (ML) INJECTION PRN
Status: CANCELLED | OUTPATIENT
Start: 2019-10-23

## 2019-10-23 RX ORDER — SODIUM CHLORIDE 0.9 % (FLUSH) 0.9 %
10 SYRINGE (ML) INJECTION PRN
Status: DISCONTINUED | OUTPATIENT
Start: 2019-10-23 | End: 2019-10-24 | Stop reason: HOSPADM

## 2019-10-23 RX ORDER — HEPARIN SODIUM (PORCINE) LOCK FLUSH IV SOLN 100 UNIT/ML 100 UNIT/ML
500 SOLUTION INTRAVENOUS PRN
Status: DISCONTINUED | OUTPATIENT
Start: 2019-10-23 | End: 2019-10-24 | Stop reason: HOSPADM

## 2019-10-23 RX ADMIN — HEPARIN SODIUM (PORCINE) LOCK FLUSH IV SOLN 100 UNIT/ML 500 UNITS: 100 SOLUTION at 08:09

## 2019-10-23 RX ADMIN — Medication 10 ML: at 08:09

## 2019-10-23 RX ADMIN — Medication 10 ML: at 08:08

## 2019-11-04 ENCOUNTER — HOSPITAL ENCOUNTER (OUTPATIENT)
Dept: INFUSION THERAPY | Age: 69
Discharge: HOME | End: 2019-11-04
Payer: MEDICARE

## 2019-11-04 ENCOUNTER — HOSPITAL ENCOUNTER (OUTPATIENT)
Age: 69
Setting detail: SPECIMEN
Discharge: HOME | End: 2019-11-04
Payer: MEDICARE

## 2019-11-04 DIAGNOSIS — C7B.00 METASTATIC CARCINOID TUMOR (HCC): Primary | ICD-10-CM

## 2019-11-04 LAB
ABSOLUTE EOS #: 0.1 K/UL (ref 0–0.4)
ABSOLUTE IMMATURE GRANULOCYTE: ABNORMAL K/UL (ref 0–0.3)
ABSOLUTE LYMPH #: 0.6 K/UL (ref 1–4.8)
ABSOLUTE MONO #: 0.6 K/UL (ref 0.1–1.2)
BASOPHILS # BLD: 1 % (ref 0–1)
BASOPHILS ABSOLUTE: 0 K/UL (ref 0–0.2)
DIFFERENTIAL TYPE: ABNORMAL
EOSINOPHILS RELATIVE PERCENT: 1 % (ref 1–7)
HCT VFR BLD CALC: 33.5 % (ref 36–46)
HEMOGLOBIN: 11 G/DL (ref 12–16)
IMMATURE GRANULOCYTES: ABNORMAL %
LYMPHOCYTES # BLD: 9 % (ref 16–46)
MCH RBC QN AUTO: 29.3 PG (ref 26–34)
MCHC RBC AUTO-ENTMCNC: 32.8 G/DL (ref 31–37)
MCV RBC AUTO: 89.3 FL (ref 80–100)
MONOCYTES # BLD: 9 % (ref 4–11)
NRBC AUTOMATED: ABNORMAL PER 100 WBC
PDW BLD-RTO: 16.6 % (ref 11–14.5)
PLATELET # BLD: 159 K/UL (ref 140–450)
PLATELET ESTIMATE: ABNORMAL
PMV BLD AUTO: 7.2 FL (ref 6–12)
RBC # BLD: 3.75 M/UL (ref 4–5.2)
RBC # BLD: ABNORMAL 10*6/UL
SEG NEUTROPHILS: 80 % (ref 43–77)
SEGMENTED NEUTROPHILS ABSOLUTE COUNT: 5.3 K/UL (ref 1.8–7.7)
WBC # BLD: 6.6 K/UL (ref 3.5–11)
WBC # BLD: ABNORMAL 10*3/UL

## 2019-11-04 PROCEDURE — 85025 COMPLETE CBC W/AUTO DIFF WBC: CPT

## 2019-11-04 PROCEDURE — 6360000002 HC RX W HCPCS: Performed by: INTERNAL MEDICINE

## 2019-11-04 PROCEDURE — 2580000003 HC RX 258: Performed by: INTERNAL MEDICINE

## 2019-11-04 PROCEDURE — 36591 DRAW BLOOD OFF VENOUS DEVICE: CPT

## 2019-11-04 RX ORDER — HEPARIN SODIUM (PORCINE) LOCK FLUSH IV SOLN 100 UNIT/ML 100 UNIT/ML
500 SOLUTION INTRAVENOUS PRN
Status: DISCONTINUED | OUTPATIENT
Start: 2019-11-04 | End: 2019-11-05 | Stop reason: HOSPADM

## 2019-11-04 RX ORDER — HEPARIN SODIUM (PORCINE) LOCK FLUSH IV SOLN 100 UNIT/ML 100 UNIT/ML
500 SOLUTION INTRAVENOUS PRN
Status: CANCELLED | OUTPATIENT
Start: 2019-11-04

## 2019-11-04 RX ORDER — SODIUM CHLORIDE 0.9 % (FLUSH) 0.9 %
10 SYRINGE (ML) INJECTION PRN
Status: DISCONTINUED | OUTPATIENT
Start: 2019-11-04 | End: 2019-11-05 | Stop reason: HOSPADM

## 2019-11-04 RX ORDER — SODIUM CHLORIDE 0.9 % (FLUSH) 0.9 %
10 SYRINGE (ML) INJECTION PRN
Status: CANCELLED | OUTPATIENT
Start: 2019-11-04

## 2019-11-04 RX ADMIN — Medication 10 ML: at 08:00

## 2019-11-04 RX ADMIN — HEPARIN SODIUM (PORCINE) LOCK FLUSH IV SOLN 100 UNIT/ML 500 UNITS: 100 SOLUTION at 08:03

## 2019-11-04 NOTE — PROGRESS NOTES
VAD accessed per protocol with 3/4 inch 20 gauge hong needle. Flushes easy with good blood return. Labs Drawn. Flushed with 10 ml of normal saline and 5 ml of heparin flush. D/C'd 3/4 inch hong needle and band aide applied. Pita Ulrich

## 2019-11-11 ENCOUNTER — HOSPITAL ENCOUNTER (OUTPATIENT)
Dept: INFUSION THERAPY | Age: 69
Discharge: HOME | End: 2019-11-11
Payer: MEDICARE

## 2019-11-11 ENCOUNTER — IMMUNIZATION (OUTPATIENT)
Dept: LAB | Age: 69
End: 2019-11-11
Payer: MEDICARE

## 2019-11-11 ENCOUNTER — HOSPITAL ENCOUNTER (OUTPATIENT)
Age: 69
Setting detail: SPECIMEN
Discharge: HOME | End: 2019-11-11
Payer: MEDICARE

## 2019-11-11 DIAGNOSIS — C7B.00 METASTATIC CARCINOID TUMOR (HCC): Primary | ICD-10-CM

## 2019-11-11 LAB
ABSOLUTE EOS #: 0.2 K/UL (ref 0–0.4)
ABSOLUTE IMMATURE GRANULOCYTE: ABNORMAL K/UL (ref 0–0.3)
ABSOLUTE LYMPH #: 0.7 K/UL (ref 1–4.8)
ABSOLUTE MONO #: 0.6 K/UL (ref 0.1–1.2)
BASOPHILS # BLD: 0 % (ref 0–1)
BASOPHILS ABSOLUTE: 0 K/UL (ref 0–0.2)
DIFFERENTIAL TYPE: ABNORMAL
EOSINOPHILS RELATIVE PERCENT: 3 % (ref 1–7)
HCT VFR BLD CALC: 34.2 % (ref 36–46)
HEMOGLOBIN: 11.3 G/DL (ref 12–16)
IMMATURE GRANULOCYTES: ABNORMAL %
LYMPHOCYTES # BLD: 13 % (ref 16–46)
MCH RBC QN AUTO: 29.2 PG (ref 26–34)
MCHC RBC AUTO-ENTMCNC: 33.1 G/DL (ref 31–37)
MCV RBC AUTO: 88.4 FL (ref 80–100)
MONOCYTES # BLD: 11 % (ref 4–11)
NRBC AUTOMATED: ABNORMAL PER 100 WBC
PDW BLD-RTO: 16.2 % (ref 11–14.5)
PLATELET # BLD: 231 K/UL (ref 140–450)
PLATELET ESTIMATE: ABNORMAL
PMV BLD AUTO: 6.9 FL (ref 6–12)
RBC # BLD: 3.87 M/UL (ref 4–5.2)
RBC # BLD: ABNORMAL 10*6/UL
SEG NEUTROPHILS: 73 % (ref 43–77)
SEGMENTED NEUTROPHILS ABSOLUTE COUNT: 3.8 K/UL (ref 1.8–7.7)
WBC # BLD: 5.3 K/UL (ref 3.5–11)
WBC # BLD: ABNORMAL 10*3/UL

## 2019-11-11 PROCEDURE — G0008 ADMIN INFLUENZA VIRUS VAC: HCPCS | Performed by: FAMILY MEDICINE

## 2019-11-11 PROCEDURE — 6360000002 HC RX W HCPCS: Performed by: INTERNAL MEDICINE

## 2019-11-11 PROCEDURE — 85025 COMPLETE CBC W/AUTO DIFF WBC: CPT

## 2019-11-11 PROCEDURE — 2580000003 HC RX 258: Performed by: INTERNAL MEDICINE

## 2019-11-11 PROCEDURE — 36591 DRAW BLOOD OFF VENOUS DEVICE: CPT

## 2019-11-11 PROCEDURE — 90653 IIV ADJUVANT VACCINE IM: CPT | Performed by: FAMILY MEDICINE

## 2019-11-11 RX ORDER — SODIUM CHLORIDE 0.9 % (FLUSH) 0.9 %
10 SYRINGE (ML) INJECTION PRN
Status: CANCELLED | OUTPATIENT
Start: 2019-11-11

## 2019-11-11 RX ORDER — HEPARIN SODIUM (PORCINE) LOCK FLUSH IV SOLN 100 UNIT/ML 100 UNIT/ML
500 SOLUTION INTRAVENOUS PRN
Status: DISCONTINUED | OUTPATIENT
Start: 2019-11-11 | End: 2019-11-12 | Stop reason: HOSPADM

## 2019-11-11 RX ORDER — HEPARIN SODIUM (PORCINE) LOCK FLUSH IV SOLN 100 UNIT/ML 100 UNIT/ML
500 SOLUTION INTRAVENOUS PRN
Status: CANCELLED | OUTPATIENT
Start: 2019-11-11

## 2019-11-11 RX ORDER — SODIUM CHLORIDE 0.9 % (FLUSH) 0.9 %
10 SYRINGE (ML) INJECTION PRN
Status: DISCONTINUED | OUTPATIENT
Start: 2019-11-11 | End: 2019-11-12 | Stop reason: HOSPADM

## 2019-11-11 RX ADMIN — HEPARIN SODIUM (PORCINE) LOCK FLUSH IV SOLN 100 UNIT/ML 500 UNITS: 100 SOLUTION at 08:10

## 2019-11-11 RX ADMIN — Medication 10 ML: at 08:10

## 2019-11-19 ENCOUNTER — HOSPITAL ENCOUNTER (OUTPATIENT)
Age: 69
Setting detail: SPECIMEN
Discharge: HOME | End: 2019-11-19
Payer: MEDICARE

## 2019-11-19 ENCOUNTER — HOSPITAL ENCOUNTER (OUTPATIENT)
Dept: INFUSION THERAPY | Age: 69
Discharge: HOME | End: 2019-11-19
Payer: MEDICARE

## 2019-11-19 DIAGNOSIS — C7B.00 METASTATIC CARCINOID TUMOR (HCC): Primary | ICD-10-CM

## 2019-11-19 LAB
ABSOLUTE EOS #: 0.1 K/UL (ref 0–0.4)
ABSOLUTE IMMATURE GRANULOCYTE: ABNORMAL K/UL (ref 0–0.3)
ABSOLUTE LYMPH #: 0.7 K/UL (ref 1–4.8)
ABSOLUTE MONO #: 0.6 K/UL (ref 0.1–1.2)
BASOPHILS # BLD: 0 % (ref 0–1)
BASOPHILS ABSOLUTE: 0 K/UL (ref 0–0.2)
DIFFERENTIAL TYPE: ABNORMAL
EOSINOPHILS RELATIVE PERCENT: 3 % (ref 1–7)
HCT VFR BLD CALC: 32.6 % (ref 36–46)
HEMOGLOBIN: 10.7 G/DL (ref 12–16)
IMMATURE GRANULOCYTES: ABNORMAL %
LYMPHOCYTES # BLD: 18 % (ref 16–46)
MCH RBC QN AUTO: 29.1 PG (ref 26–34)
MCHC RBC AUTO-ENTMCNC: 32.9 G/DL (ref 31–37)
MCV RBC AUTO: 88.4 FL (ref 80–100)
MONOCYTES # BLD: 14 % (ref 4–11)
NRBC AUTOMATED: ABNORMAL PER 100 WBC
PDW BLD-RTO: 15.9 % (ref 11–14.5)
PLATELET # BLD: 203 K/UL (ref 140–450)
PLATELET ESTIMATE: ABNORMAL
PMV BLD AUTO: 7.3 FL (ref 6–12)
RBC # BLD: 3.69 M/UL (ref 4–5.2)
RBC # BLD: ABNORMAL 10*6/UL
SEG NEUTROPHILS: 65 % (ref 43–77)
SEGMENTED NEUTROPHILS ABSOLUTE COUNT: 2.7 K/UL (ref 1.8–7.7)
WBC # BLD: 4.2 K/UL (ref 3.5–11)
WBC # BLD: ABNORMAL 10*3/UL

## 2019-11-19 PROCEDURE — 2580000003 HC RX 258: Performed by: INTERNAL MEDICINE

## 2019-11-19 PROCEDURE — 36591 DRAW BLOOD OFF VENOUS DEVICE: CPT

## 2019-11-19 PROCEDURE — 6360000002 HC RX W HCPCS: Performed by: INTERNAL MEDICINE

## 2019-11-19 PROCEDURE — 85025 COMPLETE CBC W/AUTO DIFF WBC: CPT

## 2019-11-19 RX ORDER — SODIUM CHLORIDE 0.9 % (FLUSH) 0.9 %
10 SYRINGE (ML) INJECTION PRN
Status: DISCONTINUED | OUTPATIENT
Start: 2019-11-19 | End: 2019-11-20 | Stop reason: HOSPADM

## 2019-11-19 RX ORDER — HEPARIN SODIUM (PORCINE) LOCK FLUSH IV SOLN 100 UNIT/ML 100 UNIT/ML
500 SOLUTION INTRAVENOUS PRN
Status: DISCONTINUED | OUTPATIENT
Start: 2019-11-19 | End: 2019-11-20 | Stop reason: HOSPADM

## 2019-11-19 RX ORDER — HEPARIN SODIUM (PORCINE) LOCK FLUSH IV SOLN 100 UNIT/ML 100 UNIT/ML
500 SOLUTION INTRAVENOUS PRN
Status: CANCELLED | OUTPATIENT
Start: 2019-11-19

## 2019-11-19 RX ORDER — SODIUM CHLORIDE 0.9 % (FLUSH) 0.9 %
10 SYRINGE (ML) INJECTION PRN
Status: CANCELLED | OUTPATIENT
Start: 2019-11-19

## 2019-11-19 RX ADMIN — HEPARIN SODIUM (PORCINE) LOCK FLUSH IV SOLN 100 UNIT/ML 500 UNITS: 100 SOLUTION at 08:05

## 2019-11-19 RX ADMIN — Medication 10 ML: at 08:05

## 2019-11-19 NOTE — PROGRESS NOTES
VAD accessed per protocol with 3/4 \" hong needle. Flushed easily with saline. Blood return noted. Labs drawn. Flushed with 10 ml of NSS and 5 ml of Heparin flush. VAD D/C'd and Band-Aid to site. Patient stable and discharged to home.

## 2019-12-03 ENCOUNTER — HOSPITAL ENCOUNTER (OUTPATIENT)
Dept: INFUSION THERAPY | Age: 69
Discharge: HOME | End: 2019-12-03
Payer: MEDICARE

## 2019-12-03 ENCOUNTER — OFFICE VISIT (OUTPATIENT)
Dept: PRIMARY CARE CLINIC | Age: 69
End: 2019-12-03
Payer: MEDICARE

## 2019-12-03 ENCOUNTER — HOSPITAL ENCOUNTER (OUTPATIENT)
Age: 69
Setting detail: SPECIMEN
Discharge: HOME | End: 2019-12-03
Payer: MEDICARE

## 2019-12-03 VITALS
BODY MASS INDEX: 23.52 KG/M2 | HEART RATE: 84 BPM | DIASTOLIC BLOOD PRESSURE: 68 MMHG | HEIGHT: 64 IN | WEIGHT: 137.8 LBS | SYSTOLIC BLOOD PRESSURE: 100 MMHG | RESPIRATION RATE: 16 BRPM | TEMPERATURE: 100.3 F | OXYGEN SATURATION: 97 %

## 2019-12-03 DIAGNOSIS — C7B.00 METASTATIC CARCINOID TUMOR (HCC): Primary | ICD-10-CM

## 2019-12-03 DIAGNOSIS — J01.40 ACUTE PANSINUSITIS, RECURRENCE NOT SPECIFIED: Primary | ICD-10-CM

## 2019-12-03 LAB
ABSOLUTE EOS #: 0 K/UL (ref 0–0.4)
ABSOLUTE IMMATURE GRANULOCYTE: ABNORMAL K/UL (ref 0–0.3)
ABSOLUTE LYMPH #: 0.5 K/UL (ref 1–4.8)
ABSOLUTE MONO #: 0.8 K/UL (ref 0.1–1.2)
BASOPHILS # BLD: 0 % (ref 0–1)
BASOPHILS ABSOLUTE: 0 K/UL (ref 0–0.2)
DIFFERENTIAL TYPE: ABNORMAL
EOSINOPHILS RELATIVE PERCENT: 1 % (ref 1–7)
HCT VFR BLD CALC: 30.9 % (ref 36–46)
HEMOGLOBIN: 10.2 G/DL (ref 12–16)
IMMATURE GRANULOCYTES: ABNORMAL %
LYMPHOCYTES # BLD: 7 % (ref 16–46)
MCH RBC QN AUTO: 28.5 PG (ref 26–34)
MCHC RBC AUTO-ENTMCNC: 33.1 G/DL (ref 31–37)
MCV RBC AUTO: 86.1 FL (ref 80–100)
MONOCYTES # BLD: 10 % (ref 4–11)
NRBC AUTOMATED: ABNORMAL PER 100 WBC
PDW BLD-RTO: 15.7 % (ref 11–14.5)
PLATELET # BLD: 257 K/UL (ref 140–450)
PLATELET ESTIMATE: ABNORMAL
PMV BLD AUTO: 7 FL (ref 6–12)
RBC # BLD: 3.58 M/UL (ref 4–5.2)
RBC # BLD: ABNORMAL 10*6/UL
SEG NEUTROPHILS: 82 % (ref 43–77)
SEGMENTED NEUTROPHILS ABSOLUTE COUNT: 6.6 K/UL (ref 1.8–7.7)
WBC # BLD: 8 K/UL (ref 3.5–11)
WBC # BLD: ABNORMAL 10*3/UL

## 2019-12-03 PROCEDURE — G8427 DOCREV CUR MEDS BY ELIG CLIN: HCPCS | Performed by: FAMILY MEDICINE

## 2019-12-03 PROCEDURE — 2580000003 HC RX 258: Performed by: INTERNAL MEDICINE

## 2019-12-03 PROCEDURE — G8482 FLU IMMUNIZE ORDER/ADMIN: HCPCS | Performed by: FAMILY MEDICINE

## 2019-12-03 PROCEDURE — 99213 OFFICE O/P EST LOW 20 MIN: CPT | Performed by: FAMILY MEDICINE

## 2019-12-03 PROCEDURE — 6360000002 HC RX W HCPCS: Performed by: INTERNAL MEDICINE

## 2019-12-03 PROCEDURE — G8420 CALC BMI NORM PARAMETERS: HCPCS | Performed by: FAMILY MEDICINE

## 2019-12-03 PROCEDURE — 3017F COLORECTAL CA SCREEN DOC REV: CPT | Performed by: FAMILY MEDICINE

## 2019-12-03 PROCEDURE — 1090F PRES/ABSN URINE INCON ASSESS: CPT | Performed by: FAMILY MEDICINE

## 2019-12-03 PROCEDURE — 85025 COMPLETE CBC W/AUTO DIFF WBC: CPT

## 2019-12-03 PROCEDURE — G9899 SCRN MAM PERF RSLTS DOC: HCPCS | Performed by: FAMILY MEDICINE

## 2019-12-03 PROCEDURE — 4040F PNEUMOC VAC/ADMIN/RCVD: CPT | Performed by: FAMILY MEDICINE

## 2019-12-03 PROCEDURE — 1123F ACP DISCUSS/DSCN MKR DOCD: CPT | Performed by: FAMILY MEDICINE

## 2019-12-03 PROCEDURE — 36591 DRAW BLOOD OFF VENOUS DEVICE: CPT

## 2019-12-03 PROCEDURE — 1036F TOBACCO NON-USER: CPT | Performed by: FAMILY MEDICINE

## 2019-12-03 PROCEDURE — G8399 PT W/DXA RESULTS DOCUMENT: HCPCS | Performed by: FAMILY MEDICINE

## 2019-12-03 RX ORDER — HEPARIN SODIUM (PORCINE) LOCK FLUSH IV SOLN 100 UNIT/ML 100 UNIT/ML
500 SOLUTION INTRAVENOUS PRN
Status: DISCONTINUED | OUTPATIENT
Start: 2019-12-03 | End: 2019-12-04 | Stop reason: HOSPADM

## 2019-12-03 RX ORDER — CEFUROXIME AXETIL 500 MG/1
500 TABLET ORAL 2 TIMES DAILY
Qty: 20 TABLET | Refills: 0 | Status: SHIPPED | OUTPATIENT
Start: 2019-12-03 | End: 2019-12-13

## 2019-12-03 RX ORDER — HEPARIN SODIUM (PORCINE) LOCK FLUSH IV SOLN 100 UNIT/ML 100 UNIT/ML
500 SOLUTION INTRAVENOUS PRN
Status: CANCELLED | OUTPATIENT
Start: 2019-12-03

## 2019-12-03 RX ORDER — SODIUM CHLORIDE 0.9 % (FLUSH) 0.9 %
10 SYRINGE (ML) INJECTION PRN
Status: DISCONTINUED | OUTPATIENT
Start: 2019-12-03 | End: 2019-12-04 | Stop reason: HOSPADM

## 2019-12-03 RX ORDER — SODIUM CHLORIDE 0.9 % (FLUSH) 0.9 %
10 SYRINGE (ML) INJECTION PRN
Status: CANCELLED | OUTPATIENT
Start: 2019-12-03

## 2019-12-03 RX ADMIN — HEPARIN SODIUM (PORCINE) LOCK FLUSH IV SOLN 100 UNIT/ML 500 UNITS: 100 SOLUTION at 08:26

## 2019-12-03 RX ADMIN — Medication 10 ML: at 08:26

## 2019-12-10 ENCOUNTER — HOSPITAL ENCOUNTER (OUTPATIENT)
Dept: INFUSION THERAPY | Age: 69
Discharge: HOME | End: 2019-12-10
Payer: MEDICARE

## 2019-12-10 ENCOUNTER — HOSPITAL ENCOUNTER (OUTPATIENT)
Age: 69
Setting detail: SPECIMEN
Discharge: HOME | End: 2019-12-10
Payer: MEDICARE

## 2019-12-10 DIAGNOSIS — C7B.00 METASTATIC CARCINOID TUMOR (HCC): Primary | ICD-10-CM

## 2019-12-10 LAB
ABSOLUTE EOS #: 0.1 K/UL (ref 0–0.4)
ABSOLUTE IMMATURE GRANULOCYTE: ABNORMAL K/UL (ref 0–0.3)
ABSOLUTE LYMPH #: 0.7 K/UL (ref 1–4.8)
ABSOLUTE MONO #: 0.7 K/UL (ref 0.1–1.2)
BASOPHILS # BLD: 0 % (ref 0–1)
BASOPHILS ABSOLUTE: 0 K/UL (ref 0–0.2)
DIFFERENTIAL TYPE: ABNORMAL
EOSINOPHILS RELATIVE PERCENT: 2 % (ref 1–7)
HCT VFR BLD CALC: 30 % (ref 36–46)
HEMOGLOBIN: 9.9 G/DL (ref 12–16)
IMMATURE GRANULOCYTES: ABNORMAL %
LYMPHOCYTES # BLD: 13 % (ref 16–46)
MCH RBC QN AUTO: 28.2 PG (ref 26–34)
MCHC RBC AUTO-ENTMCNC: 33.1 G/DL (ref 31–37)
MCV RBC AUTO: 85 FL (ref 80–100)
MONOCYTES # BLD: 13 % (ref 4–11)
NRBC AUTOMATED: ABNORMAL PER 100 WBC
PDW BLD-RTO: 15.9 % (ref 11–14.5)
PLATELET # BLD: 364 K/UL (ref 140–450)
PLATELET ESTIMATE: ABNORMAL
PMV BLD AUTO: 6.6 FL (ref 6–12)
RBC # BLD: 3.53 M/UL (ref 4–5.2)
RBC # BLD: ABNORMAL 10*6/UL
SEG NEUTROPHILS: 72 % (ref 43–77)
SEGMENTED NEUTROPHILS ABSOLUTE COUNT: 3.9 K/UL (ref 1.8–7.7)
WBC # BLD: 5.5 K/UL (ref 3.5–11)
WBC # BLD: ABNORMAL 10*3/UL

## 2019-12-10 PROCEDURE — 36591 DRAW BLOOD OFF VENOUS DEVICE: CPT

## 2019-12-10 PROCEDURE — 2580000003 HC RX 258: Performed by: INTERNAL MEDICINE

## 2019-12-10 PROCEDURE — 6360000002 HC RX W HCPCS: Performed by: INTERNAL MEDICINE

## 2019-12-10 PROCEDURE — 85025 COMPLETE CBC W/AUTO DIFF WBC: CPT

## 2019-12-10 RX ORDER — SODIUM CHLORIDE 0.9 % (FLUSH) 0.9 %
10 SYRINGE (ML) INJECTION PRN
Status: DISCONTINUED | OUTPATIENT
Start: 2019-12-10 | End: 2019-12-11 | Stop reason: HOSPADM

## 2019-12-10 RX ORDER — HEPARIN SODIUM (PORCINE) LOCK FLUSH IV SOLN 100 UNIT/ML 100 UNIT/ML
500 SOLUTION INTRAVENOUS PRN
Status: CANCELLED | OUTPATIENT
Start: 2019-12-10

## 2019-12-10 RX ORDER — HEPARIN SODIUM (PORCINE) LOCK FLUSH IV SOLN 100 UNIT/ML 100 UNIT/ML
500 SOLUTION INTRAVENOUS PRN
Status: DISCONTINUED | OUTPATIENT
Start: 2019-12-10 | End: 2019-12-11 | Stop reason: HOSPADM

## 2019-12-10 RX ORDER — SODIUM CHLORIDE 0.9 % (FLUSH) 0.9 %
10 SYRINGE (ML) INJECTION PRN
Status: CANCELLED | OUTPATIENT
Start: 2019-12-10

## 2019-12-10 RX ADMIN — Medication 10 ML: at 08:30

## 2019-12-10 RX ADMIN — HEPARIN SODIUM (PORCINE) LOCK FLUSH IV SOLN 100 UNIT/ML 500 UNITS: 100 SOLUTION at 08:30

## 2019-12-11 ENCOUNTER — OFFICE VISIT (OUTPATIENT)
Dept: PRIMARY CARE CLINIC | Age: 69
End: 2019-12-11
Payer: MEDICARE

## 2019-12-11 VITALS
TEMPERATURE: 97.9 F | BODY MASS INDEX: 24.38 KG/M2 | OXYGEN SATURATION: 97 % | WEIGHT: 142.8 LBS | RESPIRATION RATE: 16 BRPM | DIASTOLIC BLOOD PRESSURE: 86 MMHG | HEART RATE: 78 BPM | HEIGHT: 64 IN | SYSTOLIC BLOOD PRESSURE: 120 MMHG

## 2019-12-11 DIAGNOSIS — J01.40 ACUTE PANSINUSITIS, RECURRENCE NOT SPECIFIED: Primary | ICD-10-CM

## 2019-12-11 PROCEDURE — 4040F PNEUMOC VAC/ADMIN/RCVD: CPT | Performed by: FAMILY MEDICINE

## 2019-12-11 PROCEDURE — G8482 FLU IMMUNIZE ORDER/ADMIN: HCPCS | Performed by: FAMILY MEDICINE

## 2019-12-11 PROCEDURE — G8427 DOCREV CUR MEDS BY ELIG CLIN: HCPCS | Performed by: FAMILY MEDICINE

## 2019-12-11 PROCEDURE — 99213 OFFICE O/P EST LOW 20 MIN: CPT | Performed by: FAMILY MEDICINE

## 2019-12-11 PROCEDURE — 3017F COLORECTAL CA SCREEN DOC REV: CPT | Performed by: FAMILY MEDICINE

## 2019-12-11 PROCEDURE — 1090F PRES/ABSN URINE INCON ASSESS: CPT | Performed by: FAMILY MEDICINE

## 2019-12-11 PROCEDURE — G8420 CALC BMI NORM PARAMETERS: HCPCS | Performed by: FAMILY MEDICINE

## 2019-12-11 PROCEDURE — 1123F ACP DISCUSS/DSCN MKR DOCD: CPT | Performed by: FAMILY MEDICINE

## 2019-12-11 PROCEDURE — G9899 SCRN MAM PERF RSLTS DOC: HCPCS | Performed by: FAMILY MEDICINE

## 2019-12-11 PROCEDURE — 1036F TOBACCO NON-USER: CPT | Performed by: FAMILY MEDICINE

## 2019-12-11 PROCEDURE — G8399 PT W/DXA RESULTS DOCUMENT: HCPCS | Performed by: FAMILY MEDICINE

## 2019-12-11 RX ORDER — AZITHROMYCIN 250 MG/1
250 TABLET, FILM COATED ORAL SEE ADMIN INSTRUCTIONS
Qty: 6 TABLET | Refills: 0 | Status: SHIPPED | OUTPATIENT
Start: 2019-12-11 | End: 2022-02-15 | Stop reason: SDUPTHER

## 2019-12-11 ASSESSMENT — PATIENT HEALTH QUESTIONNAIRE - PHQ9
SUM OF ALL RESPONSES TO PHQ9 QUESTIONS 1 & 2: 0
2. FEELING DOWN, DEPRESSED OR HOPELESS: 0
SUM OF ALL RESPONSES TO PHQ QUESTIONS 1-9: 0
1. LITTLE INTEREST OR PLEASURE IN DOING THINGS: 0
SUM OF ALL RESPONSES TO PHQ QUESTIONS 1-9: 0

## 2019-12-17 ENCOUNTER — HOSPITAL ENCOUNTER (OUTPATIENT)
Age: 69
Setting detail: SPECIMEN
Discharge: HOME | End: 2019-12-17
Payer: MEDICARE

## 2019-12-17 ENCOUNTER — HOSPITAL ENCOUNTER (OUTPATIENT)
Dept: INFUSION THERAPY | Age: 69
Discharge: HOME | End: 2019-12-17
Payer: MEDICARE

## 2019-12-17 DIAGNOSIS — C7B.00 METASTATIC CARCINOID TUMOR (HCC): Primary | ICD-10-CM

## 2019-12-17 LAB
ABSOLUTE EOS #: 0.1 K/UL (ref 0–0.4)
ABSOLUTE IMMATURE GRANULOCYTE: ABNORMAL K/UL (ref 0–0.3)
ABSOLUTE LYMPH #: 0.6 K/UL (ref 1–4.8)
ABSOLUTE MONO #: 0.5 K/UL (ref 0.1–1.2)
BASOPHILS # BLD: 0 % (ref 0–1)
BASOPHILS ABSOLUTE: 0 K/UL (ref 0–0.2)
DIFFERENTIAL TYPE: ABNORMAL
EOSINOPHILS RELATIVE PERCENT: 3 % (ref 1–7)
HCT VFR BLD CALC: 31.1 % (ref 36–46)
HEMOGLOBIN: 10.2 G/DL (ref 12–16)
IMMATURE GRANULOCYTES: ABNORMAL %
LYMPHOCYTES # BLD: 17 % (ref 16–46)
MCH RBC QN AUTO: 27.8 PG (ref 26–34)
MCHC RBC AUTO-ENTMCNC: 32.7 G/DL (ref 31–37)
MCV RBC AUTO: 85 FL (ref 80–100)
MONOCYTES # BLD: 13 % (ref 4–11)
NRBC AUTOMATED: ABNORMAL PER 100 WBC
PDW BLD-RTO: 16 % (ref 11–14.5)
PLATELET # BLD: 253 K/UL (ref 140–450)
PLATELET ESTIMATE: ABNORMAL
PMV BLD AUTO: 6.4 FL (ref 6–12)
RBC # BLD: 3.66 M/UL (ref 4–5.2)
RBC # BLD: ABNORMAL 10*6/UL
SEG NEUTROPHILS: 67 % (ref 43–77)
SEGMENTED NEUTROPHILS ABSOLUTE COUNT: 2.5 K/UL (ref 1.8–7.7)
WBC # BLD: 3.7 K/UL (ref 3.5–11)
WBC # BLD: ABNORMAL 10*3/UL

## 2019-12-17 PROCEDURE — 36591 DRAW BLOOD OFF VENOUS DEVICE: CPT

## 2019-12-17 PROCEDURE — 85025 COMPLETE CBC W/AUTO DIFF WBC: CPT

## 2019-12-17 PROCEDURE — 6360000002 HC RX W HCPCS: Performed by: INTERNAL MEDICINE

## 2019-12-17 PROCEDURE — 2580000003 HC RX 258: Performed by: INTERNAL MEDICINE

## 2019-12-17 RX ORDER — SODIUM CHLORIDE 0.9 % (FLUSH) 0.9 %
10 SYRINGE (ML) INJECTION PRN
Status: DISCONTINUED | OUTPATIENT
Start: 2019-12-17 | End: 2019-12-18 | Stop reason: HOSPADM

## 2019-12-17 RX ORDER — HEPARIN SODIUM (PORCINE) LOCK FLUSH IV SOLN 100 UNIT/ML 100 UNIT/ML
500 SOLUTION INTRAVENOUS PRN
Status: DISCONTINUED | OUTPATIENT
Start: 2019-12-17 | End: 2019-12-18 | Stop reason: HOSPADM

## 2019-12-17 RX ORDER — SODIUM CHLORIDE 0.9 % (FLUSH) 0.9 %
10 SYRINGE (ML) INJECTION PRN
Status: CANCELLED | OUTPATIENT
Start: 2019-12-17

## 2019-12-17 RX ORDER — HEPARIN SODIUM (PORCINE) LOCK FLUSH IV SOLN 100 UNIT/ML 100 UNIT/ML
500 SOLUTION INTRAVENOUS PRN
Status: CANCELLED | OUTPATIENT
Start: 2019-12-17

## 2019-12-17 RX ADMIN — HEPARIN SODIUM (PORCINE) LOCK FLUSH IV SOLN 100 UNIT/ML 500 UNITS: 100 SOLUTION at 08:29

## 2019-12-17 RX ADMIN — Medication 10 ML: at 08:29

## 2019-12-17 RX ADMIN — Medication 10 ML: at 08:27

## 2020-01-07 ENCOUNTER — OFFICE VISIT (OUTPATIENT)
Dept: FAMILY MEDICINE CLINIC | Age: 70
End: 2020-01-07
Payer: MEDICARE

## 2020-01-07 VITALS
SYSTOLIC BLOOD PRESSURE: 124 MMHG | BODY MASS INDEX: 24.24 KG/M2 | WEIGHT: 142 LBS | DIASTOLIC BLOOD PRESSURE: 72 MMHG | HEIGHT: 64 IN | RESPIRATION RATE: 16 BRPM | HEART RATE: 80 BPM

## 2020-01-07 PROCEDURE — 99214 OFFICE O/P EST MOD 30 MIN: CPT | Performed by: FAMILY MEDICINE

## 2020-01-07 PROCEDURE — G8420 CALC BMI NORM PARAMETERS: HCPCS | Performed by: FAMILY MEDICINE

## 2020-01-07 PROCEDURE — G8399 PT W/DXA RESULTS DOCUMENT: HCPCS | Performed by: FAMILY MEDICINE

## 2020-01-07 PROCEDURE — 1123F ACP DISCUSS/DSCN MKR DOCD: CPT | Performed by: FAMILY MEDICINE

## 2020-01-07 PROCEDURE — G8427 DOCREV CUR MEDS BY ELIG CLIN: HCPCS | Performed by: FAMILY MEDICINE

## 2020-01-07 PROCEDURE — 1090F PRES/ABSN URINE INCON ASSESS: CPT | Performed by: FAMILY MEDICINE

## 2020-01-07 PROCEDURE — 4040F PNEUMOC VAC/ADMIN/RCVD: CPT | Performed by: FAMILY MEDICINE

## 2020-01-07 PROCEDURE — 3017F COLORECTAL CA SCREEN DOC REV: CPT | Performed by: FAMILY MEDICINE

## 2020-01-07 PROCEDURE — G8482 FLU IMMUNIZE ORDER/ADMIN: HCPCS | Performed by: FAMILY MEDICINE

## 2020-01-07 PROCEDURE — 1036F TOBACCO NON-USER: CPT | Performed by: FAMILY MEDICINE

## 2020-01-07 PROCEDURE — 99212 OFFICE O/P EST SF 10 MIN: CPT | Performed by: FAMILY MEDICINE

## 2020-01-07 RX ORDER — LISINOPRIL 30 MG/1
TABLET ORAL
Qty: 90 TABLET | Refills: 1 | Status: SHIPPED | OUTPATIENT
Start: 2020-01-07 | End: 2020-08-14

## 2020-01-07 ASSESSMENT — PATIENT HEALTH QUESTIONNAIRE - PHQ9
1. LITTLE INTEREST OR PLEASURE IN DOING THINGS: 0
2. FEELING DOWN, DEPRESSED OR HOPELESS: 0
SUM OF ALL RESPONSES TO PHQ QUESTIONS 1-9: 0
SUM OF ALL RESPONSES TO PHQ9 QUESTIONS 1 & 2: 0
SUM OF ALL RESPONSES TO PHQ QUESTIONS 1-9: 0

## 2020-01-07 NOTE — PROGRESS NOTES
[address]                        Telephone [phone]             Fax [phone]     Miles De Luna  1950  MRN:  [id number]  Date of visit:  1/7/2020    Subjective:    Miles De Luna is a 81 y.o.  female who presents to [facility] today (1/7/2020) for follow up/evaluation of:  Hypertension      She states that she has been feeling well. She reports that she has not been exercising regularly. She denies chest pain or shortness of breath with activity or at rest.  She is tolerating her medications well. She has no new concerns or complaints today.   She continues to see oncology at [facility] at [facility]       She has the following problem list:  Patient Active Problem List   Diagnosis    Essential hypertension    Osteoarthritis    Osteopenia    History of breast cancer    History of lung cancer    Elevated glucose    Primary osteoarthritis of left knee    Metastatic carcinoid tumor ([address]    Cholecystitis    Hepatic metastasis ([address]    RUQ pain    Abdominal pain    Paroxysmal supraventricular tachycardia ([address]        Current medications are:  Outpatient Medications Marked as Taking for the 1/7/20 encounter (Office Visit) with Rock Rosales MD   Medication Sig Dispense Refill    ELIQUIS 5 MG TABS tablet take 1 tablet by mouth twice a day 90 tablet 1    lisinopril (PRINIVIL;ZESTRIL) 30 MG tablet take 1 tablet by mouth once daily 90 tablet 1    diltiazem (TIAZAC) 240 MG extended release capsule Take 1 capsule by mouth daily 90 capsule 3    ondansetron (ZOFRAN) 4 MG tablet Take 1 tablet by mouth every 8 hours as needed for Nausea 20 tablet 0    fluticasone (FLONASE) 50 MCG/ACT nasal spray 1 spray by Nasal route daily as needed for Rhinitis 3 Bottle 1    acetaminophen (TYLENOL) 325 MG tablet Take 2 tablets by mouth every 4 including Pneumovax, Prevnar-13, and Tdap, is up to date at this time.         (Please note that portions of this note were completed with a voice-recognition program. Efforts were made to edit the dictation but occasionally words are mis-transcribed.)

## 2020-01-09 ENCOUNTER — HOSPITAL ENCOUNTER (OUTPATIENT)
Dept: INFUSION THERAPY | Age: 70
Discharge: HOME | End: 2020-01-09
Payer: MEDICARE

## 2020-01-09 ENCOUNTER — HOSPITAL ENCOUNTER (OUTPATIENT)
Age: 70
Setting detail: SPECIMEN
Discharge: HOME | End: 2020-01-09
Payer: MEDICARE

## 2020-01-09 DIAGNOSIS — R73.09 ELEVATED GLUCOSE: Primary | ICD-10-CM

## 2020-01-09 DIAGNOSIS — I10 ESSENTIAL (PRIMARY) HYPERTENSION: ICD-10-CM

## 2020-01-09 DIAGNOSIS — I10 ESSENTIAL HYPERTENSION: ICD-10-CM

## 2020-01-09 DIAGNOSIS — C7B.00 METASTATIC CARCINOID TUMOR (HCC): ICD-10-CM

## 2020-01-09 LAB
ABSOLUTE EOS #: 0.1 K/UL (ref 0–0.44)
ABSOLUTE IMMATURE GRANULOCYTE: <0.03 K/UL (ref 0–0.3)
ABSOLUTE LYMPH #: 0.8 K/UL (ref 1.1–3.7)
ABSOLUTE MONO #: 0.48 K/UL (ref 0.1–1.2)
ALBUMIN SERPL-MCNC: 4.3 G/DL (ref 3.5–5.2)
ALBUMIN/GLOBULIN RATIO: 1.4 (ref 1–2.5)
ALP BLD-CCNC: 84 U/L (ref 35–104)
ALT SERPL-CCNC: 10 U/L (ref 5–33)
ANION GAP SERPL CALCULATED.3IONS-SCNC: 13 MMOL/L (ref 9–17)
AST SERPL-CCNC: 21 U/L
BASOPHILS # BLD: 0 % (ref 0–2)
BASOPHILS ABSOLUTE: <0.03 K/UL (ref 0–0.2)
BILIRUB SERPL-MCNC: 0.27 MG/DL (ref 0.3–1.2)
BUN BLDV-MCNC: 21 MG/DL (ref 8–23)
BUN/CREAT BLD: 35 (ref 9–20)
CALCIUM SERPL-MCNC: 9.3 MG/DL (ref 8.6–10.4)
CHLORIDE BLD-SCNC: 104 MMOL/L (ref 98–107)
CHOLESTEROL, FASTING: 151 MG/DL
CHOLESTEROL/HDL RATIO: 2.4
CO2: 23 MMOL/L (ref 20–31)
CREAT SERPL-MCNC: 0.6 MG/DL (ref 0.5–0.9)
DIFFERENTIAL TYPE: ABNORMAL
EOSINOPHILS RELATIVE PERCENT: 2 % (ref 1–4)
ESTIMATED AVERAGE GLUCOSE: 97 MG/DL
GFR AFRICAN AMERICAN: >60 ML/MIN
GFR NON-AFRICAN AMERICAN: >60 ML/MIN
GFR SERPL CREATININE-BSD FRML MDRD: ABNORMAL ML/MIN/{1.73_M2}
GFR SERPL CREATININE-BSD FRML MDRD: ABNORMAL ML/MIN/{1.73_M2}
GLUCOSE BLD-MCNC: 105 MG/DL (ref 70–99)
HBA1C MFR BLD: 5 % (ref 4.8–5.9)
HCT VFR BLD CALC: 34.6 % (ref 36.3–47.1)
HDLC SERPL-MCNC: 62 MG/DL
HEMOGLOBIN: 10.8 G/DL (ref 11.9–15.1)
IMMATURE GRANULOCYTES: 0 %
LDL CHOLESTEROL: 74 MG/DL (ref 0–130)
LYMPHOCYTES # BLD: 18 % (ref 24–43)
MCH RBC QN AUTO: 27 PG (ref 25.2–33.5)
MCHC RBC AUTO-ENTMCNC: 31.2 G/DL (ref 25.2–33.5)
MCV RBC AUTO: 86.5 FL (ref 82.6–102.9)
MONOCYTES # BLD: 11 % (ref 3–12)
NRBC AUTOMATED: 0 PER 100 WBC
PDW BLD-RTO: 15.3 % (ref 11.8–14.4)
PLATELET # BLD: 199 K/UL (ref 138–453)
PLATELET ESTIMATE: ABNORMAL
PMV BLD AUTO: 9.1 FL (ref 8.1–13.5)
POTASSIUM SERPL-SCNC: 4.2 MMOL/L (ref 3.7–5.3)
RBC # BLD: 4 M/UL (ref 3.95–5.11)
RBC # BLD: ABNORMAL 10*6/UL
SEG NEUTROPHILS: 68 % (ref 36–65)
SEGMENTED NEUTROPHILS ABSOLUTE COUNT: 2.99 K/UL (ref 1.5–8.1)
SODIUM BLD-SCNC: 140 MMOL/L (ref 135–144)
TOTAL PROTEIN: 7.4 G/DL (ref 6.4–8.3)
TRIGLYCERIDE, FASTING: 74 MG/DL
VLDLC SERPL CALC-MCNC: NORMAL MG/DL (ref 1–30)
WBC # BLD: 4.4 K/UL (ref 3.5–11.3)
WBC # BLD: ABNORMAL 10*3/UL

## 2020-01-09 PROCEDURE — 85025 COMPLETE CBC W/AUTO DIFF WBC: CPT

## 2020-01-09 PROCEDURE — 83036 HEMOGLOBIN GLYCOSYLATED A1C: CPT

## 2020-01-09 PROCEDURE — 2580000003 HC RX 258: Performed by: INTERNAL MEDICINE

## 2020-01-09 PROCEDURE — 80061 LIPID PANEL: CPT

## 2020-01-09 PROCEDURE — 6360000002 HC RX W HCPCS: Performed by: INTERNAL MEDICINE

## 2020-01-09 PROCEDURE — 36591 DRAW BLOOD OFF VENOUS DEVICE: CPT

## 2020-01-09 PROCEDURE — 80053 COMPREHEN METABOLIC PANEL: CPT

## 2020-01-09 RX ORDER — SODIUM CHLORIDE 0.9 % (FLUSH) 0.9 %
10 SYRINGE (ML) INJECTION PRN
Status: DISCONTINUED | OUTPATIENT
Start: 2020-01-09 | End: 2020-01-10 | Stop reason: HOSPADM

## 2020-01-09 RX ORDER — HEPARIN SODIUM (PORCINE) LOCK FLUSH IV SOLN 100 UNIT/ML 100 UNIT/ML
500 SOLUTION INTRAVENOUS PRN
Status: CANCELLED | OUTPATIENT
Start: 2020-01-09

## 2020-01-09 RX ORDER — HEPARIN SODIUM (PORCINE) LOCK FLUSH IV SOLN 100 UNIT/ML 100 UNIT/ML
500 SOLUTION INTRAVENOUS PRN
Status: DISCONTINUED | OUTPATIENT
Start: 2020-01-09 | End: 2020-01-10 | Stop reason: HOSPADM

## 2020-01-09 RX ORDER — DILTIAZEM HYDROCHLORIDE 240 MG/1
240 CAPSULE, EXTENDED RELEASE ORAL DAILY
Qty: 90 CAPSULE | Refills: 3 | Status: SHIPPED | OUTPATIENT
Start: 2020-01-09 | End: 2021-02-22 | Stop reason: SDUPTHER

## 2020-01-09 RX ORDER — SODIUM CHLORIDE 0.9 % (FLUSH) 0.9 %
10 SYRINGE (ML) INJECTION PRN
Status: CANCELLED | OUTPATIENT
Start: 2020-01-09

## 2020-01-09 RX ADMIN — Medication 10 ML: at 08:22

## 2020-01-09 RX ADMIN — Medication 10 ML: at 08:21

## 2020-01-09 RX ADMIN — HEPARIN SODIUM (PORCINE) LOCK FLUSH IV SOLN 100 UNIT/ML 500 UNITS: 100 SOLUTION at 08:22

## 2020-01-09 NOTE — PROGRESS NOTES
VAD accessed per protocol Per Bridger Simpson RN with 3/4 inch 20 gauge hong needle. Flushes easy with good blood return. Labs Drawn. Flushed with 10 ml of normal saline and 5 ml of heparin flush. D/C'd 3/4 inch hong needle and band aide applied.

## 2020-01-27 ENCOUNTER — TELEPHONE (OUTPATIENT)
Dept: FAMILY MEDICINE CLINIC | Age: 70
End: 2020-01-27

## 2020-01-27 NOTE — TELEPHONE ENCOUNTER
Pt calling states that she is has been exposed to the flu and she is been taking Cemo and wants to know if she needs to be taking anything.

## 2020-01-28 RX ORDER — OSELTAMIVIR PHOSPHATE 75 MG/1
75 CAPSULE ORAL DAILY
Qty: 10 CAPSULE | Refills: 0 | Status: SHIPPED | OUTPATIENT
Start: 2020-01-28 | End: 2020-02-07

## 2020-01-30 ENCOUNTER — HOSPITAL ENCOUNTER (OUTPATIENT)
Age: 70
Setting detail: SPECIMEN
Discharge: HOME | End: 2020-01-30
Payer: MEDICARE

## 2020-01-30 ENCOUNTER — HOSPITAL ENCOUNTER (OUTPATIENT)
Dept: INFUSION THERAPY | Age: 70
Discharge: HOME | End: 2020-01-30
Payer: MEDICARE

## 2020-01-30 DIAGNOSIS — C7B.00 METASTATIC CARCINOID TUMOR (HCC): Primary | ICD-10-CM

## 2020-01-30 LAB
ABSOLUTE EOS #: 0.1 K/UL (ref 0–0.44)
ABSOLUTE IMMATURE GRANULOCYTE: 0 K/UL (ref 0–0.3)
ABSOLUTE LYMPH #: 0.72 K/UL (ref 1.1–3.7)
ABSOLUTE MONO #: 0.53 K/UL (ref 0.1–1.2)
BASOPHILS # BLD: 0 % (ref 0–2)
BASOPHILS ABSOLUTE: 0 K/UL (ref 0–0.2)
DIFFERENTIAL TYPE: ABNORMAL
EOSINOPHILS RELATIVE PERCENT: 2 % (ref 1–4)
HCT VFR BLD CALC: 34.3 % (ref 36.3–47.1)
HEMOGLOBIN: 10.7 G/DL (ref 11.9–15.1)
IMMATURE GRANULOCYTES: 0 %
LYMPHOCYTES # BLD: 15 % (ref 24–43)
MCH RBC QN AUTO: 26.4 PG (ref 25.2–33.5)
MCHC RBC AUTO-ENTMCNC: 31.2 G/DL (ref 25.2–33.5)
MCV RBC AUTO: 84.7 FL (ref 82.6–102.9)
MONOCYTES # BLD: 11 % (ref 3–12)
MORPHOLOGY: ABNORMAL
NRBC AUTOMATED: 0 PER 100 WBC
PDW BLD-RTO: 14.9 % (ref 11.8–14.4)
PLATELET # BLD: 170 K/UL (ref 138–453)
PLATELET ESTIMATE: ABNORMAL
PMV BLD AUTO: 9.3 FL (ref 8.1–13.5)
RBC # BLD: 4.05 M/UL (ref 3.95–5.11)
RBC # BLD: ABNORMAL 10*6/UL
SEG NEUTROPHILS: 72 % (ref 36–65)
SEGMENTED NEUTROPHILS ABSOLUTE COUNT: 3.45 K/UL (ref 1.5–8.1)
WBC # BLD: 4.8 K/UL (ref 3.5–11.3)
WBC # BLD: ABNORMAL 10*3/UL

## 2020-01-30 PROCEDURE — 6360000002 HC RX W HCPCS: Performed by: INTERNAL MEDICINE

## 2020-01-30 PROCEDURE — 85025 COMPLETE CBC W/AUTO DIFF WBC: CPT

## 2020-01-30 PROCEDURE — 36591 DRAW BLOOD OFF VENOUS DEVICE: CPT

## 2020-01-30 PROCEDURE — 2580000003 HC RX 258: Performed by: INTERNAL MEDICINE

## 2020-01-30 RX ORDER — HEPARIN SODIUM (PORCINE) LOCK FLUSH IV SOLN 100 UNIT/ML 100 UNIT/ML
500 SOLUTION INTRAVENOUS PRN
Status: CANCELLED | OUTPATIENT
Start: 2020-01-30

## 2020-01-30 RX ORDER — SODIUM CHLORIDE 0.9 % (FLUSH) 0.9 %
10 SYRINGE (ML) INJECTION PRN
Status: DISCONTINUED | OUTPATIENT
Start: 2020-01-30 | End: 2020-01-31 | Stop reason: HOSPADM

## 2020-01-30 RX ORDER — HEPARIN SODIUM (PORCINE) LOCK FLUSH IV SOLN 100 UNIT/ML 100 UNIT/ML
500 SOLUTION INTRAVENOUS PRN
Status: DISCONTINUED | OUTPATIENT
Start: 2020-01-30 | End: 2020-01-31 | Stop reason: HOSPADM

## 2020-01-30 RX ORDER — SODIUM CHLORIDE 0.9 % (FLUSH) 0.9 %
10 SYRINGE (ML) INJECTION PRN
Status: CANCELLED | OUTPATIENT
Start: 2020-01-30

## 2020-01-30 RX ADMIN — Medication 10 ML: at 09:08

## 2020-01-30 RX ADMIN — HEPARIN SODIUM (PORCINE) LOCK FLUSH IV SOLN 100 UNIT/ML 500 UNITS: 100 SOLUTION at 09:08

## 2020-01-30 RX ADMIN — Medication 10 ML: at 09:07

## 2020-02-06 ENCOUNTER — HOSPITAL ENCOUNTER (OUTPATIENT)
Dept: INFUSION THERAPY | Age: 70
Discharge: HOME | End: 2020-02-06
Payer: MEDICARE

## 2020-02-06 ENCOUNTER — HOSPITAL ENCOUNTER (OUTPATIENT)
Age: 70
Setting detail: SPECIMEN
Discharge: HOME | End: 2020-02-06
Payer: MEDICARE

## 2020-02-06 DIAGNOSIS — C7B.00 METASTATIC CARCINOID TUMOR (HCC): Primary | ICD-10-CM

## 2020-02-06 LAB
ABSOLUTE EOS #: 0.16 K/UL (ref 0–0.44)
ABSOLUTE IMMATURE GRANULOCYTE: <0.03 K/UL (ref 0–0.3)
ABSOLUTE LYMPH #: 0.82 K/UL (ref 1.1–3.7)
ABSOLUTE MONO #: 0.59 K/UL (ref 0.1–1.2)
BASOPHILS # BLD: 0 % (ref 0–2)
BASOPHILS ABSOLUTE: <0.03 K/UL (ref 0–0.2)
DIFFERENTIAL TYPE: ABNORMAL
EOSINOPHILS RELATIVE PERCENT: 3 % (ref 1–4)
HCT VFR BLD CALC: 34.7 % (ref 36.3–47.1)
HEMOGLOBIN: 10.8 G/DL (ref 11.9–15.1)
IMMATURE GRANULOCYTES: 0 %
LYMPHOCYTES # BLD: 15 % (ref 24–43)
MCH RBC QN AUTO: 26.5 PG (ref 25.2–33.5)
MCHC RBC AUTO-ENTMCNC: 31.1 G/DL (ref 25.2–33.5)
MCV RBC AUTO: 85 FL (ref 82.6–102.9)
MONOCYTES # BLD: 11 % (ref 3–12)
NRBC AUTOMATED: 0 PER 100 WBC
PDW BLD-RTO: 15.2 % (ref 11.8–14.4)
PLATELET # BLD: 229 K/UL (ref 138–453)
PLATELET ESTIMATE: ABNORMAL
PMV BLD AUTO: 9.8 FL (ref 8.1–13.5)
RBC # BLD: 4.08 M/UL (ref 3.95–5.11)
RBC # BLD: ABNORMAL 10*6/UL
SEG NEUTROPHILS: 70 % (ref 36–65)
SEGMENTED NEUTROPHILS ABSOLUTE COUNT: 3.82 K/UL (ref 1.5–8.1)
WBC # BLD: 5.4 K/UL (ref 3.5–11.3)
WBC # BLD: ABNORMAL 10*3/UL

## 2020-02-06 PROCEDURE — 36591 DRAW BLOOD OFF VENOUS DEVICE: CPT

## 2020-02-06 PROCEDURE — 6360000002 HC RX W HCPCS: Performed by: INTERNAL MEDICINE

## 2020-02-06 PROCEDURE — 2580000003 HC RX 258: Performed by: INTERNAL MEDICINE

## 2020-02-06 PROCEDURE — 85025 COMPLETE CBC W/AUTO DIFF WBC: CPT

## 2020-02-06 RX ORDER — HEPARIN SODIUM (PORCINE) LOCK FLUSH IV SOLN 100 UNIT/ML 100 UNIT/ML
500 SOLUTION INTRAVENOUS PRN
Status: CANCELLED | OUTPATIENT
Start: 2020-02-06

## 2020-02-06 RX ORDER — SODIUM CHLORIDE 0.9 % (FLUSH) 0.9 %
10 SYRINGE (ML) INJECTION PRN
Status: DISCONTINUED | OUTPATIENT
Start: 2020-02-06 | End: 2020-02-07 | Stop reason: HOSPADM

## 2020-02-06 RX ORDER — HEPARIN SODIUM (PORCINE) LOCK FLUSH IV SOLN 100 UNIT/ML 100 UNIT/ML
500 SOLUTION INTRAVENOUS PRN
Status: DISCONTINUED | OUTPATIENT
Start: 2020-02-06 | End: 2020-02-07 | Stop reason: HOSPADM

## 2020-02-06 RX ORDER — SODIUM CHLORIDE 0.9 % (FLUSH) 0.9 %
10 SYRINGE (ML) INJECTION PRN
Status: CANCELLED | OUTPATIENT
Start: 2020-02-06

## 2020-02-06 RX ADMIN — Medication 10 ML: at 08:56

## 2020-02-06 RX ADMIN — HEPARIN SODIUM (PORCINE) LOCK FLUSH IV SOLN 100 UNIT/ML 500 UNITS: 100 SOLUTION at 08:57

## 2020-02-06 RX ADMIN — Medication 10 ML: at 08:57

## 2020-02-12 ENCOUNTER — HOSPITAL ENCOUNTER (OUTPATIENT)
Age: 70
Setting detail: SPECIMEN
Discharge: HOME | End: 2020-02-12
Payer: MEDICARE

## 2020-02-12 ENCOUNTER — HOSPITAL ENCOUNTER (OUTPATIENT)
Dept: INFUSION THERAPY | Age: 70
Discharge: HOME | End: 2020-02-12
Payer: MEDICARE

## 2020-02-12 DIAGNOSIS — C7B.00 METASTATIC CARCINOID TUMOR (HCC): Primary | ICD-10-CM

## 2020-02-12 LAB
ABSOLUTE EOS #: 0.18 K/UL (ref 0–0.44)
ABSOLUTE IMMATURE GRANULOCYTE: <0.03 K/UL (ref 0–0.3)
ABSOLUTE LYMPH #: 0.69 K/UL (ref 1.1–3.7)
ABSOLUTE MONO #: 0.65 K/UL (ref 0.1–1.2)
BASOPHILS # BLD: 0 % (ref 0–2)
BASOPHILS ABSOLUTE: <0.03 K/UL (ref 0–0.2)
DIFFERENTIAL TYPE: ABNORMAL
EOSINOPHILS RELATIVE PERCENT: 3 % (ref 1–4)
HCT VFR BLD CALC: 36.5 % (ref 36.3–47.1)
HEMOGLOBIN: 11.3 G/DL (ref 11.9–15.1)
IMMATURE GRANULOCYTES: 0 %
LYMPHOCYTES # BLD: 12 % (ref 24–43)
MCH RBC QN AUTO: 26.2 PG (ref 25.2–33.5)
MCHC RBC AUTO-ENTMCNC: 31 G/DL (ref 25.2–33.5)
MCV RBC AUTO: 84.5 FL (ref 82.6–102.9)
MONOCYTES # BLD: 11 % (ref 3–12)
NRBC AUTOMATED: 0 PER 100 WBC
PDW BLD-RTO: 15 % (ref 11.8–14.4)
PLATELET # BLD: 212 K/UL (ref 138–453)
PLATELET ESTIMATE: ABNORMAL
PMV BLD AUTO: 9.5 FL (ref 8.1–13.5)
RBC # BLD: 4.32 M/UL (ref 3.95–5.11)
RBC # BLD: ABNORMAL 10*6/UL
SEG NEUTROPHILS: 74 % (ref 36–65)
SEGMENTED NEUTROPHILS ABSOLUTE COUNT: 4.33 K/UL (ref 1.5–8.1)
WBC # BLD: 5.9 K/UL (ref 3.5–11.3)
WBC # BLD: ABNORMAL 10*3/UL

## 2020-02-12 PROCEDURE — 6360000002 HC RX W HCPCS: Performed by: INTERNAL MEDICINE

## 2020-02-12 PROCEDURE — 2580000003 HC RX 258: Performed by: INTERNAL MEDICINE

## 2020-02-12 PROCEDURE — 96523 IRRIG DRUG DELIVERY DEVICE: CPT

## 2020-02-12 PROCEDURE — 85025 COMPLETE CBC W/AUTO DIFF WBC: CPT

## 2020-02-12 PROCEDURE — 36591 DRAW BLOOD OFF VENOUS DEVICE: CPT

## 2020-02-12 RX ORDER — SODIUM CHLORIDE 0.9 % (FLUSH) 0.9 %
10 SYRINGE (ML) INJECTION PRN
Status: DISCONTINUED | OUTPATIENT
Start: 2020-02-12 | End: 2020-02-13 | Stop reason: HOSPADM

## 2020-02-12 RX ORDER — HEPARIN SODIUM (PORCINE) LOCK FLUSH IV SOLN 100 UNIT/ML 100 UNIT/ML
500 SOLUTION INTRAVENOUS PRN
Status: DISCONTINUED | OUTPATIENT
Start: 2020-02-12 | End: 2020-02-13 | Stop reason: HOSPADM

## 2020-02-12 RX ORDER — SODIUM CHLORIDE 0.9 % (FLUSH) 0.9 %
10 SYRINGE (ML) INJECTION PRN
Status: CANCELLED | OUTPATIENT
Start: 2020-02-12

## 2020-02-12 RX ORDER — HEPARIN SODIUM (PORCINE) LOCK FLUSH IV SOLN 100 UNIT/ML 100 UNIT/ML
500 SOLUTION INTRAVENOUS PRN
Status: CANCELLED | OUTPATIENT
Start: 2020-02-12

## 2020-02-12 RX ADMIN — Medication 10 ML: at 09:01

## 2020-02-12 RX ADMIN — HEPARIN SODIUM (PORCINE) LOCK FLUSH IV SOLN 100 UNIT/ML 500 UNITS: 100 SOLUTION at 09:01

## 2020-02-12 RX ADMIN — Medication 10 ML: at 09:00

## 2020-02-12 NOTE — PROGRESS NOTES
José Miguel Hdz accessed VAD per protocol using 20 gauge 3/4\" hong needle. Flushes easily. Labs drawn. Flushed with 10 ml normal saline and 5 ml Heplock solution. Hong needle dc'd. Bandaid to site. Patient tolerated procedure with out difficulty and discharged to home.

## 2020-02-24 ENCOUNTER — OFFICE VISIT (OUTPATIENT)
Dept: CARDIOLOGY | Age: 70
End: 2020-02-24
Payer: MEDICARE

## 2020-02-24 VITALS
HEIGHT: 63 IN | BODY MASS INDEX: 24.88 KG/M2 | HEART RATE: 82 BPM | WEIGHT: 140.4 LBS | DIASTOLIC BLOOD PRESSURE: 84 MMHG | SYSTOLIC BLOOD PRESSURE: 122 MMHG

## 2020-02-24 PROCEDURE — G8399 PT W/DXA RESULTS DOCUMENT: HCPCS | Performed by: INTERNAL MEDICINE

## 2020-02-24 PROCEDURE — 99214 OFFICE O/P EST MOD 30 MIN: CPT | Performed by: INTERNAL MEDICINE

## 2020-02-24 PROCEDURE — 1090F PRES/ABSN URINE INCON ASSESS: CPT | Performed by: INTERNAL MEDICINE

## 2020-02-24 PROCEDURE — 99213 OFFICE O/P EST LOW 20 MIN: CPT

## 2020-02-24 PROCEDURE — 93005 ELECTROCARDIOGRAM TRACING: CPT | Performed by: INTERNAL MEDICINE

## 2020-02-24 PROCEDURE — 4040F PNEUMOC VAC/ADMIN/RCVD: CPT | Performed by: INTERNAL MEDICINE

## 2020-02-24 PROCEDURE — 93010 ELECTROCARDIOGRAM REPORT: CPT | Performed by: INTERNAL MEDICINE

## 2020-02-24 PROCEDURE — G8482 FLU IMMUNIZE ORDER/ADMIN: HCPCS | Performed by: INTERNAL MEDICINE

## 2020-02-24 PROCEDURE — G8417 CALC BMI ABV UP PARAM F/U: HCPCS | Performed by: INTERNAL MEDICINE

## 2020-02-24 PROCEDURE — 1123F ACP DISCUSS/DSCN MKR DOCD: CPT | Performed by: INTERNAL MEDICINE

## 2020-02-24 PROCEDURE — 1036F TOBACCO NON-USER: CPT | Performed by: INTERNAL MEDICINE

## 2020-02-24 PROCEDURE — G8427 DOCREV CUR MEDS BY ELIG CLIN: HCPCS | Performed by: INTERNAL MEDICINE

## 2020-02-24 PROCEDURE — 3017F COLORECTAL CA SCREEN DOC REV: CPT | Performed by: INTERNAL MEDICINE
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
## 2020-02-26 ENCOUNTER — HOSPITAL ENCOUNTER (OUTPATIENT)
Dept: INFUSION THERAPY | Age: 70
Discharge: HOME | End: 2020-02-26
Payer: MEDICARE

## 2020-02-26 ENCOUNTER — HOSPITAL ENCOUNTER (OUTPATIENT)
Age: 70
Setting detail: SPECIMEN
Discharge: HOME | End: 2020-02-26
Payer: MEDICARE

## 2020-02-26 DIAGNOSIS — C7B.00 METASTATIC CARCINOID TUMOR (HCC): Primary | ICD-10-CM

## 2020-02-26 LAB
ABSOLUTE EOS #: 0.12 K/UL (ref 0–0.4)
ABSOLUTE IMMATURE GRANULOCYTE: 0 K/UL (ref 0–0.3)
ABSOLUTE LYMPH #: 0.52 K/UL (ref 1–4.8)
ABSOLUTE MONO #: 0.32 K/UL (ref 0.1–1.2)
BASOPHILS # BLD: 0 % (ref 0–1)
BASOPHILS ABSOLUTE: 0 K/UL (ref 0–0.2)
DIFFERENTIAL TYPE: ABNORMAL
EOSINOPHILS RELATIVE PERCENT: 3 % (ref 1–7)
HCT VFR BLD CALC: 32.6 % (ref 36.3–47.1)
HEMOGLOBIN: 10.2 G/DL (ref 11.9–15.1)
IMMATURE GRANULOCYTES: 0 %
LYMPHOCYTES # BLD: 13 % (ref 16–46)
MCH RBC QN AUTO: 26.4 PG (ref 25.2–33.5)
MCHC RBC AUTO-ENTMCNC: 31.3 G/DL (ref 25.2–33.5)
MCV RBC AUTO: 84.2 FL (ref 82.6–102.9)
MONOCYTES # BLD: 8 % (ref 4–11)
MORPHOLOGY: ABNORMAL
MORPHOLOGY: ABNORMAL
NRBC AUTOMATED: 0 PER 100 WBC
PDW BLD-RTO: 15.4 % (ref 11.8–14.4)
PLATELET # BLD: 140 K/UL (ref 138–453)
PLATELET ESTIMATE: ABNORMAL
PMV BLD AUTO: 9.2 FL (ref 8.1–13.5)
RBC # BLD: 3.87 M/UL (ref 3.95–5.11)
RBC # BLD: ABNORMAL 10*6/UL
SEG NEUTROPHILS: 76 % (ref 43–77)
SEGMENTED NEUTROPHILS ABSOLUTE COUNT: 3.04 K/UL (ref 1.8–7.7)
WBC # BLD: 4 K/UL (ref 3.5–11.3)
WBC # BLD: ABNORMAL 10*3/UL

## 2020-02-26 PROCEDURE — 6360000002 HC RX W HCPCS: Performed by: INTERNAL MEDICINE

## 2020-02-26 PROCEDURE — 2580000003 HC RX 258: Performed by: INTERNAL MEDICINE

## 2020-02-26 PROCEDURE — 85025 COMPLETE CBC W/AUTO DIFF WBC: CPT

## 2020-02-26 PROCEDURE — 36591 DRAW BLOOD OFF VENOUS DEVICE: CPT

## 2020-02-26 RX ORDER — SODIUM CHLORIDE 0.9 % (FLUSH) 0.9 %
10 SYRINGE (ML) INJECTION PRN
Status: DISCONTINUED | OUTPATIENT
Start: 2020-02-26 | End: 2020-02-27 | Stop reason: HOSPADM

## 2020-02-26 RX ORDER — SODIUM CHLORIDE 0.9 % (FLUSH) 0.9 %
10 SYRINGE (ML) INJECTION PRN
Status: CANCELLED | OUTPATIENT
Start: 2020-02-26

## 2020-02-26 RX ORDER — HEPARIN SODIUM (PORCINE) LOCK FLUSH IV SOLN 100 UNIT/ML 100 UNIT/ML
500 SOLUTION INTRAVENOUS PRN
Status: DISCONTINUED | OUTPATIENT
Start: 2020-02-26 | End: 2020-02-27 | Stop reason: HOSPADM

## 2020-02-26 RX ORDER — HEPARIN SODIUM (PORCINE) LOCK FLUSH IV SOLN 100 UNIT/ML 100 UNIT/ML
500 SOLUTION INTRAVENOUS PRN
Status: CANCELLED | OUTPATIENT
Start: 2020-02-26

## 2020-02-26 RX ADMIN — Medication 10 ML: at 08:54

## 2020-02-26 RX ADMIN — HEPARIN SODIUM (PORCINE) LOCK FLUSH IV SOLN 100 UNIT/ML 500 UNITS: 100 SOLUTION at 08:55

## 2020-02-26 RX ADMIN — Medication 10 ML: at 08:55

## 2020-03-03 ENCOUNTER — TELEPHONE (OUTPATIENT)
Dept: FAMILY MEDICINE CLINIC | Age: 70
End: 2020-03-03

## 2020-03-03 NOTE — TELEPHONE ENCOUNTER
Last ov 01/07/2020,next ov 07/09/2020. Attempted to reach patient,voice mail box is \"full\" unable to leave a message.

## 2020-03-04 ENCOUNTER — HOSPITAL ENCOUNTER (OUTPATIENT)
Dept: INFUSION THERAPY | Age: 70
Discharge: HOME | End: 2020-03-04
Payer: MEDICARE

## 2020-03-04 ENCOUNTER — HOSPITAL ENCOUNTER (OUTPATIENT)
Age: 70
Setting detail: SPECIMEN
Discharge: HOME | End: 2020-03-04
Payer: MEDICARE

## 2020-03-04 DIAGNOSIS — C7B.00 METASTATIC CARCINOID TUMOR (HCC): Primary | ICD-10-CM

## 2020-03-04 LAB
ABSOLUTE EOS #: 0.37 K/UL (ref 0–0.4)
ABSOLUTE IMMATURE GRANULOCYTE: 0 K/UL (ref 0–0.3)
ABSOLUTE LYMPH #: 0.49 K/UL (ref 1–4.8)
ABSOLUTE MONO #: 0.49 K/UL (ref 0.1–1.2)
BASOPHILS # BLD: 0 % (ref 0–1)
BASOPHILS ABSOLUTE: 0 K/UL (ref 0–0.2)
DIFFERENTIAL TYPE: ABNORMAL
EOSINOPHILS RELATIVE PERCENT: 6 % (ref 1–7)
HCT VFR BLD CALC: 34.3 % (ref 36.3–47.1)
HEMOGLOBIN: 10.8 G/DL (ref 11.9–15.1)
IMMATURE GRANULOCYTES: 0 %
LYMPHOCYTES # BLD: 8 % (ref 16–46)
MCH RBC QN AUTO: 26.5 PG (ref 25.2–33.5)
MCHC RBC AUTO-ENTMCNC: 31.5 G/DL (ref 25.2–33.5)
MCV RBC AUTO: 84.3 FL (ref 82.6–102.9)
MONOCYTES # BLD: 8 % (ref 4–11)
MORPHOLOGY: ABNORMAL
MORPHOLOGY: ABNORMAL
NRBC AUTOMATED: 0 PER 100 WBC
PDW BLD-RTO: 15.4 % (ref 11.8–14.4)
PLATELET # BLD: 212 K/UL (ref 138–453)
PLATELET ESTIMATE: ABNORMAL
PMV BLD AUTO: 9.3 FL (ref 8.1–13.5)
RBC # BLD: 4.07 M/UL (ref 3.95–5.11)
RBC # BLD: ABNORMAL 10*6/UL
SEG NEUTROPHILS: 78 % (ref 43–77)
SEGMENTED NEUTROPHILS ABSOLUTE COUNT: 4.75 K/UL (ref 1.8–7.7)
WBC # BLD: 6.1 K/UL (ref 3.5–11.3)
WBC # BLD: ABNORMAL 10*3/UL

## 2020-03-04 PROCEDURE — 6360000002 HC RX W HCPCS: Performed by: INTERNAL MEDICINE

## 2020-03-04 PROCEDURE — 36591 DRAW BLOOD OFF VENOUS DEVICE: CPT

## 2020-03-04 PROCEDURE — 2580000003 HC RX 258: Performed by: INTERNAL MEDICINE

## 2020-03-04 PROCEDURE — 85025 COMPLETE CBC W/AUTO DIFF WBC: CPT

## 2020-03-04 RX ORDER — SODIUM CHLORIDE 0.9 % (FLUSH) 0.9 %
10 SYRINGE (ML) INJECTION PRN
Status: CANCELLED | OUTPATIENT
Start: 2020-03-04

## 2020-03-04 RX ORDER — HEPARIN SODIUM (PORCINE) LOCK FLUSH IV SOLN 100 UNIT/ML 100 UNIT/ML
500 SOLUTION INTRAVENOUS PRN
Status: CANCELLED | OUTPATIENT
Start: 2020-03-04

## 2020-03-04 RX ORDER — SODIUM CHLORIDE 0.9 % (FLUSH) 0.9 %
10 SYRINGE (ML) INJECTION PRN
Status: DISCONTINUED | OUTPATIENT
Start: 2020-03-04 | End: 2020-03-05 | Stop reason: HOSPADM

## 2020-03-04 RX ORDER — HEPARIN SODIUM (PORCINE) LOCK FLUSH IV SOLN 100 UNIT/ML 100 UNIT/ML
500 SOLUTION INTRAVENOUS PRN
Status: DISCONTINUED | OUTPATIENT
Start: 2020-03-04 | End: 2020-03-05 | Stop reason: HOSPADM

## 2020-03-04 RX ADMIN — HEPARIN SODIUM (PORCINE) LOCK FLUSH IV SOLN 100 UNIT/ML 500 UNITS: 100 SOLUTION at 09:08

## 2020-03-04 RX ADMIN — Medication 10 ML: at 09:08

## 2020-03-04 NOTE — PROGRESS NOTES
VAD accessed per protocol with 3/4 inch 20 gauge hong needle. Flushes easy with good blood return. Labs Drawn. Flushed with 10 ml of normal saline and 5 ml of heparin flush. D/C'd 3/4 inch hong needle and band aide applied. Patient discharged to home. F/U with oncologist next week will call to schedule next port draw after her appointment.

## 2020-03-04 NOTE — TELEPHONE ENCOUNTER
RSV is generally not a serious illness for most adults. Some adults may have common cold symptoms.    Please ask her to call if she develops fever, chills, cough, difficulty breathing, etc.

## 2020-07-09 ENCOUNTER — OFFICE VISIT (OUTPATIENT)
Dept: FAMILY MEDICINE CLINIC | Age: 70
End: 2020-07-09
Payer: MEDICARE

## 2020-07-09 VITALS
RESPIRATION RATE: 16 BRPM | DIASTOLIC BLOOD PRESSURE: 72 MMHG | WEIGHT: 141 LBS | HEART RATE: 86 BPM | SYSTOLIC BLOOD PRESSURE: 124 MMHG | HEIGHT: 64 IN | BODY MASS INDEX: 24.07 KG/M2

## 2020-07-09 PROCEDURE — 4040F PNEUMOC VAC/ADMIN/RCVD: CPT | Performed by: FAMILY MEDICINE

## 2020-07-09 PROCEDURE — 1123F ACP DISCUSS/DSCN MKR DOCD: CPT | Performed by: FAMILY MEDICINE

## 2020-07-09 PROCEDURE — G0439 PPPS, SUBSEQ VISIT: HCPCS | Performed by: FAMILY MEDICINE

## 2020-07-09 PROCEDURE — 3017F COLORECTAL CA SCREEN DOC REV: CPT | Performed by: FAMILY MEDICINE

## 2020-07-09 RX ORDER — FLUTICASONE PROPIONATE 50 MCG
1 SPRAY, SUSPENSION (ML) NASAL DAILY PRN
Qty: 1 BOTTLE | Refills: 1 | Status: SHIPPED | OUTPATIENT
Start: 2020-07-09 | End: 2021-01-08 | Stop reason: SDUPTHER

## 2020-07-09 ASSESSMENT — PATIENT HEALTH QUESTIONNAIRE - PHQ9
SUM OF ALL RESPONSES TO PHQ QUESTIONS 1-9: 0
SUM OF ALL RESPONSES TO PHQ QUESTIONS 1-9: 0

## 2020-07-09 ASSESSMENT — LIFESTYLE VARIABLES: HOW OFTEN DO YOU HAVE A DRINK CONTAINING ALCOHOL: 0

## 2020-07-09 NOTE — PATIENT INSTRUCTIONS
Personalized Preventive Plan for Rene Begin - 7/9/2020  Medicare offers a range of preventive health benefits. Some of the tests and screenings are paid in full while other may be subject to a deductible, co-insurance, and/or copay. Some of these benefits include a comprehensive review of your medical history including lifestyle, illnesses that may run in your family, and various assessments and screenings as appropriate. After reviewing your medical record and screening and assessments performed today your provider may have ordered immunizations, labs, imaging, and/or referrals for you. A list of these orders (if applicable) as well as your Preventive Care list are included within your After Visit Summary for your review. Other Preventive Recommendations:    · A preventive eye exam performed by an eye specialist is recommended every 1-2 years to screen for glaucoma; cataracts, macular degeneration, and other eye disorders. · A preventive dental visit is recommended every 6 months. · Try to get at least 150 minutes of exercise per week or 10,000 steps per day on a pedometer . · Order or download the FREE \"Exercise & Physical Activity: Your Everyday Guide\" from [employer]. Call 7-848.637.4685 or search [employer] online. · You need 7841-4073 mg of calcium and 2048-4423 IU of vitamin D per day. It is possible to meet your calcium requirement with diet alone, but a vitamin D supplement is usually necessary to meet this goal.  · When exposed to the sun, use a sunscreen that protects against both UVA and UVB radiation with an SPF of 30 or greater. Reapply every 2 to 3 hours or after sweating, drying off with a towel, or swimming. · Always wear a seat belt when traveling in a car. Always wear a helmet when riding a bicycle or motorcycle. Patient Education        Learning About Physical Activity  What is physical activity?      Physical activity is any kind of activity that gets your body moving. The types of physical activity that can help you get fit and stay healthy include:  · Aerobic or \"cardio\" activities that make your heart beat faster and make you breathe harder, such as brisk walking, riding a bike, or running. Aerobic activities strengthen your heart and lungs and build up your endurance. · Strength training activities that make your muscles work against, or \"resist,\" something, such as lifting weights or doing push-ups. These activities help tone and strengthen your muscles. · Stretches that allow you to move your joints and muscles through their full range of motion. Stretching helps you be more flexible and avoid injury. What are the benefits of physical activity? Being active is one of the best things you can do for your health. It helps you to:  · Feel stronger and have more energy to do all the things you like to do. · Focus better at school or work. · Feel, think, and sleep better. · Reach and stay at a healthy weight. · Lose fat and build lean muscle. · Lower your risk for serious health problems. · Keep your bones, muscles, and joints strong. How can you make physical activity part of your life? Get at least 30 minutes of exercise on most days of the week. Walking is a good choice. You also may want to do other activities, such as running, swimming, cycling, or playing tennis or team sports. Pick activities that you like--ones that make your heart beat faster, your muscles stronger, and your muscles and joints more flexible. If you find more than one thing you like doing, do them all. You don't have to do the same thing every day. Get your heart pumping every day. Any activity that makes your heart beat faster and keeps it at that rate for a while counts. Here are some great ways to get your heart beating faster:  · Go for a brisk walk, run, or bike ride. · Go for a hike or swim. · Go in-line skating.   · Play a game of touch football, basketball, or soccer. · Ride a bike. · Play tennis or racquetball. · Climb stairs. Even some household chores can be aerobic--just do them at a faster pace. Vacuuming, raking or mowing the lawn, sweeping the garage, and washing and waxing the car all can help get your heart rate up. Strengthen your muscles during the week. You don't have to lift heavy weights or grow big, bulky muscles to get stronger. Doing a few simple activities that make your muscles work against, or \"resist,\" something can help you get stronger. For example, you can:  · Do push-ups or sit-ups, which use your own body weight as resistance. · Lift weights or dumbbells or use stretch bands at home or in a gym or community center. Stretch your muscles often. Stretching will help you as you become more active. It can help you stay flexible, loosen tight muscles, and avoid injury. It can also help improve your balance and posture and can be a great way to relax. Be sure to stretch the muscles you'll be using when you work out. It's best to warm your muscles slightly before you stretch them. Walk or do some other light aerobic activity for a few minutes, and then start stretching. When you stretch your muscles:  · Do it slowly. Stretching is not about going fast or making sudden movements. · Don't push or bounce during a stretch. · Hold each stretch for at least 15 to 30 seconds, if you can. You should feel a stretch in the muscle, but not pain. · Breathe out as you do the stretch. Then breathe in as you hold the stretch. Don't hold your breath. If you're worried about how more activity might affect your health, have a checkup before you start. Follow any special advice your doctor gives you for getting a smart start. Where can you learn more? Go to [email or web address] org and sign in to your [employer] account.  Enter U334 in the [employer] box to learn more about \"Learning About Physical Activity. \"     If you do not have an account, please click on the \"Sign Up Now\" link. Current as of: January 16, 2020               Content Version: 12.5  © 2006-2020 Healthwise, Incorporated. Care instructions adapted under license by [facility] ([facility]). If you have questions about a medical condition or this instruction, always ask your healthcare professional. [place] [address] any warranty or liability for your use of this information. Remember to get a flu vaccine in the fall! The flu vaccine typically becomes available in September or October.

## 2020-07-09 NOTE — PROGRESS NOTES
spray 1 spray by Nasal route daily as needed for Rhinitis 3 Bottle 1    acetaminophen (TYLENOL) 325 MG tablet Take 2 tablets by mouth every 4 hours as needed for Pain or Fever 120 tablet 0       She is allergic to effexor xr [venlafaxine hcl] and venlafaxine. She  reports that she has never smoked. She has never used smokeless tobacco.      Objective:    Vitals:    07/09/20 0859   BP: 124/72   Site: Right Upper Arm   Position: Sitting   Cuff Size: Large Adult   Pulse: 86   Resp: 16   Weight: 141 lb (64 kg)   Height: 5' 4\" (1.626 m)     Body mass index is 24.2 kg/m². Well-nourished, well-developed  female, healthy-appearing, alert, cooperative and in no distress. Neck supple. No adenopathy. Thyroid symmetric, normal size. Chest:  Normal expansion. Clear to auscultation. No rales, rhonchi, or wheezing. Heart sounds are normal.  Regular rate and rhythm without murmur, gallop or rub. Lower extremities have no edema. Assessment and Plan:    1. Routine general medical examination at a health care facility  See separate progress note for Medicare Wellness Visit. 2. Essential hypertension  Her blood pressure is well-controlled. (BP: 124/72)   She was advised to continue current medications. She states that she does not need a refill today. She has orders for a comprehensive panel and lipid panel to be done when she returns fasting. 3. Elevated fasting glucose  She has orders for a HgbA1c to be done when she returns for labs. 4. Rhinitis, unspecified type  Flonase was refilled:  - fluticasone (FLONASE) 50 MCG/ACT nasal spray; 1 spray by Nasal route daily as needed for Rhinitis  Dispense: 1 Bottle; Refill: 1    5. Routine health maintenance  Health maintenance was reviewed with the patient. Annual influenza vaccine was recommended. All other health maintenance, including Shingrix, Pneumovax, Prevnar-13, and Tdap, is up to date at this time.         (Please note that portions

## 2020-07-09 NOTE — PROGRESS NOTES
Medicare Annual Wellness Visit  Name: Divya Schultz Date: 2020   MRN: [id number] Sex: Female   Age: 78 y.o. Ethnicity: Non-/Non    : 1950 Race: Jay Yañez is here for Medicare AWV; Hypertension; and Hyperglycemia    Screenings for behavioral, psychosocial and functional/safety risks, and cognitive dysfunction are all negative except as indicated below. These results, as well as other patient data from the [address] form, are documented in Flowsheets linked to this Encounter. Allergies   Allergen Reactions    Effexor Xr [Venlafaxine Hcl]      Made blood pressure go high    Venlafaxine        Prior to Visit Medications    Medication Sig Taking? Authorizing Provider   apixaban (ELIQUIS) 5 MG TABS tablet take 1 tablet by mouth twice a day Yes Jose Harmon MD   diltiazem (TIAZAC) 240 MG extended release capsule Take 1 capsule by mouth daily Yes Jose Harmon MD   lisinopril (PRINIVIL;ZESTRIL) 30 MG tablet take 1 tablet by mouth once daily Yes Ricky Cerna MD   ondansetron (ZOFRAN) 4 MG tablet Take 1 tablet by mouth every 8 hours as needed for Nausea Yes Paul Quinn DO   fluticasone (FLONASE) 50 MCG/ACT nasal spray 1 spray by Nasal route daily as needed for Rhinitis Yes Ricky Cerna MD   acetaminophen (TYLENOL) 325 MG tablet Take 2 tablets by mouth every 4 hours as needed for Pain or Fever Yes Nargis Ch       Past Medical History:   Diagnosis Date    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 (70% better after Celestone injection on 3-). Ale Haile PA-C.     Liver cancer ([facility]     Lung cancer ([facility]     Myopia with astigmatism and presbyopia     Osteoarthritis     Osteopenia     took Fosamax from [id number]    Pancreatitis        Past Surgical History:   Procedure Laterality Date    ABDOMINAL EXPLORATION SURGERY  01/07/2019    resection and anastomosis of small bowel repair mesenteric tear with Dr Obrien Deaterri at [address] Right 7/25/05    lymph node mapping and biopsy     BREAST BIOPSY Right 7/7/05    excisional biopsy of mass of right breast     BREAST CYST ASPIRATION Right 7/25/05    BREAST RECONSTRUCTION Right 2006    nipple areolar reconstruction right breast    BREAST REDUCTION SURGERY Left 2006    BRONCHOSCOPY  02/11/2011    BRONCHOSCOPY Right 11/12/10    video assisted thoracoscopic surgery right wedge resection    CHOLECYSTECTOMY, LAPAROSCOPIC  01/16/2017    Dr. Juma Haile, [facility]  01/12/2004    supracervical, with bilateral salpingo-oophorectomy, Dr. Que Braga, [facility] ARTHROSCOPY Right 7/1/2008    KNEE SURGERY Left 06/20/2016    unicompartmental knee replacement, Dr. Traci Seo, [address]  11/04/2016    ultrasound-guided liver biopsy (metastatic atypical carcinoid), [facility] [address] [facility] Right 2/11/2011    right upper lobectomy, mediastinal lymph node dissection for atypical carcinoid of the right upper lobe    MASTECTOMY Right 2005    breast cancer    OTHER SURGICAL HISTORY  2004    Bowman procedure    OTHER SURGICAL HISTORY Right 7/25/05    placement of tissue expander    SMALL INTESTINE SURGERY      THORACOTOMY Right 02/11/2011    redo    TOTAL KNEE ARTHROPLASTY Right 3/17/2010    Dr ANTONIO Parmar ENDOSCOPY  01/14/2017    mild esophagitis, biopsy normal, Dr. Juma Haile, [facility]       Family History   Problem Relation Age of Onset    Heart Disease Mother     Diabetes Mother     High Cholesterol Mother     High Blood Pressure Mother  Cataracts Mother     Cancer Father         lung    Cancer Sister         leukemia    Glaucoma Sister        CareTeam (Including outside providers/suppliers regularly involved in providing care):   Patient Care Team:  Nathalia Dumont MD as PCP - General (Family Medicine)  Nathalia Dumont MD as PCP - [facility] Provider  Jose Martin Arthur as Physician (Medical Oncology)    Wt Readings from Last 3 Encounters:   07/09/20 141 lb (64 kg)   02/24/20 140 lb 6.4 oz (63.7 kg)   01/07/20 142 lb (64.4 kg)     Vitals:    07/09/20 0859   BP: 124/72   Site: Right Upper Arm   Position: Sitting   Cuff Size: Large Adult   Pulse: 86   Resp: 16   Weight: 141 lb (64 kg)   Height: 5' 4\" (1.626 m)     Body mass index is 24.2 kg/m². Based upon direct observation of the patient, evaluation of cognition reveals recent and remote memory intact. Patient's complete Health Risk Assessment and screening values have been reviewed and are found in Flowsheets. The following problems were reviewed today and where indicated follow up appointments were made and/or referrals ordered. Positive Risk Factor Screenings with Interventions:     Health Habits/Nutrition:  Health Habits/Nutrition  Do you exercise for at least 20 minutes 2-3 times per week?: (!) No  Have you lost any weight without trying in the past 3 months?: No  Do you eat fewer than 2 meals per day?: No  Have you seen a dentist within the past year?: (!) No  Body mass index is 24.2 kg/m².   Health Habits/Nutrition Interventions:  · Inadequate physical activity:  educational materials provided to promote increased physical activity  · Dental exam overdue:  patient encouraged to make appointment with his/her dentist    Personalized Preventive Plan   Current Health Maintenance Status  Immunization History   Administered Date(s) Administered    Influenza Virus Vaccine 01/14/2014, 12/09/2015    Influenza, High Dose (Fluzone 65 yrs and older) 11/29/2017, 12/03/2018    Influenza, Intradermal, Preservative free 10/10/2014    Influenza, Quadv, IM, PF (6 mo and older Fluzone, Flulaval, Fluarix, and 3 yrs and older Afluria) 10/13/2016    Influenza, Triv, inactivated, subunit, adjuvanted, IM (Fluad 65 yrs and older) 11/11/2019    Pneumococcal Conjugate 13-valent ([id number]) 05/24/2017    Pneumococcal Polysaccharide ([id number]) 10/09/2012, 07/03/2019    Tdap (Boostrix, Adacel) 10/09/2012    Zoster Live (Zostavax) 12/12/2012    Zoster Recombinant (Shingrix) 06/01/2020        Health Maintenance   Topic Date Due    Shingles Vaccine (3 of 3) 07/27/2020    Flu vaccine (1) 09/01/2020    Breast cancer screen  10/02/2020    A1C test (Diabetic or Prediabetic)  01/09/2021    Potassium monitoring  01/09/2021    Creatinine monitoring  01/09/2021    DEXA (modify frequency per FRAX score)  07/11/2022    DTaP/Tdap/Td vaccine (2 - Td) 10/09/2022    Colon cancer screen colonoscopy  02/24/2024    Lipid screen  01/09/2025    Pneumococcal 65+ yrs at Risk Vaccine  Completed    Hepatitis C screen  Addressed    Hepatitis A vaccine  Aged Out    Hepatitis B vaccine  Aged Out    Hib vaccine  Aged Out    Meningococcal (ACWY) vaccine  Aged Out     Recommendations for [employer] Due: see orders and patient instructions/AVS.  . Recommended screening schedule for the next 5-10 years is provided to the patient in written form: see Patient Tejal Truong was seen today for medicare awv, hypertension and hyperglycemia.     Diagnoses and all orders for this visit:    Rhinitis, unspecified type    Essential hypertension

## 2020-07-15 ENCOUNTER — HOSPITAL ENCOUNTER (OUTPATIENT)
Age: 70
Setting detail: SPECIMEN
Discharge: HOME | End: 2020-07-15
Payer: MEDICARE

## 2020-07-15 ENCOUNTER — HOSPITAL ENCOUNTER (OUTPATIENT)
Dept: INFUSION THERAPY | Age: 70
Discharge: HOME | End: 2020-07-15
Payer: MEDICARE

## 2020-07-15 DIAGNOSIS — C7B.00 METASTATIC CARCINOID TUMOR (HCC): ICD-10-CM

## 2020-07-15 DIAGNOSIS — R73.09 ELEVATED GLUCOSE: ICD-10-CM

## 2020-07-15 DIAGNOSIS — I10 ESSENTIAL (PRIMARY) HYPERTENSION: Primary | ICD-10-CM

## 2020-07-15 LAB
ABSOLUTE EOS #: 0.14 K/UL (ref 0–0.4)
ABSOLUTE IMMATURE GRANULOCYTE: 0 K/UL (ref 0–0.3)
ABSOLUTE LYMPH #: 0.47 K/UL (ref 1–4.8)
ABSOLUTE MONO #: 0.24 K/UL (ref 0.1–1.2)
BASOPHILS # BLD: 0 % (ref 0–1)
BASOPHILS ABSOLUTE: 0 K/UL (ref 0–0.2)
CHOLESTEROL, FASTING: 146 MG/DL
CHOLESTEROL/HDL RATIO: 2.6
DIFFERENTIAL TYPE: ABNORMAL
EOSINOPHILS RELATIVE PERCENT: 3 % (ref 1–7)
HCT VFR BLD CALC: 31.2 % (ref 36.3–47.1)
HDLC SERPL-MCNC: 57 MG/DL
HEMOGLOBIN: 9.7 G/DL (ref 11.9–15.1)
IMMATURE GRANULOCYTES: 0 %
LDL CHOLESTEROL: 73 MG/DL (ref 0–130)
LYMPHOCYTES # BLD: 10 % (ref 16–46)
MCH RBC QN AUTO: 25.8 PG (ref 25.2–33.5)
MCHC RBC AUTO-ENTMCNC: 31.1 G/DL (ref 25.2–33.5)
MCV RBC AUTO: 83 FL (ref 82.6–102.9)
MONOCYTES # BLD: 5 % (ref 4–11)
MORPHOLOGY: ABNORMAL
NRBC AUTOMATED: 0 PER 100 WBC
PDW BLD-RTO: 15.5 % (ref 11.8–14.4)
PLATELET # BLD: 197 K/UL (ref 138–453)
PLATELET ESTIMATE: ABNORMAL
PMV BLD AUTO: 9.3 FL (ref 8.1–13.5)
RBC # BLD: 3.76 M/UL (ref 3.95–5.11)
RBC # BLD: ABNORMAL 10*6/UL
SEG NEUTROPHILS: 82 % (ref 43–77)
SEGMENTED NEUTROPHILS ABSOLUTE COUNT: 3.85 K/UL (ref 1.8–7.7)
TRIGLYCERIDE, FASTING: 81 MG/DL
VLDLC SERPL CALC-MCNC: NORMAL MG/DL (ref 1–30)
WBC # BLD: 4.7 K/UL (ref 3.5–11.3)
WBC # BLD: ABNORMAL 10*3/UL

## 2020-07-15 PROCEDURE — 6360000002 HC RX W HCPCS: Performed by: NURSE PRACTITIONER

## 2020-07-15 PROCEDURE — 80061 LIPID PANEL: CPT

## 2020-07-15 PROCEDURE — 2580000003 HC RX 258: Performed by: NURSE PRACTITIONER

## 2020-07-15 PROCEDURE — 36591 DRAW BLOOD OFF VENOUS DEVICE: CPT

## 2020-07-15 PROCEDURE — 85025 COMPLETE CBC W/AUTO DIFF WBC: CPT

## 2020-07-15 RX ORDER — SODIUM CHLORIDE 0.9 % (FLUSH) 0.9 %
10 SYRINGE (ML) INJECTION PRN
Status: CANCELLED | OUTPATIENT
Start: 2020-07-15

## 2020-07-15 RX ORDER — SODIUM CHLORIDE 0.9 % (FLUSH) 0.9 %
10 SYRINGE (ML) INJECTION PRN
Status: DISCONTINUED | OUTPATIENT
Start: 2020-07-15 | End: 2020-07-16 | Stop reason: HOSPADM

## 2020-07-15 RX ORDER — HEPARIN SODIUM (PORCINE) LOCK FLUSH IV SOLN 100 UNIT/ML 100 UNIT/ML
500 SOLUTION INTRAVENOUS PRN
Status: CANCELLED | OUTPATIENT
Start: 2020-07-15

## 2020-07-15 RX ORDER — HEPARIN SODIUM (PORCINE) LOCK FLUSH IV SOLN 100 UNIT/ML 100 UNIT/ML
500 SOLUTION INTRAVENOUS PRN
Status: DISCONTINUED | OUTPATIENT
Start: 2020-07-15 | End: 2020-07-16 | Stop reason: HOSPADM

## 2020-07-15 RX ADMIN — Medication 10 ML: at 07:29

## 2020-07-15 RX ADMIN — HEPARIN SODIUM (PORCINE) LOCK FLUSH IV SOLN 100 UNIT/ML 500 UNITS: 100 SOLUTION at 07:29

## 2020-07-29 ENCOUNTER — HOSPITAL ENCOUNTER (OUTPATIENT)
Age: 70
Setting detail: SPECIMEN
Discharge: HOME | End: 2020-07-29
Payer: MEDICARE

## 2020-07-29 ENCOUNTER — HOSPITAL ENCOUNTER (OUTPATIENT)
Dept: INFUSION THERAPY | Age: 70
Discharge: HOME | End: 2020-07-29
Payer: MEDICARE

## 2020-07-29 DIAGNOSIS — C7B.00 METASTATIC CARCINOID TUMOR (HCC): Primary | ICD-10-CM

## 2020-07-29 LAB
ABSOLUTE EOS #: 0.04 K/UL (ref 0–0.4)
ABSOLUTE IMMATURE GRANULOCYTE: 0 K/UL (ref 0–0.3)
ABSOLUTE LYMPH #: 0.21 K/UL (ref 1–4.8)
ABSOLUTE MONO #: 0.49 K/UL (ref 0.1–1.2)
ATYPICAL LYMPHOCYTE ABSOLUTE COUNT: 0.08 K/UL
ATYPICAL LYMPHOCYTES: 2 %
BASOPHILS # BLD: 0 % (ref 0–1)
BASOPHILS ABSOLUTE: 0 K/UL (ref 0–0.2)
DIFFERENTIAL TYPE: ABNORMAL
EOSINOPHILS RELATIVE PERCENT: 1 % (ref 1–7)
HCT VFR BLD CALC: 30.6 % (ref 36.3–47.1)
HEMOGLOBIN: 9.5 G/DL (ref 11.9–15.1)
IMMATURE GRANULOCYTES: 0 %
LYMPHOCYTES # BLD: 5 % (ref 16–46)
MCH RBC QN AUTO: 25.3 PG (ref 25.2–33.5)
MCHC RBC AUTO-ENTMCNC: 31 G/DL (ref 25.2–33.5)
MCV RBC AUTO: 81.6 FL (ref 82.6–102.9)
MONOCYTES # BLD: 12 % (ref 4–11)
MORPHOLOGY: ABNORMAL
NRBC AUTOMATED: 0 PER 100 WBC
PDW BLD-RTO: 15.3 % (ref 11.8–14.4)
PLATELET # BLD: 146 K/UL (ref 138–453)
PLATELET ESTIMATE: ABNORMAL
PMV BLD AUTO: 9.1 FL (ref 8.1–13.5)
RBC # BLD: 3.75 M/UL (ref 3.95–5.11)
RBC # BLD: ABNORMAL 10*6/UL
SEG NEUTROPHILS: 80 % (ref 43–77)
SEGMENTED NEUTROPHILS ABSOLUTE COUNT: 3.28 K/UL (ref 1.8–7.7)
WBC # BLD: 4.1 K/UL (ref 3.5–11.3)
WBC # BLD: ABNORMAL 10*3/UL

## 2020-07-29 PROCEDURE — 6360000002 HC RX W HCPCS: Performed by: NURSE PRACTITIONER

## 2020-07-29 PROCEDURE — 36591 DRAW BLOOD OFF VENOUS DEVICE: CPT

## 2020-07-29 PROCEDURE — 85025 COMPLETE CBC W/AUTO DIFF WBC: CPT

## 2020-07-29 PROCEDURE — 96523 IRRIG DRUG DELIVERY DEVICE: CPT

## 2020-07-29 PROCEDURE — 2580000003 HC RX 258: Performed by: NURSE PRACTITIONER

## 2020-07-29 RX ORDER — HEPARIN SODIUM (PORCINE) LOCK FLUSH IV SOLN 100 UNIT/ML 100 UNIT/ML
500 SOLUTION INTRAVENOUS PRN
Status: CANCELLED | OUTPATIENT
Start: 2020-07-29

## 2020-07-29 RX ORDER — HEPARIN SODIUM (PORCINE) LOCK FLUSH IV SOLN 100 UNIT/ML 100 UNIT/ML
500 SOLUTION INTRAVENOUS PRN
Status: DISCONTINUED | OUTPATIENT
Start: 2020-07-29 | End: 2020-07-30 | Stop reason: HOSPADM

## 2020-07-29 RX ORDER — SODIUM CHLORIDE 0.9 % (FLUSH) 0.9 %
10 SYRINGE (ML) INJECTION PRN
Status: DISCONTINUED | OUTPATIENT
Start: 2020-07-29 | End: 2020-07-30 | Stop reason: HOSPADM

## 2020-07-29 RX ORDER — SODIUM CHLORIDE 0.9 % (FLUSH) 0.9 %
10 SYRINGE (ML) INJECTION PRN
Status: CANCELLED | OUTPATIENT
Start: 2020-07-29

## 2020-07-29 RX ADMIN — HEPARIN SODIUM (PORCINE) LOCK FLUSH IV SOLN 100 UNIT/ML 500 UNITS: 100 SOLUTION at 08:10

## 2020-07-29 RX ADMIN — Medication 10 ML: at 08:09

## 2020-07-29 RX ADMIN — Medication 10 ML: at 08:10

## 2020-07-29 NOTE — PROGRESS NOTES
VAD accessed per protocol using 20 gauge 3/4\" hong needle. Flushes easily. Labs drawn. Flushed with 10 ml normal saline and 5 ml Heplock solution. Hong needle dc'd. Bandaid to site. Patient tolerated procedure with out difficulty.

## 2020-08-14 RX ORDER — LISINOPRIL 30 MG/1
TABLET ORAL
Qty: 90 TABLET | Refills: 1 | Status: SHIPPED | OUTPATIENT
Start: 2020-08-14 | End: 2021-01-08 | Stop reason: SDUPTHER

## 2020-08-24 ENCOUNTER — HOSPITAL ENCOUNTER (OUTPATIENT)
Dept: INFUSION THERAPY | Age: 70
Discharge: HOME | End: 2020-08-24
Payer: MEDICARE

## 2020-08-24 ENCOUNTER — HOSPITAL ENCOUNTER (OUTPATIENT)
Age: 70
Setting detail: SPECIMEN
Discharge: HOME | End: 2020-08-24
Payer: MEDICARE

## 2020-08-24 DIAGNOSIS — C7B.00 METASTATIC CARCINOID TUMOR (HCC): Primary | ICD-10-CM

## 2020-08-24 LAB
ABSOLUTE EOS #: 0.13 K/UL (ref 0–0.44)
ABSOLUTE IMMATURE GRANULOCYTE: 0 K/UL (ref 0–0.3)
ABSOLUTE LYMPH #: 0.75 K/UL (ref 1.1–3.7)
ABSOLUTE MONO #: 0.48 K/UL (ref 0.1–1.2)
BASOPHILS # BLD: 1 % (ref 0–2)
BASOPHILS ABSOLUTE: 0.04 K/UL (ref 0–0.2)
DIFFERENTIAL TYPE: ABNORMAL
EOSINOPHILS RELATIVE PERCENT: 3 % (ref 1–4)
HCT VFR BLD CALC: 36 % (ref 36.3–47.1)
HEMOGLOBIN: 11.5 G/DL (ref 11.9–15.1)
IMMATURE GRANULOCYTES: 0 %
LYMPHOCYTES # BLD: 17 % (ref 24–43)
MCH RBC QN AUTO: 27.4 PG (ref 25.2–33.5)
MCHC RBC AUTO-ENTMCNC: 31.9 G/DL (ref 25.2–33.5)
MCV RBC AUTO: 85.7 FL (ref 82.6–102.9)
MONOCYTES # BLD: 11 % (ref 3–12)
MORPHOLOGY: ABNORMAL
MORPHOLOGY: ABNORMAL
NRBC AUTOMATED: 0 PER 100 WBC
PDW BLD-RTO: 20.1 % (ref 11.8–14.4)
PLATELET # BLD: 171 K/UL (ref 138–453)
PLATELET ESTIMATE: ABNORMAL
PMV BLD AUTO: 9 FL (ref 8.1–13.5)
RBC # BLD: 4.2 M/UL (ref 3.95–5.11)
RBC # BLD: ABNORMAL 10*6/UL
SEG NEUTROPHILS: 68 % (ref 36–65)
SEGMENTED NEUTROPHILS ABSOLUTE COUNT: 3 K/UL (ref 1.5–8.1)
WBC # BLD: 4.4 K/UL (ref 3.5–11.3)
WBC # BLD: ABNORMAL 10*3/UL

## 2020-08-24 PROCEDURE — 36591 DRAW BLOOD OFF VENOUS DEVICE: CPT

## 2020-08-24 PROCEDURE — 85025 COMPLETE CBC W/AUTO DIFF WBC: CPT

## 2020-08-24 PROCEDURE — 6360000002 HC RX W HCPCS: Performed by: NURSE PRACTITIONER

## 2020-08-24 PROCEDURE — 2580000003 HC RX 258: Performed by: NURSE PRACTITIONER

## 2020-08-24 RX ORDER — SODIUM CHLORIDE 0.9 % (FLUSH) 0.9 %
10 SYRINGE (ML) INJECTION PRN
Status: CANCELLED | OUTPATIENT
Start: 2020-08-24

## 2020-08-24 RX ORDER — HEPARIN SODIUM (PORCINE) LOCK FLUSH IV SOLN 100 UNIT/ML 100 UNIT/ML
500 SOLUTION INTRAVENOUS PRN
Status: CANCELLED | OUTPATIENT
Start: 2020-08-24

## 2020-08-24 RX ORDER — SODIUM CHLORIDE 0.9 % (FLUSH) 0.9 %
10 SYRINGE (ML) INJECTION PRN
Status: DISCONTINUED | OUTPATIENT
Start: 2020-08-24 | End: 2020-08-25 | Stop reason: HOSPADM

## 2020-08-24 RX ORDER — HEPARIN SODIUM (PORCINE) LOCK FLUSH IV SOLN 100 UNIT/ML 100 UNIT/ML
500 SOLUTION INTRAVENOUS PRN
Status: DISCONTINUED | OUTPATIENT
Start: 2020-08-24 | End: 2020-08-25 | Stop reason: HOSPADM

## 2020-08-24 RX ADMIN — HEPARIN SODIUM (PORCINE) LOCK FLUSH IV SOLN 100 UNIT/ML 500 UNITS: 100 SOLUTION at 07:58

## 2020-08-24 RX ADMIN — Medication 10 ML: at 07:58

## 2020-09-22 ENCOUNTER — HOSPITAL ENCOUNTER (OUTPATIENT)
Dept: INFUSION THERAPY | Age: 70
Discharge: HOME | End: 2020-09-22
Payer: MEDICARE

## 2020-09-22 ENCOUNTER — HOSPITAL ENCOUNTER (OUTPATIENT)
Age: 70
Setting detail: SPECIMEN
Discharge: HOME | End: 2020-09-22
Payer: MEDICARE

## 2020-09-22 DIAGNOSIS — C7B.00 METASTATIC CARCINOID TUMOR (HCC): Primary | ICD-10-CM

## 2020-09-22 LAB
ABSOLUTE EOS #: 0.14 K/UL (ref 0–0.44)
ABSOLUTE IMMATURE GRANULOCYTE: <0.03 K/UL (ref 0–0.3)
ABSOLUTE LYMPH #: 0.73 K/UL (ref 1.1–3.7)
ABSOLUTE MONO #: 0.67 K/UL (ref 0.1–1.2)
BASOPHILS # BLD: 0 % (ref 0–2)
BASOPHILS ABSOLUTE: <0.03 K/UL (ref 0–0.2)
DIFFERENTIAL TYPE: ABNORMAL
EOSINOPHILS RELATIVE PERCENT: 3 % (ref 1–4)
HCT VFR BLD CALC: 37.6 % (ref 36.3–47.1)
HEMOGLOBIN: 12.1 G/DL (ref 11.9–15.1)
IMMATURE GRANULOCYTES: 0 %
LYMPHOCYTES # BLD: 15 % (ref 24–43)
MCH RBC QN AUTO: 28.8 PG (ref 25.2–33.5)
MCHC RBC AUTO-ENTMCNC: 32.2 G/DL (ref 25.2–33.5)
MCV RBC AUTO: 89.5 FL (ref 82.6–102.9)
MONOCYTES # BLD: 14 % (ref 3–12)
NRBC AUTOMATED: 0 PER 100 WBC
PDW BLD-RTO: 18.6 % (ref 11.8–14.4)
PLATELET # BLD: 145 K/UL (ref 138–453)
PLATELET ESTIMATE: ABNORMAL
PMV BLD AUTO: 9.4 FL (ref 8.1–13.5)
RBC # BLD: 4.2 M/UL (ref 3.95–5.11)
RBC # BLD: ABNORMAL 10*6/UL
SEG NEUTROPHILS: 68 % (ref 36–65)
SEGMENTED NEUTROPHILS ABSOLUTE COUNT: 3.23 K/UL (ref 1.5–8.1)
WBC # BLD: 4.8 K/UL (ref 3.5–11.3)
WBC # BLD: ABNORMAL 10*3/UL

## 2020-09-22 PROCEDURE — 85025 COMPLETE CBC W/AUTO DIFF WBC: CPT

## 2020-09-22 PROCEDURE — 36591 DRAW BLOOD OFF VENOUS DEVICE: CPT

## 2020-09-22 PROCEDURE — 2580000003 HC RX 258: Performed by: NURSE PRACTITIONER

## 2020-09-22 PROCEDURE — 6360000002 HC RX W HCPCS: Performed by: NURSE PRACTITIONER

## 2020-09-22 RX ORDER — SODIUM CHLORIDE 0.9 % (FLUSH) 0.9 %
10 SYRINGE (ML) INJECTION PRN
Status: DISCONTINUED | OUTPATIENT
Start: 2020-09-22 | End: 2020-09-23 | Stop reason: HOSPADM

## 2020-09-22 RX ORDER — HEPARIN SODIUM (PORCINE) LOCK FLUSH IV SOLN 100 UNIT/ML 100 UNIT/ML
500 SOLUTION INTRAVENOUS PRN
Status: DISCONTINUED | OUTPATIENT
Start: 2020-09-22 | End: 2020-09-23 | Stop reason: HOSPADM

## 2020-09-22 RX ORDER — SODIUM CHLORIDE 0.9 % (FLUSH) 0.9 %
10 SYRINGE (ML) INJECTION PRN
Status: CANCELLED | OUTPATIENT
Start: 2020-09-22

## 2020-09-22 RX ORDER — HEPARIN SODIUM (PORCINE) LOCK FLUSH IV SOLN 100 UNIT/ML 100 UNIT/ML
500 SOLUTION INTRAVENOUS PRN
Status: CANCELLED | OUTPATIENT
Start: 2020-09-22

## 2020-09-22 RX ADMIN — HEPARIN SODIUM (PORCINE) LOCK FLUSH IV SOLN 100 UNIT/ML 500 UNITS: 100 SOLUTION at 07:37

## 2020-09-22 RX ADMIN — Medication 10 ML: at 07:37

## 2020-10-13 ENCOUNTER — HOSPITAL ENCOUNTER (OUTPATIENT)
Dept: LAB | Age: 70
Discharge: HOME | End: 2020-10-13
Payer: MEDICARE

## 2020-10-13 ENCOUNTER — OFFICE VISIT (OUTPATIENT)
Dept: FAMILY MEDICINE CLINIC | Age: 70
End: 2020-10-13
Payer: MEDICARE

## 2020-10-13 VITALS
HEIGHT: 63 IN | SYSTOLIC BLOOD PRESSURE: 130 MMHG | BODY MASS INDEX: 24.49 KG/M2 | DIASTOLIC BLOOD PRESSURE: 86 MMHG | HEART RATE: 82 BPM | RESPIRATION RATE: 16 BRPM | WEIGHT: 138.2 LBS | TEMPERATURE: 98.3 F

## 2020-10-13 LAB
-: ABNORMAL
AMORPHOUS: ABNORMAL
BACTERIA: ABNORMAL
BILIRUBIN URINE: NEGATIVE
CASTS UA: ABNORMAL /LPF (ref 0–2)
COLOR: ABNORMAL
COMMENT UA: ABNORMAL
CRYSTALS, UA: ABNORMAL /HPF
EPITHELIAL CELLS UA: ABNORMAL /HPF (ref 0–5)
GLUCOSE URINE: NEGATIVE
KETONES, URINE: NEGATIVE
LEUKOCYTE ESTERASE, URINE: NEGATIVE
MUCUS: ABNORMAL
NITRITE, URINE: POSITIVE
OTHER OBSERVATIONS UA: ABNORMAL
PH UA: 5 (ref 5–6)
PROTEIN UA: NEGATIVE
RBC UA: ABNORMAL /HPF (ref 0–4)
RENAL EPITHELIAL, UA: ABNORMAL /HPF
SPECIFIC GRAVITY UA: 1.03 (ref 1.01–1.02)
TRICHOMONAS: ABNORMAL
TURBIDITY: ABNORMAL
URINE HGB: NEGATIVE
UROBILINOGEN, URINE: NORMAL
WBC UA: ABNORMAL /HPF (ref 0–4)
YEAST: ABNORMAL

## 2020-10-13 PROCEDURE — 87086 URINE CULTURE/COLONY COUNT: CPT

## 2020-10-13 PROCEDURE — 81001 URINALYSIS AUTO W/SCOPE: CPT

## 2020-10-13 PROCEDURE — 87077 CULTURE AEROBIC IDENTIFY: CPT

## 2020-10-13 PROCEDURE — G8399 PT W/DXA RESULTS DOCUMENT: HCPCS | Performed by: FAMILY MEDICINE

## 2020-10-13 PROCEDURE — 4040F PNEUMOC VAC/ADMIN/RCVD: CPT | Performed by: FAMILY MEDICINE

## 2020-10-13 PROCEDURE — 87186 SC STD MICRODIL/AGAR DIL: CPT

## 2020-10-13 PROCEDURE — 99212 OFFICE O/P EST SF 10 MIN: CPT

## 2020-10-13 PROCEDURE — 3017F COLORECTAL CA SCREEN DOC REV: CPT | Performed by: FAMILY MEDICINE

## 2020-10-13 PROCEDURE — 99213 OFFICE O/P EST LOW 20 MIN: CPT | Performed by: FAMILY MEDICINE

## 2020-10-13 PROCEDURE — G8427 DOCREV CUR MEDS BY ELIG CLIN: HCPCS | Performed by: FAMILY MEDICINE

## 2020-10-13 PROCEDURE — 36415 COLL VENOUS BLD VENIPUNCTURE: CPT

## 2020-10-13 PROCEDURE — 1123F ACP DISCUSS/DSCN MKR DOCD: CPT | Performed by: FAMILY MEDICINE

## 2020-10-13 PROCEDURE — 1090F PRES/ABSN URINE INCON ASSESS: CPT | Performed by: FAMILY MEDICINE

## 2020-10-13 PROCEDURE — G8420 CALC BMI NORM PARAMETERS: HCPCS | Performed by: FAMILY MEDICINE

## 2020-10-13 PROCEDURE — G8484 FLU IMMUNIZE NO ADMIN: HCPCS | Performed by: FAMILY MEDICINE

## 2020-10-13 PROCEDURE — 1036F TOBACCO NON-USER: CPT | Performed by: FAMILY MEDICINE

## 2020-10-13 RX ORDER — FERROUS SULFATE 325(65) MG
TABLET ORAL
COMMUNITY
Start: 2020-08-17 | End: 2021-02-22

## 2020-10-13 RX ORDER — CIPROFLOXACIN 500 MG/1
500 TABLET, FILM COATED ORAL 2 TIMES DAILY
Qty: 14 TABLET | Refills: 0 | Status: SHIPPED | OUTPATIENT
Start: 2020-10-13 | End: 2020-10-20

## 2020-10-13 ASSESSMENT — PATIENT HEALTH QUESTIONNAIRE - PHQ9
2. FEELING DOWN, DEPRESSED OR HOPELESS: 1
SUM OF ALL RESPONSES TO PHQ QUESTIONS 1-9: 2
SUM OF ALL RESPONSES TO PHQ QUESTIONS 1-9: 2
1. LITTLE INTEREST OR PLEASURE IN DOING THINGS: 1
SUM OF ALL RESPONSES TO PHQ9 QUESTIONS 1 & 2: 2

## 2020-10-13 NOTE — PATIENT INSTRUCTIONS
Patient Education        Urinary Tract Infection in Women: Care Instructions  Your Care Instructions     A urinary tract infection, or UTI, is a general term for an infection anywhere between the kidneys and the urethra (where urine comes out). Most UTIs are bladder infections. They often cause pain or burning when you urinate. UTIs are caused by bacteria and can be cured with antibiotics. Be sure to complete your treatment so that the infection goes away. Follow-up care is a key part of your treatment and safety. Be sure to make and go to all appointments, and call your doctor if you are having problems. It's also a good idea to know your test results and keep a list of the medicines you take. How can you care for yourself at home? · Take your antibiotics as directed. Do not stop taking them just because you feel better. You need to take the full course of antibiotics. · Drink extra water and other fluids for the next day or two. This may help wash out the bacteria that are causing the infection. (If you have kidney, heart, or liver disease and have to limit fluids, talk with your doctor before you increase your fluid intake.)  · Avoid drinks that are carbonated or have caffeine. They can irritate the bladder. · Urinate often. Try to empty your bladder each time. · To relieve pain, take a hot bath or lay a heating pad set on low over your lower belly or genital area. Never go to sleep with a heating pad in place. To prevent UTIs  · Drink plenty of water each day. This helps you urinate often, which clears bacteria from your system. (If you have kidney, heart, or liver disease and have to limit fluids, talk with your doctor before you increase your fluid intake.)  · Urinate when you need to. · Urinate right after you have sex. · Change sanitary pads often. · Avoid douches, bubble baths, feminine hygiene sprays, and other feminine hygiene products that have deodorants.   · After going to the bathroom, wipe from front to back. When should you call for help? Call your doctor now or seek immediate medical care if:    · Symptoms such as fever, chills, nausea, or vomiting get worse or appear for the first time.     · You have new pain in your back just below your rib cage. This is called flank pain.     · There is new blood or pus in your urine.     · You have any problems with your antibiotic medicine. Watch closely for changes in your health, and be sure to contact your doctor if:    · You are not getting better after taking an antibiotic for 2 days.     · Your symptoms go away but then come back. Where can you learn more? Go to https://Zetope[employer]vulineeb.Bia. org and sign in to your [employer] account. Enter J114 in the [employer] box to learn more about \"Urinary Tract Infection in Women: Care Instructions. \"     If you do not have an account, please click on the \"Sign Up Now\" link. Current as of: June 29, 2020               Content Version: 12.6  © 2006-2020 [employer]. Care instructions adapted under license by [facility] [address] If you have questions about a medical condition or this instruction, always ask your healthcare professional. [place] [address] any warranty or liability for your use of this information. We will contact you when the results of your culture are available. This usually takes at least 48 hours.

## 2020-10-13 NOTE — PROGRESS NOTES
[address]                        Telephone [phone]             Fax [phone]     Herbert Rosario  1950  MRN:  [id number]  Date of visit:  10/13/2020    Subjective:    Herbert Rosario is a 78 y.o.  female who presents to [facility] today (10/13/2020) for follow up/evaluation of:  Back Pain and Other      She reports a strong odor to her urine since last night. She has also had aching across her back for the past couple of days. She denies dysuria or increased urinary frequency. She denies fever, chills, or vomiting. She has had some slight nausea. She has had urinary tract infections in the past, but she has not had one recently.          She has the following problem list:  Patient Active Problem List   Diagnosis    Essential hypertension    Osteoarthritis    Osteopenia    History of breast cancer    History of lung cancer    Elevated glucose    Primary osteoarthritis of left knee    Metastatic carcinoid tumor ([address]    Cholecystitis    Hepatic metastasis ([address]    RUQ pain    Abdominal pain    Paroxysmal supraventricular tachycardia ([address]        Current medications are:  Outpatient Medications Marked as Taking for the 10/13/20 encounter (Office Visit) with Idalia Arambula MD   Medication Sig Dispense Refill    FEROSUL 325 (65 Fe) MG tablet take 1 tablet by mouth three times a day      apixaban (ELIQUIS) 5 MG TABS tablet take 1 tablet by mouth twice a day 90 tablet 3    lisinopril (PRINIVIL;ZESTRIL) 30 MG tablet take 1 tablet by mouth once daily 90 tablet 1    fluticasone (FLONASE) 50 MCG/ACT nasal spray 1 spray by Nasal route daily as needed for Rhinitis 1 Bottle 1    diltiazem (TIAZAC) 240 MG extended release capsule Take 1 capsule by mouth daily 90 capsule 3    ondansetron (ZOFRAN) 4 MG tablet Take 1 tablet by mouth every 8 hours as 10/13/2020 None  0 - 5 /HPF Final    Renal Epithelial, UA 10/13/2020 NOT REPORTED  0 /HPF Final    Bacteria, UA 10/13/2020 4+* None Final    Mucus, UA 10/13/2020 NOT REPORTED  None Final    Trichomonas, UA 10/13/2020 NOT REPORTED  None Final    Amorphous, UA 10/13/2020 NOT REPORTED  None Final    Other Observations UA 10/13/2020 NOT REPORTED  NOT REQ. Final    Yeast, UA 10/13/2020 NOT REPORTED  None Final         Assessment and Plan:    1. Bacteria in urine  Cipro was prescribed:  - ciprofloxacin (CIPRO) 500 MG tablet; Take 1 tablet by mouth 2 times daily for 7 days  Dispense: 14 tablet; Refill: 0    A urine culture was ordered:  - Culture, Urine; Future    She will be contacted when the results are available. Printed information regarding Urinary Tract Infection in Women was provided to the patient with her after visit summary. 2. Other secondary neuroendocrine tumors [facility])  She has been [facility].         (Please note that portions of this note were completed with a voice-recognition program. Efforts were made to edit the dictation but occasionally words are mis-transcribed.)

## 2020-10-15 LAB
CULTURE: ABNORMAL
Lab: ABNORMAL
SPECIMEN DESCRIPTION: ABNORMAL

## 2020-10-19 ENCOUNTER — HOSPITAL ENCOUNTER (OUTPATIENT)
Dept: INFUSION THERAPY | Age: 70
Discharge: HOME | End: 2020-10-19
Payer: MEDICARE

## 2020-10-19 ENCOUNTER — HOSPITAL ENCOUNTER (OUTPATIENT)
Age: 70
Setting detail: SPECIMEN
Discharge: HOME | End: 2020-10-19
Payer: MEDICARE

## 2020-10-19 DIAGNOSIS — C7B.00 METASTATIC CARCINOID TUMOR (HCC): Primary | ICD-10-CM

## 2020-10-19 LAB
ABSOLUTE EOS #: 0.19 K/UL (ref 0–0.44)
ABSOLUTE IMMATURE GRANULOCYTE: <0.03 K/UL (ref 0–0.3)
ABSOLUTE LYMPH #: 0.62 K/UL (ref 1.1–3.7)
ABSOLUTE MONO #: 0.54 K/UL (ref 0.1–1.2)
BASOPHILS # BLD: 0 % (ref 0–2)
BASOPHILS ABSOLUTE: <0.03 K/UL (ref 0–0.2)
DIFFERENTIAL TYPE: ABNORMAL
EOSINOPHILS RELATIVE PERCENT: 5 % (ref 1–4)
HCT VFR BLD CALC: 38.3 % (ref 36.3–47.1)
HEMOGLOBIN: 12.7 G/DL (ref 11.9–15.1)
IMMATURE GRANULOCYTES: 0 %
LYMPHOCYTES # BLD: 15 % (ref 24–43)
MCH RBC QN AUTO: 30.2 PG (ref 25.2–33.5)
MCHC RBC AUTO-ENTMCNC: 33.2 G/DL (ref 25.2–33.5)
MCV RBC AUTO: 91.2 FL (ref 82.6–102.9)
MONOCYTES # BLD: 13 % (ref 3–12)
NRBC AUTOMATED: 0 PER 100 WBC
PDW BLD-RTO: 16.6 % (ref 11.8–14.4)
PLATELET # BLD: 163 K/UL (ref 138–453)
PLATELET ESTIMATE: ABNORMAL
PMV BLD AUTO: 8.7 FL (ref 8.1–13.5)
RBC # BLD: 4.2 M/UL (ref 3.95–5.11)
RBC # BLD: ABNORMAL 10*6/UL
SEG NEUTROPHILS: 67 % (ref 36–65)
SEGMENTED NEUTROPHILS ABSOLUTE COUNT: 2.83 K/UL (ref 1.5–8.1)
WBC # BLD: 4.2 K/UL (ref 3.5–11.3)
WBC # BLD: ABNORMAL 10*3/UL

## 2020-10-19 PROCEDURE — 6360000002 HC RX W HCPCS: Performed by: NURSE PRACTITIONER

## 2020-10-19 PROCEDURE — 96523 IRRIG DRUG DELIVERY DEVICE: CPT

## 2020-10-19 PROCEDURE — 2580000003 HC RX 258: Performed by: NURSE PRACTITIONER

## 2020-10-19 PROCEDURE — 85025 COMPLETE CBC W/AUTO DIFF WBC: CPT

## 2020-10-19 RX ORDER — HEPARIN SODIUM (PORCINE) LOCK FLUSH IV SOLN 100 UNIT/ML 100 UNIT/ML
500 SOLUTION INTRAVENOUS PRN
Status: CANCELLED | OUTPATIENT
Start: 2020-10-19

## 2020-10-19 RX ORDER — SODIUM CHLORIDE 0.9 % (FLUSH) 0.9 %
10 SYRINGE (ML) INJECTION PRN
Status: CANCELLED | OUTPATIENT
Start: 2020-10-19

## 2020-10-19 RX ORDER — HEPARIN SODIUM (PORCINE) LOCK FLUSH IV SOLN 100 UNIT/ML 100 UNIT/ML
500 SOLUTION INTRAVENOUS PRN
Status: DISCONTINUED | OUTPATIENT
Start: 2020-10-19 | End: 2020-10-20 | Stop reason: HOSPADM

## 2020-10-19 RX ORDER — SODIUM CHLORIDE 0.9 % (FLUSH) 0.9 %
10 SYRINGE (ML) INJECTION PRN
Status: DISCONTINUED | OUTPATIENT
Start: 2020-10-19 | End: 2020-10-20 | Stop reason: HOSPADM

## 2020-10-19 RX ADMIN — Medication 10 ML: at 08:08

## 2020-10-19 RX ADMIN — HEPARIN SODIUM (PORCINE) LOCK FLUSH IV SOLN 100 UNIT/ML 500 UNITS: 100 SOLUTION at 08:08

## 2020-10-19 NOTE — PROGRESS NOTES
VAD accessed per Mitch Jesus RN per protocol with 3/4 \" hong needle. Flushed easily with saline. Blood return noted. Flushed with 10 ml of NSS and 5 ml of Heparin flush. VAD D/C'd and Band-Aid to site. Patient stable and discharged to home.

## 2020-11-18 ENCOUNTER — HOSPITAL ENCOUNTER (OUTPATIENT)
Dept: INFUSION THERAPY | Age: 70
Discharge: HOME | End: 2020-11-18
Payer: MEDICARE

## 2020-11-18 ENCOUNTER — HOSPITAL ENCOUNTER (OUTPATIENT)
Age: 70
Setting detail: SPECIMEN
Discharge: HOME | End: 2020-11-18
Payer: MEDICARE

## 2020-11-18 DIAGNOSIS — C7B.00 METASTATIC CARCINOID TUMOR (HCC): Primary | ICD-10-CM

## 2020-11-18 LAB
ABSOLUTE EOS #: 0.15 K/UL (ref 0–0.44)
ABSOLUTE IMMATURE GRANULOCYTE: 0.03 K/UL (ref 0–0.3)
ABSOLUTE LYMPH #: 0.63 K/UL (ref 1.1–3.7)
ABSOLUTE MONO #: 0.63 K/UL (ref 0.1–1.2)
BASOPHILS # BLD: 0 % (ref 0–2)
BASOPHILS ABSOLUTE: <0.03 K/UL (ref 0–0.2)
DIFFERENTIAL TYPE: ABNORMAL
EOSINOPHILS RELATIVE PERCENT: 3 % (ref 1–4)
HCT VFR BLD CALC: 38.6 % (ref 36.3–47.1)
HEMOGLOBIN: 12.9 G/DL (ref 11.9–15.1)
IMMATURE GRANULOCYTES: 1 %
LYMPHOCYTES # BLD: 14 % (ref 24–43)
MCH RBC QN AUTO: 31.2 PG (ref 25.2–33.5)
MCHC RBC AUTO-ENTMCNC: 33.4 G/DL (ref 25.2–33.5)
MCV RBC AUTO: 93.5 FL (ref 82.6–102.9)
MONOCYTES # BLD: 14 % (ref 3–12)
NRBC AUTOMATED: 0 PER 100 WBC
PDW BLD-RTO: 14.2 % (ref 11.8–14.4)
PLATELET # BLD: 165 K/UL (ref 138–453)
PLATELET ESTIMATE: ABNORMAL
PMV BLD AUTO: 9 FL (ref 8.1–13.5)
RBC # BLD: 4.13 M/UL (ref 3.95–5.11)
RBC # BLD: ABNORMAL 10*6/UL
SEG NEUTROPHILS: 68 % (ref 36–65)
SEGMENTED NEUTROPHILS ABSOLUTE COUNT: 3.16 K/UL (ref 1.5–8.1)
WBC # BLD: 4.6 K/UL (ref 3.5–11.3)
WBC # BLD: ABNORMAL 10*3/UL

## 2020-11-18 PROCEDURE — 6360000002 HC RX W HCPCS: Performed by: NURSE PRACTITIONER

## 2020-11-18 PROCEDURE — 36591 DRAW BLOOD OFF VENOUS DEVICE: CPT

## 2020-11-18 PROCEDURE — 2580000003 HC RX 258: Performed by: NURSE PRACTITIONER

## 2020-11-18 PROCEDURE — 85025 COMPLETE CBC W/AUTO DIFF WBC: CPT

## 2020-11-18 RX ORDER — HEPARIN SODIUM (PORCINE) LOCK FLUSH IV SOLN 100 UNIT/ML 100 UNIT/ML
500 SOLUTION INTRAVENOUS PRN
Status: CANCELLED | OUTPATIENT
Start: 2020-11-18

## 2020-11-18 RX ORDER — SODIUM CHLORIDE 0.9 % (FLUSH) 0.9 %
10 SYRINGE (ML) INJECTION PRN
Status: DISCONTINUED | OUTPATIENT
Start: 2020-11-18 | End: 2020-11-19 | Stop reason: HOSPADM

## 2020-11-18 RX ORDER — HEPARIN SODIUM (PORCINE) LOCK FLUSH IV SOLN 100 UNIT/ML 100 UNIT/ML
500 SOLUTION INTRAVENOUS PRN
Status: DISCONTINUED | OUTPATIENT
Start: 2020-11-18 | End: 2020-11-19 | Stop reason: HOSPADM

## 2020-11-18 RX ORDER — SODIUM CHLORIDE 0.9 % (FLUSH) 0.9 %
10 SYRINGE (ML) INJECTION PRN
Status: CANCELLED | OUTPATIENT
Start: 2020-11-18

## 2020-11-18 RX ADMIN — HEPARIN SODIUM (PORCINE) LOCK FLUSH IV SOLN 100 UNIT/ML 500 UNITS: 100 SOLUTION at 08:00

## 2020-11-18 RX ADMIN — Medication 10 ML: at 08:00

## 2020-11-18 NOTE — PROGRESS NOTES
VAD accessed per protocol Per Quynh Nobles RN with 3/4 inch 20 gauge hong needle. Flushes easy with good blood return. Labs Drawn. Flushed with 10 ml of normal saline and 5 ml of heparin flush. D/C'd 3/4 inch hong needle and band aide applied. Patient stable and discharged to home.

## 2020-11-30 ENCOUNTER — TELEPHONE (OUTPATIENT)
Dept: FAMILY MEDICINE CLINIC | Age: 70
End: 2020-11-30

## 2020-11-30 PROBLEM — Z86.16 HISTORY OF 2019 NOVEL CORONAVIRUS DISEASE (COVID-19): Status: ACTIVE | Noted: 2020-11-30

## 2020-11-30 NOTE — TELEPHONE ENCOUNTER
Pt calling to let you know she saw her oncologist last week and was tested positive for Covid, pt was told to let her PCP know this, pt states she just lost her smell and taste.

## 2020-12-08 ENCOUNTER — HOSPITAL ENCOUNTER (OUTPATIENT)
Age: 70
Setting detail: SPECIMEN
Discharge: HOME | End: 2020-12-08
Payer: MEDICARE

## 2020-12-08 ENCOUNTER — HOSPITAL ENCOUNTER (OUTPATIENT)
Dept: INFUSION THERAPY | Age: 70
Discharge: HOME | End: 2020-12-08
Payer: MEDICARE

## 2020-12-08 DIAGNOSIS — C7B.00 METASTATIC CARCINOID TUMOR (HCC): Primary | ICD-10-CM

## 2020-12-08 LAB
ABSOLUTE EOS #: 0.09 K/UL (ref 0–0.44)
ABSOLUTE IMMATURE GRANULOCYTE: <0.03 K/UL (ref 0–0.3)
ABSOLUTE LYMPH #: 0.75 K/UL (ref 1.1–3.7)
ABSOLUTE MONO #: 0.57 K/UL (ref 0.1–1.2)
BASOPHILS # BLD: 0 % (ref 0–2)
BASOPHILS ABSOLUTE: <0.03 K/UL (ref 0–0.2)
DIFFERENTIAL TYPE: ABNORMAL
EOSINOPHILS RELATIVE PERCENT: 2 % (ref 1–4)
HCT VFR BLD CALC: 38.1 % (ref 36.3–47.1)
HEMOGLOBIN: 12.7 G/DL (ref 11.9–15.1)
IMMATURE GRANULOCYTES: 0 %
LYMPHOCYTES # BLD: 12 % (ref 24–43)
MCH RBC QN AUTO: 31.4 PG (ref 25.2–33.5)
MCHC RBC AUTO-ENTMCNC: 33.3 G/DL (ref 25.2–33.5)
MCV RBC AUTO: 94.1 FL (ref 82.6–102.9)
MONOCYTES # BLD: 9 % (ref 3–12)
NRBC AUTOMATED: 0 PER 100 WBC
PDW BLD-RTO: 13.4 % (ref 11.8–14.4)
PLATELET # BLD: 189 K/UL (ref 138–453)
PLATELET ESTIMATE: ABNORMAL
PMV BLD AUTO: 8.7 FL (ref 8.1–13.5)
RBC # BLD: 4.05 M/UL (ref 3.95–5.11)
RBC # BLD: ABNORMAL 10*6/UL
SEG NEUTROPHILS: 77 % (ref 36–65)
SEGMENTED NEUTROPHILS ABSOLUTE COUNT: 4.65 K/UL (ref 1.5–8.1)
WBC # BLD: 6.1 K/UL (ref 3.5–11.3)
WBC # BLD: ABNORMAL 10*3/UL

## 2020-12-08 PROCEDURE — 2580000003 HC RX 258: Performed by: NURSE PRACTITIONER

## 2020-12-08 PROCEDURE — 6360000002 HC RX W HCPCS: Performed by: NURSE PRACTITIONER

## 2020-12-08 PROCEDURE — 85025 COMPLETE CBC W/AUTO DIFF WBC: CPT

## 2020-12-08 PROCEDURE — 36591 DRAW BLOOD OFF VENOUS DEVICE: CPT

## 2020-12-08 RX ORDER — HEPARIN SODIUM (PORCINE) LOCK FLUSH IV SOLN 100 UNIT/ML 100 UNIT/ML
500 SOLUTION INTRAVENOUS PRN
Status: CANCELLED | OUTPATIENT
Start: 2020-12-08

## 2020-12-08 RX ORDER — SODIUM CHLORIDE 0.9 % (FLUSH) 0.9 %
10 SYRINGE (ML) INJECTION PRN
Status: DISCONTINUED | OUTPATIENT
Start: 2020-12-08 | End: 2020-12-09 | Stop reason: HOSPADM

## 2020-12-08 RX ORDER — SODIUM CHLORIDE 0.9 % (FLUSH) 0.9 %
10 SYRINGE (ML) INJECTION PRN
Status: CANCELLED | OUTPATIENT
Start: 2020-12-08

## 2020-12-08 RX ORDER — HEPARIN SODIUM (PORCINE) LOCK FLUSH IV SOLN 100 UNIT/ML 100 UNIT/ML
500 SOLUTION INTRAVENOUS PRN
Status: DISCONTINUED | OUTPATIENT
Start: 2020-12-08 | End: 2020-12-09 | Stop reason: HOSPADM

## 2020-12-08 RX ADMIN — HEPARIN SODIUM (PORCINE) LOCK FLUSH IV SOLN 100 UNIT/ML 500 UNITS: 100 SOLUTION at 15:45

## 2020-12-08 RX ADMIN — Medication 10 ML: at 15:45

## 2020-12-08 NOTE — PROGRESS NOTES
VAD accessed per protocol with 3/4 inch 20 gauge hong needle. Flushes easy with good blood return. Labs Drawn. Flushed with 10 ml of normal saline and 5 ml of heparin flush. D/C'd 3/4 inch hong needle and band aide applied. Stable and discharged to home.

## 2020-12-28 ENCOUNTER — HOSPITAL ENCOUNTER (OUTPATIENT)
Dept: INFUSION THERAPY | Age: 70
Discharge: HOME | End: 2020-12-28
Payer: MEDICARE

## 2020-12-28 ENCOUNTER — HOSPITAL ENCOUNTER (OUTPATIENT)
Age: 70
Setting detail: SPECIMEN
Discharge: HOME | End: 2020-12-28
Payer: MEDICARE

## 2020-12-28 DIAGNOSIS — C7B.00 METASTATIC CARCINOID TUMOR (HCC): Primary | ICD-10-CM

## 2020-12-28 LAB
ABSOLUTE EOS #: 0.09 K/UL (ref 0–0.4)
ABSOLUTE IMMATURE GRANULOCYTE: 0.09 K/UL (ref 0–0.3)
ABSOLUTE LYMPH #: 0.41 K/UL (ref 1–4.8)
ABSOLUTE MONO #: 0.45 K/UL (ref 0.1–1.2)
BASOPHILS # BLD: 0 % (ref 0–1)
BASOPHILS ABSOLUTE: 0 K/UL (ref 0–0.2)
DIFFERENTIAL TYPE: ABNORMAL
EOSINOPHILS RELATIVE PERCENT: 2 % (ref 1–7)
HCT VFR BLD CALC: 36 % (ref 36.3–47.1)
HEMOGLOBIN: 11.8 G/DL (ref 11.9–15.1)
IMMATURE GRANULOCYTES: 2 %
LYMPHOCYTES # BLD: 9 % (ref 16–46)
MCH RBC QN AUTO: 31.4 PG (ref 25.2–33.5)
MCHC RBC AUTO-ENTMCNC: 32.8 G/DL (ref 25.2–33.5)
MCV RBC AUTO: 95.7 FL (ref 82.6–102.9)
MONOCYTES # BLD: 10 % (ref 4–11)
MORPHOLOGY: ABNORMAL
MORPHOLOGY: ABNORMAL
NRBC AUTOMATED: 0 PER 100 WBC
PDW BLD-RTO: 13.4 % (ref 11.8–14.4)
PLATELET # BLD: 160 K/UL (ref 138–453)
PLATELET ESTIMATE: ABNORMAL
PMV BLD AUTO: 9.3 FL (ref 8.1–13.5)
RBC # BLD: 3.76 M/UL (ref 3.95–5.11)
RBC # BLD: ABNORMAL 10*6/UL
SEG NEUTROPHILS: 77 % (ref 43–77)
SEGMENTED NEUTROPHILS ABSOLUTE COUNT: 3.46 K/UL (ref 1.5–8.1)
WBC # BLD: 4.5 K/UL (ref 3.5–11.3)
WBC # BLD: ABNORMAL 10*3/UL

## 2020-12-28 PROCEDURE — 2580000003 HC RX 258: Performed by: NURSE PRACTITIONER

## 2020-12-28 PROCEDURE — 85025 COMPLETE CBC W/AUTO DIFF WBC: CPT

## 2020-12-28 PROCEDURE — 36591 DRAW BLOOD OFF VENOUS DEVICE: CPT

## 2020-12-28 PROCEDURE — 6360000002 HC RX W HCPCS: Performed by: NURSE PRACTITIONER

## 2020-12-28 RX ORDER — SODIUM CHLORIDE 0.9 % (FLUSH) 0.9 %
10 SYRINGE (ML) INJECTION PRN
Status: CANCELLED | OUTPATIENT
Start: 2020-12-28

## 2020-12-28 RX ORDER — HEPARIN SODIUM (PORCINE) LOCK FLUSH IV SOLN 100 UNIT/ML 100 UNIT/ML
500 SOLUTION INTRAVENOUS PRN
Status: CANCELLED | OUTPATIENT
Start: 2020-12-28

## 2020-12-28 RX ORDER — SODIUM CHLORIDE 0.9 % (FLUSH) 0.9 %
10 SYRINGE (ML) INJECTION PRN
Status: DISCONTINUED | OUTPATIENT
Start: 2020-12-28 | End: 2020-12-29 | Stop reason: HOSPADM

## 2020-12-28 RX ORDER — HEPARIN SODIUM (PORCINE) LOCK FLUSH IV SOLN 100 UNIT/ML 100 UNIT/ML
500 SOLUTION INTRAVENOUS PRN
Status: DISCONTINUED | OUTPATIENT
Start: 2020-12-28 | End: 2020-12-29 | Stop reason: HOSPADM

## 2020-12-28 RX ADMIN — HEPARIN SODIUM (PORCINE) LOCK FLUSH IV SOLN 100 UNIT/ML 500 UNITS: 100 SOLUTION at 07:49

## 2020-12-28 RX ADMIN — Medication 10 ML: at 07:47

## 2020-12-28 NOTE — PROGRESS NOTES
Eagle Reddy here for labs via port   Port accessed per policy, good blood return   Labs drawn and sent   Port flushed per policy, needle removed

## 2020-12-28 NOTE — PLAN OF CARE
Problem: SAFETY  Goal: Free from accidental physical injury  12/28/2020 0753 by Brittani Harris RN  Outcome: Completed

## 2021-01-08 ENCOUNTER — VIRTUAL VISIT (OUTPATIENT)
Dept: FAMILY MEDICINE CLINIC | Age: 71
End: 2021-01-08
Payer: MEDICARE

## 2021-01-08 DIAGNOSIS — R73.09 ELEVATED GLUCOSE: ICD-10-CM

## 2021-01-08 DIAGNOSIS — C7B.8 OTHER SECONDARY NEUROENDOCRINE TUMORS (HCC): ICD-10-CM

## 2021-01-08 DIAGNOSIS — I47.1 PSVT (PAROXYSMAL SUPRAVENTRICULAR TACHYCARDIA) (HCC): ICD-10-CM

## 2021-01-08 DIAGNOSIS — J31.0 RHINITIS, UNSPECIFIED TYPE: ICD-10-CM

## 2021-01-08 DIAGNOSIS — I10 ESSENTIAL HYPERTENSION: Primary | ICD-10-CM

## 2021-01-08 PROCEDURE — 99212 OFFICE O/P EST SF 10 MIN: CPT

## 2021-01-08 PROCEDURE — 99442 PR PHYS/QHP TELEPHONE EVALUATION 11-20 MIN: CPT | Performed by: FAMILY MEDICINE

## 2021-01-08 RX ORDER — LISINOPRIL 30 MG/1
30 TABLET ORAL DAILY
Qty: 90 TABLET | Refills: 1 | Status: SHIPPED | OUTPATIENT
Start: 2021-01-08 | End: 2021-07-08 | Stop reason: SDUPTHER

## 2021-01-08 RX ORDER — FLUTICASONE PROPIONATE 50 MCG
1 SPRAY, SUSPENSION (ML) NASAL DAILY PRN
Qty: 1 BOTTLE | Refills: 5 | Status: SHIPPED | OUTPATIENT
Start: 2021-01-08

## 2021-01-08 SDOH — ECONOMIC STABILITY: INCOME INSECURITY: HOW HARD IS IT FOR YOU TO PAY FOR THE VERY BASICS LIKE FOOD, HOUSING, MEDICAL CARE, AND HEATING?: PATIENT DECLINED

## 2021-01-08 SDOH — ECONOMIC STABILITY: TRANSPORTATION INSECURITY
IN THE PAST 12 MONTHS, HAS THE LACK OF TRANSPORTATION KEPT YOU FROM MEDICAL APPOINTMENTS OR FROM GETTING MEDICATIONS?: PATIENT DECLINED

## 2021-01-08 SDOH — ECONOMIC STABILITY: TRANSPORTATION INSECURITY
IN THE PAST 12 MONTHS, HAS LACK OF TRANSPORTATION KEPT YOU FROM MEETINGS, WORK, OR FROM GETTING THINGS NEEDED FOR DAILY LIVING?: PATIENT DECLINED

## 2021-01-08 ASSESSMENT — PATIENT HEALTH QUESTIONNAIRE - PHQ9
SUM OF ALL RESPONSES TO PHQ QUESTIONS 1-9: 0
1. LITTLE INTEREST OR PLEASURE IN DOING THINGS: 0

## 2021-01-08 NOTE — PROGRESS NOTES
2021    TELEHEALTH EVALUATION -- Audio/Visual (During ENBCI-04 public health emergency)    HPI:    Josefa Prado (:  1950) has requested an audio/video evaluation for the following concern(s):    She states that she has been feeling well. She continues to have oncology follow up at [facility]. She states that she was taken off her study medication due to tumor progression. She was prescribed a different chemotherapy. She has not yet started the new chemotherapy. She is tolerating her other medications well. She states that her energy level is good. She has not been exercising regularly. She denies chest pain or shortness of breath with activity or at rest.  She has not had the labs drawn that were ordered to be done before today's visit. She has no new concerns or complaints today. Review of Systems    Prior to Visit Medications    Medication Sig Taking?  Authorizing Provider   FEROSUL 325 (65 Fe) MG tablet take 1 tablet by mouth three times a day Yes Historical Provider, MD   apixaban (ELIQUIS) 5 MG TABS tablet take 1 tablet by mouth twice a day Yes Olegraio العراقي DO   lisinopril (PRINIVIL;ZESTRIL) 30 MG tablet take 1 tablet by mouth once daily Yes Ирина Vieira MD   fluticasone (FLONASE) 50 MCG/ACT nasal spray 1 spray by Nasal route daily as needed for Rhinitis Yes Ирина Vieira MD   diltiazem (TIAZAC) 240 MG extended release capsule Take 1 capsule by mouth daily Yes Arash Panchal MD   ondansetron (ZOFRAN) 4 MG tablet Take 1 tablet by mouth every 8 hours as needed for Nausea Yes Paul Quinn DO   acetaminophen (TYLENOL) 325 MG tablet Take 2 tablets by mouth every 4 hours as needed for Pain or Fever Yes Bhupendra [facility]       Social History     Tobacco Use    Smoking status: Never Smoker    Smokeless tobacco: Never Used   Substance Use Topics    Alcohol use: No     Alcohol/week: 0.0 standard drinks    Drug use: No        She has the following allergies:  WBC Morphology 12/28/2020 NOT REPORTED   Final    RBC Morphology 12/28/2020 NOT REPORTED   Final    Platelet Estimate 24/91/7567 NOT REPORTED   Final    Monocytes 12/28/2020 10  4 - 11 % Final    Lymphocytes 12/28/2020 9* 16 - 46 % Final    Seg Neutrophils 12/28/2020 77  43 - 77 % Final    Eosinophils % 12/28/2020 2  1 - 7 % Final    Basophils 12/28/2020 0  0 - 1 % Final    Immature Granulocytes 12/28/2020 2* 0 % Final    Absolute Mono # 12/28/2020 0.45  0.1 - 1.2 k/uL Final    Absolute Lymph # 12/28/2020 0.41* 1.0 - 4.8 k/uL Final    Segs Absolute 12/28/2020 3.46  1.50 - 8.10 k/uL Final    Absolute Eos # 12/28/2020 0.09  0.0 - 0.4 k/uL Final    Basophils Absolute 12/28/2020 0.00  0.0 - 0.2 k/uL Final    Absolute Immature Granulocyte 12/28/2020 0.09  0.00 - 0.30 k/uL Final    Morphology 12/28/2020 Platelet scan shows Decreased Platelets   Final    Morphology 12/28/2020 RBC morphology normal.   Final        ASSESSMENT/PLAN:  1. Essential hypertension  Today's blood pressure is elevated. (143/73)  She was advised to continue to monitor her blood pressure at home. She was advised to continue her current medications. Lisinopril was refilled:  - lisinopril (PRINIVIL;ZESTRIL) 30 MG tablet; Take 1 tablet by mouth daily  Dispense: 90 tablet; Refill: 1    She has orders for a comprehensive panel and lipid panel. She was encouraged to come to the lab to have these done. Labs were also ordered to be done prior to her return visit in 6 months:  - Lipid Panel; Future  - Comprehensive Metabolic Panel; Future      2. Rhinitis, unspecified type  She is doing well with Flonase; this was refilled:  - fluticasone (FLONASE) 50 MCG/ACT nasal spray; 1 spray by Nasal route daily as needed for Rhinitis  Dispense: 1 Bottle; Refill: 5    3. Elevated glucose  She has orders for a comprehensive panel and HgbA1c to be done when she comes to the lab. An electronic signature was used to authenticate this note.

## 2021-01-13 ENCOUNTER — HOSPITAL ENCOUNTER (OUTPATIENT)
Dept: INFUSION THERAPY | Age: 71
Discharge: HOME | End: 2021-01-13
Payer: MEDICARE

## 2021-01-13 DIAGNOSIS — R73.09 ELEVATED GLUCOSE: ICD-10-CM

## 2021-01-13 DIAGNOSIS — C7B.00 METASTATIC CARCINOID TUMOR (HCC): ICD-10-CM

## 2021-01-13 DIAGNOSIS — I10 ESSENTIAL HYPERTENSION: Primary | ICD-10-CM

## 2021-01-13 LAB
ALBUMIN SERPL-MCNC: 4.1 G/DL (ref 3.5–5.2)
ALBUMIN/GLOBULIN RATIO: 1.4 (ref 1–2.5)
ALP BLD-CCNC: 74 U/L (ref 35–104)
ALT SERPL-CCNC: 11 U/L (ref 5–33)
ANION GAP SERPL CALCULATED.3IONS-SCNC: 8 MMOL/L (ref 9–17)
AST SERPL-CCNC: 17 U/L
BILIRUB SERPL-MCNC: 0.66 MG/DL (ref 0.3–1.2)
BUN BLDV-MCNC: 22 MG/DL (ref 8–23)
BUN/CREAT BLD: 37 (ref 9–20)
CALCIUM SERPL-MCNC: 9.7 MG/DL (ref 8.6–10.4)
CHLORIDE BLD-SCNC: 104 MMOL/L (ref 98–107)
CHOLESTEROL, FASTING: 145 MG/DL
CHOLESTEROL/HDL RATIO: 2.4
CO2: 26 MMOL/L (ref 20–31)
CREAT SERPL-MCNC: 0.59 MG/DL (ref 0.5–0.9)
GFR AFRICAN AMERICAN: >60 ML/MIN
GFR NON-AFRICAN AMERICAN: >60 ML/MIN
GFR SERPL CREATININE-BSD FRML MDRD: ABNORMAL ML/MIN/{1.73_M2}
GFR SERPL CREATININE-BSD FRML MDRD: ABNORMAL ML/MIN/{1.73_M2}
GLUCOSE BLD-MCNC: 89 MG/DL (ref 70–99)
HDLC SERPL-MCNC: 60 MG/DL
LDL CHOLESTEROL: 72 MG/DL (ref 0–130)
POTASSIUM SERPL-SCNC: 4 MMOL/L (ref 3.7–5.3)
SODIUM BLD-SCNC: 138 MMOL/L (ref 135–144)
TOTAL PROTEIN: 7 G/DL (ref 6.4–8.3)
TRIGLYCERIDE, FASTING: 63 MG/DL
VLDLC SERPL CALC-MCNC: NORMAL MG/DL (ref 1–30)

## 2021-01-13 PROCEDURE — 6360000002 HC RX W HCPCS: Performed by: NURSE PRACTITIONER

## 2021-01-13 PROCEDURE — 80053 COMPREHEN METABOLIC PANEL: CPT

## 2021-01-13 PROCEDURE — 80061 LIPID PANEL: CPT

## 2021-01-13 PROCEDURE — 83036 HEMOGLOBIN GLYCOSYLATED A1C: CPT

## 2021-01-13 PROCEDURE — 36591 DRAW BLOOD OFF VENOUS DEVICE: CPT

## 2021-01-13 PROCEDURE — 2580000003 HC RX 258: Performed by: NURSE PRACTITIONER

## 2021-01-13 RX ORDER — HEPARIN SODIUM (PORCINE) LOCK FLUSH IV SOLN 100 UNIT/ML 100 UNIT/ML
500 SOLUTION INTRAVENOUS PRN
Status: DISCONTINUED | OUTPATIENT
Start: 2021-01-13 | End: 2021-01-14 | Stop reason: HOSPADM

## 2021-01-13 RX ORDER — SODIUM CHLORIDE 0.9 % (FLUSH) 0.9 %
10 SYRINGE (ML) INJECTION PRN
Status: DISCONTINUED | OUTPATIENT
Start: 2021-01-13 | End: 2021-01-14 | Stop reason: HOSPADM

## 2021-01-13 RX ORDER — SODIUM CHLORIDE 0.9 % (FLUSH) 0.9 %
10 SYRINGE (ML) INJECTION PRN
Status: CANCELLED | OUTPATIENT
Start: 2021-01-13

## 2021-01-13 RX ORDER — HEPARIN SODIUM (PORCINE) LOCK FLUSH IV SOLN 100 UNIT/ML 100 UNIT/ML
500 SOLUTION INTRAVENOUS PRN
Status: CANCELLED | OUTPATIENT
Start: 2021-01-13

## 2021-01-13 RX ADMIN — Medication 10 ML: at 08:04

## 2021-01-13 RX ADMIN — Medication 10 ML: at 08:03

## 2021-01-13 RX ADMIN — HEPARIN SODIUM (PORCINE) LOCK FLUSH IV SOLN 100 UNIT/ML 500 UNITS: 100 SOLUTION at 08:04

## 2021-01-13 NOTE — PROGRESS NOTES
Patient on unit for port lab draw. Patient tolerated procedure without difficulty and discharged to home.

## 2021-01-15 LAB
-: NORMAL
REASON FOR REJECTION: NORMAL
ZZ NTE CLEAN UP: ORDERED TEST: NORMAL
ZZ NTE WITH NAME CLEAN UP: SPECIMEN SOURCE: NORMAL

## 2021-01-21 ENCOUNTER — HOSPITAL ENCOUNTER (OUTPATIENT)
Age: 71
Setting detail: SPECIMEN
Discharge: HOME | End: 2021-01-21
Payer: MEDICARE

## 2021-01-21 ENCOUNTER — HOSPITAL ENCOUNTER (OUTPATIENT)
Dept: INFUSION THERAPY | Age: 71
Discharge: HOME | End: 2021-01-21
Payer: MEDICARE

## 2021-01-21 DIAGNOSIS — C7B.00 METASTATIC CARCINOID TUMOR (HCC): Primary | ICD-10-CM

## 2021-01-21 DIAGNOSIS — R73.09 ELEVATED GLUCOSE: Primary | ICD-10-CM

## 2021-01-21 LAB
ABSOLUTE EOS #: 0.15 K/UL (ref 0–0.44)
ABSOLUTE IMMATURE GRANULOCYTE: <0.03 K/UL (ref 0–0.3)
ABSOLUTE LYMPH #: 0.81 K/UL (ref 1.1–3.7)
ABSOLUTE MONO #: 0.57 K/UL (ref 0.1–1.2)
ALBUMIN SERPL-MCNC: 4.3 G/DL (ref 3.5–5.2)
ALBUMIN/GLOBULIN RATIO: 1.5 (ref 1–2.5)
ALP BLD-CCNC: 80 U/L (ref 35–104)
ALT SERPL-CCNC: 11 U/L (ref 5–33)
ANION GAP SERPL CALCULATED.3IONS-SCNC: 9 MMOL/L (ref 9–17)
AST SERPL-CCNC: 22 U/L
BASOPHILS # BLD: 1 % (ref 0–2)
BASOPHILS ABSOLUTE: 0.03 K/UL (ref 0–0.2)
BILIRUB SERPL-MCNC: 0.86 MG/DL (ref 0.3–1.2)
BUN BLDV-MCNC: 21 MG/DL (ref 8–23)
BUN/CREAT BLD: 29 (ref 9–20)
CALCIUM SERPL-MCNC: 9.8 MG/DL (ref 8.6–10.4)
CHLORIDE BLD-SCNC: 104 MMOL/L (ref 98–107)
CO2: 25 MMOL/L (ref 20–31)
CREAT SERPL-MCNC: 0.73 MG/DL (ref 0.5–0.9)
DIFFERENTIAL TYPE: ABNORMAL
EOSINOPHILS RELATIVE PERCENT: 2 % (ref 1–4)
GFR AFRICAN AMERICAN: >60 ML/MIN
GFR NON-AFRICAN AMERICAN: >60 ML/MIN
GFR SERPL CREATININE-BSD FRML MDRD: ABNORMAL ML/MIN/{1.73_M2}
GFR SERPL CREATININE-BSD FRML MDRD: ABNORMAL ML/MIN/{1.73_M2}
GLUCOSE BLD-MCNC: 95 MG/DL (ref 70–99)
HCT VFR BLD CALC: 39.1 % (ref 36.3–47.1)
HEMOGLOBIN: 12.9 G/DL (ref 11.9–15.1)
IMMATURE GRANULOCYTES: 0 %
LACTATE DEHYDROGENASE: 211 U/L (ref 135–214)
LYMPHOCYTES # BLD: 13 % (ref 24–43)
MCH RBC QN AUTO: 31 PG (ref 25.2–33.5)
MCHC RBC AUTO-ENTMCNC: 33 G/DL (ref 25.2–33.5)
MCV RBC AUTO: 94 FL (ref 82.6–102.9)
MONOCYTES # BLD: 9 % (ref 3–12)
NRBC AUTOMATED: 0 PER 100 WBC
PDW BLD-RTO: 12.9 % (ref 11.8–14.4)
PLATELET # BLD: 201 K/UL (ref 138–453)
PLATELET ESTIMATE: ABNORMAL
PMV BLD AUTO: 9 FL (ref 8.1–13.5)
POTASSIUM SERPL-SCNC: 3.9 MMOL/L (ref 3.7–5.3)
RBC # BLD: 4.16 M/UL (ref 3.95–5.11)
RBC # BLD: ABNORMAL 10*6/UL
SEG NEUTROPHILS: 75 % (ref 36–65)
SEGMENTED NEUTROPHILS ABSOLUTE COUNT: 4.84 K/UL (ref 1.5–8.1)
SODIUM BLD-SCNC: 138 MMOL/L (ref 135–144)
TOTAL PROTEIN: 7.2 G/DL (ref 6.4–8.3)
WBC # BLD: 6.4 K/UL (ref 3.5–11.3)
WBC # BLD: ABNORMAL 10*3/UL

## 2021-01-21 PROCEDURE — 2580000003 HC RX 258: Performed by: NURSE PRACTITIONER

## 2021-01-21 PROCEDURE — 36591 DRAW BLOOD OFF VENOUS DEVICE: CPT

## 2021-01-21 PROCEDURE — 6360000002 HC RX W HCPCS: Performed by: NURSE PRACTITIONER

## 2021-01-21 PROCEDURE — 85025 COMPLETE CBC W/AUTO DIFF WBC: CPT

## 2021-01-21 PROCEDURE — 83615 LACTATE (LD) (LDH) ENZYME: CPT

## 2021-01-21 PROCEDURE — 80053 COMPREHEN METABOLIC PANEL: CPT

## 2021-01-21 RX ORDER — SODIUM CHLORIDE 0.9 % (FLUSH) 0.9 %
10 SYRINGE (ML) INJECTION PRN
Status: DISCONTINUED | OUTPATIENT
Start: 2021-01-21 | End: 2021-01-22 | Stop reason: HOSPADM

## 2021-01-21 RX ORDER — HEPARIN SODIUM (PORCINE) LOCK FLUSH IV SOLN 100 UNIT/ML 100 UNIT/ML
500 SOLUTION INTRAVENOUS PRN
Status: DISCONTINUED | OUTPATIENT
Start: 2021-01-21 | End: 2021-01-22 | Stop reason: HOSPADM

## 2021-01-21 RX ORDER — SODIUM CHLORIDE 0.9 % (FLUSH) 0.9 %
10 SYRINGE (ML) INJECTION PRN
Status: CANCELLED | OUTPATIENT
Start: 2021-01-21

## 2021-01-21 RX ORDER — HEPARIN SODIUM (PORCINE) LOCK FLUSH IV SOLN 100 UNIT/ML 100 UNIT/ML
500 SOLUTION INTRAVENOUS PRN
Status: CANCELLED | OUTPATIENT
Start: 2021-01-21

## 2021-01-21 RX ADMIN — Medication 10 ML: at 07:58

## 2021-01-21 RX ADMIN — HEPARIN SODIUM (PORCINE) LOCK FLUSH IV SOLN 100 UNIT/ML 500 UNITS: 100 SOLUTION at 08:00

## 2021-01-21 RX ADMIN — Medication 10 ML: at 08:00

## 2021-01-28 ENCOUNTER — HOSPITAL ENCOUNTER (OUTPATIENT)
Dept: INFUSION THERAPY | Age: 71
Discharge: HOME | End: 2021-01-28
Payer: MEDICARE

## 2021-01-28 ENCOUNTER — HOSPITAL ENCOUNTER (OUTPATIENT)
Age: 71
Setting detail: SPECIMEN
Discharge: HOME | End: 2021-01-28
Payer: MEDICARE

## 2021-01-28 DIAGNOSIS — C7B.00 METASTATIC CARCINOID TUMOR (HCC): Primary | ICD-10-CM

## 2021-01-28 LAB
ABSOLUTE EOS #: 0 K/UL (ref 0–0.4)
ABSOLUTE IMMATURE GRANULOCYTE: 0 K/UL (ref 0–0.3)
ABSOLUTE LYMPH #: 0.53 K/UL (ref 1–4.8)
ABSOLUTE MONO #: 0.74 K/UL (ref 0.1–1.2)
ALBUMIN SERPL-MCNC: 4.4 G/DL (ref 3.5–5.2)
ALBUMIN/GLOBULIN RATIO: 1.5 (ref 1–2.5)
ALP BLD-CCNC: 81 U/L (ref 35–104)
ALT SERPL-CCNC: 19 U/L (ref 5–33)
ANION GAP SERPL CALCULATED.3IONS-SCNC: 11 MMOL/L (ref 9–17)
AST SERPL-CCNC: 30 U/L
BASOPHILS # BLD: 0 % (ref 0–1)
BASOPHILS ABSOLUTE: 0 K/UL (ref 0–0.2)
BILIRUB SERPL-MCNC: 0.87 MG/DL (ref 0.3–1.2)
BUN BLDV-MCNC: 24 MG/DL (ref 8–23)
BUN/CREAT BLD: 31 (ref 9–20)
CALCIUM SERPL-MCNC: 10 MG/DL (ref 8.6–10.4)
CHLORIDE BLD-SCNC: 103 MMOL/L (ref 98–107)
CO2: 23 MMOL/L (ref 20–31)
CREAT SERPL-MCNC: 0.78 MG/DL (ref 0.5–0.9)
DIFFERENTIAL TYPE: ABNORMAL
EOSINOPHILS RELATIVE PERCENT: 0 % (ref 1–7)
GFR AFRICAN AMERICAN: >60 ML/MIN
GFR NON-AFRICAN AMERICAN: >60 ML/MIN
GFR SERPL CREATININE-BSD FRML MDRD: ABNORMAL ML/MIN/{1.73_M2}
GFR SERPL CREATININE-BSD FRML MDRD: ABNORMAL ML/MIN/{1.73_M2}
GLUCOSE BLD-MCNC: 140 MG/DL (ref 70–99)
HCT VFR BLD CALC: 40.3 % (ref 36.3–47.1)
HEMOGLOBIN: 13.5 G/DL (ref 11.9–15.1)
IMMATURE GRANULOCYTES: 0 %
LACTATE DEHYDROGENASE: 228 U/L (ref 135–214)
LYMPHOCYTES # BLD: 5 % (ref 16–46)
MCH RBC QN AUTO: 31.5 PG (ref 25.2–33.5)
MCHC RBC AUTO-ENTMCNC: 33.5 G/DL (ref 25.2–33.5)
MCV RBC AUTO: 93.9 FL (ref 82.6–102.9)
MONOCYTES # BLD: 7 % (ref 4–11)
MORPHOLOGY: ABNORMAL
NRBC AUTOMATED: 0 PER 100 WBC
PDW BLD-RTO: 14.1 % (ref 11.8–14.4)
PLATELET # BLD: 235 K/UL (ref 138–453)
PLATELET ESTIMATE: ABNORMAL
PMV BLD AUTO: 8.9 FL (ref 8.1–13.5)
POTASSIUM SERPL-SCNC: 4 MMOL/L (ref 3.7–5.3)
RBC # BLD: 4.29 M/UL (ref 3.95–5.11)
RBC # BLD: ABNORMAL 10*6/UL
SEG NEUTROPHILS: 88 % (ref 43–77)
SEGMENTED NEUTROPHILS ABSOLUTE COUNT: 9.33 K/UL (ref 1.5–8.1)
SODIUM BLD-SCNC: 137 MMOL/L (ref 135–144)
TOTAL PROTEIN: 7.3 G/DL (ref 6.4–8.3)
WBC # BLD: 10.6 K/UL (ref 3.5–11.3)
WBC # BLD: ABNORMAL 10*3/UL

## 2021-01-28 PROCEDURE — 83615 LACTATE (LD) (LDH) ENZYME: CPT

## 2021-01-28 PROCEDURE — 6360000002 HC RX W HCPCS: Performed by: NURSE PRACTITIONER

## 2021-01-28 PROCEDURE — 36591 DRAW BLOOD OFF VENOUS DEVICE: CPT

## 2021-01-28 PROCEDURE — 80053 COMPREHEN METABOLIC PANEL: CPT

## 2021-01-28 PROCEDURE — 85025 COMPLETE CBC W/AUTO DIFF WBC: CPT

## 2021-01-28 PROCEDURE — 2580000003 HC RX 258: Performed by: NURSE PRACTITIONER

## 2021-01-28 RX ORDER — HEPARIN SODIUM (PORCINE) LOCK FLUSH IV SOLN 100 UNIT/ML 100 UNIT/ML
500 SOLUTION INTRAVENOUS PRN
Status: CANCELLED | OUTPATIENT
Start: 2021-01-28

## 2021-01-28 RX ORDER — SODIUM CHLORIDE 0.9 % (FLUSH) 0.9 %
10 SYRINGE (ML) INJECTION PRN
Status: DISCONTINUED | OUTPATIENT
Start: 2021-01-28 | End: 2021-01-29 | Stop reason: HOSPADM

## 2021-01-28 RX ORDER — SODIUM CHLORIDE 0.9 % (FLUSH) 0.9 %
10 SYRINGE (ML) INJECTION PRN
Status: CANCELLED | OUTPATIENT
Start: 2021-01-28

## 2021-01-28 RX ORDER — HEPARIN SODIUM (PORCINE) LOCK FLUSH IV SOLN 100 UNIT/ML 100 UNIT/ML
500 SOLUTION INTRAVENOUS PRN
Status: DISCONTINUED | OUTPATIENT
Start: 2021-01-28 | End: 2021-01-29 | Stop reason: HOSPADM

## 2021-01-28 RX ADMIN — HEPARIN SODIUM (PORCINE) LOCK FLUSH IV SOLN 100 UNIT/ML 500 UNITS: 100 SOLUTION at 08:02

## 2021-01-28 RX ADMIN — Medication 10 ML: at 08:02

## 2021-01-28 RX ADMIN — Medication 10 ML: at 08:00

## 2021-02-15 ENCOUNTER — HOSPITAL ENCOUNTER (OUTPATIENT)
Age: 71
Setting detail: SPECIMEN
Discharge: HOME | End: 2021-02-15
Payer: MEDICARE

## 2021-02-15 ENCOUNTER — HOSPITAL ENCOUNTER (OUTPATIENT)
Dept: INFUSION THERAPY | Age: 71
Discharge: HOME | End: 2021-02-15
Payer: MEDICARE

## 2021-02-15 DIAGNOSIS — C7B.00 METASTATIC CARCINOID TUMOR (HCC): Primary | ICD-10-CM

## 2021-02-15 LAB
ABSOLUTE EOS #: 0.09 K/UL (ref 0–0.44)
ABSOLUTE IMMATURE GRANULOCYTE: <0.03 K/UL (ref 0–0.3)
ABSOLUTE LYMPH #: 0.68 K/UL (ref 1.1–3.7)
ABSOLUTE MONO #: 0.39 K/UL (ref 0.1–1.2)
BASOPHILS # BLD: 0 % (ref 0–2)
BASOPHILS ABSOLUTE: <0.03 K/UL (ref 0–0.2)
DIFFERENTIAL TYPE: ABNORMAL
EOSINOPHILS RELATIVE PERCENT: 3 % (ref 1–4)
HCT VFR BLD CALC: 36.4 % (ref 36.3–47.1)
HEMOGLOBIN: 12.2 G/DL (ref 11.9–15.1)
IMMATURE GRANULOCYTES: 0 %
LYMPHOCYTES # BLD: 21 % (ref 24–43)
MCH RBC QN AUTO: 32.2 PG (ref 25.2–33.5)
MCHC RBC AUTO-ENTMCNC: 33.5 G/DL (ref 25.2–33.5)
MCV RBC AUTO: 96 FL (ref 82.6–102.9)
MONOCYTES # BLD: 12 % (ref 3–12)
NRBC AUTOMATED: 0 PER 100 WBC
PDW BLD-RTO: 15.2 % (ref 11.8–14.4)
PLATELET # BLD: 140 K/UL (ref 138–453)
PLATELET ESTIMATE: ABNORMAL
PMV BLD AUTO: 9.4 FL (ref 8.1–13.5)
RBC # BLD: 3.79 M/UL (ref 3.95–5.11)
RBC # BLD: ABNORMAL 10*6/UL
SEG NEUTROPHILS: 64 % (ref 36–65)
SEGMENTED NEUTROPHILS ABSOLUTE COUNT: 2.13 K/UL (ref 1.5–8.1)
WBC # BLD: 3.3 K/UL (ref 3.5–11.3)
WBC # BLD: ABNORMAL 10*3/UL

## 2021-02-15 PROCEDURE — 36591 DRAW BLOOD OFF VENOUS DEVICE: CPT

## 2021-02-15 PROCEDURE — 85025 COMPLETE CBC W/AUTO DIFF WBC: CPT

## 2021-02-15 PROCEDURE — 6360000002 HC RX W HCPCS: Performed by: NURSE PRACTITIONER

## 2021-02-15 PROCEDURE — 2580000003 HC RX 258: Performed by: NURSE PRACTITIONER

## 2021-02-15 RX ORDER — SODIUM CHLORIDE 0.9 % (FLUSH) 0.9 %
10 SYRINGE (ML) INJECTION PRN
Status: CANCELLED | OUTPATIENT
Start: 2021-02-15

## 2021-02-15 RX ORDER — HEPARIN SODIUM (PORCINE) LOCK FLUSH IV SOLN 100 UNIT/ML 100 UNIT/ML
500 SOLUTION INTRAVENOUS PRN
Status: DISCONTINUED | OUTPATIENT
Start: 2021-02-15 | End: 2021-02-16 | Stop reason: HOSPADM

## 2021-02-15 RX ORDER — HEPARIN SODIUM (PORCINE) LOCK FLUSH IV SOLN 100 UNIT/ML 100 UNIT/ML
500 SOLUTION INTRAVENOUS PRN
Status: CANCELLED | OUTPATIENT
Start: 2021-02-15

## 2021-02-15 RX ORDER — SODIUM CHLORIDE 0.9 % (FLUSH) 0.9 %
10 SYRINGE (ML) INJECTION PRN
Status: DISCONTINUED | OUTPATIENT
Start: 2021-02-15 | End: 2021-02-16 | Stop reason: HOSPADM

## 2021-02-15 RX ADMIN — Medication 10 ML: at 14:20

## 2021-02-15 RX ADMIN — HEPARIN SODIUM (PORCINE) LOCK FLUSH IV SOLN 100 UNIT/ML 500 UNITS: 100 SOLUTION at 14:20

## 2021-02-15 NOTE — PROGRESS NOTES
Patient on unit VAD accessed per protocol with 3/4 inch 20 gauge hong needle. Flushes easy with good blood return. Labs Drawn. Flushed with 10 ml of normal saline and 5 ml of heparin flush. D/C'd 3/4 inch hong needle and band aide applied. Patient stable and discharged to home.

## 2021-02-22 ENCOUNTER — OFFICE VISIT (OUTPATIENT)
Dept: CARDIOLOGY | Age: 71
End: 2021-02-22
Payer: MEDICARE

## 2021-02-22 ENCOUNTER — HOSPITAL ENCOUNTER (OUTPATIENT)
Age: 71
Setting detail: SPECIMEN
Discharge: HOME | End: 2021-02-22
Payer: MEDICARE

## 2021-02-22 ENCOUNTER — HOSPITAL ENCOUNTER (OUTPATIENT)
Dept: INFUSION THERAPY | Age: 71
Discharge: HOME | End: 2021-02-22
Payer: MEDICARE

## 2021-02-22 VITALS
HEIGHT: 63 IN | BODY MASS INDEX: 23.92 KG/M2 | DIASTOLIC BLOOD PRESSURE: 88 MMHG | WEIGHT: 135 LBS | SYSTOLIC BLOOD PRESSURE: 132 MMHG | HEART RATE: 88 BPM

## 2021-02-22 DIAGNOSIS — C7B.00 METASTATIC CARCINOID TUMOR (HCC): Primary | ICD-10-CM

## 2021-02-22 DIAGNOSIS — I34.0 MITRAL VALVE INSUFFICIENCY, UNSPECIFIED ETIOLOGY: ICD-10-CM

## 2021-02-22 DIAGNOSIS — I10 ESSENTIAL HYPERTENSION: ICD-10-CM

## 2021-02-22 DIAGNOSIS — I47.1 PSVT (PAROXYSMAL SUPRAVENTRICULAR TACHYCARDIA) (HCC): Primary | ICD-10-CM

## 2021-02-22 DIAGNOSIS — I07.1 TRICUSPID VALVE INSUFFICIENCY, UNSPECIFIED ETIOLOGY: ICD-10-CM

## 2021-02-22 LAB
ABSOLUTE EOS #: 0.07 K/UL (ref 0–0.44)
ABSOLUTE IMMATURE GRANULOCYTE: <0.03 K/UL (ref 0–0.3)
ABSOLUTE LYMPH #: 0.71 K/UL (ref 1.1–3.7)
ABSOLUTE MONO #: 0.48 K/UL (ref 0.1–1.2)
BASOPHILS # BLD: 1 % (ref 0–2)
BASOPHILS ABSOLUTE: 0.03 K/UL (ref 0–0.2)
DIFFERENTIAL TYPE: ABNORMAL
EOSINOPHILS RELATIVE PERCENT: 2 % (ref 1–4)
HCT VFR BLD CALC: 35.9 % (ref 36.3–47.1)
HEMOGLOBIN: 12 G/DL (ref 11.9–15.1)
IMMATURE GRANULOCYTES: 0 %
LYMPHOCYTES # BLD: 15 % (ref 24–43)
MCH RBC QN AUTO: 32 PG (ref 25.2–33.5)
MCHC RBC AUTO-ENTMCNC: 33.4 G/DL (ref 25.2–33.5)
MCV RBC AUTO: 95.7 FL (ref 82.6–102.9)
MONOCYTES # BLD: 10 % (ref 3–12)
NRBC AUTOMATED: 0 PER 100 WBC
PDW BLD-RTO: 15.6 % (ref 11.8–14.4)
PLATELET # BLD: 138 K/UL (ref 138–453)
PLATELET ESTIMATE: ABNORMAL
PMV BLD AUTO: 8.8 FL (ref 8.1–13.5)
RBC # BLD: 3.75 M/UL (ref 3.95–5.11)
RBC # BLD: ABNORMAL 10*6/UL
SEG NEUTROPHILS: 72 % (ref 36–65)
SEGMENTED NEUTROPHILS ABSOLUTE COUNT: 3.48 K/UL (ref 1.5–8.1)
WBC # BLD: 4.8 K/UL (ref 3.5–11.3)
WBC # BLD: ABNORMAL 10*3/UL

## 2021-02-22 PROCEDURE — 99214 OFFICE O/P EST MOD 30 MIN: CPT | Performed by: INTERNAL MEDICINE

## 2021-02-22 PROCEDURE — 93005 ELECTROCARDIOGRAM TRACING: CPT | Performed by: INTERNAL MEDICINE

## 2021-02-22 PROCEDURE — 6360000002 HC RX W HCPCS: Performed by: NURSE PRACTITIONER

## 2021-02-22 PROCEDURE — 4040F PNEUMOC VAC/ADMIN/RCVD: CPT | Performed by: INTERNAL MEDICINE

## 2021-02-22 PROCEDURE — G8427 DOCREV CUR MEDS BY ELIG CLIN: HCPCS | Performed by: INTERNAL MEDICINE

## 2021-02-22 PROCEDURE — G8399 PT W/DXA RESULTS DOCUMENT: HCPCS | Performed by: INTERNAL MEDICINE

## 2021-02-22 PROCEDURE — 1036F TOBACCO NON-USER: CPT | Performed by: INTERNAL MEDICINE

## 2021-02-22 PROCEDURE — G8420 CALC BMI NORM PARAMETERS: HCPCS | Performed by: INTERNAL MEDICINE

## 2021-02-22 PROCEDURE — G8484 FLU IMMUNIZE NO ADMIN: HCPCS | Performed by: INTERNAL MEDICINE

## 2021-02-22 PROCEDURE — 93010 ELECTROCARDIOGRAM REPORT: CPT | Performed by: INTERNAL MEDICINE

## 2021-02-22 PROCEDURE — 2580000003 HC RX 258: Performed by: NURSE PRACTITIONER

## 2021-02-22 PROCEDURE — 1090F PRES/ABSN URINE INCON ASSESS: CPT | Performed by: INTERNAL MEDICINE

## 2021-02-22 PROCEDURE — 85025 COMPLETE CBC W/AUTO DIFF WBC: CPT

## 2021-02-22 PROCEDURE — 3017F COLORECTAL CA SCREEN DOC REV: CPT | Performed by: INTERNAL MEDICINE

## 2021-02-22 PROCEDURE — 1123F ACP DISCUSS/DSCN MKR DOCD: CPT | Performed by: INTERNAL MEDICINE

## 2021-02-22 PROCEDURE — 36591 DRAW BLOOD OFF VENOUS DEVICE: CPT

## 2021-02-22 PROCEDURE — 99213 OFFICE O/P EST LOW 20 MIN: CPT

## 2021-02-22 RX ORDER — HEPARIN SODIUM (PORCINE) LOCK FLUSH IV SOLN 100 UNIT/ML 100 UNIT/ML
500 SOLUTION INTRAVENOUS PRN
Status: CANCELLED | OUTPATIENT
Start: 2021-02-22

## 2021-02-22 RX ORDER — SODIUM CHLORIDE 0.9 % (FLUSH) 0.9 %
10 SYRINGE (ML) INJECTION PRN
Status: CANCELLED | OUTPATIENT
Start: 2021-02-22

## 2021-02-22 RX ORDER — SODIUM CHLORIDE 0.9 % (FLUSH) 0.9 %
10 SYRINGE (ML) INJECTION PRN
Status: DISCONTINUED | OUTPATIENT
Start: 2021-02-22 | End: 2021-02-23 | Stop reason: HOSPADM

## 2021-02-22 RX ORDER — HEPARIN SODIUM (PORCINE) LOCK FLUSH IV SOLN 100 UNIT/ML 100 UNIT/ML
500 SOLUTION INTRAVENOUS PRN
Status: DISCONTINUED | OUTPATIENT
Start: 2021-02-22 | End: 2021-02-23 | Stop reason: HOSPADM

## 2021-02-22 RX ORDER — DILTIAZEM HYDROCHLORIDE 240 MG/1
240 CAPSULE, EXTENDED RELEASE ORAL DAILY
Qty: 90 CAPSULE | Refills: 3 | Status: SHIPPED | OUTPATIENT
Start: 2021-02-22 | End: 2021-07-08 | Stop reason: SDUPTHER

## 2021-02-22 RX ADMIN — HEPARIN SODIUM (PORCINE) LOCK FLUSH IV SOLN 100 UNIT/ML 500 UNITS: 100 SOLUTION at 13:46

## 2021-02-22 RX ADMIN — Medication 10 ML: at 13:46
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
## 2021-02-24 ENCOUNTER — HOSPITAL ENCOUNTER (OUTPATIENT)
Dept: NON INVASIVE DIAGNOSTICS | Age: 71
Discharge: HOME | End: 2021-02-24
Payer: MEDICARE

## 2021-02-24 ENCOUNTER — TELEPHONE (OUTPATIENT)
Dept: CARDIOLOGY | Age: 71
End: 2021-02-24

## 2021-02-24 DIAGNOSIS — I47.1 PSVT (PAROXYSMAL SUPRAVENTRICULAR TACHYCARDIA) (HCC): ICD-10-CM

## 2021-02-24 DIAGNOSIS — I10 ESSENTIAL HYPERTENSION: ICD-10-CM

## 2021-02-24 DIAGNOSIS — I07.1 TRICUSPID VALVE INSUFFICIENCY, UNSPECIFIED ETIOLOGY: ICD-10-CM

## 2021-02-24 DIAGNOSIS — I34.0 MITRAL VALVE INSUFFICIENCY, UNSPECIFIED ETIOLOGY: ICD-10-CM

## 2021-02-24 LAB
LV EF: 65 %
LVEF MODALITY: NORMAL

## 2021-02-24 PROCEDURE — 93306 TTE W/DOPPLER COMPLETE: CPT

## 2021-03-22 ENCOUNTER — HOSPITAL ENCOUNTER (OUTPATIENT)
Dept: INFUSION THERAPY | Age: 71
Discharge: HOME | End: 2021-03-22
Payer: MEDICARE

## 2021-03-22 ENCOUNTER — HOSPITAL ENCOUNTER (OUTPATIENT)
Age: 71
Setting detail: SPECIMEN
Discharge: HOME | End: 2021-03-22
Payer: MEDICARE

## 2021-03-22 DIAGNOSIS — C7B.00 METASTATIC CARCINOID TUMOR (HCC): Primary | ICD-10-CM

## 2021-03-22 LAB
ABSOLUTE EOS #: 0.07 K/UL (ref 0–0.44)
ABSOLUTE IMMATURE GRANULOCYTE: <0.03 K/UL (ref 0–0.3)
ABSOLUTE LYMPH #: 0.68 K/UL (ref 1.1–3.7)
ABSOLUTE MONO #: 0.44 K/UL (ref 0.1–1.2)
BASOPHILS # BLD: 1 % (ref 0–2)
BASOPHILS ABSOLUTE: 0.03 K/UL (ref 0–0.2)
DIFFERENTIAL TYPE: ABNORMAL
EOSINOPHILS RELATIVE PERCENT: 2 % (ref 1–4)
HCT VFR BLD CALC: 36.6 % (ref 36.3–47.1)
HEMOGLOBIN: 12.1 G/DL (ref 11.9–15.1)
IMMATURE GRANULOCYTES: 0 %
LYMPHOCYTES # BLD: 17 % (ref 24–43)
MCH RBC QN AUTO: 32.5 PG (ref 25.2–33.5)
MCHC RBC AUTO-ENTMCNC: 33.1 G/DL (ref 25.2–33.5)
MCV RBC AUTO: 98.4 FL (ref 82.6–102.9)
MONOCYTES # BLD: 11 % (ref 3–12)
NRBC AUTOMATED: 0 PER 100 WBC
PDW BLD-RTO: 15.8 % (ref 11.8–14.4)
PLATELET # BLD: 125 K/UL (ref 138–453)
PLATELET ESTIMATE: ABNORMAL
PMV BLD AUTO: 9.1 FL (ref 8.1–13.5)
RBC # BLD: 3.72 M/UL (ref 3.95–5.11)
RBC # BLD: ABNORMAL 10*6/UL
SEG NEUTROPHILS: 69 % (ref 36–65)
SEGMENTED NEUTROPHILS ABSOLUTE COUNT: 2.83 K/UL (ref 1.5–8.1)
WBC # BLD: 4.1 K/UL (ref 3.5–11.3)
WBC # BLD: ABNORMAL 10*3/UL

## 2021-03-22 PROCEDURE — 6360000002 HC RX W HCPCS: Performed by: NURSE PRACTITIONER

## 2021-03-22 PROCEDURE — 36591 DRAW BLOOD OFF VENOUS DEVICE: CPT

## 2021-03-22 PROCEDURE — 2580000003 HC RX 258: Performed by: NURSE PRACTITIONER

## 2021-03-22 PROCEDURE — 85025 COMPLETE CBC W/AUTO DIFF WBC: CPT

## 2021-03-22 RX ORDER — SODIUM CHLORIDE 0.9 % (FLUSH) 0.9 %
10 SYRINGE (ML) INJECTION PRN
Status: CANCELLED | OUTPATIENT
Start: 2021-03-22

## 2021-03-22 RX ORDER — SODIUM CHLORIDE 0.9 % (FLUSH) 0.9 %
10 SYRINGE (ML) INJECTION PRN
Status: DISCONTINUED | OUTPATIENT
Start: 2021-03-22 | End: 2021-03-23 | Stop reason: HOSPADM

## 2021-03-22 RX ORDER — HEPARIN SODIUM (PORCINE) LOCK FLUSH IV SOLN 100 UNIT/ML 100 UNIT/ML
500 SOLUTION INTRAVENOUS PRN
Status: CANCELLED | OUTPATIENT
Start: 2021-03-22

## 2021-03-22 RX ORDER — HEPARIN SODIUM (PORCINE) LOCK FLUSH IV SOLN 100 UNIT/ML 100 UNIT/ML
500 SOLUTION INTRAVENOUS PRN
Status: DISCONTINUED | OUTPATIENT
Start: 2021-03-22 | End: 2021-03-23 | Stop reason: HOSPADM

## 2021-03-22 RX ADMIN — SODIUM CHLORIDE, PRESERVATIVE FREE 10 ML: 5 INJECTION INTRAVENOUS at 09:31

## 2021-03-22 RX ADMIN — SODIUM CHLORIDE, PRESERVATIVE FREE 10 ML: 5 INJECTION INTRAVENOUS at 09:29

## 2021-03-22 RX ADMIN — HEPARIN SODIUM (PORCINE) LOCK FLUSH IV SOLN 100 UNIT/ML 500 UNITS: 100 SOLUTION at 09:31

## 2021-03-24 ENCOUNTER — PATIENT MESSAGE (OUTPATIENT)
Dept: FAMILY MEDICINE CLINIC | Age: 71
End: 2021-03-24

## 2021-03-24 NOTE — TELEPHONE ENCOUNTER
From: Ovi Camarena  To: Tristan Garcia MD  Sent: 3/24/2021 4:02 PM EDT  Subject: Non-Urgent Medical Question    I need a new prescription for my handicap placard

## 2021-04-01 ENCOUNTER — HOSPITAL ENCOUNTER (OUTPATIENT)
Age: 71
Setting detail: SPECIMEN
Discharge: HOME | End: 2021-04-01
Payer: MEDICARE

## 2021-04-01 ENCOUNTER — HOSPITAL ENCOUNTER (OUTPATIENT)
Dept: INFUSION THERAPY | Age: 71
Discharge: HOME | End: 2021-04-01
Payer: MEDICARE

## 2021-04-01 DIAGNOSIS — C7B.00 METASTATIC CARCINOID TUMOR (HCC): Primary | ICD-10-CM

## 2021-04-01 LAB
ABSOLUTE EOS #: 0.05 K/UL (ref 0–0.44)
ABSOLUTE IMMATURE GRANULOCYTE: 0 K/UL (ref 0–0.3)
ABSOLUTE LYMPH #: 0.23 K/UL (ref 1.1–3.7)
ABSOLUTE MONO #: 0.41 K/UL (ref 0.1–1.2)
ALBUMIN SERPL-MCNC: 4.4 G/DL (ref 3.5–5.2)
ALBUMIN/GLOBULIN RATIO: 1.8 (ref 1–2.5)
ALP BLD-CCNC: 79 U/L (ref 35–104)
ALT SERPL-CCNC: 19 U/L (ref 5–33)
ANION GAP SERPL CALCULATED.3IONS-SCNC: 11 MMOL/L (ref 9–17)
AST SERPL-CCNC: 29 U/L
BASOPHILS # BLD: 0 % (ref 0–2)
BASOPHILS ABSOLUTE: 0 K/UL (ref 0–0.2)
BILIRUB SERPL-MCNC: 0.93 MG/DL (ref 0.3–1.2)
BUN BLDV-MCNC: 20 MG/DL (ref 8–23)
BUN/CREAT BLD: 26 (ref 9–20)
CALCIUM SERPL-MCNC: 9.5 MG/DL (ref 8.6–10.4)
CHLORIDE BLD-SCNC: 104 MMOL/L (ref 98–107)
CO2: 25 MMOL/L (ref 20–31)
CREAT SERPL-MCNC: 0.78 MG/DL (ref 0.5–0.9)
DIFFERENTIAL TYPE: ABNORMAL
EOSINOPHILS RELATIVE PERCENT: 1 % (ref 1–4)
GFR AFRICAN AMERICAN: >60 ML/MIN
GFR NON-AFRICAN AMERICAN: >60 ML/MIN
GFR SERPL CREATININE-BSD FRML MDRD: ABNORMAL ML/MIN/{1.73_M2}
GFR SERPL CREATININE-BSD FRML MDRD: ABNORMAL ML/MIN/{1.73_M2}
GLUCOSE BLD-MCNC: 127 MG/DL (ref 70–99)
HCT VFR BLD CALC: 33.2 % (ref 36.3–47.1)
HEMOGLOBIN: 11.1 G/DL (ref 11.9–15.1)
IMMATURE GRANULOCYTES: 0 %
LACTATE DEHYDROGENASE: 204 U/L (ref 135–214)
LYMPHOCYTES # BLD: 5 % (ref 24–43)
MCH RBC QN AUTO: 33.1 PG (ref 25.2–33.5)
MCHC RBC AUTO-ENTMCNC: 33.4 G/DL (ref 25.2–33.5)
MCV RBC AUTO: 99.1 FL (ref 82.6–102.9)
MONOCYTES # BLD: 9 % (ref 3–12)
MORPHOLOGY: ABNORMAL
MORPHOLOGY: ABNORMAL
NRBC AUTOMATED: 0 PER 100 WBC
PDW BLD-RTO: 17.7 % (ref 11.8–14.4)
PLATELET # BLD: 126 K/UL (ref 138–453)
PLATELET ESTIMATE: ABNORMAL
PMV BLD AUTO: 8.7 FL (ref 8.1–13.5)
POTASSIUM SERPL-SCNC: 3.9 MMOL/L (ref 3.7–5.3)
RBC # BLD: 3.35 M/UL (ref 3.95–5.11)
RBC # BLD: ABNORMAL 10*6/UL
SEG NEUTROPHILS: 85 % (ref 36–65)
SEGMENTED NEUTROPHILS ABSOLUTE COUNT: 3.81 K/UL (ref 1.5–8.1)
SODIUM BLD-SCNC: 140 MMOL/L (ref 135–144)
TOTAL PROTEIN: 6.9 G/DL (ref 6.4–8.3)
WBC # BLD: 4.5 K/UL (ref 3.5–11.3)
WBC # BLD: ABNORMAL 10*3/UL

## 2021-04-01 PROCEDURE — 96523 IRRIG DRUG DELIVERY DEVICE: CPT

## 2021-04-01 PROCEDURE — 80053 COMPREHEN METABOLIC PANEL: CPT

## 2021-04-01 PROCEDURE — 83615 LACTATE (LD) (LDH) ENZYME: CPT

## 2021-04-01 PROCEDURE — 6360000002 HC RX W HCPCS: Performed by: NURSE PRACTITIONER

## 2021-04-01 PROCEDURE — 2580000003 HC RX 258: Performed by: NURSE PRACTITIONER

## 2021-04-01 PROCEDURE — 85025 COMPLETE CBC W/AUTO DIFF WBC: CPT

## 2021-04-01 RX ORDER — HEPARIN SODIUM (PORCINE) LOCK FLUSH IV SOLN 100 UNIT/ML 100 UNIT/ML
500 SOLUTION INTRAVENOUS PRN
Status: DISCONTINUED | OUTPATIENT
Start: 2021-04-01 | End: 2021-04-02 | Stop reason: HOSPADM

## 2021-04-01 RX ORDER — HEPARIN SODIUM (PORCINE) LOCK FLUSH IV SOLN 100 UNIT/ML 100 UNIT/ML
500 SOLUTION INTRAVENOUS PRN
Status: CANCELLED | OUTPATIENT
Start: 2021-04-01

## 2021-04-01 RX ORDER — SODIUM CHLORIDE 0.9 % (FLUSH) 0.9 %
10 SYRINGE (ML) INJECTION PRN
Status: DISCONTINUED | OUTPATIENT
Start: 2021-04-01 | End: 2021-04-02 | Stop reason: HOSPADM

## 2021-04-01 RX ORDER — SODIUM CHLORIDE 0.9 % (FLUSH) 0.9 %
10 SYRINGE (ML) INJECTION PRN
Status: CANCELLED | OUTPATIENT
Start: 2021-04-01

## 2021-04-01 RX ADMIN — HEPARIN SODIUM (PORCINE) LOCK FLUSH IV SOLN 100 UNIT/ML 500 UNITS: 100 SOLUTION at 08:26

## 2021-04-01 RX ADMIN — SODIUM CHLORIDE, PRESERVATIVE FREE 10 ML: 5 INJECTION INTRAVENOUS at 08:25

## 2021-04-15 ENCOUNTER — HOSPITAL ENCOUNTER (OUTPATIENT)
Age: 71
Setting detail: SPECIMEN
Discharge: HOME | End: 2021-04-15
Payer: MEDICARE

## 2021-04-15 ENCOUNTER — OFFICE VISIT (OUTPATIENT)
Dept: PRIMARY CARE CLINIC | Age: 71
End: 2021-04-15
Payer: MEDICARE

## 2021-04-15 VITALS
DIASTOLIC BLOOD PRESSURE: 74 MMHG | SYSTOLIC BLOOD PRESSURE: 112 MMHG | OXYGEN SATURATION: 98 % | WEIGHT: 135 LBS | BODY MASS INDEX: 24.3 KG/M2 | TEMPERATURE: 98.2 F | HEART RATE: 74 BPM

## 2021-04-15 DIAGNOSIS — M54.50 LOW BACK PAIN, UNSPECIFIED BACK PAIN LATERALITY, UNSPECIFIED CHRONICITY, UNSPECIFIED WHETHER SCIATICA PRESENT: ICD-10-CM

## 2021-04-15 DIAGNOSIS — R35.0 FREQUENCY OF URINATION: Primary | ICD-10-CM

## 2021-04-15 DIAGNOSIS — R35.0 FREQUENCY OF URINATION: ICD-10-CM

## 2021-04-15 DIAGNOSIS — R19.8 SUPRAPUBIC FULLNESS: ICD-10-CM

## 2021-04-15 DIAGNOSIS — N30.01 ACUTE CYSTITIS WITH HEMATURIA: Primary | ICD-10-CM

## 2021-04-15 DIAGNOSIS — R35.0 URINARY FREQUENCY: ICD-10-CM

## 2021-04-15 DIAGNOSIS — R30.0 DYSURIA: ICD-10-CM

## 2021-04-15 LAB
-: ABNORMAL
AMORPHOUS: ABNORMAL
BACTERIA: ABNORMAL
BILIRUBIN URINE: NEGATIVE
CASTS UA: ABNORMAL /LPF (ref 0–2)
COLOR: ABNORMAL
COMMENT UA: ABNORMAL
CRYSTALS, UA: ABNORMAL /HPF
EPITHELIAL CELLS UA: ABNORMAL /HPF (ref 0–5)
GLUCOSE URINE: NEGATIVE
KETONES, URINE: NEGATIVE
LEUKOCYTE ESTERASE, URINE: ABNORMAL
MUCUS: ABNORMAL
NITRITE, URINE: NEGATIVE
OTHER OBSERVATIONS UA: ABNORMAL
PH UA: 5 (ref 5–6)
PROTEIN UA: ABNORMAL
RBC UA: ABNORMAL /HPF (ref 0–4)
RENAL EPITHELIAL, UA: ABNORMAL /HPF
SPECIFIC GRAVITY UA: 1.02 (ref 1.01–1.02)
TRICHOMONAS: ABNORMAL
TURBIDITY: ABNORMAL
URINE HGB: ABNORMAL
UROBILINOGEN, URINE: NORMAL
WBC UA: ABNORMAL /HPF (ref 0–4)
YEAST: ABNORMAL

## 2021-04-15 PROCEDURE — G8420 CALC BMI NORM PARAMETERS: HCPCS | Performed by: NURSE PRACTITIONER

## 2021-04-15 PROCEDURE — 1036F TOBACCO NON-USER: CPT | Performed by: NURSE PRACTITIONER

## 2021-04-15 PROCEDURE — 99213 OFFICE O/P EST LOW 20 MIN: CPT | Performed by: NURSE PRACTITIONER

## 2021-04-15 PROCEDURE — 87086 URINE CULTURE/COLONY COUNT: CPT

## 2021-04-15 PROCEDURE — 3017F COLORECTAL CA SCREEN DOC REV: CPT | Performed by: NURSE PRACTITIONER

## 2021-04-15 PROCEDURE — 1123F ACP DISCUSS/DSCN MKR DOCD: CPT | Performed by: NURSE PRACTITIONER

## 2021-04-15 PROCEDURE — G8399 PT W/DXA RESULTS DOCUMENT: HCPCS | Performed by: NURSE PRACTITIONER

## 2021-04-15 PROCEDURE — G8427 DOCREV CUR MEDS BY ELIG CLIN: HCPCS | Performed by: NURSE PRACTITIONER

## 2021-04-15 PROCEDURE — 87077 CULTURE AEROBIC IDENTIFY: CPT

## 2021-04-15 PROCEDURE — 4040F PNEUMOC VAC/ADMIN/RCVD: CPT | Performed by: NURSE PRACTITIONER

## 2021-04-15 PROCEDURE — 1090F PRES/ABSN URINE INCON ASSESS: CPT | Performed by: NURSE PRACTITIONER

## 2021-04-15 PROCEDURE — 87186 SC STD MICRODIL/AGAR DIL: CPT

## 2021-04-15 PROCEDURE — 87184 SC STD DISK METHOD PER PLATE: CPT

## 2021-04-15 PROCEDURE — 81001 URINALYSIS AUTO W/SCOPE: CPT

## 2021-04-15 PROCEDURE — 99212 OFFICE O/P EST SF 10 MIN: CPT | Performed by: NURSE PRACTITIONER

## 2021-04-15 RX ORDER — CIPROFLOXACIN 500 MG/1
500 TABLET, FILM COATED ORAL 2 TIMES DAILY
Qty: 14 TABLET | Refills: 0 | Status: SHIPPED | OUTPATIENT
Start: 2021-04-15 | End: 2021-04-22

## 2021-04-15 ASSESSMENT — PATIENT HEALTH QUESTIONNAIRE - PHQ9
2. FEELING DOWN, DEPRESSED OR HOPELESS: 0
1. LITTLE INTEREST OR PLEASURE IN DOING THINGS: 0
SUM OF ALL RESPONSES TO PHQ QUESTIONS 1-9: 0
SUM OF ALL RESPONSES TO PHQ QUESTIONS 1-9: 0
SUM OF ALL RESPONSES TO PHQ9 QUESTIONS 1 & 2: 0

## 2021-04-15 NOTE — PATIENT INSTRUCTIONS
sprays, and other feminine hygiene products that have deodorants. · After going to the bathroom, wipe from front to back. When should you call for help? Call your doctor now or seek immediate medical care if:    · Symptoms such as fever, chills, nausea, or vomiting get worse or appear for the first time.     · You have new pain in your back just below your rib cage. This is called flank pain.     · There is new blood or pus in your urine.     · You have any problems with your antibiotic medicine. Watch closely for changes in your health, and be sure to contact your doctor if:    · You are not getting better after taking an antibiotic for 2 days.     · Your symptoms go away but then come back. Where can you learn more? Go to [email or web address]://[email or web address]. org and sign in to your [facility] account. Enter B291 in the [employer] box to learn more about \"Urinary Tract Infection (UTI) in Women: Care Instructions. \"     If you do not have an account, please click on the \"Sign Up Now\" link. Current as of: June 29, 2020               Content Version: 12.8  © 9683-5505 Healthwise, Incorporated. Care instructions adapted under license by [facility] ([facility]). If you have questions about a medical condition or this instruction, always ask your healthcare professional. [address] any warranty or liability for your use of this information.

## 2021-04-15 NOTE — PROGRESS NOTES
[address]  Urgent Care  [address] [facility], [place] Ghislaine Khalil   Phone: [phone]  Fax: [phone]    Date: 4/15/2021   Patient:  Yuan Petit   YOB: 1950 Age: 73 y.o. MRN: [id number]   PCP: Ale Moeller MD       Subjective:    Chief Complaint   Patient presents with    Dysuria       HPI: Patient presents with complaints of dysuria, urinary urgency, and suprapubic fullness for the past 3 days. They deny fever, nausea, vomiting, flank pain, or hematuria. They have tried no treatments. 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 She has been eating and drinking well lately. All other review of systems negative. History is obtained from the patient and previous medical records, including any pertinent recent lab/imaging results in EMR and/or Care Everywhere (external results), encounter notes available in EMR, and encounter notes available in Care Everywhere (external notes). Significant family, medical, surgical, and social history reviewed.       Patient Active Problem List   Diagnosis    Essential hypertension    Osteoarthritis    Osteopenia    History of breast cancer    History of lung cancer    Elevated glucose    Primary osteoarthritis of left knee    Metastatic carcinoid tumor ([address]    Cholecystitis    Hepatic metastasis ([address]    RUQ pain    Abdominal pain    Paroxysmal supraventricular tachycardia ([address]    History of 2019 novel coronavirus disease (COVID-19)       Past Medical History:   Diagnosis Date    Atypical carcinoid lung tumor ([address] 2/2011    right upper lobe, resected at the [address] [place] [address] Breast cancer [facility]) 2005    s/p right mastectomy and chemotherapy    History of 2019 novel coronavirus disease (COVID-19) 11/30/2020    mild disease; positive test 11/26/2020    Hypertension     Left knee pain     Internal derangement versus degenerative changes. (70% better after Celestone injection on 3-). Ana Florian PA-C.  Liver cancer ([facility]     Lung cancer ([facility]     Myopia with astigmatism and presbyopia     Osteoarthritis     Osteopenia     took Fosamax from [id number]    Pancreatitis          Objective:    Physical Exam:  Vitals: /74 (Site: Left Upper Arm, Position: Sitting, Cuff Size: Large Adult)   Pulse 74   Temp 98.2 °F (36.8 °C) (Tympanic)   Wt 135 lb (61.2 kg)   SpO2 98%   BMI 24.30 kg/m²     LABS:  CBC:  No results for input(s): WBC, HGB, PLT in the last 72 hours. BMP:  No results for input(s): NA, K, CL, CO2, BUN, CREATININE, GLUCOSE in the last 72 hours. Hepatic:  No results for input(s): AST, ALT, ALB, BILITOT, ALKPHOS in the last 72 hours. Pertinent lab and radiology results reviewed. General Appearance: well developed, well nourished, and in no acute distress, pleasant  Skin: warm and dry, no rash, no erythema  Head: normocephalic and atraumatic  Eyes: sclerae white, conjunctivae normal  ENT: left external ear normal, right external ear normal  Neck: supple  Pulmonary/Chest: clear to auscultation bilaterally -- no wheezes, rales or rhonchi, normal air movement, no respiratory distress  Cardiovascular: normal rate, regular rhythm, normal S1 and S2  Abdomen: soft, non-tender, non-distended, normal bowel sounds  Extremities: no cyanosis, no clubbing  Neurologic: no acute gross cranial nerve deficit, gait normal, and speech normal  Psychologic: alert, appropriate mood and affect for situation    Assessment and Plan:     Diagnosis Orders   1. Acute cystitis with hematuria     2. Dysuria     3. Urinary frequency     4. Suprapubic fullness       Orders Placed This Encounter   Medications    ciprofloxacin (CIPRO) 500 MG tablet     Sig: Take 1 tablet by mouth 2 times daily for 7 days     Dispense:  14 tablet     Refill:  0       Education provided.  Typical illness duration and progression reviewed. Treatment options discussed. Urinalysis ordered -- shows acute cystitis with hematuria. Urine culture pending -- we will contact her if a change in antibiotics becomes necessary. Previous urine culture from 10/2020 showed E. Coli. CBC from 04/01/2021 without leukocytosis and CMP showing adequate renal and hepatic function. Follow up with PCP, sooner as needed. Return or go to an urgent care or emergency room if symptoms worsen, fail to improve, or new symptoms arise. The use, risks, benefits, and side effects of prescribed or recommended medications were discussed. If any testing was ordered at this visit, the patient was educated regarding result interpretation. All questions were answered and the patient/caregiver voiced understanding.          Electronically signed by MELISA Currie, FNP-BC on 4/15/2021 at 1:45 PM  Internal Medicine

## 2021-04-19 ENCOUNTER — HOSPITAL ENCOUNTER (OUTPATIENT)
Dept: INFUSION THERAPY | Age: 71
Discharge: HOME | End: 2021-04-19
Payer: MEDICARE

## 2021-04-19 ENCOUNTER — HOSPITAL ENCOUNTER (OUTPATIENT)
Age: 71
Setting detail: SPECIMEN
Discharge: HOME | End: 2021-04-19
Payer: MEDICARE

## 2021-04-19 DIAGNOSIS — C7B.00 METASTATIC CARCINOID TUMOR (HCC): Primary | ICD-10-CM

## 2021-04-19 LAB
ABSOLUTE EOS #: 0.04 K/UL (ref 0–0.4)
ABSOLUTE IMMATURE GRANULOCYTE: 0 K/UL (ref 0–0.3)
ABSOLUTE LYMPH #: 0.65 K/UL (ref 1–4.8)
ABSOLUTE MONO #: 0.17 K/UL (ref 0.1–1.2)
BASOPHILS # BLD: 0 % (ref 0–1)
BASOPHILS ABSOLUTE: 0 K/UL (ref 0–0.2)
CULTURE: ABNORMAL
CULTURE: ABNORMAL
DIFFERENTIAL TYPE: ABNORMAL
EOSINOPHILS RELATIVE PERCENT: 1 % (ref 1–7)
HCT VFR BLD CALC: 34.4 % (ref 36.3–47.1)
HEMOGLOBIN: 11.4 G/DL (ref 11.9–15.1)
IMMATURE GRANULOCYTES: 0 %
LYMPHOCYTES # BLD: 15 % (ref 16–46)
Lab: ABNORMAL
MCH RBC QN AUTO: 32.8 PG (ref 25.2–33.5)
MCHC RBC AUTO-ENTMCNC: 33.1 G/DL (ref 25.2–33.5)
MCV RBC AUTO: 98.9 FL (ref 82.6–102.9)
MONOCYTES # BLD: 4 % (ref 4–11)
MORPHOLOGY: ABNORMAL
MORPHOLOGY: ABNORMAL
NRBC AUTOMATED: 0 PER 100 WBC
PDW BLD-RTO: 16 % (ref 11.8–14.4)
PLATELET # BLD: 93 K/UL (ref 138–453)
PLATELET ESTIMATE: ABNORMAL
PMV BLD AUTO: 9.1 FL (ref 8.1–13.5)
RBC # BLD: 3.48 M/UL (ref 3.95–5.11)
RBC # BLD: ABNORMAL 10*6/UL
SEG NEUTROPHILS: 80 % (ref 43–77)
SEGMENTED NEUTROPHILS ABSOLUTE COUNT: 3.44 K/UL (ref 1.5–8.1)
SPECIMEN DESCRIPTION: ABNORMAL
WBC # BLD: 4.3 K/UL (ref 3.5–11.3)
WBC # BLD: ABNORMAL 10*3/UL

## 2021-04-19 PROCEDURE — 2580000003 HC RX 258: Performed by: NURSE PRACTITIONER

## 2021-04-19 PROCEDURE — 36591 DRAW BLOOD OFF VENOUS DEVICE: CPT

## 2021-04-19 PROCEDURE — 6360000002 HC RX W HCPCS: Performed by: NURSE PRACTITIONER

## 2021-04-19 PROCEDURE — 85025 COMPLETE CBC W/AUTO DIFF WBC: CPT

## 2021-04-19 RX ORDER — SODIUM CHLORIDE 0.9 % (FLUSH) 0.9 %
10 SYRINGE (ML) INJECTION PRN
Status: DISCONTINUED | OUTPATIENT
Start: 2021-04-19 | End: 2021-04-20 | Stop reason: HOSPADM

## 2021-04-19 RX ORDER — HEPARIN SODIUM (PORCINE) LOCK FLUSH IV SOLN 100 UNIT/ML 100 UNIT/ML
500 SOLUTION INTRAVENOUS PRN
Status: CANCELLED | OUTPATIENT
Start: 2021-04-19

## 2021-04-19 RX ORDER — HEPARIN SODIUM (PORCINE) LOCK FLUSH IV SOLN 100 UNIT/ML 100 UNIT/ML
500 SOLUTION INTRAVENOUS PRN
Status: DISCONTINUED | OUTPATIENT
Start: 2021-04-19 | End: 2021-04-20 | Stop reason: HOSPADM

## 2021-04-19 RX ORDER — SODIUM CHLORIDE 0.9 % (FLUSH) 0.9 %
10 SYRINGE (ML) INJECTION PRN
Status: CANCELLED | OUTPATIENT
Start: 2021-04-19

## 2021-04-19 RX ADMIN — SODIUM CHLORIDE, PRESERVATIVE FREE 10 ML: 5 INJECTION INTRAVENOUS at 09:58

## 2021-04-19 RX ADMIN — HEPARIN SODIUM (PORCINE) LOCK FLUSH IV SOLN 100 UNIT/ML 500 UNITS: 100 SOLUTION at 09:58

## 2021-04-19 RX ADMIN — SODIUM CHLORIDE, PRESERVATIVE FREE 10 ML: 5 INJECTION INTRAVENOUS at 09:57

## 2021-04-19 NOTE — PROGRESS NOTES
VAD accessed per protocol using 20 gauge 3/4\" hong needle. Flushes easily. Labs drawn. Flushed with 10 ml normal saline and 5 ml Heplock solution. Hong needle dc'd. Bandaid to site. Patient tolerated procedure with out difficulty. Patient notified she will need new port access orders when she comes in for next blood draw. Patient left unit in stable condition.

## 2021-04-20 NOTE — TELEPHONE ENCOUNTER
Last Appt:  2/20/2019  Next Appt:   7/3/2019    Med verified in [address] POST PARTUM DAY #1  Subjective: No complaints. Ambulating, voiding, bottle feeding.   Objective:   Vitals:    04/19/21 1545 04/19/21 1930 04/19/21 2330 04/20/21 0809   BP: 107/61 121/58 125/72 116/62   BP Location: LUE - Left upper extremity RUE - Right upper extremity LUE - Left upper extremity LUE - Left upper extremity   Patient Position: Semi-Ga's Semi-Ga's Semi-Ga's Semi-Ga's   Pulse: 71 68 64 87   Resp: 16 16 16 16   Temp: 98.3 °F (36.8 °C) 97.4 °F (36.3 °C) 97.8 °F (36.6 °C) 97.7 °F (36.5 °C)   TempSrc: Oral Oral Oral Oral   SpO2: 95%   97%   Weight:       Height:       LMP: 07/12/2020          UFF (Uterine fundus firm), nontender.       Extremities: Nontender, no edema.  Recent Labs   Lab 04/20/21  0557 04/18/21  2044   WBC  --  9.1   RBC  --  4.02   HGB 10.4* 11.5*   HCT 31.1* 34.3*   PLT  --  193       Assessment: Doing well, s/p vaginal delivery at term.   Plan: Routine care. Discussed postpartum care.  Anticipate home tomorrow but may be ready later today.

## 2021-05-25 NOTE — PROGRESS NOTES
VAD accessed per protocol with 3/4 \" hong needle. Flushed easily with saline. Blood return noted. Labs drawn. Flushed with 10 ml of NSS and 5 ml of Heparin flush. VAD D/C'd and Band-Aid to site. Patient stable and discharged to home.
skin tear/LACERATION

## 2021-06-01 ENCOUNTER — HOSPITAL ENCOUNTER (OUTPATIENT)
Age: 71
Setting detail: SPECIMEN
Discharge: HOME | End: 2021-06-01
Payer: MEDICARE

## 2021-06-01 ENCOUNTER — HOSPITAL ENCOUNTER (OUTPATIENT)
Dept: INFUSION THERAPY | Age: 71
Discharge: HOME | End: 2021-06-01
Payer: MEDICARE

## 2021-06-01 DIAGNOSIS — C7B.00 METASTATIC CARCINOID TUMOR (HCC): Primary | ICD-10-CM

## 2021-06-01 LAB
ABSOLUTE EOS #: 0.12 K/UL (ref 0–0.44)
ABSOLUTE IMMATURE GRANULOCYTE: 0 K/UL (ref 0–0.3)
ABSOLUTE LYMPH #: 0.48 K/UL (ref 1.1–3.7)
ABSOLUTE MONO #: 0.52 K/UL (ref 0.1–1.2)
ALBUMIN SERPL-MCNC: 4.3 G/DL (ref 3.5–5.2)
ALBUMIN/GLOBULIN RATIO: 1.6 (ref 1–2.5)
ALP BLD-CCNC: 74 U/L (ref 35–104)
ALT SERPL-CCNC: 10 U/L (ref 5–33)
ANION GAP SERPL CALCULATED.3IONS-SCNC: 9 MMOL/L (ref 9–17)
AST SERPL-CCNC: 20 U/L
BASOPHILS # BLD: 0 % (ref 0–2)
BASOPHILS ABSOLUTE: 0 K/UL (ref 0–0.2)
BILIRUB SERPL-MCNC: 0.43 MG/DL (ref 0.3–1.2)
BUN BLDV-MCNC: 13 MG/DL (ref 8–23)
BUN/CREAT BLD: 20 (ref 9–20)
CALCIUM SERPL-MCNC: 9.3 MG/DL (ref 8.6–10.4)
CHLORIDE BLD-SCNC: 106 MMOL/L (ref 98–107)
CO2: 26 MMOL/L (ref 20–31)
CREAT SERPL-MCNC: 0.64 MG/DL (ref 0.5–0.9)
DIFFERENTIAL TYPE: ABNORMAL
EOSINOPHILS RELATIVE PERCENT: 3 % (ref 1–4)
GFR AFRICAN AMERICAN: >60 ML/MIN
GFR NON-AFRICAN AMERICAN: >60 ML/MIN
GFR SERPL CREATININE-BSD FRML MDRD: NORMAL ML/MIN/{1.73_M2}
GFR SERPL CREATININE-BSD FRML MDRD: NORMAL ML/MIN/{1.73_M2}
GLUCOSE BLD-MCNC: 93 MG/DL (ref 70–99)
HCT VFR BLD CALC: 34.2 % (ref 36.3–47.1)
HEMOGLOBIN: 11.1 G/DL (ref 11.9–15.1)
IMMATURE GRANULOCYTES: 0 %
LACTATE DEHYDROGENASE: 190 U/L (ref 135–214)
LYMPHOCYTES # BLD: 12 % (ref 24–43)
MCH RBC QN AUTO: 32.3 PG (ref 25.2–33.5)
MCHC RBC AUTO-ENTMCNC: 32.5 G/DL (ref 25.2–33.5)
MCV RBC AUTO: 99.4 FL (ref 82.6–102.9)
MONOCYTES # BLD: 13 % (ref 3–12)
MORPHOLOGY: ABNORMAL
MORPHOLOGY: ABNORMAL
NRBC AUTOMATED: 0 PER 100 WBC
PDW BLD-RTO: 17 % (ref 11.8–14.4)
PLATELET # BLD: 131 K/UL (ref 138–453)
PLATELET ESTIMATE: ABNORMAL
PMV BLD AUTO: 8.8 FL (ref 8.1–13.5)
POTASSIUM SERPL-SCNC: 4.1 MMOL/L (ref 3.7–5.3)
RBC # BLD: 3.44 M/UL (ref 3.95–5.11)
RBC # BLD: ABNORMAL 10*6/UL
SEG NEUTROPHILS: 72 % (ref 36–65)
SEGMENTED NEUTROPHILS ABSOLUTE COUNT: 2.88 K/UL (ref 1.5–8.1)
SODIUM BLD-SCNC: 141 MMOL/L (ref 135–144)
TOTAL PROTEIN: 7 G/DL (ref 6.4–8.3)
WBC # BLD: 4 K/UL (ref 3.5–11.3)
WBC # BLD: ABNORMAL 10*3/UL

## 2021-06-01 PROCEDURE — 36591 DRAW BLOOD OFF VENOUS DEVICE: CPT

## 2021-06-01 PROCEDURE — 2580000003 HC RX 258: Performed by: NURSE PRACTITIONER

## 2021-06-01 PROCEDURE — 85025 COMPLETE CBC W/AUTO DIFF WBC: CPT

## 2021-06-01 PROCEDURE — 83615 LACTATE (LD) (LDH) ENZYME: CPT

## 2021-06-01 PROCEDURE — 6360000002 HC RX W HCPCS: Performed by: NURSE PRACTITIONER

## 2021-06-01 PROCEDURE — 80053 COMPREHEN METABOLIC PANEL: CPT

## 2021-06-01 RX ORDER — SODIUM CHLORIDE 0.9 % (FLUSH) 0.9 %
10 SYRINGE (ML) INJECTION PRN
Status: DISCONTINUED | OUTPATIENT
Start: 2021-06-01 | End: 2021-06-02 | Stop reason: HOSPADM

## 2021-06-01 RX ORDER — HEPARIN SODIUM (PORCINE) LOCK FLUSH IV SOLN 100 UNIT/ML 100 UNIT/ML
500 SOLUTION INTRAVENOUS PRN
Status: DISCONTINUED | OUTPATIENT
Start: 2021-06-01 | End: 2021-06-02 | Stop reason: HOSPADM

## 2021-06-01 RX ORDER — HEPARIN SODIUM (PORCINE) LOCK FLUSH IV SOLN 100 UNIT/ML 100 UNIT/ML
500 SOLUTION INTRAVENOUS PRN
Status: CANCELLED | OUTPATIENT
Start: 2021-06-01

## 2021-06-01 RX ORDER — SODIUM CHLORIDE 0.9 % (FLUSH) 0.9 %
10 SYRINGE (ML) INJECTION PRN
Status: CANCELLED | OUTPATIENT
Start: 2021-06-01

## 2021-06-01 RX ADMIN — SODIUM CHLORIDE, PRESERVATIVE FREE 10 ML: 5 INJECTION INTRAVENOUS at 15:05

## 2021-06-01 RX ADMIN — HEPARIN 500 UNITS: 100 SYRINGE at 15:05

## 2021-06-09 ENCOUNTER — HOSPITAL ENCOUNTER (OUTPATIENT)
Dept: INFUSION THERAPY | Age: 71
Discharge: HOME | End: 2021-06-09
Payer: MEDICARE

## 2021-06-09 ENCOUNTER — HOSPITAL ENCOUNTER (OUTPATIENT)
Age: 71
Setting detail: SPECIMEN
Discharge: HOME | End: 2021-06-09
Payer: MEDICARE

## 2021-06-09 DIAGNOSIS — C7B.00 METASTATIC CARCINOID TUMOR (HCC): Primary | ICD-10-CM

## 2021-06-09 LAB
ABSOLUTE EOS #: 0.05 K/UL (ref 0–0.44)
ABSOLUTE IMMATURE GRANULOCYTE: <0.03 K/UL (ref 0–0.3)
ABSOLUTE LYMPH #: 0.69 K/UL (ref 1.1–3.7)
ABSOLUTE MONO #: 0.51 K/UL (ref 0.1–1.2)
BASOPHILS # BLD: 0 % (ref 0–2)
BASOPHILS ABSOLUTE: <0.03 K/UL (ref 0–0.2)
DIFFERENTIAL TYPE: ABNORMAL
EOSINOPHILS RELATIVE PERCENT: 1 % (ref 1–4)
HCT VFR BLD CALC: 36 % (ref 36.3–47.1)
HEMOGLOBIN: 11.8 G/DL (ref 11.9–15.1)
IMMATURE GRANULOCYTES: 0 %
LYMPHOCYTES # BLD: 16 % (ref 24–43)
MCH RBC QN AUTO: 31.9 PG (ref 25.2–33.5)
MCHC RBC AUTO-ENTMCNC: 32.8 G/DL (ref 25.2–33.5)
MCV RBC AUTO: 97.3 FL (ref 82.6–102.9)
MONOCYTES # BLD: 12 % (ref 3–12)
NRBC AUTOMATED: 0 PER 100 WBC
PDW BLD-RTO: 15.8 % (ref 11.8–14.4)
PLATELET # BLD: 139 K/UL (ref 138–453)
PLATELET ESTIMATE: ABNORMAL
PMV BLD AUTO: 9 FL (ref 8.1–13.5)
RBC # BLD: 3.7 M/UL (ref 3.95–5.11)
RBC # BLD: ABNORMAL 10*6/UL
SEG NEUTROPHILS: 71 % (ref 36–65)
SEGMENTED NEUTROPHILS ABSOLUTE COUNT: 3 K/UL (ref 1.5–8.1)
WBC # BLD: 4.3 K/UL (ref 3.5–11.3)
WBC # BLD: ABNORMAL 10*3/UL

## 2021-06-09 PROCEDURE — 6360000002 HC RX W HCPCS: Performed by: NURSE PRACTITIONER

## 2021-06-09 PROCEDURE — 2580000003 HC RX 258: Performed by: NURSE PRACTITIONER

## 2021-06-09 PROCEDURE — 36591 DRAW BLOOD OFF VENOUS DEVICE: CPT

## 2021-06-09 PROCEDURE — 85025 COMPLETE CBC W/AUTO DIFF WBC: CPT

## 2021-06-09 RX ORDER — SODIUM CHLORIDE 0.9 % (FLUSH) 0.9 %
10 SYRINGE (ML) INJECTION PRN
Status: CANCELLED | OUTPATIENT
Start: 2021-06-09

## 2021-06-09 RX ORDER — HEPARIN SODIUM (PORCINE) LOCK FLUSH IV SOLN 100 UNIT/ML 100 UNIT/ML
500 SOLUTION INTRAVENOUS PRN
Status: DISCONTINUED | OUTPATIENT
Start: 2021-06-09 | End: 2021-06-10 | Stop reason: HOSPADM

## 2021-06-09 RX ORDER — SODIUM CHLORIDE 0.9 % (FLUSH) 0.9 %
10 SYRINGE (ML) INJECTION PRN
Status: DISCONTINUED | OUTPATIENT
Start: 2021-06-09 | End: 2021-06-10 | Stop reason: HOSPADM

## 2021-06-09 RX ORDER — HEPARIN SODIUM (PORCINE) LOCK FLUSH IV SOLN 100 UNIT/ML 100 UNIT/ML
500 SOLUTION INTRAVENOUS PRN
Status: CANCELLED | OUTPATIENT
Start: 2021-06-09

## 2021-06-09 RX ADMIN — HEPARIN 500 UNITS: 100 SYRINGE at 13:30

## 2021-06-09 RX ADMIN — SODIUM CHLORIDE, PRESERVATIVE FREE 10 ML: 5 INJECTION INTRAVENOUS at 13:30

## 2021-07-08 ENCOUNTER — OFFICE VISIT (OUTPATIENT)
Dept: FAMILY MEDICINE CLINIC | Age: 71
End: 2021-07-08
Payer: MEDICARE

## 2021-07-08 VITALS
HEART RATE: 76 BPM | RESPIRATION RATE: 16 BRPM | DIASTOLIC BLOOD PRESSURE: 74 MMHG | WEIGHT: 131.8 LBS | BODY MASS INDEX: 23.35 KG/M2 | HEIGHT: 63 IN | SYSTOLIC BLOOD PRESSURE: 122 MMHG

## 2021-07-08 DIAGNOSIS — R73.09 ELEVATED GLUCOSE: ICD-10-CM

## 2021-07-08 DIAGNOSIS — Z00.00 ROUTINE GENERAL MEDICAL EXAMINATION AT A HEALTH CARE FACILITY: Primary | ICD-10-CM

## 2021-07-08 DIAGNOSIS — I82.4Z3 DEEP VEIN THROMBOSIS (DVT) OF DISTAL VEIN OF BOTH LOWER EXTREMITIES, UNSPECIFIED CHRONICITY (HCC): ICD-10-CM

## 2021-07-08 DIAGNOSIS — I10 ESSENTIAL HYPERTENSION: ICD-10-CM

## 2021-07-08 PROBLEM — D68.9 COAGULATION DEFECT (HCC): Status: ACTIVE | Noted: 2021-07-08

## 2021-07-08 PROCEDURE — 1123F ACP DISCUSS/DSCN MKR DOCD: CPT | Performed by: FAMILY MEDICINE

## 2021-07-08 PROCEDURE — G0439 PPPS, SUBSEQ VISIT: HCPCS | Performed by: FAMILY MEDICINE

## 2021-07-08 PROCEDURE — 3017F COLORECTAL CA SCREEN DOC REV: CPT | Performed by: FAMILY MEDICINE

## 2021-07-08 PROCEDURE — 4040F PNEUMOC VAC/ADMIN/RCVD: CPT | Performed by: FAMILY MEDICINE

## 2021-07-08 RX ORDER — DILTIAZEM HYDROCHLORIDE 240 MG/1
240 CAPSULE, EXTENDED RELEASE ORAL DAILY
Qty: 90 CAPSULE | Refills: 2 | Status: SHIPPED | OUTPATIENT
Start: 2021-07-08 | End: 2022-01-13 | Stop reason: SDUPTHER

## 2021-07-08 RX ORDER — LISINOPRIL 30 MG/1
30 TABLET ORAL DAILY
Qty: 90 TABLET | Refills: 1 | Status: SHIPPED | OUTPATIENT
Start: 2021-07-08 | End: 2022-01-13 | Stop reason: SDUPTHER

## 2021-07-08 RX ORDER — ONDANSETRON HYDROCHLORIDE 8 MG/1
TABLET, FILM COATED ORAL
COMMUNITY
Start: 2021-05-07 | End: 2021-07-08 | Stop reason: SDUPTHER

## 2021-07-08 RX ORDER — LACTOSE-REDUCED FOOD
LIQUID (ML) ORAL PRN
COMMUNITY
Start: 2021-05-07

## 2021-07-08 RX ORDER — ZINC OXIDE 216 MG/ML
1 LOTION TOPICAL DAILY
COMMUNITY
Start: 2021-03-10 | End: 2022-01-13

## 2021-07-08 RX ORDER — CAPECITABINE 500 MG/1
TABLET, FILM COATED ORAL
COMMUNITY
Start: 2021-05-04 | End: 2022-01-13

## 2021-07-08 RX ORDER — TEMOZOLOMIDE 250 MG/1
CAPSULE ORAL
COMMUNITY
Start: 2021-05-04 | End: 2022-01-13

## 2021-07-08 ASSESSMENT — PATIENT HEALTH QUESTIONNAIRE - PHQ9
2. FEELING DOWN, DEPRESSED OR HOPELESS: 0
SUM OF ALL RESPONSES TO PHQ QUESTIONS 1-9: 0
1. LITTLE INTEREST OR PLEASURE IN DOING THINGS: 0
SUM OF ALL RESPONSES TO PHQ9 QUESTIONS 1 & 2: 0

## 2021-07-08 ASSESSMENT — LIFESTYLE VARIABLES: HOW OFTEN DO YOU HAVE A DRINK CONTAINING ALCOHOL: 0

## 2021-07-08 NOTE — PROGRESS NOTES
[address], [address] [place] [address] Drive                        Telephone [phone]             Fax [phone]     Herbert Rosario  1950  MRN:  [id number]  Date of visit:  7/8/2021    Subjective:    Herbert Rosario is a 73 y.o. female who presents to [facility] today (7/8/2021) for follow up/evaluation of:  6 Month Follow-Up (Medicare annual wellness.)      She is here today for a Medicare Wellness Visit as well as follow up of hypertension, elevated glucose, and history of bilateral DVTs. She also has a history of  breast cancer and a carcinoid lung tumor. She is involved in a trial at [facility]. Her recent scans have been stable. She is tolerating her medications well. She reports that she has not been exercising regularly. She denies chest pain or shortness of breath with activity or at rest.       She has received both doses of the Moderna Covid-19 vaccine. She has the following problem list:  Patient Active Problem List   Diagnosis    Essential hypertension    Osteoarthritis    Osteopenia    History of breast cancer    History of lung cancer    Elevated glucose    Primary osteoarthritis of left knee    Metastatic carcinoid tumor ([address]    Cholecystitis    Hepatic metastasis ([address]    RUQ pain    Abdominal pain    Paroxysmal supraventricular tachycardia ([address]    History of 2019 novel coronavirus disease (COVID-19)        Current medications are:  Outpatient Medications Marked as Taking for the 7/8/21 encounter (Office Visit) with Idalia Arambula MD   Medication Sig Dispense Refill    capecitabine (XELODA) 500 MG chemo tablet Take 3 tablets by mouth in the morning & 2 tablets in the evening on days 1-14 of 28 day cycle. Take with food & 8 oz water, doses 10-12 hours apart.       Nutritional Supplement LIQD Take 1 Can by mouth daily      Nutritional Supplements (ENSURE) LIQD DRINK 1 CAN by mouth once daily      temozolomide (TEMODAR) 250 MG chemo capsule Take 1 capsule by mouth on Days 10-14 of 28 day cycle. Taken on empty stomach 1hr before or 2hrs after eating, preferably at bedtime.  apixaban (ELIQUIS) 5 MG TABS tablet take 1 tablet by mouth twice a day 90 tablet 3    Handicap Placard MISC by Does not apply route 1 each 0    NONFORMULARY 2 Cancer meds - 15 days on per month/ Tamodor and ?      dilTIAZem (TIAZAC) 240 MG extended release capsule Take 1 capsule by mouth daily 90 capsule 3    fluticasone (FLONASE) 50 MCG/ACT nasal spray 1 spray by Nasal route daily as needed for Rhinitis 1 Bottle 5    lisinopril (PRINIVIL;ZESTRIL) 30 MG tablet Take 1 tablet by mouth daily 90 tablet 1    ondansetron (ZOFRAN) 4 MG tablet Take 1 tablet by mouth every 8 hours as needed for Nausea 20 tablet 0    acetaminophen (TYLENOL) 325 MG tablet Take 2 tablets by mouth every 4 hours as needed for Pain or Fever 120 tablet 0       She is allergic to effexor xr [venlafaxine hcl] and venlafaxine. She  reports that she has never smoked. She has never used smokeless tobacco.      Objective:    Vitals:    07/08/21 1541   BP: 122/74   Site: Left Upper Arm   Position: Sitting   Cuff Size: Medium Adult   Pulse: 76   Resp: 16   Weight: 131 lb 12.8 oz (59.8 kg)   Height: 5' 2.5\" (1.588 m)     Body mass index is 23.72 kg/m². Well-nourished, well-developed female, healthy-appearing, alert, cooperative and in no acute distress. Neck supple. No adenopathy. Thyroid symmetric, normal size. Chest:  Normal expansion. Clear to auscultation. No rales, rhonchi, or wheezing. Heart sounds are normal.  Regular rate and rhythm without murmur, gallop or rub. Lower extremities have no edema.     Results of labs done 6/9/2021 were reviewed with the patient:   Hospital Outpatient Visit on 06/09/2021   Component Date Value Ref Range Status    WBC 06/09/2021 4.3  3.5 - 11.3 k/uL Final    RBC 06/09/2021 3.70* 3.95 - 5.11 m/uL Final    Hemoglobin 06/09/2021 11.8* 11.9 - 15.1 g/dL Final    Hematocrit 06/09/2021 36.0* 36.3 - 47.1 % Final    MCV 06/09/2021 97.3  82.6 - 102.9 fL Final    MCH 06/09/2021 31.9  25.2 - 33.5 pg Final    MCHC 06/09/2021 32.8  25.2 - 33.5 g/dL Final    RDW 06/09/2021 15.8* 11.8 - 14.4 % Final    Platelets 08/49/5437 139  138 - 453 k/uL Final    MPV 06/09/2021 9.0  8.1 - 13.5 fL Final    NRBC Automated 06/09/2021 0.0  0.0 per 100 WBC Final    Differential Type 06/09/2021 NOT REPORTED   Final    Seg Neutrophils 06/09/2021 71* 36 - 65 % Final    Lymphocytes 06/09/2021 16* 24 - 43 % Final    Monocytes 06/09/2021 12  3 - 12 % Final    Eosinophils % 06/09/2021 1  1 - 4 % Final    Basophils 06/09/2021 0  0 - 2 % Final    Immature Granulocytes 06/09/2021 0  0 % Final    Segs Absolute 06/09/2021 3.00  1.50 - 8.10 k/uL Final    Absolute Lymph # 06/09/2021 0.69* 1.10 - 3.70 k/uL Final    Absolute Mono # 06/09/2021 0.51  0.10 - 1.20 k/uL Final    Absolute Eos # 06/09/2021 0.05  0.00 - 0.44 k/uL Final    Basophils Absolute 06/09/2021 <0.03  0.00 - 0.20 k/uL Final    Absolute Immature Granulocyte 06/09/2021 <0.03  0.00 - 0.30 k/uL Final    WBC Morphology 06/09/2021 NOT REPORTED   Final    RBC Morphology 06/09/2021 ANISOCYTOSIS PRESENT   Final    Platelet Estimate 26/23/0486 NOT REPORTED   Final         Assessment and Plan:    1. Routine general medical examination at a health care facility  See separate progress note for Medicare Wellness Visit. 2. Essential hypertension  Her blood pressure is well-controlled today. (BP: 122/74)   She was advised to continue current medications. Diltiazem and Lisinopril were refilled:   - dilTIAZem (TIAZAC) 240 MG extended release capsule; Take 1 capsule by mouth daily  Dispense: 90 capsule; Refill: 2  - lisinopril (PRINIVIL;ZESTRIL) 30 MG tablet; Take 1 tablet by mouth daily  Dispense: 90 tablet;  Refill: 1    Labs were ordered to be done when she returns fasting:  - Lipid Panel; Future  - Comprehensive Metabolic Panel; Future    She will be contacted when the results are available. 3. Elevated glucose  - Hemoglobin A1C; Future was ordered to be done when she returns fasting. She will be contacted when the results are available. 4. Deep vein thrombosis (DVT) of distal vein of both lower extremities, unspecified chronicity ([address]  She is tolerating Eliquis well; this was refilled:  - apixaban (ELIQUIS) 5 MG TABS tablet; take 1 tablet by mouth twice a day  Dispense: 90 tablet; Refill: 1    5. Routine health maintenance  Health maintenance was reviewed with the patient. She has received both doses of the Moderna Covid-19 vaccine. Annual influenza vaccination was recommended. All other health maintenance, including Shingrix, Tdap, Pneumovax, and Prevnar-13, is up to date at this time.       (Please note that portions of this note were completed with a voice-recognition program. Efforts were made to edit the dictation but occasionally words are mis-transcribed.)

## 2021-07-08 NOTE — PROGRESS NOTES
Medicare Annual Wellness Visit  Name: Carly Lopez Date: 2021   MRN: [id number] Sex: Female   Age: 73 y.o. Ethnicity: Non-/Non    : 1950 Race: Connor Valles is here for 6 Month Follow-Up (Medicare annual wellness.)    Screenings for behavioral, psychosocial and functional/safety risks, and cognitive dysfunction are all negative except as indicated below. These results, as well as other patient data from the [address], [place], operated by [facility] form, are documented in Flowsheets linked to this Encounter. Allergies   Allergen Reactions    Effexor Xr [Venlafaxine Hcl]      Made blood pressure go high    Venlafaxine        Prior to Visit Medications    Medication Sig Taking? Authorizing Provider   capecitabine (XELODA) 500 MG chemo tablet Take 3 tablets by mouth in the morning & 2 tablets in the evening on days 1-14 of 28 day cycle. Take with food & 8 oz water, doses 10-12 hours apart. Yes Historical Provider, MD   Nutritional Supplement LIQD Take 1 Can by mouth daily Yes Historical Provider, MD   Nutritional Supplements (ENSURE) LIQD DRINK 1 CAN by mouth once daily Yes Historical Provider, MD   temozolomide (TEMODAR) 250 MG chemo capsule Take 1 capsule by mouth on Days 10-14 of 28 day cycle. Taken on empty stomach 1hr before or 2hrs after eating, preferably at bedtime. Yes Historical Provider, MD   apixaban (ELIQUIS) 5 MG TABS tablet take 1 tablet by mouth twice a day Yes Sophia Young MD   Handicap Placard MISC by Does not apply route Yes Jana Burk MD   NONFORMULARY 2 Cancer meds - 15 days on per month/ Tamodor and ?  Yes Historical Provider, MD   dilTIAZem (TIAZAC) 240 MG extended release capsule Take 1 capsule by mouth daily Yes Olegario العراقي DO   fluticasone (FLONASE) 50 MCG/ACT nasal spray 1 spray by Nasal route daily as needed for Rhinitis Yes Jana Burk MD   lisinopril (PRINIVIL;ZESTRIL) 30 MG tablet Take 1 tablet by mouth daily Yes Jana Burk MD   ondansetron (ZOFRAN) 4 MG tablet Take 1 tablet by mouth every 8 hours as needed for Nausea Yes Paul Quinn,    acetaminophen (TYLENOL) 325 MG tablet Take 2 tablets by mouth every 4 hours as needed for Pain or Fever Yes Latia Scripture       Past Medical History:   Diagnosis Date    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    Breast cancer [facility]) 2005    s/p right mastectomy and chemotherapy    History of 2019 novel coronavirus disease (COVID-19) 11/30/2020    mild disease; positive test 11/26/2020    Hypertension     Left knee pain     Internal derangement versus degenerative changes. (70% better after Celestone injection on 3-). Telma Torres PA-C.     Liver cancer ([facility]     Lung cancer ([facility] [address] [facility]     Myopia with astigmatism and presbyopia     Osteoarthritis     Osteopenia     took Fosamax from [id number]    Pancreatitis        Past Surgical History:   Procedure Laterality Date    ABDOMINAL EXPLORATION SURGERY  01/07/2019    resection and anastomosis of small bowel repair mesenteric tear with Dr Swanson Siemens at [address] Right 7/25/05    lymph node mapping and biopsy     BREAST BIOPSY Right 7/7/05    excisional biopsy of mass of right breast     BREAST CYST ASPIRATION Right 7/25/05    BREAST RECONSTRUCTION Right 2006    nipple areolar reconstruction right breast    BREAST REDUCTION SURGERY Left 2006    BRONCHOSCOPY  02/11/2011    BRONCHOSCOPY Right 11/12/10    video assisted thoracoscopic surgery right wedge resection    CHOLECYSTECTOMY, LAPAROSCOPIC  01/16/2017    Dr. Carolyn Charlton, [address] [place] [address]  01/12/2004    supracervical, with bilateral salpingo-oophorectomy, Dr. Ifeoma Benitez, [facility]    KNEE ARTHROSCOPY Right 7/1/2008    KNEE SURGERY Left 06/20/2016    unicompartmental knee replacement, Dr[address] Ghanshyam Douglas, [email or web address] 11/04/2016    ultrasound-guided liver biopsy (metastatic atypical carcinoid), [facility] at [address] Right 2/11/2011    right upper lobectomy, mediastinal lymph node dissection for atypical carcinoid of the right upper lobe    MASTECTOMY Right 2005    breast cancer    OTHER SURGICAL HISTORY  2004    Bowman procedure    OTHER SURGICAL HISTORY Right 7/25/05    placement of tissue expander    SMALL INTESTINE SURGERY      THORACOTOMY Right 02/11/2011    redo    TOTAL KNEE ARTHROPLASTY Right 3/17/2010    Dr ANTONIO Lindsey ENDOSCOPY  01/14/2017    mild esophagitis, biopsy normal, Dr. Layton Solomon, [facility]       Family History   Problem Relation Age of Onset    Heart Disease Mother     Diabetes Mother     High Cholesterol Mother     High Blood Pressure Mother     Cataracts Mother     Cancer Father         lung    Cancer Sister         leukemia    Glaucoma Sister        CareTeam (Including outside providers/suppliers regularly involved in providing care):   Patient Care Team:  Yonathan Larson MD as PCP - General (Family Medicine)  Yonathan Larson MD as PCP - [facility] Empaneled Provider  Paddy Aguilar as Physician (Medical Oncology)    Wt Readings from Last 3 Encounters:   07/08/21 131 lb 12.8 oz (59.8 kg)   04/15/21 135 lb (61.2 kg)   02/22/21 135 lb (61.2 kg)     Vitals:    07/08/21 1541   BP: 122/74   Site: Left Upper Arm   Position: Sitting   Cuff Size: Medium Adult   Pulse: 76   Resp: 16   Weight: 131 lb 12.8 oz (59.8 kg)   Height: 5' 2.5\" (1.588 m)     Body mass index is 23.72 kg/m². Based upon direct observation of the patient, evaluation of cognition reveals recent and remote memory intact. Patient's complete Health Risk Assessment and screening values have been reviewed and are found in Flowsheets.  The following problems were reviewed today and where indicated follow up appointments were made and/or referrals ordered. Positive Risk Factor Screenings with Interventions:          General Health and ACP:  General  In general, how would you say your health is?: Very Good  In the past 7 days, have you experienced any of the following?  New or Increased Pain, New or Increased Fatigue, Loneliness, Social Isolation, Stress or Anger?: None of These  Do you get the social and emotional support that you need?: Yes  Do you have a Living Will?: Yes  Advance Directives     Power of  Living Will ACP-Advance Directive ACP-Power of     Not on File Not on File Not on File Not on File      General Health Risk Interventions:  · No Living Will: ACP documents already completed- patient asked to provide copy to the office    Health Habits/Nutrition:  Health Habits/Nutrition  Do you exercise for at least 20 minutes 2-3 times per week?: (!) No  Have you lost any weight without trying in the past 3 months?: No  Do you eat only one meal per day?: No  Have you seen the dentist within the past year?: (!) No  Body mass index: 23.72  Health Habits/Nutrition Interventions:  · Inadequate physical activity:  educational materials provided to promote increased physical activity  · Dental exam overdue:  patient encouraged to make appointment with his/her dentist       Personalized Preventive Plan   Current Health Maintenance Status  Immunization History   Administered Date(s) Administered    COVID-19, Moderna, PF, 100mcg/0.5mL 03/03/2021, 03/31/2021    Influenza Virus Vaccine 01/14/2014, 12/09/2015    Influenza, High Dose (Fluzone 65 yrs and older) 11/29/2017, 12/03/2018    Influenza, Intradermal, Preservative free 10/10/2014    Influenza, Quadv, IM, PF (6 mo and older Fluzone, Flulaval, Fluarix, and 3 yrs and older Afluria) 10/13/2016    Influenza, Triv, inactivated, subunit, adjuvanted, IM (Fluad 65 yrs and older) 11/11/2019    Pneumococcal Conjugate 13-valent ([id number]) 05/24/2017    Pneumococcal Polysaccharide ([id number]) 10/09/2012, 07/03/2019    Tdap (Boostrix, Adacel) 10/09/2012    Zoster Live (Zostavax) 12/12/2012    Zoster Recombinant (Shingrix) 06/01/2020, 08/19/2020        Health Maintenance   Topic Date Due    A1C test (Diabetic or Prediabetic)  01/09/2021    Flu vaccine (1) 09/01/2021    Potassium monitoring  06/01/2022    Creatinine monitoring  06/01/2022    DEXA (modify frequency per FRAX score)  07/11/2022    DTaP/Tdap/Td vaccine (2 - Td or Tdap) 10/09/2022    Lipid screen  01/13/2026    Shingles Vaccine  Completed    Pneumococcal 65+ yrs at Risk Vaccine  Completed    COVID-19 Vaccine  Completed    Hepatitis C screen  Addressed    Hepatitis A vaccine  Aged Out    Hepatitis B vaccine  Aged Out    Hib vaccine  Aged Out    Meningococcal (ACWY) vaccine  Aged Out     Recommendations for [employer] Due: see orders and patient instructions/AVS.  . Recommended screening schedule for the next 5-10 years is provided to the patient in written form: see Patient Instructions/AVS.    Malden  was seen today for 6 month follow-up.     Diagnoses and all orders for this visit:    Elevated glucose    Essential hypertension

## 2021-07-08 NOTE — PROGRESS NOTES
Medicare annual wellness. Did not have a1c or Lipid labs done for today. Did have CMP on 6/1/2021. Will have labs done within the next two weeks, going out of town next week.

## 2021-09-21 ENCOUNTER — OFFICE VISIT (OUTPATIENT)
Dept: OPTOMETRY | Age: 71
End: 2021-09-21
Payer: COMMERCIAL

## 2021-09-21 DIAGNOSIS — H25.813 COMBINED FORMS OF AGE-RELATED CATARACT OF BOTH EYES: ICD-10-CM

## 2021-09-21 DIAGNOSIS — H52.203 ASTIGMATISM OF BOTH EYES WITH PRESBYOPIA: Primary | ICD-10-CM

## 2021-09-21 DIAGNOSIS — H52.4 ASTIGMATISM OF BOTH EYES WITH PRESBYOPIA: Primary | ICD-10-CM

## 2021-09-21 PROCEDURE — 92014 COMPRE OPH EXAM EST PT 1/>: CPT | Performed by: OPTOMETRIST

## 2021-09-21 ASSESSMENT — REFRACTION_MANIFEST
OD_SPHERE: PLANO
OS_ADD: +2.25
OS_AXIS: 080
OS_SPHERE: +1.00
OD_AXIS: 094
OD_ADD: +2.25
OD_CYLINDER: -0.50
OS_CYLINDER: -0.50

## 2021-09-21 ASSESSMENT — REFRACTION_WEARINGRX
OS_SPHERE: +1.00
OD_SPHERE: PLANO
OS_AXIS: 090
SPECS_TYPE: BIFOCAL
OS_CYLINDER: -0.50
OD_CYLINDER: -0.50
OD_ADD: +2.25
OD_AXIS: 094
OS_ADD: +2.25

## 2021-09-21 ASSESSMENT — ENCOUNTER SYMPTOMS
RESPIRATORY NEGATIVE: 0
EYES NEGATIVE: 0
GASTROINTESTINAL NEGATIVE: 0
ALLERGIC/IMMUNOLOGIC NEGATIVE: 0

## 2021-09-21 ASSESSMENT — KERATOMETRY
OD_AXISANGLE2_DEGREES: 000
OD_K2POWER_DIOPTERS: 45.00
OD_K1POWER_DIOPTERS: 45.00
METHOD_AUTO_MANUAL: AUTOMATED
OS_AXISANGLE_DEGREES: 086
OD_AXISANGLE_DEGREES: 090
OS_K1POWER_DIOPTERS: 45.25
OS_K2POWER_DIOPTERS: 45.75
OS_AXISANGLE2_DEGREES: 176

## 2021-09-21 ASSESSMENT — SLIT LAMP EXAM - LIDS
COMMENTS: NORMAL
COMMENTS: NORMAL

## 2021-09-21 ASSESSMENT — VISUAL ACUITY
OS_CC: 20/25
METHOD: SNELLEN - LINEAR
OD_CC: 20/20
CORRECTION_TYPE: GLASSES

## 2021-09-21 ASSESSMENT — TONOMETRY
OS_IOP_MMHG: 17
IOP_METHOD: NON-CONTACT AIR PUFF
OD_IOP_MMHG: 16

## 2021-09-21 NOTE — PROGRESS NOTES
Karis Madden presents today for   Chief Complaint   Patient presents with    Vision Exam    Blurred Vision   . HPI     Blurred Vision     In both eyes. Vision is blurred. Context:  distance vision and reading. Comments     Last Vision Exam: 6/6/2019 Aw  Last Ophthalmology Exam: n/a  Last Filled Glasses Rx: 6/6/2019  Insurance: Ya Rolon  Update: [employer] and reading are getting more blurry  Full time glasses                  Current Outpatient Medications   Medication Sig Dispense Refill    capecitabine (XELODA) 500 MG chemo tablet Take 3 tablets by mouth in the morning & 2 tablets in the evening on days 1-14 of 28 day cycle. Take with food & 8 oz water, doses 10-12 hours apart.  Nutritional Supplement LIQD Take 1 Can by mouth daily      Nutritional Supplements (ENSURE) LIQD DRINK 1 CAN by mouth once daily      temozolomide (TEMODAR) 250 MG chemo capsule Take 1 capsule by mouth on Days 10-14 of 28 day cycle. Taken on empty stomach 1hr before or 2hrs after eating, preferably at bedtime.  apixaban (ELIQUIS) 5 MG TABS tablet take 1 tablet by mouth twice a day 90 tablet 1    dilTIAZem (TIAZAC) 240 MG extended release capsule Take 1 capsule by mouth daily 90 capsule 2    lisinopril (PRINIVIL;ZESTRIL) 30 MG tablet Take 1 tablet by mouth daily 90 tablet 1    Handicap Placard MISC by Does not apply route 1 each 0    NONFORMULARY 2 Cancer meds - 15 days on per month/ Tamodor and ?      fluticasone (FLONASE) 50 MCG/ACT nasal spray 1 spray by Nasal route daily as needed for Rhinitis 1 Bottle 5    ondansetron (ZOFRAN) 4 MG tablet Take 1 tablet by mouth every 8 hours as needed for Nausea 20 tablet 0    acetaminophen (TYLENOL) 325 MG tablet Take 2 tablets by mouth every 4 hours as needed for Pain or Fever 120 tablet 0     No current facility-administered medications for this visit.          Family History   Problem Relation Age of Onset    Heart Disease Mother     Diabetes Mother 11/30/2020    mild disease; positive test 11/26/2020    Hypertension     Left knee pain     Internal derangement versus degenerative changes. (70% better after Celestone injection on 3-). Yaritza Lopez PA-C.     [facility] ([facility] [address]     [facility]     Myopia with astigmatism and presbyopia     Osteoarthritis     Osteopenia     took Fosamax from [id number]    Pancreatitis        ROS     Negative for: Constitutional, Gastrointestinal, Neurological, Skin, Genitourinary, Musculoskeletal, HENT, Endocrine, Cardiovascular, Eyes, Respiratory, Psychiatric, Allergic/Imm, Heme/Lymph          Main Ophthalmology Exam     External Exam       Right Left    External Normal Normal          Slit Lamp Exam       Right Left    Lids/Lashes Normal Normal    Conjunctiva/Sclera White and quiet White and quiet    Cornea Clear Clear    Anterior Chamber Deep and quiet Deep and quiet    Iris Round and reactive Round and reactive    Lens Trace Nuclear sclerosis Trace Nuclear sclerosis    Vitreous Normal Normal          Fundus Exam       Right Left    Disc Normal Normal    C/D Ratio 0.2 0.2    Macula Normal Normal    Vessels Normal Normal                   Tonometry     Tonometry (Non-contact air puff, 1:09 PM)       Right Left    Pressure 16 17      Right / Left  IOPg 18.8 / 17.0  CH 12.7 / 10.2   WS 7.1 / 4.5                      Not recorded         Not recorded          Visual Acuity (Snellen - Linear)       Right Left    Dist cc 20/20 20/25    Near cc 20/20     Correction: Glasses          Pupils     Pupils       Pupils    Right PERRL    Left PERRL              Neuro/Psych     Neuro/Psych     Oriented x3: Yes    Mood/Affect: Normal              Keratometry     Keratometry (Automated)       K1 Axis K2 Axis    Right 45.00 000 45.00 090    Left 45.25 176 45.75 086                  Ophthalmology Exam     Wearing Rx       Sphere Cylinder Axis Add    Right [place] -0.50 094 +2.25    Left +1.00 -0.50 090 +2.25    Age: 4yrs Type: Bifocal              Manifest Refraction     Manifest Refraction       Sphere Cylinder Axis Dist VA Add    Right [place] -0.50 094 20/20 +2.25    Left +1.00 -0.50 080 20/20 +2.25          Manifest Refraction #2 (Auto)       Sphere Cylinder Axis Dist VA Add    Right -0.25 -0.50 085      Left +0.50 -0.50 076                 Final Rx       Sphere Cylinder Axis Add    Right [place] -0.50 094 +2.25    Left +1.00 -0.50 080 +2.25    Type: Bifocal    Expiration Date: 9/22/2023            No orders of the defined types were placed in this encounter. IMPRESSION:  1. Astigmatism of both eyes with presbyopia    2. Combined forms of age-related cataract of both eyes        PLAN:    1. New glasses recommended  2. Monitor for future progression and visual significance. Counseled patient that more glare may be noticed and more light may be needed for reading.         Patient Instructions   New glasses recommended      Return in about 2 years (around 9/21/2023) for complete eye exam.

## 2021-10-11 ENCOUNTER — TELEPHONE (OUTPATIENT)
Dept: FAMILY MEDICINE CLINIC | Age: 71
End: 2021-10-11

## 2021-10-11 DIAGNOSIS — Z12.31 SCREENING MAMMOGRAM FOR BREAST CANCER: Primary | ICD-10-CM

## 2021-10-11 NOTE — TELEPHONE ENCOUNTER
----- Message from Xochilt De Luna sent at 10/11/2021  1:33 PM EDT -----  Subject: Message to Provider    QUESTIONS  Information for Provider? Pt called in and asked if pt can get a 3d   imaging mammogram order put in. If someone can give the pt a call.   ---------------------------------------------------------------------------  --------------  CALL BACK INFO  What is the best way for the office to contact you? OK to leave message on   voicemail  Preferred Call Back Phone Number? [phone]  ---------------------------------------------------------------------------  --------------  SCRIPT ANSWERS  Relationship to Patient?  Self

## 2021-10-26 ENCOUNTER — HOSPITAL ENCOUNTER (OUTPATIENT)
Dept: MAMMOGRAPHY | Age: 71
Discharge: HOME | End: 2021-10-28
Payer: MEDICARE

## 2021-10-26 DIAGNOSIS — Z12.31 SCREENING MAMMOGRAM FOR BREAST CANCER: ICD-10-CM

## 2021-10-26 PROCEDURE — 77063 BREAST TOMOSYNTHESIS BI: CPT

## 2021-10-26 PROCEDURE — 77067 SCR MAMMO BI INCL CAD: CPT

## 2021-11-08 DIAGNOSIS — I82.4Z3 DEEP VEIN THROMBOSIS (DVT) OF DISTAL VEIN OF BOTH LOWER EXTREMITIES, UNSPECIFIED CHRONICITY (HCC): ICD-10-CM

## 2021-11-08 NOTE — TELEPHONE ENCOUNTER
Aaliyah Nelson called requesting a refill of the below medication which has been pended for you:     Requested Prescriptions     Pending Prescriptions Disp Refills    apixaban (ELIQUIS) 5 MG TABS tablet 180 tablet 0     Sig: take 1 tablet by mouth twice a day       Last Appointment Date: 7/8/2021  Next Appointment Date: 1/13/2022    Allergies   Allergen Reactions    Effexor Xr [Venlafaxine Hcl]      Made blood pressure go high    Venlafaxine

## 2021-12-28 ENCOUNTER — HOSPITAL ENCOUNTER (OUTPATIENT)
Dept: INFUSION THERAPY | Age: 71
Discharge: HOME | End: 2021-12-28
Payer: MEDICARE

## 2021-12-28 ENCOUNTER — HOSPITAL ENCOUNTER (OUTPATIENT)
Age: 71
Setting detail: SPECIMEN
Discharge: HOME | End: 2021-12-28
Payer: MEDICARE

## 2021-12-28 DIAGNOSIS — C7B.00 METASTATIC CARCINOID TUMOR (HCC): ICD-10-CM

## 2021-12-28 DIAGNOSIS — I10 ESSENTIAL HYPERTENSION: Primary | ICD-10-CM

## 2021-12-28 DIAGNOSIS — R73.09 ELEVATED GLUCOSE: ICD-10-CM

## 2021-12-28 LAB
ABSOLUTE EOS #: 0.04 K/UL (ref 0–0.44)
ABSOLUTE IMMATURE GRANULOCYTE: <0.03 K/UL (ref 0–0.3)
ABSOLUTE LYMPH #: 0.79 K/UL (ref 1.1–3.7)
ABSOLUTE MONO #: 0.33 K/UL (ref 0.1–1.2)
ALBUMIN SERPL-MCNC: 4 G/DL (ref 3.5–5.2)
ALBUMIN/GLOBULIN RATIO: 1.4 (ref 1–2.5)
ALP BLD-CCNC: 91 U/L (ref 35–104)
ALT SERPL-CCNC: 23 U/L (ref 5–33)
ANION GAP SERPL CALCULATED.3IONS-SCNC: 10 MMOL/L (ref 9–17)
AST SERPL-CCNC: 32 U/L
BASOPHILS # BLD: 1 % (ref 0–2)
BASOPHILS ABSOLUTE: <0.03 K/UL (ref 0–0.2)
BILIRUB SERPL-MCNC: 0.43 MG/DL (ref 0.3–1.2)
BUN BLDV-MCNC: 17 MG/DL (ref 8–23)
BUN/CREAT BLD: 26 (ref 9–20)
CALCIUM SERPL-MCNC: 8.7 MG/DL (ref 8.6–10.4)
CHLORIDE BLD-SCNC: 106 MMOL/L (ref 98–107)
CHOLESTEROL/HDL RATIO: 2.2
CHOLESTEROL: 167 MG/DL
CO2: 23 MMOL/L (ref 20–31)
CREAT SERPL-MCNC: 0.65 MG/DL (ref 0.5–0.9)
DIFFERENTIAL TYPE: ABNORMAL
EOSINOPHILS RELATIVE PERCENT: 2 % (ref 1–4)
ESTIMATED AVERAGE GLUCOSE: 114 MG/DL
GFR AFRICAN AMERICAN: >60 ML/MIN
GFR NON-AFRICAN AMERICAN: >60 ML/MIN
GFR SERPL CREATININE-BSD FRML MDRD: ABNORMAL ML/MIN/{1.73_M2}
GFR SERPL CREATININE-BSD FRML MDRD: ABNORMAL ML/MIN/{1.73_M2}
GLUCOSE BLD-MCNC: 95 MG/DL (ref 70–99)
HBA1C MFR BLD: 5.6 % (ref 4–6)
HCT VFR BLD CALC: 33.2 % (ref 36.3–47.1)
HDLC SERPL-MCNC: 76 MG/DL
HEMOGLOBIN: 11.1 G/DL (ref 11.9–15.1)
IMMATURE GRANULOCYTES: 0 %
LDL CHOLESTEROL: 78 MG/DL (ref 0–130)
LYMPHOCYTES # BLD: 36 % (ref 24–43)
MCH RBC QN AUTO: 27.7 PG (ref 25.2–33.5)
MCHC RBC AUTO-ENTMCNC: 33.4 G/DL (ref 25.2–33.5)
MCV RBC AUTO: 82.8 FL (ref 82.6–102.9)
MONOCYTES # BLD: 15 % (ref 3–12)
NRBC AUTOMATED: 0 PER 100 WBC
PDW BLD-RTO: 13.1 % (ref 11.8–14.4)
PLATELET # BLD: 133 K/UL (ref 138–453)
PLATELET ESTIMATE: ABNORMAL
PMV BLD AUTO: 9.8 FL (ref 8.1–13.5)
POTASSIUM SERPL-SCNC: 3.8 MMOL/L (ref 3.7–5.3)
RBC # BLD: 4.01 M/UL (ref 3.95–5.11)
RBC # BLD: ABNORMAL 10*6/UL
SEG NEUTROPHILS: 46 % (ref 36–65)
SEGMENTED NEUTROPHILS ABSOLUTE COUNT: 1 K/UL (ref 1.5–8.1)
SODIUM BLD-SCNC: 139 MMOL/L (ref 135–144)
TOTAL PROTEIN: 6.8 G/DL (ref 6.4–8.3)
TRIGL SERPL-MCNC: 64 MG/DL
VLDLC SERPL CALC-MCNC: NORMAL MG/DL (ref 1–30)
WBC # BLD: 2.2 K/UL (ref 3.5–11.3)
WBC # BLD: ABNORMAL 10*3/UL

## 2021-12-28 PROCEDURE — 85025 COMPLETE CBC W/AUTO DIFF WBC: CPT

## 2021-12-28 PROCEDURE — 80061 LIPID PANEL: CPT

## 2021-12-28 PROCEDURE — 96523 IRRIG DRUG DELIVERY DEVICE: CPT

## 2021-12-28 PROCEDURE — 83036 HEMOGLOBIN GLYCOSYLATED A1C: CPT

## 2021-12-28 PROCEDURE — 80053 COMPREHEN METABOLIC PANEL: CPT

## 2021-12-28 PROCEDURE — 6360000002 HC RX W HCPCS: Performed by: INTERNAL MEDICINE

## 2021-12-28 PROCEDURE — 2580000003 HC RX 258: Performed by: INTERNAL MEDICINE

## 2021-12-28 PROCEDURE — 36591 DRAW BLOOD OFF VENOUS DEVICE: CPT

## 2021-12-28 RX ORDER — HEPARIN SODIUM (PORCINE) LOCK FLUSH IV SOLN 100 UNIT/ML 100 UNIT/ML
500 SOLUTION INTRAVENOUS PRN
Status: DISCONTINUED | OUTPATIENT
Start: 2021-12-28 | End: 2021-12-29 | Stop reason: HOSPADM

## 2021-12-28 RX ORDER — SODIUM CHLORIDE 9 MG/ML
25 INJECTION, SOLUTION INTRAVENOUS PRN
Status: CANCELLED | OUTPATIENT
Start: 2021-12-28

## 2021-12-28 RX ORDER — HEPARIN SODIUM (PORCINE) LOCK FLUSH IV SOLN 100 UNIT/ML 100 UNIT/ML
500 SOLUTION INTRAVENOUS PRN
Status: CANCELLED | OUTPATIENT
Start: 2021-12-28

## 2021-12-28 RX ORDER — SODIUM CHLORIDE 0.9 % (FLUSH) 0.9 %
5-40 SYRINGE (ML) INJECTION PRN
Status: CANCELLED | OUTPATIENT
Start: 2021-12-28

## 2021-12-28 RX ORDER — SODIUM CHLORIDE 0.9 % (FLUSH) 0.9 %
5-40 SYRINGE (ML) INJECTION PRN
Status: DISCONTINUED | OUTPATIENT
Start: 2021-12-28 | End: 2021-12-29 | Stop reason: HOSPADM

## 2021-12-28 RX ADMIN — SODIUM CHLORIDE, PRESERVATIVE FREE 10 ML: 5 INJECTION INTRAVENOUS at 08:07

## 2021-12-28 RX ADMIN — HEPARIN 500 UNITS: 100 SYRINGE at 08:07

## 2021-12-28 NOTE — PROGRESS NOTES
VAD accessed per protocol per CAMMIE Thornton RN  with 3/4 \" hong needle. Flushed easily with saline. Blood return noted. Labs drawn. Flushed with 10 ml of NSS and 5 ml of Heparin flush. VAD D/C'd and Band-Aid to site. Patient stable and discharged to home.

## 2021-12-30 ENCOUNTER — HOSPITAL ENCOUNTER (OUTPATIENT)
Dept: INFUSION THERAPY | Age: 71
Discharge: HOME | End: 2021-12-30
Payer: MEDICARE

## 2021-12-30 ENCOUNTER — HOSPITAL ENCOUNTER (OUTPATIENT)
Age: 71
Setting detail: SPECIMEN
Discharge: HOME | End: 2021-12-30
Payer: MEDICARE

## 2021-12-30 DIAGNOSIS — C7B.00 METASTATIC CARCINOID TUMOR (HCC): Primary | ICD-10-CM

## 2021-12-30 LAB
ABSOLUTE EOS #: 0.05 K/UL (ref 0–0.44)
ABSOLUTE IMMATURE GRANULOCYTE: <0.03 K/UL (ref 0–0.3)
ABSOLUTE LYMPH #: 0.81 K/UL (ref 1.1–3.7)
ABSOLUTE MONO #: 0.44 K/UL (ref 0.1–1.2)
BASOPHILS # BLD: 0 % (ref 0–2)
BASOPHILS ABSOLUTE: <0.03 K/UL (ref 0–0.2)
DIFFERENTIAL TYPE: ABNORMAL
EOSINOPHILS RELATIVE PERCENT: 2 % (ref 1–4)
HCT VFR BLD CALC: 33.5 % (ref 36.3–47.1)
HEMOGLOBIN: 11 G/DL (ref 11.9–15.1)
IMMATURE GRANULOCYTES: 0 %
LYMPHOCYTES # BLD: 32 % (ref 24–43)
MCH RBC QN AUTO: 27 PG (ref 25.2–33.5)
MCHC RBC AUTO-ENTMCNC: 32.8 G/DL (ref 25.2–33.5)
MCV RBC AUTO: 82.1 FL (ref 82.6–102.9)
MONOCYTES # BLD: 17 % (ref 3–12)
NRBC AUTOMATED: 0 PER 100 WBC
PDW BLD-RTO: 13 % (ref 11.8–14.4)
PLATELET # BLD: ABNORMAL K/UL (ref 138–453)
PLATELET ESTIMATE: ABNORMAL
PLATELET, FLUORESCENCE: 137 K/UL (ref 138–453)
PLATELET, IMMATURE FRACTION: 3 % (ref 1.1–10.3)
PMV BLD AUTO: ABNORMAL FL (ref 8.1–13.5)
RBC # BLD: 4.08 M/UL (ref 3.95–5.11)
RBC # BLD: ABNORMAL 10*6/UL
SEG NEUTROPHILS: 48 % (ref 36–65)
SEGMENTED NEUTROPHILS ABSOLUTE COUNT: 1.22 K/UL (ref 1.5–8.1)
WBC # BLD: 2.5 K/UL (ref 3.5–11.3)
WBC # BLD: ABNORMAL 10*3/UL

## 2021-12-30 PROCEDURE — 96523 IRRIG DRUG DELIVERY DEVICE: CPT

## 2021-12-30 PROCEDURE — 6360000002 HC RX W HCPCS: Performed by: INTERNAL MEDICINE

## 2021-12-30 PROCEDURE — 85025 COMPLETE CBC W/AUTO DIFF WBC: CPT

## 2021-12-30 PROCEDURE — 85055 RETICULATED PLATELET ASSAY: CPT

## 2021-12-30 PROCEDURE — 2580000003 HC RX 258: Performed by: INTERNAL MEDICINE

## 2021-12-30 RX ORDER — HEPARIN SODIUM (PORCINE) LOCK FLUSH IV SOLN 100 UNIT/ML 100 UNIT/ML
500 SOLUTION INTRAVENOUS PRN
Status: CANCELLED | OUTPATIENT
Start: 2021-12-30

## 2021-12-30 RX ORDER — HEPARIN SODIUM (PORCINE) LOCK FLUSH IV SOLN 100 UNIT/ML 100 UNIT/ML
500 SOLUTION INTRAVENOUS PRN
Status: DISCONTINUED | OUTPATIENT
Start: 2021-12-30 | End: 2021-12-31 | Stop reason: HOSPADM

## 2021-12-30 RX ORDER — SODIUM CHLORIDE 0.9 % (FLUSH) 0.9 %
5-40 SYRINGE (ML) INJECTION PRN
Status: DISCONTINUED | OUTPATIENT
Start: 2021-12-30 | End: 2021-12-31 | Stop reason: HOSPADM

## 2021-12-30 RX ORDER — SODIUM CHLORIDE 9 MG/ML
25 INJECTION, SOLUTION INTRAVENOUS PRN
Status: CANCELLED | OUTPATIENT
Start: 2021-12-30

## 2021-12-30 RX ORDER — SODIUM CHLORIDE 0.9 % (FLUSH) 0.9 %
5-40 SYRINGE (ML) INJECTION PRN
Status: CANCELLED | OUTPATIENT
Start: 2021-12-30

## 2021-12-30 RX ADMIN — SODIUM CHLORIDE, PRESERVATIVE FREE 10 ML: 5 INJECTION INTRAVENOUS at 08:00

## 2021-12-30 RX ADMIN — HEPARIN 500 UNITS: 100 SYRINGE at 08:00

## 2022-01-11 ENCOUNTER — HOSPITAL ENCOUNTER (OUTPATIENT)
Dept: INFUSION THERAPY | Age: 72
Discharge: HOME | End: 2022-01-11
Payer: MEDICARE

## 2022-01-11 ENCOUNTER — HOSPITAL ENCOUNTER (OUTPATIENT)
Age: 72
Setting detail: SPECIMEN
Discharge: HOME | End: 2022-01-11
Payer: MEDICARE

## 2022-01-11 DIAGNOSIS — C7B.00 METASTATIC CARCINOID TUMOR (HCC): Primary | ICD-10-CM

## 2022-01-11 LAB
ABSOLUTE EOS #: 0.08 K/UL (ref 0–0.44)
ABSOLUTE IMMATURE GRANULOCYTE: <0.03 K/UL (ref 0–0.3)
ABSOLUTE LYMPH #: 1.01 K/UL (ref 1.1–3.7)
ABSOLUTE MONO #: 0.58 K/UL (ref 0.1–1.2)
BASOPHILS # BLD: 0 % (ref 0–2)
BASOPHILS ABSOLUTE: <0.03 K/UL (ref 0–0.2)
DIFFERENTIAL TYPE: ABNORMAL
EOSINOPHILS RELATIVE PERCENT: 2 % (ref 1–4)
HCT VFR BLD CALC: 35.5 % (ref 36.3–47.1)
HEMOGLOBIN: 11.6 G/DL (ref 11.9–15.1)
IMMATURE GRANULOCYTES: 0 %
LYMPHOCYTES # BLD: 22 % (ref 24–43)
MCH RBC QN AUTO: 26.7 PG (ref 25.2–33.5)
MCHC RBC AUTO-ENTMCNC: 32.7 G/DL (ref 25.2–33.5)
MCV RBC AUTO: 81.8 FL (ref 82.6–102.9)
MONOCYTES # BLD: 13 % (ref 3–12)
NRBC AUTOMATED: 0 PER 100 WBC
PDW BLD-RTO: 13.2 % (ref 11.8–14.4)
PLATELET # BLD: ABNORMAL K/UL (ref 138–453)
PLATELET ESTIMATE: ABNORMAL
PLATELET, FLUORESCENCE: 147 K/UL (ref 138–453)
PLATELET, IMMATURE FRACTION: 2.7 % (ref 1.1–10.3)
PMV BLD AUTO: ABNORMAL FL (ref 8.1–13.5)
RBC # BLD: 4.34 M/UL (ref 3.95–5.11)
RBC # BLD: ABNORMAL 10*6/UL
SEG NEUTROPHILS: 63 % (ref 36–65)
SEGMENTED NEUTROPHILS ABSOLUTE COUNT: 2.85 K/UL (ref 1.5–8.1)
WBC # BLD: 4.6 K/UL (ref 3.5–11.3)
WBC # BLD: ABNORMAL 10*3/UL

## 2022-01-11 PROCEDURE — 2580000003 HC RX 258: Performed by: INTERNAL MEDICINE

## 2022-01-11 PROCEDURE — 6360000002 HC RX W HCPCS: Performed by: INTERNAL MEDICINE

## 2022-01-11 PROCEDURE — 85025 COMPLETE CBC W/AUTO DIFF WBC: CPT

## 2022-01-11 PROCEDURE — 85055 RETICULATED PLATELET ASSAY: CPT

## 2022-01-11 PROCEDURE — 36591 DRAW BLOOD OFF VENOUS DEVICE: CPT

## 2022-01-11 RX ORDER — SODIUM CHLORIDE 0.9 % (FLUSH) 0.9 %
5-40 SYRINGE (ML) INJECTION PRN
Status: CANCELLED | OUTPATIENT
Start: 2022-01-11

## 2022-01-11 RX ORDER — HEPARIN SODIUM (PORCINE) LOCK FLUSH IV SOLN 100 UNIT/ML 100 UNIT/ML
500 SOLUTION INTRAVENOUS PRN
Status: DISCONTINUED | OUTPATIENT
Start: 2022-01-11 | End: 2022-01-12 | Stop reason: HOSPADM

## 2022-01-11 RX ORDER — SODIUM CHLORIDE 0.9 % (FLUSH) 0.9 %
5-40 SYRINGE (ML) INJECTION PRN
Status: DISCONTINUED | OUTPATIENT
Start: 2022-01-11 | End: 2022-01-12 | Stop reason: HOSPADM

## 2022-01-11 RX ORDER — SODIUM CHLORIDE 9 MG/ML
25 INJECTION, SOLUTION INTRAVENOUS PRN
Status: CANCELLED | OUTPATIENT
Start: 2022-01-11

## 2022-01-11 RX ORDER — HEPARIN SODIUM (PORCINE) LOCK FLUSH IV SOLN 100 UNIT/ML 100 UNIT/ML
500 SOLUTION INTRAVENOUS PRN
Status: CANCELLED | OUTPATIENT
Start: 2022-01-11

## 2022-01-11 RX ADMIN — SODIUM CHLORIDE, PRESERVATIVE FREE 10 ML: 5 INJECTION INTRAVENOUS at 08:24

## 2022-01-11 RX ADMIN — HEPARIN 500 UNITS: 100 SYRINGE at 08:24

## 2022-01-12 NOTE — PROGRESS NOTES
VAD accessed per protocol with 3/4 inch 20 gauge hong needle. Flushes easy with good blood return. Labs Drawn. Flushed with 10 ml of normal saline and 5 ml of heparin flush. D/C'd 3/4 inch hong needle and band aide applied. Warm

## 2022-01-13 ENCOUNTER — OFFICE VISIT (OUTPATIENT)
Dept: FAMILY MEDICINE CLINIC | Age: 72
End: 2022-01-13
Payer: MEDICARE

## 2022-01-13 VITALS
WEIGHT: 134.8 LBS | HEIGHT: 63 IN | TEMPERATURE: 97.7 F | OXYGEN SATURATION: 99 % | HEART RATE: 74 BPM | BODY MASS INDEX: 23.88 KG/M2 | SYSTOLIC BLOOD PRESSURE: 122 MMHG | DIASTOLIC BLOOD PRESSURE: 78 MMHG

## 2022-01-13 DIAGNOSIS — I10 ESSENTIAL HYPERTENSION: Primary | ICD-10-CM

## 2022-01-13 DIAGNOSIS — Z23 NEED FOR INFLUENZA VACCINATION: ICD-10-CM

## 2022-01-13 DIAGNOSIS — R73.09 ELEVATED GLUCOSE: ICD-10-CM

## 2022-01-13 PROCEDURE — G8427 DOCREV CUR MEDS BY ELIG CLIN: HCPCS | Performed by: FAMILY MEDICINE

## 2022-01-13 PROCEDURE — 1090F PRES/ABSN URINE INCON ASSESS: CPT | Performed by: FAMILY MEDICINE

## 2022-01-13 PROCEDURE — G0008 ADMIN INFLUENZA VIRUS VAC: HCPCS | Performed by: FAMILY MEDICINE

## 2022-01-13 PROCEDURE — 4040F PNEUMOC VAC/ADMIN/RCVD: CPT | Performed by: FAMILY MEDICINE

## 2022-01-13 PROCEDURE — 3017F COLORECTAL CA SCREEN DOC REV: CPT | Performed by: FAMILY MEDICINE

## 2022-01-13 PROCEDURE — PBSHW INFLUENZA, QUADV, ADJUVANTED, 65 YRS +, IM, PF, PREFILL SYR, 0.5ML (FLUAD): Performed by: FAMILY MEDICINE

## 2022-01-13 PROCEDURE — G8399 PT W/DXA RESULTS DOCUMENT: HCPCS | Performed by: FAMILY MEDICINE

## 2022-01-13 PROCEDURE — G8484 FLU IMMUNIZE NO ADMIN: HCPCS | Performed by: FAMILY MEDICINE

## 2022-01-13 PROCEDURE — 1036F TOBACCO NON-USER: CPT | Performed by: FAMILY MEDICINE

## 2022-01-13 PROCEDURE — 1123F ACP DISCUSS/DSCN MKR DOCD: CPT | Performed by: FAMILY MEDICINE

## 2022-01-13 PROCEDURE — G8420 CALC BMI NORM PARAMETERS: HCPCS | Performed by: FAMILY MEDICINE

## 2022-01-13 PROCEDURE — 99213 OFFICE O/P EST LOW 20 MIN: CPT | Performed by: FAMILY MEDICINE

## 2022-01-13 RX ORDER — EVEROLIMUS 10 MG/1
10 TABLET ORAL DAILY
COMMUNITY
Start: 2021-11-17

## 2022-01-13 RX ORDER — DEXAMETHASONE 0.5 MG/5ML
1 ELIXIR ORAL 3 TIMES DAILY
COMMUNITY
Start: 2021-11-17 | End: 2022-09-13 | Stop reason: ALTCHOICE

## 2022-01-13 RX ORDER — LISINOPRIL 30 MG/1
30 TABLET ORAL DAILY
Qty: 90 TABLET | Refills: 1 | Status: SHIPPED | OUTPATIENT
Start: 2022-01-13 | End: 2022-09-15

## 2022-01-13 RX ORDER — DILTIAZEM HYDROCHLORIDE 240 MG/1
240 CAPSULE, EXTENDED RELEASE ORAL DAILY
Qty: 90 CAPSULE | Refills: 1 | Status: SHIPPED | OUTPATIENT
Start: 2022-01-13 | End: 2022-08-30

## 2022-01-13 SDOH — ECONOMIC STABILITY: FOOD INSECURITY: WITHIN THE PAST 12 MONTHS, YOU WORRIED THAT YOUR FOOD WOULD RUN OUT BEFORE YOU GOT MONEY TO BUY MORE.: NEVER TRUE

## 2022-01-13 SDOH — ECONOMIC STABILITY: FOOD INSECURITY: WITHIN THE PAST 12 MONTHS, THE FOOD YOU BOUGHT JUST DIDN'T LAST AND YOU DIDN'T HAVE MONEY TO GET MORE.: NEVER TRUE

## 2022-01-13 ASSESSMENT — SOCIAL DETERMINANTS OF HEALTH (SDOH): HOW HARD IS IT FOR YOU TO PAY FOR THE VERY BASICS LIKE FOOD, HOUSING, MEDICAL CARE, AND HEATING?: NOT HARD AT ALL

## 2022-01-13 NOTE — PROGRESS NOTES
[address], [address]                        Telephone [phone]             Fax [phone]       Agueda Calvert  :  1950  Age:  73 y.o. MRN:  [id number]  Date of visit:  2022       Assessment and Plan:    1. Essential hypertension  Her blood pressure is well-controlled today. (BP: 122/78)   She was advised to continue current medications. Diltiazem and Lisinopril were refilled:   - dilTIAZem (TIAZAC) 240 MG extended release capsule; Take 1 capsule by mouth daily  Dispense: 90 capsule; Refill: 1  - lisinopril (PRINIVIL;ZESTRIL) 30 MG tablet; Take 1 tablet by mouth daily  Dispense: 90 tablet; Refill: 1    Labs were ordered to be done prior to her return visit in 6 months:   - Comprehensive Metabolic Panel; Future  - Lipid Panel; Future    2. Elevated glucose  Her fasting glucose and HgbA1c are both within normal limits on her recent lab work. - Hemoglobin A1C; Future was ordered to be done prior to her return visit in 6 months. 3. Routine health maintenance  Health maintenance was reviewed with the patient. She has received three doses of the Moderna Covid-19 vaccine. She has received an influenza vaccine this influenza season. All other health maintenance, including Shingrix, Tdap, Pneumovax, and Prevnar-13, is up to date at this time. Follow up instructions were given to the patient:  Return in about 6 months (around 2022) for hypertension. Subjective:    Agueda Calvert is a 73 y.o. female who presents to [facility] today (2022) for follow up/evaluation of:  Hypertension, Hyperglycemia (elevated glucose), and Cancer (Currently liver and pancreas. Lung cancer was removed .)    She continues to get oncologic care at [facility] [place].   She reports fatigue, but she states that she has otherwise been feeling well. She is tolerating her medications well. She has no new concerns or complaints today. She has received three doses of the Moderna Covid-19 vaccine. She has the following problem list:  Patient Active Problem List   Diagnosis    Essential hypertension    Osteoarthritis    Osteopenia    History of breast cancer    History of lung cancer    Elevated glucose    Primary osteoarthritis of left knee    Metastatic carcinoid tumor ([address]    Cholecystitis    Hepatic metastasis ([address]    RUQ pain    Abdominal pain    Paroxysmal supraventricular tachycardia ([address]    History of 2019 novel coronavirus disease (COVID-19)    Coagulation defect ([address]        Current medications are:  Outpatient Medications Marked as Taking for the 1/13/22 encounter (Office Visit) with Ewa Corrales MD   Medication Sig Dispense Refill    everolimus (AFINITOR) 10 MG TABS chemo tablet Take 10 mg by mouth daily      dexamethasone 0.5 MG/5ML elixir Apply 1 mg topically 3 times daily      apixaban (ELIQUIS) 5 MG TABS tablet take 1 tablet by mouth twice a day 180 tablet 0    Nutritional Supplements (ENSURE) LIQD DRINK 1 CAN by mouth once daily      dilTIAZem (TIAZAC) 240 MG extended release capsule Take 1 capsule by mouth daily 90 capsule 2    lisinopril (PRINIVIL;ZESTRIL) 30 MG tablet Take 1 tablet by mouth daily 90 tablet 1    Handicap Placard MISC by Does not apply route 1 each 0    fluticasone (FLONASE) 50 MCG/ACT nasal spray 1 spray by Nasal route daily as needed for Rhinitis 1 Bottle 5    ondansetron (ZOFRAN) 4 MG tablet Take 1 tablet by mouth every 8 hours as needed for Nausea 20 tablet 0    acetaminophen (TYLENOL) 325 MG tablet Take 2 tablets by mouth every 4 hours as needed for Pain or Fever 120 tablet 0       She is allergic to effexor xr [venlafaxine hcl] and venlafaxine. She  reports that she has never smoked.  She has never used smokeless tobacco.      Objective:    Vitals:    01/13/22 1032 BP: 122/78   Site: Right Upper Arm   Position: Sitting   Cuff Size: Medium Adult   Pulse: 74   Temp: 97.7 °F (36.5 °C)   TempSrc: Temporal   SpO2: 99%   Weight: 134 lb 12.8 oz (61.1 kg)   Height: 5' 2.5\" (1.588 m)     Body mass index is 24.26 kg/m². Well-nourished, well-developed female, healthy-appearing, alert, cooperative and in no acute distress. Neck supple. No adenopathy. Thyroid symmetric, normal size. Chest:  Normal expansion. Clear to auscultation. No rales, rhonchi, or wheezing. Heart sounds are normal.  Regular rate and rhythm without murmur, gallop or rub. Lower extremities have no edema.     Results of labs done 12/28/2021, 12/30/2021, and 1/11/2022 were reviewed with the patient:   Hospital Outpatient Visit on 01/11/2022   Component Date Value Ref Range Status    WBC 01/11/2022 4.6  3.5 - 11.3 k/uL Final    RBC 01/11/2022 4.34  3.95 - 5.11 m/uL Final    Hemoglobin 01/11/2022 11.6* 11.9 - 15.1 g/dL Final    Hematocrit 01/11/2022 35.5* 36.3 - 47.1 % Final    MCV 01/11/2022 81.8* 82.6 - 102.9 fL Final    MCH 01/11/2022 26.7  25.2 - 33.5 pg Final    MCHC 01/11/2022 32.7  25.2 - 33.5 g/dL Final    RDW 01/11/2022 13.2  11.8 - 14.4 % Final    Platelets 70/53/0271 See Reflexed IPF Result  138 - 453 k/uL Final    MPV 01/11/2022 NOT REPORTED  8.1 - 13.5 fL Final    NRBC Automated 01/11/2022 0.0  0.0 per 100 WBC Final    Differential Type 01/11/2022 NOT REPORTED   Final    WBC Morphology 01/11/2022 NOT REPORTED   Final    RBC Morphology 01/11/2022 NOT REPORTED   Final    Platelet Estimate 88/98/1837 NOT REPORTED   Final    Seg Neutrophils 01/11/2022 63  36 - 65 % Final    Lymphocytes 01/11/2022 22* 24 - 43 % Final    Monocytes 01/11/2022 13* 3 - 12 % Final    Eosinophils % 01/11/2022 2  1 - 4 % Final    Basophils 01/11/2022 0  0 - 2 % Final    Immature Granulocytes 01/11/2022 0  0 % Final    Segs Absolute 01/11/2022 2.85  1.50 - 8.10 k/uL Final    Absolute Lymph # 01/11/2022 1.01* 1.10 - 3.70 k/uL Final    Absolute Mono # 01/11/2022 0.58  0.10 - 1.20 k/uL Final    Absolute Eos # 01/11/2022 0.08  0.00 - 0.44 k/uL Final    Basophils Absolute 01/11/2022 <0.03  0.00 - 0.20 k/uL Final    Absolute Immature Granulocyte 01/11/2022 <0.03  0.00 - 0.30 k/uL Final    Platelet, Immature Fraction 01/11/2022 2.7  1.1 - 10.3 % Final    Platelet, Fluorescence 01/11/2022 147  138 - 453 k/uL Final   Hospital Outpatient Visit on 12/30/2021   Component Date Value Ref Range Status    WBC 12/30/2021 2.5* 3.5 - 11.3 k/uL Final    RBC 12/30/2021 4.08  3.95 - 5.11 m/uL Final    Hemoglobin 12/30/2021 11.0* 11.9 - 15.1 g/dL Final    Hematocrit 12/30/2021 33.5* 36.3 - 47.1 % Final    MCV 12/30/2021 82.1* 82.6 - 102.9 fL Final    MCH 12/30/2021 27.0  25.2 - 33.5 pg Final    MCHC 12/30/2021 32.8  25.2 - 33.5 g/dL Final    RDW 12/30/2021 13.0  11.8 - 14.4 % Final    Platelets 64/29/0831 See Reflexed IPF Result  138 - 453 k/uL Final    MPV 12/30/2021 NOT REPORTED  8.1 - 13.5 fL Final    NRBC Automated 12/30/2021 0.0  0.0 per 100 WBC Final    Differential Type 12/30/2021 NOT REPORTED   Final    WBC Morphology 12/30/2021 NOT REPORTED   Final    RBC Morphology 12/30/2021 NOT REPORTED   Final    Platelet Estimate 80/43/3087 NOT REPORTED   Final    Seg Neutrophils 12/30/2021 48  36 - 65 % Final    Lymphocytes 12/30/2021 32  24 - 43 % Final    Monocytes 12/30/2021 17* 3 - 12 % Final    Eosinophils % 12/30/2021 2  1 - 4 % Final    Basophils 12/30/2021 0  0 - 2 % Final    Immature Granulocytes 12/30/2021 0  0 % Final    Segs Absolute 12/30/2021 1.22* 1.50 - 8.10 k/uL Final    Absolute Lymph # 12/30/2021 0.81* 1.10 - 3.70 k/uL Final    Absolute Mono # 12/30/2021 0.44  0.10 - 1.20 k/uL Final    Absolute Eos # 12/30/2021 0.05  0.00 - 0.44 k/uL Final    Basophils Absolute 12/30/2021 <0.03  0.00 - 0.20 k/uL Final    Absolute Immature Granulocyte 12/30/2021 <0.03  0.00 - 0.30 k/uL Final    Platelet, Immature Fraction 12/30/2021 3.0  1.1 - 10.3 % Final    Platelet, Fluorescence 12/30/2021 137* 138 - 453 k/uL Final   Hospital Outpatient Visit on 12/28/2021   Component Date Value Ref Range Status    WBC 12/28/2021 2.2* 3.5 - 11.3 k/uL Final    RBC 12/28/2021 4.01  3.95 - 5.11 m/uL Final    Hemoglobin 12/28/2021 11.1* 11.9 - 15.1 g/dL Final    Hematocrit 12/28/2021 33.2* 36.3 - 47.1 % Final    MCV 12/28/2021 82.8  82.6 - 102.9 fL Final    MCH 12/28/2021 27.7  25.2 - 33.5 pg Final    MCHC 12/28/2021 33.4  25.2 - 33.5 g/dL Final    RDW 12/28/2021 13.1  11.8 - 14.4 % Final    Platelets 61/05/4935 133* 138 - 453 k/uL Final    MPV 12/28/2021 9.8  8.1 - 13.5 fL Final    NRBC Automated 12/28/2021 0.0  0.0 per 100 WBC Final    Differential Type 12/28/2021 NOT REPORTED   Final    Seg Neutrophils 12/28/2021 46  36 - 65 % Final    Lymphocytes 12/28/2021 36  24 - 43 % Final    Monocytes 12/28/2021 15* 3 - 12 % Final    Eosinophils % 12/28/2021 2  1 - 4 % Final    Basophils 12/28/2021 1  0 - 2 % Final    Immature Granulocytes 12/28/2021 0  0 % Final    Segs Absolute 12/28/2021 1.00* 1.50 - 8.10 k/uL Final    Absolute Lymph # 12/28/2021 0.79* 1.10 - 3.70 k/uL Final    Absolute Mono # 12/28/2021 0.33  0.10 - 1.20 k/uL Final    Absolute Eos # 12/28/2021 0.04  0.00 - 0.44 k/uL Final    Basophils Absolute 12/28/2021 <0.03  0.00 - 0.20 k/uL Final    Absolute Immature Granulocyte 12/28/2021 <0.03  0.00 - 0.30 k/uL Final    WBC Morphology 12/28/2021 NOT REPORTED   Final    RBC Morphology 12/28/2021 NOT REPORTED   Final    Platelet Estimate 26/27/4800 NOT REPORTED   Final   Hospital Outpatient Visit on 12/28/2021   Component Date Value Ref Range Status    Glucose 12/28/2021 95  70 - 99 mg/dL Final    BUN 12/28/2021 17  8 - 23 mg/dL Final    CREATININE 12/28/2021 0.65  0.50 - 0.90 mg/dL Final    Bun/Cre Ratio 12/28/2021 26* 9 - 20 Final    Calcium 12/28/2021 8.7  8.6 - 10.4 mg/dL Final    Sodium 12/28/2021 139  135 - 144 mmol/L Final    Potassium 12/28/2021 3.8  3.7 - 5.3 mmol/L Final    Chloride 12/28/2021 106  98 - 107 mmol/L Final    CO2 12/28/2021 23  20 - 31 mmol/L Final    Anion Gap 12/28/2021 10  9 - 17 mmol/L Final    Alkaline Phosphatase 12/28/2021 91  35 - 104 U/L Final    ALT 12/28/2021 23  5 - 33 U/L Final    AST 12/28/2021 32* <32 U/L Final    Total Bilirubin 12/28/2021 0.43  0.3 - 1.2 mg/dL Final    Total Protein 12/28/2021 6.8  6.4 - 8.3 g/dL Final    Albumin 12/28/2021 4.0  3.5 - 5.2 g/dL Final    Albumin/Globulin Ratio 12/28/2021 1.4  1.0 - 2.5 Final    GFR Non- 12/28/2021 >60  >60 mL/min Final    GFR  12/28/2021 >60  >60 mL/min Final    GFR Comment 12/28/2021        Final    Comment: Average GFR for 61-76 years old:   80 mL/min/1.73sq m  Chronic Kidney Disease:   <60 mL/min/1.73sq m  Kidney failure:   <15 mL/min/1.73sq m              eGFR calculated using average adult body mass. Additional eGFR calculator available at:        [email or web address]            GFR Staging 12/28/2021 NOT REPORTED   Final    Hemoglobin A1C 12/28/2021 5.6  4.0 - 6.0 % Final    Estimated Avg Glucose 12/28/2021 114  mg/dL Final    Comment: The ADA and AACC recommend providing the estimated average glucose result to permit better   patient understanding of their HBA1c result.  Cholesterol 12/28/2021 167  <200 mg/dL Final    Comment:    Cholesterol Guidelines:      <200  Desirable   200-240  Borderline      >240  Undesirable         HDL 12/28/2021 76  >40 mg/dL Final    Comment:    HDL Guidelines:    <40     Undesirable   40-59    Borderline    >59     Desirable         LDL Cholesterol 12/28/2021 78  0 - 130 mg/dL Final    Comment:    LDL Guidelines:     <100    Desirable   100-129   Near to/above Desirable   130-159   Borderline      >159   Undesirable     Direct (measured) LDL and calculated LDL are not interchangeable tests.       Chol/HDL Ratio 12/28/2021 2.2  <5 Final            Triglycerides 12/28/2021 64  <150 mg/dL Final    Comment:    Triglyceride Guidelines:     <150   Desirable   150-199  Borderline   200-499  High     >499   Very high   Based on AHA Guidelines for fasting triglyceride, October 2012.          VLDL 12/28/2021 NOT REPORTED  1 - 30 mg/dL Final             (Please note that portions of this note were completed with a voice-recognition program. Efforts were made to edit the dictation but occasionally words are mis-transcribed.)

## 2022-01-13 NOTE — PROGRESS NOTES
Have you had an allergic reaction to the flu (influenza) shot? no  Are you allergic to eggs or any component of the flu vaccine? no  Do you have a history of Guillain-Radnor Syndrome (GBS), which is paralysis after receiving the flu vaccine? no  Are you feeling well today? Yes  Flu vaccine given as ordered. Patient tolerated it well. No questions re: VIS information.

## 2022-01-18 ENCOUNTER — HOSPITAL ENCOUNTER (OUTPATIENT)
Age: 72
Setting detail: SPECIMEN
Discharge: HOME | End: 2022-01-18
Payer: MEDICARE

## 2022-01-18 ENCOUNTER — HOSPITAL ENCOUNTER (OUTPATIENT)
Dept: INFUSION THERAPY | Age: 72
Discharge: HOME | End: 2022-01-18
Payer: MEDICARE

## 2022-01-18 DIAGNOSIS — C7B.00 METASTATIC CARCINOID TUMOR (HCC): Primary | ICD-10-CM

## 2022-01-18 LAB
ABSOLUTE EOS #: 0.09 K/UL (ref 0–0.44)
ABSOLUTE IMMATURE GRANULOCYTE: <0.03 K/UL (ref 0–0.3)
ABSOLUTE LYMPH #: 0.7 K/UL (ref 1.1–3.7)
ABSOLUTE MONO #: 0.45 K/UL (ref 0.1–1.2)
BASOPHILS # BLD: 1 % (ref 0–2)
BASOPHILS ABSOLUTE: <0.03 K/UL (ref 0–0.2)
DIFFERENTIAL TYPE: ABNORMAL
EOSINOPHILS RELATIVE PERCENT: 3 % (ref 1–4)
HCT VFR BLD CALC: 33.3 % (ref 36.3–47.1)
HEMOGLOBIN: 10.8 G/DL (ref 11.9–15.1)
IMMATURE GRANULOCYTES: 0 %
LYMPHOCYTES # BLD: 22 % (ref 24–43)
MCH RBC QN AUTO: 26.7 PG (ref 25.2–33.5)
MCHC RBC AUTO-ENTMCNC: 32.4 G/DL (ref 25.2–33.5)
MCV RBC AUTO: 82.2 FL (ref 82.6–102.9)
MONOCYTES # BLD: 14 % (ref 3–12)
NRBC AUTOMATED: 0 PER 100 WBC
PDW BLD-RTO: 13.2 % (ref 11.8–14.4)
PLATELET # BLD: ABNORMAL K/UL (ref 138–453)
PLATELET ESTIMATE: ABNORMAL
PLATELET, FLUORESCENCE: 133 K/UL (ref 138–453)
PLATELET, IMMATURE FRACTION: 2.5 % (ref 1.1–10.3)
PMV BLD AUTO: ABNORMAL FL (ref 8.1–13.5)
RBC # BLD: 4.05 M/UL (ref 3.95–5.11)
RBC # BLD: ABNORMAL 10*6/UL
SEG NEUTROPHILS: 60 % (ref 36–65)
SEGMENTED NEUTROPHILS ABSOLUTE COUNT: 1.92 K/UL (ref 1.5–8.1)
WBC # BLD: 3.2 K/UL (ref 3.5–11.3)
WBC # BLD: ABNORMAL 10*3/UL

## 2022-01-18 PROCEDURE — 85055 RETICULATED PLATELET ASSAY: CPT

## 2022-01-18 PROCEDURE — 2580000003 HC RX 258: Performed by: INTERNAL MEDICINE

## 2022-01-18 PROCEDURE — 36591 DRAW BLOOD OFF VENOUS DEVICE: CPT

## 2022-01-18 PROCEDURE — 6360000002 HC RX W HCPCS: Performed by: INTERNAL MEDICINE

## 2022-01-18 PROCEDURE — 85025 COMPLETE CBC W/AUTO DIFF WBC: CPT

## 2022-01-18 RX ORDER — HEPARIN SODIUM (PORCINE) LOCK FLUSH IV SOLN 100 UNIT/ML 100 UNIT/ML
500 SOLUTION INTRAVENOUS PRN
Status: DISCONTINUED | OUTPATIENT
Start: 2022-01-18 | End: 2022-01-19 | Stop reason: HOSPADM

## 2022-01-18 RX ORDER — SODIUM CHLORIDE 9 MG/ML
25 INJECTION, SOLUTION INTRAVENOUS PRN
Status: CANCELLED | OUTPATIENT
Start: 2022-01-18

## 2022-01-18 RX ORDER — SODIUM CHLORIDE 0.9 % (FLUSH) 0.9 %
5-40 SYRINGE (ML) INJECTION PRN
Status: DISCONTINUED | OUTPATIENT
Start: 2022-01-18 | End: 2022-01-19 | Stop reason: HOSPADM

## 2022-01-18 RX ORDER — SODIUM CHLORIDE 0.9 % (FLUSH) 0.9 %
5-40 SYRINGE (ML) INJECTION PRN
Status: CANCELLED | OUTPATIENT
Start: 2022-01-18

## 2022-01-18 RX ORDER — HEPARIN SODIUM (PORCINE) LOCK FLUSH IV SOLN 100 UNIT/ML 100 UNIT/ML
500 SOLUTION INTRAVENOUS PRN
Status: CANCELLED | OUTPATIENT
Start: 2022-01-18

## 2022-01-18 RX ADMIN — HEPARIN 500 UNITS: 100 SYRINGE at 08:38

## 2022-01-18 RX ADMIN — SODIUM CHLORIDE, PRESERVATIVE FREE 10 ML: 5 INJECTION INTRAVENOUS at 08:38

## 2022-02-08 ENCOUNTER — HOSPITAL ENCOUNTER (OUTPATIENT)
Dept: INFUSION THERAPY | Age: 72
Discharge: HOME | End: 2022-02-08
Payer: MEDICARE

## 2022-02-08 ENCOUNTER — HOSPITAL ENCOUNTER (OUTPATIENT)
Age: 72
Setting detail: SPECIMEN
Discharge: HOME | End: 2022-02-08
Payer: MEDICARE

## 2022-02-08 DIAGNOSIS — C7B.00 METASTATIC CARCINOID TUMOR (HCC): Primary | ICD-10-CM

## 2022-02-08 LAB
ABSOLUTE EOS #: 0.13 K/UL (ref 0–0.44)
ABSOLUTE IMMATURE GRANULOCYTE: <0.03 K/UL (ref 0–0.3)
ABSOLUTE LYMPH #: 0.64 K/UL (ref 1.1–3.7)
ABSOLUTE MONO #: 0.54 K/UL (ref 0.1–1.2)
BASOPHILS # BLD: 1 % (ref 0–2)
BASOPHILS ABSOLUTE: <0.03 K/UL (ref 0–0.2)
DIFFERENTIAL TYPE: ABNORMAL
EOSINOPHILS RELATIVE PERCENT: 3 % (ref 1–4)
HCT VFR BLD CALC: 31.9 % (ref 36.3–47.1)
HEMOGLOBIN: 10.3 G/DL (ref 11.9–15.1)
IMMATURE GRANULOCYTES: 1 %
LYMPHOCYTES # BLD: 15 % (ref 24–43)
MCH RBC QN AUTO: 26.3 PG (ref 25.2–33.5)
MCHC RBC AUTO-ENTMCNC: 32.3 G/DL (ref 25.2–33.5)
MCV RBC AUTO: 81.4 FL (ref 82.6–102.9)
MONOCYTES # BLD: 13 % (ref 3–12)
NRBC AUTOMATED: 0 PER 100 WBC
PDW BLD-RTO: 14 % (ref 11.8–14.4)
PLATELET # BLD: 171 K/UL (ref 138–453)
PLATELET ESTIMATE: ABNORMAL
PMV BLD AUTO: 9.2 FL (ref 8.1–13.5)
RBC # BLD: 3.92 M/UL (ref 3.95–5.11)
RBC # BLD: ABNORMAL 10*6/UL
SEG NEUTROPHILS: 67 % (ref 36–65)
SEGMENTED NEUTROPHILS ABSOLUTE COUNT: 2.85 K/UL (ref 1.5–8.1)
WBC # BLD: 4.2 K/UL (ref 3.5–11.3)
WBC # BLD: ABNORMAL 10*3/UL

## 2022-02-08 PROCEDURE — 2580000003 HC RX 258: Performed by: INTERNAL MEDICINE

## 2022-02-08 PROCEDURE — 85025 COMPLETE CBC W/AUTO DIFF WBC: CPT

## 2022-02-08 PROCEDURE — 6360000002 HC RX W HCPCS: Performed by: INTERNAL MEDICINE

## 2022-02-08 PROCEDURE — 36591 DRAW BLOOD OFF VENOUS DEVICE: CPT

## 2022-02-08 RX ORDER — HEPARIN SODIUM (PORCINE) LOCK FLUSH IV SOLN 100 UNIT/ML 100 UNIT/ML
500 SOLUTION INTRAVENOUS PRN
Status: DISCONTINUED | OUTPATIENT
Start: 2022-02-08 | End: 2022-02-09 | Stop reason: HOSPADM

## 2022-02-08 RX ORDER — SODIUM CHLORIDE 9 MG/ML
25 INJECTION, SOLUTION INTRAVENOUS PRN
Status: CANCELLED | OUTPATIENT
Start: 2022-02-08

## 2022-02-08 RX ORDER — SODIUM CHLORIDE 0.9 % (FLUSH) 0.9 %
5-40 SYRINGE (ML) INJECTION PRN
Status: DISCONTINUED | OUTPATIENT
Start: 2022-02-08 | End: 2022-02-09 | Stop reason: HOSPADM

## 2022-02-08 RX ORDER — HEPARIN SODIUM (PORCINE) LOCK FLUSH IV SOLN 100 UNIT/ML 100 UNIT/ML
500 SOLUTION INTRAVENOUS PRN
Status: CANCELLED | OUTPATIENT
Start: 2022-02-08

## 2022-02-08 RX ORDER — SODIUM CHLORIDE 0.9 % (FLUSH) 0.9 %
5-40 SYRINGE (ML) INJECTION PRN
Status: CANCELLED | OUTPATIENT
Start: 2022-02-08

## 2022-02-08 RX ADMIN — HEPARIN 500 UNITS: 100 SYRINGE at 08:35

## 2022-02-08 RX ADMIN — SODIUM CHLORIDE, PRESERVATIVE FREE 10 ML: 5 INJECTION INTRAVENOUS at 08:35

## 2022-02-11 DIAGNOSIS — I82.4Z3 DEEP VEIN THROMBOSIS (DVT) OF DISTAL VEIN OF BOTH LOWER EXTREMITIES, UNSPECIFIED CHRONICITY (HCC): ICD-10-CM

## 2022-02-11 NOTE — TELEPHONE ENCOUNTER
Marie Begum called requesting a refill of the below medication which has been pended for you:     RR out of office    Requested Prescriptions     Pending Prescriptions Disp Refills    apixaban (ELIQUIS) 5 MG TABS tablet 180 tablet 0     Sig: take 1 tablet by mouth twice a day       Last Appointment Date: 1/13/2022  Next Appointment Date: 7/13/2022    Allergies   Allergen Reactions    Effexor Xr [Venlafaxine Hcl]      Made blood pressure go high    Venlafaxine

## 2022-02-15 ENCOUNTER — VIRTUAL VISIT (OUTPATIENT)
Dept: FAMILY MEDICINE CLINIC | Age: 72
End: 2022-02-15
Payer: MEDICARE

## 2022-02-15 DIAGNOSIS — J01.40 ACUTE PANSINUSITIS, RECURRENCE NOT SPECIFIED: ICD-10-CM

## 2022-02-15 PROCEDURE — 3017F COLORECTAL CA SCREEN DOC REV: CPT | Performed by: NURSE PRACTITIONER

## 2022-02-15 PROCEDURE — 99212 OFFICE O/P EST SF 10 MIN: CPT

## 2022-02-15 PROCEDURE — G8427 DOCREV CUR MEDS BY ELIG CLIN: HCPCS | Performed by: NURSE PRACTITIONER

## 2022-02-15 PROCEDURE — 1090F PRES/ABSN URINE INCON ASSESS: CPT | Performed by: NURSE PRACTITIONER

## 2022-02-15 PROCEDURE — 4040F PNEUMOC VAC/ADMIN/RCVD: CPT | Performed by: NURSE PRACTITIONER

## 2022-02-15 PROCEDURE — 1123F ACP DISCUSS/DSCN MKR DOCD: CPT | Performed by: NURSE PRACTITIONER

## 2022-02-15 PROCEDURE — G8399 PT W/DXA RESULTS DOCUMENT: HCPCS | Performed by: NURSE PRACTITIONER

## 2022-02-15 PROCEDURE — 99213 OFFICE O/P EST LOW 20 MIN: CPT | Performed by: NURSE PRACTITIONER

## 2022-02-15 RX ORDER — BENZONATATE 100 MG/1
100 CAPSULE ORAL 3 TIMES DAILY PRN
Qty: 30 CAPSULE | Refills: 1 | Status: SHIPPED | OUTPATIENT
Start: 2022-02-15 | End: 2022-05-02

## 2022-02-15 RX ORDER — AZITHROMYCIN 250 MG/1
250 TABLET, FILM COATED ORAL SEE ADMIN INSTRUCTIONS
Qty: 6 TABLET | Refills: 0 | Status: SHIPPED | OUTPATIENT
Start: 2022-02-15 | End: 2022-02-20

## 2022-02-15 ASSESSMENT — ENCOUNTER SYMPTOMS
SINUS PAIN: 1
COUGH: 1
NAUSEA: 0
SORE THROAT: 0
RHINORRHEA: 1
DIARRHEA: 0
SHORTNESS OF BREATH: 0
WHEEZING: 0

## 2022-02-15 NOTE — PROGRESS NOTES
2/15/2022    TELEHEALTH EVALUATION -- Audio/Visual (During SGLCD-12 public health emergency)    HPI:    Augustine Holland (:  1950) has requested an audio/video evaluation for the following concern(s):    URI   This is a new problem. The current episode started 1 to 4 weeks ago (3 weeks ago). The problem has been unchanged. There has been no fever. Associated symptoms include congestion, coughing, headaches, rhinorrhea and sinus pain. Pertinent negatives include no chest pain, diarrhea, nausea, sore throat or wheezing. She has tried decongestant for the symptoms. The treatment provided mild relief. 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 (70% better after Celestone injection on 3-). Rafita Zamorano PA-C.     Liver cancer ([address]     Lung cancer ([address]     Myopia with astigmatism and presbyopia     Osteoarthritis     Osteopenia     took Fosamax from [id number]    Pancreatitis        Past Surgical History:   Procedure Laterality Date    ABDOMINAL EXPLORATION SURGERY  2019    resection and anastomosis of small bowel repair mesenteric tear with Dr Eileen Tubbs at [address] Right 2005    lymph node mapping and biopsy     BREAST BIOPSY Right 2005    excisional biopsy of mass of right breast     BREAST CYST ASPIRATION Right 2005    BREAST RECONSTRUCTION Right 2006    nipple areolar reconstruction right breast    BREAST REDUCTION SURGERY Left 2006    BRONCHOSCOPY  2011    BRONCHOSCOPY Right 11/12/2010    video assisted thoracoscopic surgery right wedge resection    CHOLECYSTECTOMY, LAPAROSCOPIC  01/16/2017    [facility]  02/[facility]/2014    normal, repeat recommended in 10 years, Dr. Kishore Montes, [address]  01/12/2004    supracervical, with bilateral salpingo-oophorectomy, Dr. Fatemeh Goldberg, [address] [place] ARTHROSCOPY Right 07/01/2008    KNEE SURGERY Left 06/20/2016    unicompartmental knee replacement, Dr. Tha Lynn, [address]  11/04/2016    ultrasound-guided liver biopsy (metastatic atypical carcinoid), [facility] Right 02/11/2011    right upper lobectomy, mediastinal lymph node dissection for atypical carcinoid of the right upper lobe    MASTECTOMY Right 2005    breast cancer    OTHER SURGICAL HISTORY  2004    Bowman procedure    OTHER SURGICAL HISTORY Right 07/25/2005    placement of tissue expander    SMALL INTESTINE SURGERY      THORACOTOMY Right 02/11/2011    redo    TOTAL KNEE ARTHROPLASTY Right 03/17/2010    Dr ANTOINO Lawson GASTROINTESTINAL ENDOSCOPY  01/14/2017    mild esophagitis, biopsy normal, Dr. Kinga James, [facility]       Social History     Socioeconomic History    Marital status:      Spouse name: None    Number of children: None    Years of education: None    Highest education level: None   Occupational History    None   Tobacco Use    Smoking status: Never Smoker    Smokeless tobacco: Never Used   Substance and Sexual Activity    Alcohol use: No     Alcohol/week: 0.0 standard drinks    Drug use: No    Sexual activity: None   Other Topics Concern    None   Social History Narrative    None     Social Determinants of Health     Financial Resource Strain: Low Risk     Difficulty of Paying Living Expenses: Not hard at all   Food Insecurity: No Food Insecurity    Worried About Running Out of Food in the Last Year: Never true    Ran Out of Food in the Last Year: Never true   Transportation Needs:     Lack of Transportation (Medical): Not on file    Lack of Transportation (Non-Medical):  Not on file   Physical Activity:     Days of Exercise per Week: Not on file    Minutes of Exercise per Session: Not on file   Stress:     Feeling of Stress : Not on file   Social Connections:     Frequency of Communication with Friends and Family: Not on file    Frequency of Social Gatherings with Friends and Family: Not on file    Attends Alevism Services: Not on file    Active Member of Clubs or Organizations: Not on file    Attends Club or Organization Meetings: Not on file    Marital Status: Not on file   Intimate Partner Violence:     Fear of Current or Ex-Partner: Not on file    Emotionally Abused: Not on file    Physically Abused: Not on file    Sexually Abused: Not on file   Housing Stability:     Unable to Pay for Housing in the Last Year: Not on file    Number of [place] in the Last Year: Not on file    Unstable Housing in the Last Year: Not on file       Family History   Problem Relation Age of Onset    Heart Disease Mother     Diabetes Mother     High Cholesterol Mother     High Blood Pressure Mother     Cataracts Mother     Cancer Father         lung    Cancer Sister         leukemia    Glaucoma Sister        Allergies   Allergen Reactions    Effexor Xr [Venlafaxine Hcl]      Made blood pressure go high    Venlafaxine        Current Outpatient Medications   Medication Sig Dispense Refill    azithromycin (ZITHROMAX) 250 MG tablet Take 1 tablet by mouth See Admin Instructions for 5 days 500mg on day 1 followed by 250mg on days 2 - 5 6 tablet 0    benzonatate (TESSALON PERLES) 100 MG capsule Take 1 capsule by mouth 3 times daily as needed for Cough 30 capsule 1    apixaban (ELIQUIS) 5 MG TABS tablet take 1 tablet by mouth twice a day 180 tablet 0    everolimus (AFINITOR) 10 MG TABS chemo tablet Take 10 mg by mouth daily      dilTIAZem (TIAZAC) 240 MG extended release capsule Take 1 capsule by mouth daily 90 capsule 1    lisinopril (PRINIVIL;ZESTRIL) 30 MG tablet Take 1 tablet by mouth daily 90 tablet 1    Nutritional Supplements (ENSURE) LIQD as needed       Handicap Placard MISC by Does not apply route 1 each 0    fluticasone (FLONASE) 50 MCG/ACT nasal spray 1 spray by Nasal route daily as needed for Rhinitis 1 Bottle 5    ondansetron (ZOFRAN) 4 MG tablet Take 1 tablet by mouth every 8 hours as needed for Nausea 20 tablet 0    acetaminophen (TYLENOL) 325 MG tablet Take 2 tablets by mouth every 4 hours as needed for Pain or Fever 120 tablet 0    dexamethasone 0.5 MG/5ML elixir Apply 1 mg topically 3 times daily (Patient not taking: Reported on 2/15/2022)       No current facility-administered medications for this visit. Review of Systems   Constitutional: Negative for activity change, appetite change, fatigue and fever. HENT: Positive for congestion, postnasal drip, rhinorrhea and sinus pain. Negative for sore throat. Respiratory: Positive for cough. Negative for shortness of breath and wheezing. Cardiovascular: Negative for chest pain and palpitations. Gastrointestinal: Negative for diarrhea and nausea. Skin: Negative. Neurological: Positive for headaches. Negative for dizziness and light-headedness. Prior to Visit Medications    Medication Sig Taking?  Authorizing Provider   azithromycin (ZITHROMAX) 250 MG tablet Take 1 tablet by mouth See Admin Instructions for 5 days 500mg on day 1 followed by 250mg on days 2 - 5 Yes PHONG Miranda CNP   benzonatate (TESSALON PERLES) 100 MG capsule Take 1 capsule by mouth 3 times daily as needed for Cough Yes PHONG Miranda CNP   apixaban (ELIQUIS) 5 MG TABS tablet take 1 tablet by mouth twice a day Yes Michael Solis, APRN - CNP   everolimus (AFINITOR) 10 MG TABS chemo tablet Take 10 mg by mouth daily Yes Historical Provider, MD   dilTIAZem (TIAZAC) 240 MG extended release capsule Take 1 capsule by mouth daily Yes Bernadette Matamoros MD   lisinopril (PRINIVIL;ZESTRIL) 30 MG tablet Take 1 tablet by mouth daily Yes Bernadette Matamoros MD   Nutritional Supplements (ENSURE) LIQD as needed  Yes Historical Provider, MD   Handicap Placard MISC by Does not apply route Yes Bernadette Matamoros MD   fluticasone (FLONASE) 50 MCG/ACT nasal spray 1 spray by Nasal route daily as needed for Rhinitis Yes Bernadette Matamoros MD   ondansetron (ZOFRAN) 4 MG tablet Take 1 tablet by mouth every 8 hours as needed for Nausea Yes Paul Quinn DO   acetaminophen (TYLENOL) 325 MG tablet Take 2 tablets by mouth every 4 hours as needed for Pain or Fever Yes Marisa Oates   dexamethasone 0.5 MG/5ML elixir Apply 1 mg topically 3 times daily  Patient not taking: Reported on 2/15/2022  Historical Provider, MD       Social History     Tobacco Use    Smoking status: Never Smoker    Smokeless tobacco: Never Used   Substance Use Topics    Alcohol use: No     Alcohol/week: 0.0 standard drinks    Drug use: No            PHYSICAL EXAMINATION:  [ INSTRUCTIONS:  \"[x]\" Indicates a positive item  \"[]\" Indicates a negative item  -- DELETE ALL ITEMS NOT EXAMINED]  Vital Signs: (As obtained by patient/caregiver or practitioner observation)    Pt unable to obtain vital signs. Constitutional: [x] Appears well-developed and well-nourished [x] No apparent distress      [] Abnormal-   Mental status  [x] Alert and awake  [x] Oriented to person/place/time [x]Able to follow commands      Eyes:  EOM    [x]  Normal  [] Abnormal-  Sclera  [x]  Normal  [] Abnormal -         Discharge [x]  None visible  [] Abnormal -    HENT:   [x] Normocephalic, atraumatic.   [x] Abnormal pain with palpation in the maxillary and frontal sinus   [x] Mouth/Throat: Mucous membranes are moist. External Ears [x] Normal  [] Abnormal-     Neck: [x] No visualized mass     Pulmonary/Chest: [x] Respiratory effort normal.  [x] No visualized signs of difficulty breathing or respiratory distress        [] Abnormal-      Musculoskeletal:   [x] Normal gait with no signs of ataxia         [x] Normal range of motion of neck        [] Abnormal-       Neurological:        [x] No Facial Asymmetry (Cranial nerve 7 motor function) (limited exam to video visit)          [x] No gaze palsy        [] Abnormal-         Skin:        [x] No significant exanthematous lesions or discoloration noted on facial skin         [] Abnormal-            Psychiatric:       [x] Normal Affect [x] No Hallucinations        [] Abnormal-         ASSESSMENT/PLAN:  1. Acute pansinusitis, recurrence not specified  Discussed COVID testing but as it has been 3 weeks we are not going to test at this time  Will start zpak as directed  Tessalon 1 capsule TID PRN   Push fluids  Supportive care  - azithromycin (ZITHROMAX) 250 MG tablet; Take 1 tablet by mouth See Admin Instructions for 5 days 500mg on day 1 followed by 250mg on days 2 - 5  Dispense: 6 tablet; Refill: 0      No follow-ups on file. Claudio Jeffreybetty, was evaluated through a synchronous (real-time) audio-video encounter. The patient (or guardian if applicable) is aware that this is a billable service, which includes applicable co-pays. This Virtual Visit was conducted with patient's (and/or legal guardian's) consent. The visit was conducted pursuant to the emergency declaration under the [address] [place] [address] authority and the [employer] Act. Patient identification was verified, and a caregiver was present when appropriate. The patient was located at home in a state where the provider was licensed to provide care.       Total time spent on this encounter: Not billed by time    --[place] PHONG Downey - CNP on 2/15/2022 at 4:31 PM    An electronic signature was used to authenticate this note.

## 2022-03-09 ENCOUNTER — TELEPHONE (OUTPATIENT)
Dept: FAMILY MEDICINE CLINIC | Age: 72
End: 2022-03-09

## 2022-03-09 NOTE — TELEPHONE ENCOUNTER
Fax from [address] [facility] requesting RR to review Lipids ordered by them and for RR to address with pt, any questions call their office at [phone]. Lipids can be found in chart under Labs from 3-9.

## 2022-03-15 NOTE — TELEPHONE ENCOUNTER
Please advise Emily Frankie that her LDL has increased significantly compared to the previous labs done 12/28/2021. I recommend avoiding highly processed foods in the diet, and repeating a lipid panel in 3-4 months. She has a lipid panel ordered to be done in July. She has an office visit scheduled with me in July also.

## 2022-04-04 ENCOUNTER — HOSPITAL ENCOUNTER (OUTPATIENT)
Dept: INFUSION THERAPY | Age: 72
Discharge: HOME | End: 2022-04-04
Payer: MEDICARE

## 2022-04-04 ENCOUNTER — OFFICE VISIT (OUTPATIENT)
Dept: CARDIOLOGY | Age: 72
End: 2022-04-04
Payer: MEDICARE

## 2022-04-04 ENCOUNTER — HOSPITAL ENCOUNTER (OUTPATIENT)
Age: 72
Setting detail: SPECIMEN
Discharge: HOME | End: 2022-04-04
Payer: MEDICARE

## 2022-04-04 VITALS
SYSTOLIC BLOOD PRESSURE: 128 MMHG | WEIGHT: 137 LBS | HEIGHT: 62 IN | BODY MASS INDEX: 25.21 KG/M2 | HEART RATE: 83 BPM | DIASTOLIC BLOOD PRESSURE: 77 MMHG

## 2022-04-04 DIAGNOSIS — I07.1 TRICUSPID VALVE INSUFFICIENCY, UNSPECIFIED ETIOLOGY: ICD-10-CM

## 2022-04-04 DIAGNOSIS — C7B.00 METASTATIC CARCINOID TUMOR (HCC): Primary | ICD-10-CM

## 2022-04-04 DIAGNOSIS — I10 ESSENTIAL HYPERTENSION: ICD-10-CM

## 2022-04-04 DIAGNOSIS — I34.0 MITRAL VALVE INSUFFICIENCY, UNSPECIFIED ETIOLOGY: ICD-10-CM

## 2022-04-04 DIAGNOSIS — I47.1 PAROXYSMAL SUPRAVENTRICULAR TACHYCARDIA (HCC): Primary | ICD-10-CM

## 2022-04-04 LAB
ABSOLUTE EOS #: 0.03 K/UL (ref 0–0.44)
ABSOLUTE IMMATURE GRANULOCYTE: <0.03 K/UL (ref 0–0.3)
ABSOLUTE LYMPH #: 0.63 K/UL (ref 1.1–3.7)
ABSOLUTE MONO #: 0.35 K/UL (ref 0.1–1.2)
BASOPHILS # BLD: 0 % (ref 0–2)
BASOPHILS ABSOLUTE: <0.03 K/UL (ref 0–0.2)
EOSINOPHILS RELATIVE PERCENT: 1 % (ref 1–4)
HCT VFR BLD CALC: 26 % (ref 36.3–47.1)
HEMOGLOBIN: 8.3 G/DL (ref 11.9–15.1)
IMMATURE GRANULOCYTES: 0 %
LYMPHOCYTES # BLD: 25 % (ref 24–43)
MCH RBC QN AUTO: 25.4 PG (ref 25.2–33.5)
MCHC RBC AUTO-ENTMCNC: 31.9 G/DL (ref 25.2–33.5)
MCV RBC AUTO: 79.5 FL (ref 82.6–102.9)
MONOCYTES # BLD: 14 % (ref 3–12)
NRBC AUTOMATED: 0 PER 100 WBC
PDW BLD-RTO: 14 % (ref 11.8–14.4)
PLATELET # BLD: 131 K/UL (ref 138–453)
PMV BLD AUTO: 9.7 FL (ref 8.1–13.5)
RBC # BLD: 3.27 M/UL (ref 3.95–5.11)
RBC # BLD: ABNORMAL 10*6/UL
SEG NEUTROPHILS: 60 % (ref 36–65)
SEGMENTED NEUTROPHILS ABSOLUTE COUNT: 1.45 K/UL (ref 1.5–8.1)
WBC # BLD: 2.5 K/UL (ref 3.5–11.3)

## 2022-04-04 PROCEDURE — 4040F PNEUMOC VAC/ADMIN/RCVD: CPT | Performed by: INTERNAL MEDICINE

## 2022-04-04 PROCEDURE — G8417 CALC BMI ABV UP PARAM F/U: HCPCS | Performed by: INTERNAL MEDICINE

## 2022-04-04 PROCEDURE — G8399 PT W/DXA RESULTS DOCUMENT: HCPCS | Performed by: INTERNAL MEDICINE

## 2022-04-04 PROCEDURE — 99214 OFFICE O/P EST MOD 30 MIN: CPT | Performed by: INTERNAL MEDICINE

## 2022-04-04 PROCEDURE — G8427 DOCREV CUR MEDS BY ELIG CLIN: HCPCS | Performed by: INTERNAL MEDICINE

## 2022-04-04 PROCEDURE — 93005 ELECTROCARDIOGRAM TRACING: CPT | Performed by: INTERNAL MEDICINE

## 2022-04-04 PROCEDURE — 6360000002 HC RX W HCPCS: Performed by: INTERNAL MEDICINE

## 2022-04-04 PROCEDURE — 1123F ACP DISCUSS/DSCN MKR DOCD: CPT | Performed by: INTERNAL MEDICINE

## 2022-04-04 PROCEDURE — 2580000003 HC RX 258: Performed by: INTERNAL MEDICINE

## 2022-04-04 PROCEDURE — 3017F COLORECTAL CA SCREEN DOC REV: CPT | Performed by: INTERNAL MEDICINE

## 2022-04-04 PROCEDURE — 1090F PRES/ABSN URINE INCON ASSESS: CPT | Performed by: INTERNAL MEDICINE

## 2022-04-04 PROCEDURE — 36591 DRAW BLOOD OFF VENOUS DEVICE: CPT

## 2022-04-04 PROCEDURE — 85025 COMPLETE CBC W/AUTO DIFF WBC: CPT

## 2022-04-04 PROCEDURE — 93010 ELECTROCARDIOGRAM REPORT: CPT | Performed by: INTERNAL MEDICINE

## 2022-04-04 PROCEDURE — 1036F TOBACCO NON-USER: CPT | Performed by: INTERNAL MEDICINE

## 2022-04-04 RX ORDER — SODIUM CHLORIDE 0.9 % (FLUSH) 0.9 %
5-40 SYRINGE (ML) INJECTION PRN
Status: CANCELLED | OUTPATIENT
Start: 2022-04-04

## 2022-04-04 RX ORDER — SODIUM CHLORIDE 9 MG/ML
25 INJECTION, SOLUTION INTRAVENOUS PRN
Status: CANCELLED | OUTPATIENT
Start: 2022-04-04

## 2022-04-04 RX ORDER — HEPARIN SODIUM (PORCINE) LOCK FLUSH IV SOLN 100 UNIT/ML 100 UNIT/ML
500 SOLUTION INTRAVENOUS PRN
Status: DISCONTINUED | OUTPATIENT
Start: 2022-04-04 | End: 2022-04-05 | Stop reason: HOSPADM

## 2022-04-04 RX ORDER — HEPARIN SODIUM (PORCINE) LOCK FLUSH IV SOLN 100 UNIT/ML 100 UNIT/ML
500 SOLUTION INTRAVENOUS PRN
Status: CANCELLED | OUTPATIENT
Start: 2022-04-04

## 2022-04-04 RX ORDER — SODIUM CHLORIDE 0.9 % (FLUSH) 0.9 %
5-40 SYRINGE (ML) INJECTION PRN
Status: DISCONTINUED | OUTPATIENT
Start: 2022-04-04 | End: 2022-04-05 | Stop reason: HOSPADM

## 2022-04-04 RX ADMIN — SODIUM CHLORIDE, PRESERVATIVE FREE 10 ML: 5 INJECTION INTRAVENOUS at 08:57

## 2022-04-04 RX ADMIN — SODIUM CHLORIDE, PRESERVATIVE FREE 10 ML: 5 INJECTION INTRAVENOUS at 08:59

## 2022-04-04 RX ADMIN — HEPARIN 500 UNITS: 100 SYRINGE at 08:59
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
## 2022-04-21 ENCOUNTER — TELEPHONE (OUTPATIENT)
Dept: FAMILY MEDICINE CLINIC | Age: 72
End: 2022-04-21

## 2022-04-21 NOTE — TELEPHONE ENCOUNTER
----- Message from Francia Hodgkins sent at 4/21/2022 12:50 PM EDT -----  Subject: Refill Request    QUESTIONS  Name of Medication? azithromycin (ZITHROMAX) 250 MG tablet  Patient-reported dosage and instructions? 2 tab on first day, 1 tab every   day after that   How many days do you have left? 0  Preferred Pharmacy? [id number] phone number (if available)? [phone]  Additional Information for Provider? pt is having a stretchy throat and   sinus issues. pt would like a call back. ---------------------------------------------------------------------------  --------------  Delvis OKEEFE  What is the best way for the office to contact you? OK to leave message on   voicemail  Preferred Call Back Phone Number? [phone]  ---------------------------------------------------------------------------  --------------  SCRIPT ANSWERS  Relationship to Patient?  Self

## 2022-04-21 NOTE — TELEPHONE ENCOUNTER
Patient requesting z cynthia for sore throat, congestion and sinus. Patient states she had this 3 months ago and was given a z cynthia then as well. Advised patient she would need to be seen and evaluated. Patient agreeable.  She states she will come into Urgent Care

## 2022-04-22 ENCOUNTER — HOSPITAL ENCOUNTER (OUTPATIENT)
Dept: INFUSION THERAPY | Age: 72
Discharge: HOME | End: 2022-04-22
Payer: MEDICARE

## 2022-04-22 ENCOUNTER — OFFICE VISIT (OUTPATIENT)
Dept: PRIMARY CARE CLINIC | Age: 72
End: 2022-04-22
Payer: MEDICARE

## 2022-04-22 ENCOUNTER — HOSPITAL ENCOUNTER (OUTPATIENT)
Age: 72
Setting detail: SPECIMEN
Discharge: HOME | End: 2022-04-22
Payer: MEDICARE

## 2022-04-22 VITALS
BODY MASS INDEX: 24.1 KG/M2 | OXYGEN SATURATION: 98 % | TEMPERATURE: 97.3 F | HEIGHT: 63 IN | SYSTOLIC BLOOD PRESSURE: 102 MMHG | WEIGHT: 136 LBS | HEART RATE: 81 BPM | DIASTOLIC BLOOD PRESSURE: 80 MMHG

## 2022-04-22 DIAGNOSIS — C7B.00 METASTATIC CARCINOID TUMOR (HCC): Primary | ICD-10-CM

## 2022-04-22 DIAGNOSIS — J01.00 ACUTE NON-RECURRENT MAXILLARY SINUSITIS: Primary | ICD-10-CM

## 2022-04-22 LAB
ABSOLUTE EOS #: 0.05 K/UL (ref 0–0.44)
ABSOLUTE IMMATURE GRANULOCYTE: <0.03 K/UL (ref 0–0.3)
ABSOLUTE LYMPH #: 0.82 K/UL (ref 1.1–3.7)
ABSOLUTE MONO #: 0.44 K/UL (ref 0.1–1.2)
BASOPHILS # BLD: 0 % (ref 0–2)
BASOPHILS ABSOLUTE: <0.03 K/UL (ref 0–0.2)
EOSINOPHILS RELATIVE PERCENT: 2 % (ref 1–4)
HCT VFR BLD CALC: 24.5 % (ref 36.3–47.1)
HEMOGLOBIN: 7.5 G/DL (ref 11.9–15.1)
IMMATURE GRANULOCYTES: 0 %
LYMPHOCYTES # BLD: 30 % (ref 24–43)
MCH RBC QN AUTO: 23.4 PG (ref 25.2–33.5)
MCHC RBC AUTO-ENTMCNC: 30.6 G/DL (ref 25.2–33.5)
MCV RBC AUTO: 76.6 FL (ref 82.6–102.9)
MONOCYTES # BLD: 16 % (ref 3–12)
NRBC AUTOMATED: 0 PER 100 WBC
PDW BLD-RTO: 14.5 % (ref 11.8–14.4)
PLATELET # BLD: 144 K/UL (ref 138–453)
PMV BLD AUTO: 10 FL (ref 8.1–13.5)
RBC # BLD: 3.2 M/UL (ref 3.95–5.11)
RBC # BLD: ABNORMAL 10*6/UL
SEG NEUTROPHILS: 52 % (ref 36–65)
SEGMENTED NEUTROPHILS ABSOLUTE COUNT: 1.39 K/UL (ref 1.5–8.1)
WBC # BLD: 2.7 K/UL (ref 3.5–11.3)

## 2022-04-22 PROCEDURE — 2580000003 HC RX 258: Performed by: INTERNAL MEDICINE

## 2022-04-22 PROCEDURE — G8420 CALC BMI NORM PARAMETERS: HCPCS | Performed by: NURSE PRACTITIONER

## 2022-04-22 PROCEDURE — 99212 OFFICE O/P EST SF 10 MIN: CPT | Performed by: NURSE PRACTITIONER

## 2022-04-22 PROCEDURE — 85025 COMPLETE CBC W/AUTO DIFF WBC: CPT

## 2022-04-22 PROCEDURE — 1123F ACP DISCUSS/DSCN MKR DOCD: CPT | Performed by: NURSE PRACTITIONER

## 2022-04-22 PROCEDURE — 6360000002 HC RX W HCPCS: Performed by: INTERNAL MEDICINE

## 2022-04-22 PROCEDURE — G8399 PT W/DXA RESULTS DOCUMENT: HCPCS | Performed by: NURSE PRACTITIONER

## 2022-04-22 PROCEDURE — 99213 OFFICE O/P EST LOW 20 MIN: CPT | Performed by: NURSE PRACTITIONER

## 2022-04-22 PROCEDURE — 1090F PRES/ABSN URINE INCON ASSESS: CPT | Performed by: NURSE PRACTITIONER

## 2022-04-22 PROCEDURE — 3017F COLORECTAL CA SCREEN DOC REV: CPT | Performed by: NURSE PRACTITIONER

## 2022-04-22 PROCEDURE — 4040F PNEUMOC VAC/ADMIN/RCVD: CPT | Performed by: NURSE PRACTITIONER

## 2022-04-22 PROCEDURE — G8427 DOCREV CUR MEDS BY ELIG CLIN: HCPCS | Performed by: NURSE PRACTITIONER

## 2022-04-22 PROCEDURE — 36591 DRAW BLOOD OFF VENOUS DEVICE: CPT

## 2022-04-22 PROCEDURE — 1036F TOBACCO NON-USER: CPT | Performed by: NURSE PRACTITIONER

## 2022-04-22 RX ORDER — SODIUM CHLORIDE 9 MG/ML
25 INJECTION, SOLUTION INTRAVENOUS PRN
Status: CANCELLED | OUTPATIENT
Start: 2022-04-22

## 2022-04-22 RX ORDER — AZITHROMYCIN 250 MG/1
TABLET, FILM COATED ORAL
Qty: 1 PACKET | Refills: 0 | Status: SHIPPED | OUTPATIENT
Start: 2022-04-22 | End: 2022-09-13 | Stop reason: ALTCHOICE

## 2022-04-22 RX ORDER — SODIUM CHLORIDE 0.9 % (FLUSH) 0.9 %
5-40 SYRINGE (ML) INJECTION PRN
Status: CANCELLED | OUTPATIENT
Start: 2022-04-22

## 2022-04-22 RX ORDER — HEPARIN SODIUM (PORCINE) LOCK FLUSH IV SOLN 100 UNIT/ML 100 UNIT/ML
500 SOLUTION INTRAVENOUS PRN
Status: CANCELLED | OUTPATIENT
Start: 2022-04-22

## 2022-04-22 RX ORDER — SODIUM CHLORIDE 0.9 % (FLUSH) 0.9 %
5-40 SYRINGE (ML) INJECTION PRN
Status: DISCONTINUED | OUTPATIENT
Start: 2022-04-22 | End: 2022-04-23 | Stop reason: HOSPADM

## 2022-04-22 RX ORDER — HEPARIN SODIUM (PORCINE) LOCK FLUSH IV SOLN 100 UNIT/ML 100 UNIT/ML
500 SOLUTION INTRAVENOUS PRN
Status: DISCONTINUED | OUTPATIENT
Start: 2022-04-22 | End: 2022-04-23 | Stop reason: HOSPADM

## 2022-04-22 RX ADMIN — SODIUM CHLORIDE, PRESERVATIVE FREE 10 ML: 5 INJECTION INTRAVENOUS at 08:26

## 2022-04-22 RX ADMIN — HEPARIN 500 UNITS: 100 SYRINGE at 08:26

## 2022-04-22 ASSESSMENT — ENCOUNTER SYMPTOMS
WHEEZING: 0
SORE THROAT: 1
RHINORRHEA: 1
SHORTNESS OF BREATH: 0
SINUS PRESSURE: 0
SINUS COMPLAINT: 1
GASTROINTESTINAL NEGATIVE: 1
CHEST TIGHTNESS: 0
COUGH: 1

## 2022-04-22 ASSESSMENT — PATIENT HEALTH QUESTIONNAIRE - PHQ9
SUM OF ALL RESPONSES TO PHQ QUESTIONS 1-9: 0
SUM OF ALL RESPONSES TO PHQ9 QUESTIONS 1 & 2: 0
2. FEELING DOWN, DEPRESSED OR HOPELESS: 0
SUM OF ALL RESPONSES TO PHQ QUESTIONS 1-9: 0
1. LITTLE INTEREST OR PLEASURE IN DOING THINGS: 0
SUM OF ALL RESPONSES TO PHQ QUESTIONS 1-9: 0
SUM OF ALL RESPONSES TO PHQ QUESTIONS 1-9: 0

## 2022-04-22 NOTE — PATIENT INSTRUCTIONS
Patient Education        Sinusitis: Care Instructions  Your Care Instructions     Sinusitis is an infection of the lining of the sinus cavities in your head. Sinusitis often follows a cold. It causes pain and pressure in your head andface. In most cases, sinusitis gets better on its own in 1 to 2 weeks. But some mildsymptoms may last for several weeks. Sometimes antibiotics are needed. Follow-up care is a key part of your treatment and safety. Be sure to make and go to all appointments, and call your doctor if you are having problems. It's also a good idea to know your test results and keep alist of the medicines you take. How can you care for yourself at home?  Take an over-the-counter pain medicine, such as acetaminophen (Tylenol), ibuprofen (Advil, Motrin), or naproxen (Aleve). Read and follow all instructions on the label.  If the doctor prescribed antibiotics, take them as directed. Do not stop taking them just because you feel better. You need to take the full course of antibiotics.  Be careful when taking over-the-counter cold or flu medicines and Tylenol at the same time. Many of these medicines have acetaminophen, which is Tylenol. Read the labels to make sure that you are not taking more than the recommended dose. Too much acetaminophen (Tylenol) can be harmful.  Breathe warm, moist air from a steamy shower, a hot bath, or a sink filled with hot water. Avoid cold, dry air. Using a humidifier in your home may help. Follow the directions for cleaning the machine.  Use saline (saltwater) nasal washes. This can help keep your nasal passages open and wash out mucus and bacteria. You can buy saline nose drops at a grocery store or drugstore. Or you can make your own at home by adding 1 teaspoon of salt and 1 teaspoon of baking soda to 2 cups of distilled water. If you make your own, fill a bulb syringe with the solution, insert the tip into your nostril, and squeeze gently. Kayla Moulding your nose.    Put a hot, wet towel or a warm gel pack on your face 3 or 4 times a day for 5 to 10 minutes each time.  Try a decongestant nasal spray like oxymetazoline (Afrin). Do not use it for more than 3 days in a row. Using it for more than 3 days can make your congestion worse. When should you call for help? Call your doctor now or seek immediate medical care if:     You have new or worse swelling or redness in your face or around your eyes.      You have a new or higher fever. Watch closely for changes in your health, and be sure to contact your doctor if:     You have new or worse facial pain.      The mucus from your nose becomes thicker (like pus) or has new blood in it.      You are not getting better as expected. Where can you learn more? Go to https://[email or web address].[employer]. org and sign in to your [email or web address] account. Enter I250 in the [employer] box to learn more about \"Sinusitis: Care Instructions. \"     If you do not have an account, please click on the \"Sign Up Now\" link. Current as of: September 8, 2021               Content Version: 13.2  © 2006-2022 Healthwise, Incorporated. Care instructions adapted under license by [facility] ([facility]). If you have questions about a medical condition or this instruction, always ask your healthcare professional. [address] any warranty or liability for your use of this information.

## 2022-04-22 NOTE — PROGRESS NOTES
[facility]             [address], [address]                        Telephone [phone]             Fax [phone]     Carrie Reinoso  1950  [facility]:[id number]   Date of visit:  4/22/2022    Subjective:    Carrie Reinoso is a 81 y.o.  female who presents to [facility] today (4/22/2022) for evaluation of:    Chief Complaint   Patient presents with    Congestion     sinus pressure,cough,st denies fever       Sinus Problem  This is a new problem. The current episode started in the past 7 days. The problem has been gradually worsening since onset. There has been no fever. She is experiencing no pain. Associated symptoms include congestion, coughing, sneezing and a sore throat (now resolved). Pertinent negatives include no chills, ear pain, headaches, shortness of breath or sinus pressure. (Rhinorrhea; postnasal drip) Treatments tried: tessalon. The treatment provided mild relief.        She has the following problem list:  Patient Active Problem List   Diagnosis    Essential hypertension    Osteoarthritis    Osteopenia    History of breast cancer    History of lung cancer    Elevated glucose    Primary osteoarthritis of left knee    Metastatic carcinoid tumor ([address]    Cholecystitis    Hepatic metastasis (HCC)    RUQ pain    Abdominal pain    Paroxysmal supraventricular tachycardia ([address]    History of 2019 novel coronavirus disease (COVID-19)    Coagulation defect (HCC)        Current medications are:  Current Outpatient Medications   Medication Sig Dispense Refill    azithromycin (ZITHROMAX Z-SANGEETA) 250 MG tablet Take 2 tablets (500 mg) on Day 1, and then take 1 tablet (250 mg) on days 2 through 5. 1 packet 0    apixaban (ELIQUIS) 5 MG TABS tablet take 1 tablet by mouth twice a day 180 tablet 0    everolimus (AFINITOR) 10 MG TABS chemo tablet Take 10 mg by mouth daily      dexamethasone 0.5 MG/5ML elixir Apply 1 mg topically 3 times daily       dilTIAZem (TIAZAC) 240 MG extended release capsule Take 1 capsule by mouth daily 90 capsule 1    lisinopril (PRINIVIL;ZESTRIL) 30 MG tablet Take 1 tablet by mouth daily 90 tablet 1    fluticasone (FLONASE) 50 MCG/ACT nasal spray 1 spray by Nasal route daily as needed for Rhinitis 1 Bottle 5    acetaminophen (TYLENOL) 325 MG tablet Take 2 tablets by mouth every 4 hours as needed for Pain or Fever 120 tablet 0    benzonatate (TESSALON PERLES) 100 MG capsule Take 1 capsule by mouth 3 times daily as needed for Cough (Patient not taking: Reported on 4/4/2022) 30 capsule 1    Nutritional Supplements (ENSURE) LIQD as needed  (Patient not taking: Reported on 4/22/2022)      Handicap Placard MISC by Does not apply route 1 each 0    ondansetron (ZOFRAN) 4 MG tablet Take 1 tablet by mouth every 8 hours as needed for Nausea (Patient not taking: Reported on 4/22/2022) 20 tablet 0     No current facility-administered medications for this visit. Facility-Administered Medications Ordered in Other Visits   Medication Dose Route Frequency Provider Last Rate Last Admin    sodium chloride flush 0.9 % injection 5-40 mL  5-40 mL IntraVENous PRN Yohana Antwon   10 mL at 04/22/22 0826    heparin flush 100 UNIT/ML injection 500 Units  500 Units IntraCATHeter PRN Yohana Antwon   500 Units at 04/22/22 8210        She is allergic to effexor xr [venlafaxine hcl] and venlafaxine. .    She  reports that she has never smoked. She has never used smokeless tobacco.      Objective:    Vitals:    04/22/22 0848   BP: 102/80   Site: Left Upper Arm   Position: Sitting   Cuff Size: Medium Adult   Pulse: 81   Temp: 97.3 °F (36.3 °C)   TempSrc: Tympanic   SpO2: 98%   Weight: 136 lb (61.7 kg)   Height: 5' 2.5\" (1.588 m)     Body mass index is 24.48 kg/m². Review of Systems   Constitutional: Negative. Negative for chills.    HENT: Positive for congestion, postnasal drip, rhinorrhea, course of antibiotic. Continue Tessalon as needed for cough. I also recommended Flonase for sinus symptoms. she was also encouraged to use tylenol for pain/fever. Increase water intake. Use cool mist humidifier at bedtime. Use nasal saline flush as needed. Good hand hygiene. she was instructed to return if there is no improvement or symptoms worsen. The use, risks, benefits, and side effects of prescribed or recommended medications were discussed. All questions were answered and the patient/caregiver voiced understanding. No orders of the defined types were placed in this encounter.         Electronically signed by PHONG Pandey CNP on 4/22/22 at 8:53 AM EDT

## 2022-04-25 ENCOUNTER — HOSPITAL ENCOUNTER (OUTPATIENT)
Age: 72
Setting detail: SPECIMEN
Discharge: HOME | End: 2022-04-25
Payer: MEDICARE

## 2022-04-25 ENCOUNTER — HOSPITAL ENCOUNTER (OUTPATIENT)
Dept: INFUSION THERAPY | Age: 72
Discharge: HOME | End: 2022-04-25
Payer: MEDICARE

## 2022-04-25 DIAGNOSIS — C7B.00 METASTATIC CARCINOID TUMOR (HCC): Primary | ICD-10-CM

## 2022-04-25 LAB
ABSOLUTE EOS #: 0.04 K/UL (ref 0–0.44)
ABSOLUTE IMMATURE GRANULOCYTE: <0.03 K/UL (ref 0–0.3)
ABSOLUTE LYMPH #: 0.78 K/UL (ref 1.1–3.7)
ABSOLUTE MONO #: 0.29 K/UL (ref 0.1–1.2)
BASOPHILS # BLD: 0 % (ref 0–2)
BASOPHILS ABSOLUTE: <0.03 K/UL (ref 0–0.2)
EOSINOPHILS RELATIVE PERCENT: 1 % (ref 1–4)
HCT VFR BLD CALC: 23.6 % (ref 36.3–47.1)
HEMOGLOBIN: 7.3 G/DL (ref 11.9–15.1)
IMMATURE GRANULOCYTES: 0 %
LYMPHOCYTES # BLD: 28 % (ref 24–43)
MCH RBC QN AUTO: 23.4 PG (ref 25.2–33.5)
MCHC RBC AUTO-ENTMCNC: 30.9 G/DL (ref 25.2–33.5)
MCV RBC AUTO: 75.6 FL (ref 82.6–102.9)
MONOCYTES # BLD: 10 % (ref 3–12)
NRBC AUTOMATED: 0 PER 100 WBC
PDW BLD-RTO: 14.6 % (ref 11.8–14.4)
PLATELET # BLD: 155 K/UL (ref 138–453)
PMV BLD AUTO: 9.6 FL (ref 8.1–13.5)
RBC # BLD: 3.12 M/UL (ref 3.95–5.11)
RBC # BLD: ABNORMAL 10*6/UL
SEG NEUTROPHILS: 61 % (ref 36–65)
SEGMENTED NEUTROPHILS ABSOLUTE COUNT: 1.66 K/UL (ref 1.5–8.1)
WBC # BLD: 2.8 K/UL (ref 3.5–11.3)

## 2022-04-25 PROCEDURE — 36591 DRAW BLOOD OFF VENOUS DEVICE: CPT

## 2022-04-25 PROCEDURE — 6360000002 HC RX W HCPCS: Performed by: INTERNAL MEDICINE

## 2022-04-25 PROCEDURE — 85025 COMPLETE CBC W/AUTO DIFF WBC: CPT

## 2022-04-25 PROCEDURE — 2580000003 HC RX 258: Performed by: INTERNAL MEDICINE

## 2022-04-25 RX ORDER — SODIUM CHLORIDE 0.9 % (FLUSH) 0.9 %
5-40 SYRINGE (ML) INJECTION PRN
Status: CANCELLED | OUTPATIENT
Start: 2022-04-25

## 2022-04-25 RX ORDER — HEPARIN SODIUM (PORCINE) LOCK FLUSH IV SOLN 100 UNIT/ML 100 UNIT/ML
500 SOLUTION INTRAVENOUS PRN
Status: DISCONTINUED | OUTPATIENT
Start: 2022-04-25 | End: 2022-04-26 | Stop reason: HOSPADM

## 2022-04-25 RX ORDER — SODIUM CHLORIDE 0.9 % (FLUSH) 0.9 %
5-40 SYRINGE (ML) INJECTION PRN
Status: DISCONTINUED | OUTPATIENT
Start: 2022-04-25 | End: 2022-04-26 | Stop reason: HOSPADM

## 2022-04-25 RX ORDER — SODIUM CHLORIDE 9 MG/ML
25 INJECTION, SOLUTION INTRAVENOUS PRN
Status: CANCELLED | OUTPATIENT
Start: 2022-04-25

## 2022-04-25 RX ORDER — HEPARIN SODIUM (PORCINE) LOCK FLUSH IV SOLN 100 UNIT/ML 100 UNIT/ML
500 SOLUTION INTRAVENOUS PRN
Status: CANCELLED | OUTPATIENT
Start: 2022-04-25

## 2022-04-25 RX ADMIN — HEPARIN 500 UNITS: 100 SYRINGE at 09:23

## 2022-04-25 RX ADMIN — SODIUM CHLORIDE, PRESERVATIVE FREE 10 ML: 5 INJECTION INTRAVENOUS at 09:23

## 2022-04-28 PROBLEM — Z87.828 HISTORY OF MOTOR VEHICLE ACCIDENT: Status: ACTIVE | Noted: 2022-04-28

## 2022-05-02 ENCOUNTER — HOSPITAL ENCOUNTER (EMERGENCY)
Age: 72
Discharge: HOME | End: 2022-05-02
Attending: EMERGENCY MEDICINE
Payer: MEDICARE

## 2022-05-02 ENCOUNTER — TELEPHONE (OUTPATIENT)
Dept: FAMILY MEDICINE CLINIC | Age: 72
End: 2022-05-02

## 2022-05-02 ENCOUNTER — HOSPITAL ENCOUNTER (OUTPATIENT)
Age: 72
Setting detail: SPECIMEN
Discharge: HOME | End: 2022-05-02
Payer: MEDICARE

## 2022-05-02 ENCOUNTER — HOSPITAL ENCOUNTER (OUTPATIENT)
Dept: INFUSION THERAPY | Age: 72
Discharge: HOME | End: 2022-05-02
Payer: MEDICARE

## 2022-05-02 ENCOUNTER — APPOINTMENT (OUTPATIENT)
Dept: GENERAL RADIOLOGY | Age: 72
End: 2022-05-02
Payer: MEDICARE

## 2022-05-02 VITALS
TEMPERATURE: 98.1 F | SYSTOLIC BLOOD PRESSURE: 151 MMHG | BODY MASS INDEX: 24.48 KG/M2 | RESPIRATION RATE: 18 BRPM | OXYGEN SATURATION: 100 % | DIASTOLIC BLOOD PRESSURE: 61 MMHG | HEART RATE: 97 BPM | HEIGHT: 62 IN | WEIGHT: 133 LBS

## 2022-05-02 DIAGNOSIS — R42 DIZZINESS: Primary | ICD-10-CM

## 2022-05-02 DIAGNOSIS — C7B.00 METASTATIC CARCINOID TUMOR (HCC): Primary | ICD-10-CM

## 2022-05-02 DIAGNOSIS — D50.9 IRON DEFICIENCY ANEMIA, UNSPECIFIED IRON DEFICIENCY ANEMIA TYPE: ICD-10-CM

## 2022-05-02 LAB
ABSOLUTE EOS #: 0.06 K/UL (ref 0–0.44)
ABSOLUTE EOS #: <0.03 K/UL (ref 0–0.44)
ABSOLUTE IMMATURE GRANULOCYTE: <0.03 K/UL (ref 0–0.3)
ABSOLUTE IMMATURE GRANULOCYTE: <0.03 K/UL (ref 0–0.3)
ABSOLUTE LYMPH #: 0.67 K/UL (ref 1.1–3.7)
ABSOLUTE LYMPH #: 0.68 K/UL (ref 1.1–3.7)
ABSOLUTE MONO #: 0.28 K/UL (ref 0.1–1.2)
ABSOLUTE MONO #: 0.43 K/UL (ref 0.1–1.2)
ALBUMIN SERPL-MCNC: 4.5 G/DL (ref 3.5–5.2)
ALBUMIN/GLOBULIN RATIO: 1.9 (ref 1–2.5)
ALP BLD-CCNC: 64 U/L (ref 35–104)
ALT SERPL-CCNC: 10 U/L (ref 5–33)
ANION GAP SERPL CALCULATED.3IONS-SCNC: 11 MMOL/L (ref 9–17)
AST SERPL-CCNC: 23 U/L
BASOPHILS # BLD: 0 % (ref 0–2)
BASOPHILS # BLD: 1 % (ref 0–2)
BASOPHILS ABSOLUTE: 0.03 K/UL (ref 0–0.2)
BASOPHILS ABSOLUTE: <0.03 K/UL (ref 0–0.2)
BILIRUB SERPL-MCNC: 0.26 MG/DL (ref 0.3–1.2)
BUN BLDV-MCNC: 22 MG/DL (ref 8–23)
BUN/CREAT BLD: 26 (ref 9–20)
CALCIUM SERPL-MCNC: 9.2 MG/DL (ref 8.6–10.4)
CHLORIDE BLD-SCNC: 108 MMOL/L (ref 98–107)
CO2: 23 MMOL/L (ref 20–31)
CREAT SERPL-MCNC: 0.84 MG/DL (ref 0.5–0.9)
EKG ATRIAL RATE: 96 BPM
EKG P AXIS: 47 DEGREES
EKG P-R INTERVAL: 184 MS
EKG Q-T INTERVAL: 360 MS
EKG QRS DURATION: 82 MS
EKG QTC CALCULATION (BAZETT): 454 MS
EKG R AXIS: -36 DEGREES
EKG T AXIS: 76 DEGREES
EKG VENTRICULAR RATE: 96 BPM
EOSINOPHILS RELATIVE PERCENT: 0 % (ref 1–4)
EOSINOPHILS RELATIVE PERCENT: 2 % (ref 1–4)
GFR AFRICAN AMERICAN: >60 ML/MIN
GFR NON-AFRICAN AMERICAN: >60 ML/MIN
GFR SERPL CREATININE-BSD FRML MDRD: ABNORMAL ML/MIN/{1.73_M2}
GLUCOSE BLD-MCNC: 92 MG/DL (ref 70–99)
HCT VFR BLD CALC: 21.5 % (ref 36.3–47.1)
HCT VFR BLD CALC: 22.1 % (ref 36.3–47.1)
HEMOGLOBIN: 6.3 G/DL (ref 11.9–15.1)
HEMOGLOBIN: 6.4 G/DL (ref 11.9–15.1)
IMMATURE GRANULOCYTES: 0 %
IMMATURE GRANULOCYTES: 1 %
LYMPHOCYTES # BLD: 19 % (ref 24–43)
LYMPHOCYTES # BLD: 28 % (ref 24–43)
MCH RBC QN AUTO: 21.8 PG (ref 25.2–33.5)
MCH RBC QN AUTO: 22.5 PG (ref 25.2–33.5)
MCHC RBC AUTO-ENTMCNC: 29 G/DL (ref 25.2–33.5)
MCHC RBC AUTO-ENTMCNC: 29.3 G/DL (ref 25.2–33.5)
MCV RBC AUTO: 75.4 FL (ref 82.6–102.9)
MCV RBC AUTO: 76.8 FL (ref 82.6–102.9)
MONOCYTES # BLD: 12 % (ref 3–12)
MONOCYTES # BLD: 13 % (ref 3–12)
NRBC AUTOMATED: 0 PER 100 WBC
NRBC AUTOMATED: 0 PER 100 WBC
PDW BLD-RTO: 15 % (ref 11.8–14.4)
PDW BLD-RTO: 15 % (ref 11.8–14.4)
PLATELET # BLD: 191 K/UL (ref 138–453)
PLATELET # BLD: 221 K/UL (ref 138–453)
PMV BLD AUTO: 9 FL (ref 8.1–13.5)
PMV BLD AUTO: 9.2 FL (ref 8.1–13.5)
POTASSIUM SERPL-SCNC: 4.3 MMOL/L (ref 3.7–5.3)
PRO-BNP: 494 PG/ML
RBC # BLD: 2.8 M/UL (ref 3.95–5.11)
RBC # BLD: 2.93 M/UL (ref 3.95–5.11)
RBC # BLD: ABNORMAL 10*6/UL
RBC # BLD: ABNORMAL 10*6/UL
SARS-COV-2, RAPID: NOT DETECTED
SEG NEUTROPHILS: 59 % (ref 36–65)
SEG NEUTROPHILS: 65 % (ref 36–65)
SEGMENTED NEUTROPHILS ABSOLUTE COUNT: 1.42 K/UL (ref 1.5–8.1)
SEGMENTED NEUTROPHILS ABSOLUTE COUNT: 2.24 K/UL (ref 1.5–8.1)
SODIUM BLD-SCNC: 142 MMOL/L (ref 135–144)
SPECIMEN DESCRIPTION: NORMAL
TOTAL PROTEIN: 6.9 G/DL (ref 6.4–8.3)
TROPONIN, HIGH SENSITIVITY: 10 NG/L (ref 0–14)
WBC # BLD: 2.4 K/UL (ref 3.5–11.3)
WBC # BLD: 3.5 K/UL (ref 3.5–11.3)

## 2022-05-02 PROCEDURE — 2580000003 HC RX 258: Performed by: INTERNAL MEDICINE

## 2022-05-02 PROCEDURE — 36415 COLL VENOUS BLD VENIPUNCTURE: CPT

## 2022-05-02 PROCEDURE — 86870 RBC ANTIBODY IDENTIFICATION: CPT

## 2022-05-02 PROCEDURE — 99285 EMERGENCY DEPT VISIT HI MDM: CPT

## 2022-05-02 PROCEDURE — 85025 COMPLETE CBC W/AUTO DIFF WBC: CPT

## 2022-05-02 PROCEDURE — 71045 X-RAY EXAM CHEST 1 VIEW: CPT

## 2022-05-02 PROCEDURE — 86850 RBC ANTIBODY SCREEN: CPT

## 2022-05-02 PROCEDURE — 86920 COMPATIBILITY TEST SPIN: CPT

## 2022-05-02 PROCEDURE — 80053 COMPREHEN METABOLIC PANEL: CPT

## 2022-05-02 PROCEDURE — 83880 ASSAY OF NATRIURETIC PEPTIDE: CPT

## 2022-05-02 PROCEDURE — 6360000002 HC RX W HCPCS: Performed by: INTERNAL MEDICINE

## 2022-05-02 PROCEDURE — 84484 ASSAY OF TROPONIN QUANT: CPT

## 2022-05-02 PROCEDURE — 86901 BLOOD TYPING SEROLOGIC RH(D): CPT

## 2022-05-02 PROCEDURE — 86885 COOMBS TEST INDIRECT QUAL: CPT

## 2022-05-02 PROCEDURE — 86880 COOMBS TEST DIRECT: CPT

## 2022-05-02 PROCEDURE — 87635 SARS-COV-2 COVID-19 AMP PRB: CPT

## 2022-05-02 PROCEDURE — 2580000003 HC RX 258: Performed by: EMERGENCY MEDICINE

## 2022-05-02 PROCEDURE — 36591 DRAW BLOOD OFF VENOUS DEVICE: CPT

## 2022-05-02 PROCEDURE — 86900 BLOOD TYPING SEROLOGIC ABO: CPT

## 2022-05-02 PROCEDURE — 93005 ELECTROCARDIOGRAM TRACING: CPT | Performed by: EMERGENCY MEDICINE

## 2022-05-02 RX ORDER — HEPARIN SODIUM (PORCINE) LOCK FLUSH IV SOLN 100 UNIT/ML 100 UNIT/ML
500 SOLUTION INTRAVENOUS PRN
Status: CANCELLED | OUTPATIENT
Start: 2022-05-02

## 2022-05-02 RX ORDER — SODIUM CHLORIDE 9 MG/ML
25 INJECTION, SOLUTION INTRAVENOUS PRN
Status: CANCELLED | OUTPATIENT
Start: 2022-05-02

## 2022-05-02 RX ORDER — SODIUM CHLORIDE 9 MG/ML
1000 INJECTION, SOLUTION INTRAVENOUS CONTINUOUS
Status: DISCONTINUED | OUTPATIENT
Start: 2022-05-02 | End: 2022-05-02 | Stop reason: HOSPADM

## 2022-05-02 RX ORDER — SODIUM CHLORIDE 0.9 % (FLUSH) 0.9 %
5-40 SYRINGE (ML) INJECTION PRN
Status: DISCONTINUED | OUTPATIENT
Start: 2022-05-02 | End: 2022-05-03 | Stop reason: HOSPADM

## 2022-05-02 RX ORDER — SODIUM CHLORIDE 9 MG/ML
INJECTION, SOLUTION INTRAVENOUS PRN
Status: DISCONTINUED | OUTPATIENT
Start: 2022-05-02 | End: 2022-05-02 | Stop reason: HOSPADM

## 2022-05-02 RX ORDER — SODIUM CHLORIDE 0.9 % (FLUSH) 0.9 %
5-40 SYRINGE (ML) INJECTION PRN
Status: CANCELLED | OUTPATIENT
Start: 2022-05-02

## 2022-05-02 RX ORDER — HEPARIN SODIUM (PORCINE) LOCK FLUSH IV SOLN 100 UNIT/ML 100 UNIT/ML
500 SOLUTION INTRAVENOUS PRN
Status: DISCONTINUED | OUTPATIENT
Start: 2022-05-02 | End: 2022-05-03 | Stop reason: HOSPADM

## 2022-05-02 RX ADMIN — HEPARIN 500 UNITS: 100 SYRINGE at 08:38

## 2022-05-02 RX ADMIN — SODIUM CHLORIDE, PRESERVATIVE FREE 10 ML: 5 INJECTION INTRAVENOUS at 08:38

## 2022-05-02 RX ADMIN — SODIUM CHLORIDE, PRESERVATIVE FREE 10 ML: 5 INJECTION INTRAVENOUS at 08:37

## 2022-05-02 RX ADMIN — SODIUM CHLORIDE 1000 ML: 9 INJECTION, SOLUTION INTRAVENOUS at 12:38

## 2022-05-02 NOTE — ED PROVIDER NOTES
[facility]  [address]  [address] [address] [place]  Phone: [phone]        Pt Name: Myrna Brown  MRN: [id number]  Regigfjess 1950  Date of evaluation: 5/2/22      CHIEF COMPLAINT     Chief Complaint   Patient presents with    Dizziness     for about a week, to week an a half. Feels her heart rate go up and feels like she is going to pass out. HISTORY OF PRESENT ILLNESS  (Location/Symptom, Timing/Onset, Context/Setting, Quality, Duration, Modifying Factors, Severity.)    Myrna Brown is a 81 y.o. female who presents with dizziness and exertional shortness of breath. Patient had blood work drawn today through the infusion center and she was found to have a hemoglobin of 6.3 with a hematocrit of 21.5. He was sent to the emergency department for further evaluation. Patient has a history of breast cancer diagnosed in 2005. She was diagnosed to have lung cancer in 2010. She was diagnosed to have metastatic lung cancer of the pancreas and liver in 2015. At that time she did receive IV chemotherapy. Currently she is receiving oral chemotherapy. She states over the past week she is gradually felt more dizzy and had more exertional shortness of breath. She denies having any chest pain. She denies having any fever chills cough congestion or sore throat. She denies having any abdominal pain. Denies any nausea or vomiting diarrhea or constipation. She denies having any black stools or blood in the stool. Only other time she has had a blood transfusion is when she was anemic after she delivered a baby.       REVIEW OF SYSTEMS    (2-9 systems for level 4, 10 or more for level 5)     Review of Systems signs are reviewed and are negative except was present in the HPI    [address]    has a past medical history of Atypical carcinoid lung tumor ([address] Breast cancer ([address] History of 2019 novel coronavirus disease (COVID-19), History of therapeutic radiation, Hx antineoplastic chemo, Hypertension, Left knee pain, Liver cancer ([facility] Lung cancer ([facility] Myopia with astigmatism and presbyopia, Osteoarthritis, Osteopenia, and Pancreatitis. SURGICAL HISTORY      has a past surgical history that includes Breast reconstruction (Right, 2006); Knee arthroscopy (Right, 07/01/2008); other surgical history (2004); Dilation and curettage of uterus; Tubal ligation; lobectomy (Right, 02/11/2011); Total knee arthroplasty (Right, 03/17/2010); Breast reduction surgery (Left, 2006); bronchoscopy (02/11/2011); thoracotomy (Right, 02/11/2011); bronchoscopy (Right, 11/12/2010); other surgical history (Right, 07/25/2005); Breast cyst aspiration (Right, 07/25/2005); knee surgery (Left, 06/20/2016); liver biopsy (11/04/2016); Cholecystectomy, laparoscopic (01/16/2017); Upper gastrointestinal endoscopy (01/14/2017); Abdominal exploration surgery (01/07/2019); Small intestine surgery; Colonoscopy (02/24/2014); Breast biopsy (Right, 07/25/2005); Breast biopsy (Right, 07/07/2005); Mastectomy (Right, 2005); and Hysterectomy (01/12/2004). [address]       Current Discharge Medication List      CONTINUE these medications which have NOT CHANGED    Details   azithromycin (ZITHROMAX Z-SANGEETA) 250 MG tablet Take 2 tablets (500 mg) on Day 1, and then take 1 tablet (250 mg) on days 2 through 5.   Qty: 1 packet, Refills: 0    Associated Diagnoses: Acute non-recurrent maxillary sinusitis      apixaban (ELIQUIS) 5 MG TABS tablet take 1 tablet by mouth twice a day  Qty: 180 tablet, Refills: 0    Associated Diagnoses: Deep vein thrombosis (DVT) of distal vein of both lower extremities, unspecified chronicity (HCC)      everolimus (AFINITOR) 10 MG TABS chemo tablet Take 10 mg by mouth daily      dexamethasone 0.5 MG/5ML elixir Apply 1 mg topically 3 times daily       dilTIAZem (TIAZAC) 240 MG extended release capsule Take 1 capsule by mouth daily  Qty: 90 capsule, Refills: 1    Associated Diagnoses: Essential hypertension      lisinopril (PRINIVIL;ZESTRIL) 30 MG tablet Take 1 tablet by mouth daily  Qty: 90 tablet, Refills: 1    Associated Diagnoses: Essential hypertension      Nutritional Supplements (ENSURE) LIQD as needed       Handicap Placard MISC by Does not apply route  Qty: 1 each, Refills: 0    Comments: Expires 3/24/2026      fluticasone (FLONASE) 50 MCG/ACT nasal spray 1 spray by Nasal route daily as needed for Rhinitis  Qty: 1 Bottle, Refills: 5    Associated Diagnoses: Rhinitis, unspecified type      ondansetron (ZOFRAN) 4 MG tablet Take 1 tablet by mouth every 8 hours as needed for Nausea  Qty: 20 tablet, Refills: 0      acetaminophen (TYLENOL) 325 MG tablet Take 2 tablets by mouth every 4 hours as needed for Pain or Fever  Qty: 120 tablet, Refills: 0             ALLERGIES     is allergic to effexor xr [venlafaxine hcl] and venlafaxine. FAMILY HISTORY     She indicated that her mother is alive. She indicated that her father is . She indicated that one of her two sisters is . family history includes Cancer in her father and sister; Cataracts in her mother; Diabetes in her mother; Glaucoma in her sister; Heart Disease in her mother; High Blood Pressure in her mother; High Cholesterol in her mother. SOCIAL HISTORY      reports that she has never smoked. She has never used smokeless tobacco. She reports that she does not drink alcohol and does not use drugs. PHYSICAL EXAM    (up to 7 for level 4, 8 or more for level 5)   INITIAL VITALS:  height is 5' 2\" (1.575 m) and weight is 133 lb (60.3 kg). Her tympanic temperature is 98.1 °F (36.7 °C). Her blood pressure is 153/83 (abnormal) and her pulse is 97. Her respiration is 18 and oxygen saturation is 100%. Physical Exam  Constitutional:       General: She is in acute distress. Comments: Patient is in mild distress secondary to exertional shortness of breath and dizziness.    HENT:      Head: Normocephalic and atraumatic. Eyes:      Extraocular Movements: Extraocular movements intact. Conjunctiva/sclera: Conjunctivae normal.      Pupils: Pupils are equal, round, and reactive to light. Cardiovascular:      Rate and Rhythm: Normal rate and regular rhythm. Pulses: Normal pulses. Heart sounds: Normal heart sounds. Pulmonary:      Effort: Pulmonary effort is normal. No respiratory distress. Breath sounds: Normal breath sounds. Abdominal:      General: Bowel sounds are normal. There is no distension. Palpations: Abdomen is soft. There is no mass. Tenderness: There is no abdominal tenderness. There is no right CVA tenderness, left CVA tenderness, guarding or rebound. Hernia: No hernia is present. Musculoskeletal:         General: Normal range of motion. Cervical back: Normal range of motion and neck supple. No tenderness. Skin:     General: Skin is warm and dry. Capillary Refill: Capillary refill takes less than 2 seconds. Coloration: Skin is pale. Neurological:      General: No focal deficit present. Mental Status: She is alert and oriented to person, place, and time. Cranial Nerves: Cranial nerves are intact. Sensory: Sensation is intact. Motor: Weakness present. Coordination: Coordination is intact. Gait: Gait is intact. Comments: Has some generalized weakness but no specific weakness on one side or the other   Psychiatric:         Attention and Perception: Attention normal.         Speech: Speech normal.         Behavior: Behavior normal.         Thought Content: Thought content normal.         Cognition and Memory: Cognition normal.         Judgment: Judgment normal.         DIFFERENTIAL DIAGNOSIS/ MDM:     Symptomatic anemia with dizziness and exertional shortness of breath. Patient will need blood transfusion. No evidence of a GI bleed. Chest pain is not present. No concerns of an acute MI.   Shortness of breath secondary to her anemia. Will check cardiac labs and chest x-ray we will also check a D-dimer. DIAGNOSTIC RESULTS     EKG: All EKG's are interpreted by the Emergency Department Physician who either signs or Co-signs this chart in the absence of a cardiologist.      Interpreted by Dora Evans DO     Rhythm: normal sinus   Rate: 96  Axis: Left  Ectopy: none  Conduction: normal  ST Segments: no acute change  T Waves: no acute change  Q Waves: Q waves V1 V2 V3 present on previous EKG 2/2/2021. Clinical Impression: normal sinus rhythm with no acute changes old septal injury. No acute infarction/ischemia noted. RADIOLOGY:        Interpretation per the Radiologist below, if available at the time of this note:      XR CHEST PORTABLE    Result Date: 5/2/2022  EXAMINATION: [address] 5/2/2022 1:12 pm COMPARISON: January 14, 2017 HISTORY: ORDERING SYSTEM PROVIDED HISTORY: Dizziness TECHNOLOGIST PROVIDED HISTORY: Dizziness Reason for Exam: Dizziness. History of breast/lung/liver cancer. FINDINGS: Interval placement of a left-sided port terminating at the atrial caval junction. Stable cardiomediastinal silhouette. No pulmonary venous congestion or edema. No focal lung consolidation or infiltrate. No pleural effusion or pneumothorax. Postsurgical changes related to prior right mastectomy and placement of breast implant as well as right axillary lymph node dissection. No acute cardiopulmonary process. LABS:  Results for orders placed or performed during the hospital encounter of 05/02/22   COVID-19, Rapid    Specimen: Nasopharyngeal Swab   Result Value Ref Range    Specimen Description . NASOPHARYNGEAL SWAB     SARS-CoV-2, Rapid Not Detected Not Detected   CBC with Auto Differential   Result Value Ref Range    WBC 2.4 (L) 3.5 - 11.3 k/uL    RBC 2.93 (L) 3.95 - 5.11 m/uL    Hemoglobin 6.4 (LL) 11.9 - 15.1 g/dL    Hematocrit 22.1 (L) 36.3 - 47.1 %    MCV 75.4 (L) 82.6 - 102.9 fL    MCH 21.8 (L) 25.2 - 33.5 pg    MCHC 29.0 25.2 - 33.5 g/dL    RDW 15.0 (H) 11.8 - 14.4 %    Platelets 803 576 - 328 k/uL    MPV 9.2 8.1 - 13.5 fL    NRBC Automated 0.0 0.0 per 100 WBC    RBC Morphology ANISOCYTOSIS PRESENT     Seg Neutrophils 59 36 - 65 %    Lymphocytes 28 24 - 43 %    Monocytes 12 3 - 12 %    Eosinophils % 0 (L) 1 - 4 %    Basophils 0 0 - 2 %    Immature Granulocytes 0 0 %    Segs Absolute 1.42 (L) 1.50 - 8.10 k/uL    Absolute Lymph # 0.68 (L) 1.10 - 3.70 k/uL    Absolute Mono # 0.28 0.10 - 1.20 k/uL    Absolute Eos # <0.03 0.00 - 0.44 k/uL    Basophils Absolute <0.03 0.00 - 0.20 k/uL    Absolute Immature Granulocyte <0.03 0.00 - 0.30 k/uL   Comprehensive Metabolic Panel w/ Reflex to MG   Result Value Ref Range    Glucose 92 70 - 99 mg/dL    BUN 22 8 - 23 mg/dL    CREATININE 0.84 0.50 - 0.90 mg/dL    Bun/Cre Ratio 26 (H) 9 - 20    Calcium 9.2 8.6 - 10.4 mg/dL    Sodium 142 135 - 144 mmol/L    Potassium 4.3 3.7 - 5.3 mmol/L    Chloride 108 (H) 98 - 107 mmol/L    CO2 23 20 - 31 mmol/L    Anion Gap 11 9 - 17 mmol/L    Alkaline Phosphatase 64 35 - 104 U/L    ALT 10 5 - 33 U/L    AST 23 <32 U/L    Total Bilirubin 0.26 (L) 0.3 - 1.2 mg/dL    Total Protein 6.9 6.4 - 8.3 g/dL    Albumin 4.5 3.5 - 5.2 g/dL    Albumin/Globulin Ratio 1.9 1.0 - 2.5    GFR Non-African American >60 >60 mL/min    GFR African American >60 >60 mL/min    GFR Comment         Troponin   Result Value Ref Range    Troponin, High Sensitivity 10 0 - 14 ng/L   Brain Natriuretic Peptide   Result Value Ref Range    Pro- (H) <300 pg/mL   EKG 12 Lead   Result Value Ref Range    Ventricular Rate 96 BPM    Atrial Rate 96 BPM    P-R Interval 184 ms    QRS Duration 82 ms    Q-T Interval 360 ms    QTc Calculation (Bazett) 454 ms    P Axis 47 degrees    R Axis -36 degrees    T Axis 76 degrees   TYPE AND SCREEN   Result Value Ref Range    Expiration Date 05/05/2022,5848     Arm Band Number [id number]     ABO/Rh B NEGATIVE     Antibody Screen POSITIVE Antibody ID PENDING            EMERGENCY DEPARTMENT COURSE:   Vitals:    Vitals:    05/02/22 1235 05/02/22 1300 05/02/22 1400 05/02/22 1500   BP:  (!) 149/72 (!) 153/83    Pulse:       Resp:       Temp: 98.1 °F (36.7 °C)      TempSrc: Tympanic      SpO2:  100% 100% 100%   Weight:       Height:         -------------------------  BP: (!) 153/83, Temp: 98.1 °F (36.7 °C), Pulse: 97, Resp: 18      RE-EVALUATION:  3:57 PM EDT patient has antibodies in her blood. It may be a delay in having available blood to transfuse her. Arrangements are being made through ambulatory care for patient to receive a blood transfusion. She will be discharged home and they will call her when the blood is available to come back and get the transfusion. 4:08 PM EDT patient does have antibodies and so they will be a delay in having crossmatched blood for her. Be discharged home and we will contact her probably tomorrow when the blood is available and have her come in to receive the blood transfusion. I did explain the risks and benefit to the blood transfusion. I included the risk of reaction to blood transfusions as well as risk of infection from the person who donated the blood. Patient understands the risks of receiving a blood transfusion and gives consent for the transfusion  CONSULTS:  2:46 PM EDT Daya Crews requests we set this up through ambulatory. I spoke with Sophy Pyle in ambulatory. She will talk with the blood bank and call me back. PROCEDURES:  None    FINAL IMPRESSION      1. Dizziness    2.  Iron deficiency anemia, unspecified iron deficiency anemia type          DISPOSITION/PLAN   DISPOSITION        CONDITION ON DISPOSITION:   Stable    PATIENT REFERRED TO:  Jana Burk MD  [address] [address] [place]  [phone]    Call in 2 days        DISCHARGE MEDICATIONS:  Current Discharge Medication List          (Please note that portions of this note were completed with a voicerecognition program.  Efforts were made to edit the dictations but occasionally words are mis-transcribed. )    Xochitl Lr DO, , MD, F.A.C.E.P.   Attending Emergency Medicine Physician        Darrius Causey,   05/02/22 [facility]  05/02/22 2468

## 2022-05-02 NOTE — TELEPHONE ENCOUNTER
Call from [facility] and patient needs blood infusion but  blood is not it is unavailable at present and will need as outpatient. Patient will be call when available. Talked with Omar Mobley RN charge nurse and verbal orders given per Padmini Wheatley for infusion                                                                                                   renny  blood available,

## 2022-05-03 ENCOUNTER — CLINICAL DOCUMENTATION (OUTPATIENT)
Dept: SPIRITUAL SERVICES | Age: 72
End: 2022-05-03

## 2022-05-03 ENCOUNTER — HOSPITAL ENCOUNTER (OUTPATIENT)
Dept: INFUSION THERAPY | Age: 72
Discharge: HOME | End: 2022-05-03
Payer: MEDICARE

## 2022-05-03 VITALS
DIASTOLIC BLOOD PRESSURE: 87 MMHG | RESPIRATION RATE: 14 BRPM | OXYGEN SATURATION: 100 % | SYSTOLIC BLOOD PRESSURE: 138 MMHG | TEMPERATURE: 98 F | HEART RATE: 80 BPM

## 2022-05-03 DIAGNOSIS — C7B.00 METASTATIC CARCINOID TUMOR (HCC): Primary | ICD-10-CM

## 2022-05-03 PROCEDURE — 36430 TRANSFUSION BLD/BLD COMPNT: CPT

## 2022-05-03 PROCEDURE — 6360000002 HC RX W HCPCS: Performed by: INTERNAL MEDICINE

## 2022-05-03 PROCEDURE — 2580000003 HC RX 258: Performed by: INTERNAL MEDICINE

## 2022-05-03 PROCEDURE — 2580000003 HC RX 258: Performed by: PHYSICIAN ASSISTANT

## 2022-05-03 PROCEDURE — P9016 RBC LEUKOCYTES REDUCED: HCPCS

## 2022-05-03 RX ORDER — SODIUM CHLORIDE 9 MG/ML
25 INJECTION, SOLUTION INTRAVENOUS PRN
Status: CANCELLED | OUTPATIENT
Start: 2022-05-03

## 2022-05-03 RX ORDER — HEPARIN SODIUM (PORCINE) LOCK FLUSH IV SOLN 100 UNIT/ML 100 UNIT/ML
500 SOLUTION INTRAVENOUS PRN
Status: CANCELLED | OUTPATIENT
Start: 2022-05-03

## 2022-05-03 RX ORDER — SODIUM CHLORIDE 0.9 % (FLUSH) 0.9 %
5-40 SYRINGE (ML) INJECTION PRN
Status: CANCELLED | OUTPATIENT
Start: 2022-05-03

## 2022-05-03 RX ORDER — SODIUM CHLORIDE 9 MG/ML
INJECTION, SOLUTION INTRAVENOUS PRN
Status: COMPLETED | OUTPATIENT
Start: 2022-05-03 | End: 2022-05-03

## 2022-05-03 RX ORDER — SODIUM CHLORIDE 0.9 % (FLUSH) 0.9 %
5-40 SYRINGE (ML) INJECTION PRN
Status: DISCONTINUED | OUTPATIENT
Start: 2022-05-03 | End: 2022-05-04 | Stop reason: HOSPADM

## 2022-05-03 RX ORDER — HEPARIN SODIUM (PORCINE) LOCK FLUSH IV SOLN 100 UNIT/ML 100 UNIT/ML
500 SOLUTION INTRAVENOUS PRN
Status: DISCONTINUED | OUTPATIENT
Start: 2022-05-03 | End: 2022-05-04 | Stop reason: HOSPADM

## 2022-05-03 RX ADMIN — SODIUM CHLORIDE 20 ML/HR: 9 INJECTION, SOLUTION INTRAVENOUS at 10:00

## 2022-05-03 RX ADMIN — SODIUM CHLORIDE, PRESERVATIVE FREE 10 ML: 5 INJECTION INTRAVENOUS at 12:27

## 2022-05-03 RX ADMIN — HEPARIN 500 UNITS: 100 SYRINGE at 12:27

## 2022-05-03 NOTE — PROGRESS NOTES
rounding in PCU. Assessment: Patient has had a history of cancel but also has a history of overcoming cancer. Patient has good omega and family support. Intervention: Engaged in conversation and active listening. Prayed with Patient. Outcome: Patient expressed appreciation for visit and offer of continued prayer. Plan: Chaplains are available on site or on call 24/7 for spiritual and emotional support.     Electronically signed by Cristela Can on 5/3/2022 at 11:55 AM

## 2022-05-03 NOTE — PROGRESS NOTES
Blood transfusion completed and no sign of reaction at this time. VAD de-accessed after 10ml NS and 5ml of heparin. Band aid applied to site. No sign of reaction to infusion at this time. Pt ambulated out infusion center door without difficulty. Pt ambulated out infusion center without difficulty in stable condition.

## 2022-05-05 ENCOUNTER — HOSPITAL ENCOUNTER (OUTPATIENT)
Dept: INFUSION THERAPY | Age: 72
Discharge: HOME | End: 2022-05-05
Payer: MEDICARE

## 2022-05-05 ENCOUNTER — TELEPHONE (OUTPATIENT)
Dept: CARDIOLOGY | Age: 72
End: 2022-05-05

## 2022-05-05 ENCOUNTER — HOSPITAL ENCOUNTER (OUTPATIENT)
Age: 72
Setting detail: SPECIMEN
Discharge: HOME | End: 2022-05-05
Payer: MEDICARE

## 2022-05-05 DIAGNOSIS — C7B.00 METASTATIC CARCINOID TUMOR (HCC): Primary | ICD-10-CM

## 2022-05-05 LAB
ABSOLUTE EOS #: 0.04 K/UL (ref 0–0.44)
ABSOLUTE IMMATURE GRANULOCYTE: <0.03 K/UL (ref 0–0.3)
ABSOLUTE LYMPH #: 0.61 K/UL (ref 1.1–3.7)
ABSOLUTE MONO #: 0.42 K/UL (ref 0.1–1.2)
BASOPHILS # BLD: 0 % (ref 0–2)
BASOPHILS ABSOLUTE: <0.03 K/UL (ref 0–0.2)
EOSINOPHILS RELATIVE PERCENT: 2 % (ref 1–4)
HCT VFR BLD CALC: 24.5 % (ref 36.3–47.1)
HEMOGLOBIN: 7.5 G/DL (ref 11.9–15.1)
IMMATURE GRANULOCYTES: 0 %
LYMPHOCYTES # BLD: 22 % (ref 24–43)
MCH RBC QN AUTO: 23.4 PG (ref 25.2–33.5)
MCHC RBC AUTO-ENTMCNC: 30.6 G/DL (ref 25.2–33.5)
MCV RBC AUTO: 76.3 FL (ref 82.6–102.9)
MONOCYTES # BLD: 15 % (ref 3–12)
NRBC AUTOMATED: 0 PER 100 WBC
PDW BLD-RTO: 16.5 % (ref 11.8–14.4)
PLATELET # BLD: 168 K/UL (ref 138–453)
PMV BLD AUTO: 8.8 FL (ref 8.1–13.5)
RBC # BLD: 3.21 M/UL (ref 3.95–5.11)
RBC # BLD: ABNORMAL 10*6/UL
SEG NEUTROPHILS: 61 % (ref 36–65)
SEGMENTED NEUTROPHILS ABSOLUTE COUNT: 1.63 K/UL (ref 1.5–8.1)
WBC # BLD: 2.7 K/UL (ref 3.5–11.3)

## 2022-05-05 PROCEDURE — 6360000002 HC RX W HCPCS: Performed by: INTERNAL MEDICINE

## 2022-05-05 PROCEDURE — 36591 DRAW BLOOD OFF VENOUS DEVICE: CPT

## 2022-05-05 PROCEDURE — 2580000003 HC RX 258: Performed by: INTERNAL MEDICINE

## 2022-05-05 PROCEDURE — 85025 COMPLETE CBC W/AUTO DIFF WBC: CPT

## 2022-05-05 RX ORDER — SODIUM CHLORIDE 9 MG/ML
25 INJECTION, SOLUTION INTRAVENOUS PRN
Status: CANCELLED | OUTPATIENT
Start: 2022-05-05

## 2022-05-05 RX ORDER — HEPARIN SODIUM (PORCINE) LOCK FLUSH IV SOLN 100 UNIT/ML 100 UNIT/ML
500 SOLUTION INTRAVENOUS PRN
Status: CANCELLED | OUTPATIENT
Start: 2022-05-05

## 2022-05-05 RX ORDER — SODIUM CHLORIDE 0.9 % (FLUSH) 0.9 %
5-40 SYRINGE (ML) INJECTION PRN
Status: DISCONTINUED | OUTPATIENT
Start: 2022-05-05 | End: 2022-05-06 | Stop reason: HOSPADM

## 2022-05-05 RX ORDER — SODIUM CHLORIDE 0.9 % (FLUSH) 0.9 %
5-40 SYRINGE (ML) INJECTION PRN
Status: CANCELLED | OUTPATIENT
Start: 2022-05-05

## 2022-05-05 RX ORDER — HEPARIN SODIUM (PORCINE) LOCK FLUSH IV SOLN 100 UNIT/ML 100 UNIT/ML
500 SOLUTION INTRAVENOUS PRN
Status: DISCONTINUED | OUTPATIENT
Start: 2022-05-05 | End: 2022-05-06 | Stop reason: HOSPADM

## 2022-05-05 RX ADMIN — HEPARIN 500 UNITS: 100 SYRINGE at 09:44

## 2022-05-05 RX ADMIN — SODIUM CHLORIDE, PRESERVATIVE FREE 10 ML: 5 INJECTION INTRAVENOUS at 09:42

## 2022-05-05 RX ADMIN — SODIUM CHLORIDE, PRESERVATIVE FREE 10 ML: 5 INJECTION INTRAVENOUS at 09:44

## 2022-05-05 NOTE — TELEPHONE ENCOUNTER
[facility] called to let Dr Sayra Herzog know they are having the pt hold her eliquis due to anemia of unknown source.  Any questions please call    [phone]    Last Appt:  4/4/2022  Next Appt:  4/3/23

## 2022-05-06 DIAGNOSIS — I82.4Z3 DEEP VEIN THROMBOSIS (DVT) OF DISTAL VEIN OF BOTH LOWER EXTREMITIES, UNSPECIFIED CHRONICITY (HCC): ICD-10-CM

## 2022-05-06 NOTE — TELEPHONE ENCOUNTER
Josephine Munoz called requesting a refill of the below medication which has been pended for you:     Requested Prescriptions     Pending Prescriptions Disp Refills    apixaban (ELIQUIS) 5 MG TABS tablet [Pharmacy Med Name: ELIQUIS 5 MG TABLET] 180 tablet 0     Sig: take 1 tablet by mouth twice a day       Last Appointment Date: 1/13/2022  Next Appointment Date: 7/13/2022    Allergies   Allergen Reactions    Effexor Xr [Venlafaxine Hcl]      Made blood pressure go high    Venlafaxine

## 2022-05-07 LAB
ABO/RH: NORMAL
ANTIBODY IDENTIFICATION: NORMAL
ANTIBODY SCREEN: POSITIVE
ARM BAND NUMBER: NORMAL
BLD PROD TYP BPU: NORMAL
BLOOD BANK BLOOD PRODUCT EXPIRATION DATE: NORMAL
BLOOD BANK ISBT PRODUCT BLOOD TYPE: 9500
BLOOD BANK UNIT TYPE AND RH: NORMAL
BPU ID: NORMAL
CROSSMATCH RESULT: NORMAL
DISPENSE STATUS BLOOD BANK: NORMAL
EXPIRATION DATE: NORMAL
TRANSFUSION STATUS: NORMAL
UNIT DIVISION: 0
UNIT ISSUE DATE/TIME: NORMAL

## 2022-05-09 ENCOUNTER — HOSPITAL ENCOUNTER (OUTPATIENT)
Age: 72
Setting detail: SPECIMEN
Discharge: HOME | End: 2022-05-09
Payer: MEDICARE

## 2022-05-09 ENCOUNTER — HOSPITAL ENCOUNTER (OUTPATIENT)
Dept: INFUSION THERAPY | Age: 72
Discharge: HOME | End: 2022-05-09
Payer: MEDICARE

## 2022-05-09 DIAGNOSIS — C7B.00 METASTATIC CARCINOID TUMOR (HCC): Primary | ICD-10-CM

## 2022-05-09 LAB
ABSOLUTE BANDS #: 0.05 K/UL
ABSOLUTE EOS #: 0 K/UL (ref 0–0.4)
ABSOLUTE IMMATURE GRANULOCYTE: 0 K/UL (ref 0–0.3)
ABSOLUTE LYMPH #: 0.32 K/UL (ref 1–4.8)
ABSOLUTE MONO #: 0.32 K/UL (ref 0.1–1.2)
BANDS: 2 %
BASOPHILS # BLD: 1 % (ref 0–1)
BASOPHILS ABSOLUTE: 0.03 K/UL (ref 0–0.2)
EOSINOPHILS RELATIVE PERCENT: 0 % (ref 1–7)
HCT VFR BLD CALC: 27.5 % (ref 36.3–47.1)
HEMOGLOBIN: 7.9 G/DL (ref 11.9–15.1)
IMMATURE GRANULOCYTES: 0 %
LYMPHOCYTES # BLD: 12 % (ref 16–46)
MCH RBC QN AUTO: 22.5 PG (ref 25.2–33.5)
MCHC RBC AUTO-ENTMCNC: 28.7 G/DL (ref 25.2–33.5)
MCV RBC AUTO: 78.3 FL (ref 82.6–102.9)
MONOCYTES # BLD: 12 % (ref 4–11)
MORPHOLOGY: ABNORMAL
NRBC AUTOMATED: 0 PER 100 WBC
PDW BLD-RTO: 17.2 % (ref 11.8–14.4)
PLATELET # BLD: 123 K/UL (ref 138–453)
PMV BLD AUTO: 9.2 FL (ref 8.1–13.5)
RBC # BLD: 3.51 M/UL (ref 3.95–5.11)
SEG NEUTROPHILS: 73 % (ref 43–77)
SEGMENTED NEUTROPHILS ABSOLUTE COUNT: 1.98 K/UL (ref 1.5–8.1)
WBC # BLD: 2.7 K/UL (ref 3.5–11.3)

## 2022-05-09 PROCEDURE — 2580000003 HC RX 258: Performed by: INTERNAL MEDICINE

## 2022-05-09 PROCEDURE — 6360000002 HC RX W HCPCS: Performed by: INTERNAL MEDICINE

## 2022-05-09 PROCEDURE — 85025 COMPLETE CBC W/AUTO DIFF WBC: CPT

## 2022-05-09 PROCEDURE — 36591 DRAW BLOOD OFF VENOUS DEVICE: CPT

## 2022-05-09 RX ORDER — HEPARIN SODIUM (PORCINE) LOCK FLUSH IV SOLN 100 UNIT/ML 100 UNIT/ML
500 SOLUTION INTRAVENOUS PRN
Status: CANCELLED | OUTPATIENT
Start: 2022-05-09

## 2022-05-09 RX ORDER — SODIUM CHLORIDE 9 MG/ML
25 INJECTION, SOLUTION INTRAVENOUS PRN
Status: CANCELLED | OUTPATIENT
Start: 2022-05-09

## 2022-05-09 RX ORDER — SODIUM CHLORIDE 0.9 % (FLUSH) 0.9 %
5-40 SYRINGE (ML) INJECTION PRN
Status: DISCONTINUED | OUTPATIENT
Start: 2022-05-09 | End: 2022-05-10 | Stop reason: HOSPADM

## 2022-05-09 RX ORDER — HEPARIN SODIUM (PORCINE) LOCK FLUSH IV SOLN 100 UNIT/ML 100 UNIT/ML
500 SOLUTION INTRAVENOUS PRN
Status: DISCONTINUED | OUTPATIENT
Start: 2022-05-09 | End: 2022-05-10 | Stop reason: HOSPADM

## 2022-05-09 RX ORDER — SODIUM CHLORIDE 0.9 % (FLUSH) 0.9 %
5-40 SYRINGE (ML) INJECTION PRN
Status: CANCELLED | OUTPATIENT
Start: 2022-05-09

## 2022-05-09 RX ADMIN — SODIUM CHLORIDE, PRESERVATIVE FREE 10 ML: 5 INJECTION INTRAVENOUS at 08:30

## 2022-05-09 RX ADMIN — HEPARIN 500 UNITS: 100 SYRINGE at 08:30

## 2022-05-13 ENCOUNTER — HOSPITAL ENCOUNTER (OUTPATIENT)
Dept: INFUSION THERAPY | Age: 72
Discharge: HOME | End: 2022-05-13
Payer: MEDICARE

## 2022-05-13 ENCOUNTER — HOSPITAL ENCOUNTER (OUTPATIENT)
Age: 72
Setting detail: SPECIMEN
Discharge: HOME | End: 2022-05-13
Payer: MEDICARE

## 2022-05-13 DIAGNOSIS — C7B.00 METASTATIC CARCINOID TUMOR (HCC): Primary | ICD-10-CM

## 2022-05-13 LAB
ABSOLUTE EOS #: 0 K/UL (ref 0–0.4)
ABSOLUTE IMMATURE GRANULOCYTE: 0 K/UL (ref 0–0.3)
ABSOLUTE LYMPH #: 0.63 K/UL (ref 1–4.8)
ABSOLUTE MONO #: 0.41 K/UL (ref 0.1–1.2)
ALBUMIN SERPL-MCNC: 4.2 G/DL (ref 3.5–5.2)
ALBUMIN/GLOBULIN RATIO: 1.7 (ref 1–2.5)
ALP BLD-CCNC: 66 U/L (ref 35–104)
ALT SERPL-CCNC: 11 U/L (ref 5–33)
ANION GAP SERPL CALCULATED.3IONS-SCNC: 10 MMOL/L (ref 9–17)
AST SERPL-CCNC: 20 U/L
BASOPHILS # BLD: 0 % (ref 0–1)
BASOPHILS ABSOLUTE: 0 K/UL (ref 0–0.2)
BILIRUB SERPL-MCNC: 0.3 MG/DL (ref 0.3–1.2)
BILIRUBIN DIRECT: <0.08 MG/DL
BILIRUBIN, INDIRECT: NORMAL MG/DL (ref 0–1)
BUN BLDV-MCNC: 17 MG/DL (ref 8–23)
BUN/CREAT BLD: 24 (ref 9–20)
CALCIUM SERPL-MCNC: 8.9 MG/DL (ref 8.6–10.4)
CHLORIDE BLD-SCNC: 105 MMOL/L (ref 98–107)
CO2: 23 MMOL/L (ref 20–31)
CREAT SERPL-MCNC: 0.71 MG/DL (ref 0.5–0.9)
EOSINOPHILS RELATIVE PERCENT: 0 % (ref 1–7)
GFR AFRICAN AMERICAN: >60 ML/MIN
GFR NON-AFRICAN AMERICAN: >60 ML/MIN
GFR SERPL CREATININE-BSD FRML MDRD: ABNORMAL ML/MIN/{1.73_M2}
GLOBULIN: 2.5 G/DL (ref 1.5–3.8)
GLUCOSE BLD-MCNC: 95 MG/DL (ref 70–99)
HCT VFR BLD CALC: 26.1 % (ref 36.3–47.1)
HEMOGLOBIN: 7.6 G/DL (ref 11.9–15.1)
IMMATURE GRANULOCYTES: 0 %
LYMPHOCYTES # BLD: 17 % (ref 16–46)
MCH RBC QN AUTO: 22.2 PG (ref 25.2–33.5)
MCHC RBC AUTO-ENTMCNC: 29.1 G/DL (ref 25.2–33.5)
MCV RBC AUTO: 76.3 FL (ref 82.6–102.9)
MONOCYTES # BLD: 11 % (ref 4–11)
MORPHOLOGY: ABNORMAL
NRBC AUTOMATED: 0 PER 100 WBC
PDW BLD-RTO: 17.6 % (ref 11.8–14.4)
PLATELET # BLD: 162 K/UL (ref 138–453)
PMV BLD AUTO: 9.3 FL (ref 8.1–13.5)
POTASSIUM SERPL-SCNC: 3.9 MMOL/L (ref 3.7–5.3)
RBC # BLD: 3.42 M/UL (ref 3.95–5.11)
SEG NEUTROPHILS: 72 % (ref 43–77)
SEGMENTED NEUTROPHILS ABSOLUTE COUNT: 2.66 K/UL (ref 1.5–8.1)
SODIUM BLD-SCNC: 138 MMOL/L (ref 135–144)
TOTAL PROTEIN: 6.7 G/DL (ref 6.4–8.3)
WBC # BLD: 3.7 K/UL (ref 3.5–11.3)

## 2022-05-13 PROCEDURE — 80048 BASIC METABOLIC PNL TOTAL CA: CPT

## 2022-05-13 PROCEDURE — 2580000003 HC RX 258: Performed by: INTERNAL MEDICINE

## 2022-05-13 PROCEDURE — 6360000002 HC RX W HCPCS: Performed by: INTERNAL MEDICINE

## 2022-05-13 PROCEDURE — 85025 COMPLETE CBC W/AUTO DIFF WBC: CPT

## 2022-05-13 PROCEDURE — 36415 COLL VENOUS BLD VENIPUNCTURE: CPT

## 2022-05-13 PROCEDURE — 80076 HEPATIC FUNCTION PANEL: CPT

## 2022-05-13 PROCEDURE — 36591 DRAW BLOOD OFF VENOUS DEVICE: CPT

## 2022-05-13 RX ORDER — HEPARIN SODIUM (PORCINE) LOCK FLUSH IV SOLN 100 UNIT/ML 100 UNIT/ML
500 SOLUTION INTRAVENOUS PRN
Status: DISCONTINUED | OUTPATIENT
Start: 2022-05-13 | End: 2022-05-14 | Stop reason: HOSPADM

## 2022-05-13 RX ORDER — SODIUM CHLORIDE 0.9 % (FLUSH) 0.9 %
5-40 SYRINGE (ML) INJECTION PRN
Status: DISCONTINUED | OUTPATIENT
Start: 2022-05-13 | End: 2022-05-14 | Stop reason: HOSPADM

## 2022-05-13 RX ORDER — SODIUM CHLORIDE 0.9 % (FLUSH) 0.9 %
5-40 SYRINGE (ML) INJECTION PRN
Status: CANCELLED | OUTPATIENT
Start: 2022-05-13

## 2022-05-13 RX ORDER — SODIUM CHLORIDE 9 MG/ML
25 INJECTION, SOLUTION INTRAVENOUS PRN
Status: CANCELLED | OUTPATIENT
Start: 2022-05-13

## 2022-05-13 RX ORDER — HEPARIN SODIUM (PORCINE) LOCK FLUSH IV SOLN 100 UNIT/ML 100 UNIT/ML
500 SOLUTION INTRAVENOUS PRN
Status: CANCELLED | OUTPATIENT
Start: 2022-05-13

## 2022-05-13 RX ADMIN — HEPARIN 500 UNITS: 100 SYRINGE at 08:36

## 2022-05-13 RX ADMIN — SODIUM CHLORIDE, PRESERVATIVE FREE 10 ML: 5 INJECTION INTRAVENOUS at 08:35

## 2022-05-13 RX ADMIN — SODIUM CHLORIDE, PRESERVATIVE FREE 10 ML: 5 INJECTION INTRAVENOUS at 08:36

## 2022-05-24 ENCOUNTER — HOSPITAL ENCOUNTER (OUTPATIENT)
Age: 72
Setting detail: SPECIMEN
Discharge: HOME | End: 2022-05-24
Payer: MEDICARE

## 2022-05-24 ENCOUNTER — HOSPITAL ENCOUNTER (OUTPATIENT)
Dept: INFUSION THERAPY | Age: 72
Discharge: HOME | End: 2022-05-24
Payer: MEDICARE

## 2022-05-24 DIAGNOSIS — C7B.00 METASTATIC CARCINOID TUMOR (HCC): Primary | ICD-10-CM

## 2022-05-24 LAB
ABSOLUTE EOS #: 0.07 K/UL (ref 0–0.44)
ABSOLUTE IMMATURE GRANULOCYTE: <0.03 K/UL (ref 0–0.3)
ABSOLUTE LYMPH #: 1.02 K/UL (ref 1.1–3.7)
ABSOLUTE MONO #: 0.47 K/UL (ref 0.1–1.2)
BASOPHILS # BLD: 1 % (ref 0–2)
BASOPHILS ABSOLUTE: 0.04 K/UL (ref 0–0.2)
EOSINOPHILS RELATIVE PERCENT: 2 % (ref 1–4)
HCT VFR BLD CALC: 27.9 % (ref 36.3–47.1)
HEMOGLOBIN: 7.8 G/DL (ref 11.9–15.1)
IMMATURE GRANULOCYTES: 0 %
LYMPHOCYTES # BLD: 23 % (ref 24–43)
MCH RBC QN AUTO: 20.9 PG (ref 25.2–33.5)
MCHC RBC AUTO-ENTMCNC: 28 G/DL (ref 25.2–33.5)
MCV RBC AUTO: 74.8 FL (ref 82.6–102.9)
MONOCYTES # BLD: 11 % (ref 3–12)
NRBC AUTOMATED: 0 PER 100 WBC
PDW BLD-RTO: 18.6 % (ref 11.8–14.4)
PLATELET # BLD: 237 K/UL (ref 138–453)
PMV BLD AUTO: 9.2 FL (ref 8.1–13.5)
RBC # BLD: 3.73 M/UL (ref 3.95–5.11)
RBC # BLD: ABNORMAL 10*6/UL
SEG NEUTROPHILS: 64 % (ref 36–65)
SEGMENTED NEUTROPHILS ABSOLUTE COUNT: 2.84 K/UL (ref 1.5–8.1)
WBC # BLD: 4.5 K/UL (ref 3.5–11.3)

## 2022-05-24 PROCEDURE — 2580000003 HC RX 258: Performed by: INTERNAL MEDICINE

## 2022-05-24 PROCEDURE — 36591 DRAW BLOOD OFF VENOUS DEVICE: CPT

## 2022-05-24 PROCEDURE — 6360000002 HC RX W HCPCS: Performed by: INTERNAL MEDICINE

## 2022-05-24 PROCEDURE — 85025 COMPLETE CBC W/AUTO DIFF WBC: CPT

## 2022-05-24 RX ORDER — SODIUM CHLORIDE 0.9 % (FLUSH) 0.9 %
5-40 SYRINGE (ML) INJECTION PRN
Status: DISCONTINUED | OUTPATIENT
Start: 2022-05-24 | End: 2022-05-25 | Stop reason: HOSPADM

## 2022-05-24 RX ORDER — SODIUM CHLORIDE 0.9 % (FLUSH) 0.9 %
5-40 SYRINGE (ML) INJECTION PRN
Status: CANCELLED | OUTPATIENT
Start: 2022-05-24

## 2022-05-24 RX ORDER — SODIUM CHLORIDE 9 MG/ML
25 INJECTION, SOLUTION INTRAVENOUS PRN
Status: CANCELLED | OUTPATIENT
Start: 2022-05-24

## 2022-05-24 RX ORDER — HEPARIN SODIUM (PORCINE) LOCK FLUSH IV SOLN 100 UNIT/ML 100 UNIT/ML
500 SOLUTION INTRAVENOUS PRN
Status: CANCELLED | OUTPATIENT
Start: 2022-05-24

## 2022-05-24 RX ORDER — HEPARIN SODIUM (PORCINE) LOCK FLUSH IV SOLN 100 UNIT/ML 100 UNIT/ML
500 SOLUTION INTRAVENOUS PRN
Status: DISCONTINUED | OUTPATIENT
Start: 2022-05-24 | End: 2022-05-25 | Stop reason: HOSPADM

## 2022-05-24 RX ADMIN — SODIUM CHLORIDE, PRESERVATIVE FREE 10 ML: 5 INJECTION INTRAVENOUS at 12:06

## 2022-05-24 RX ADMIN — SODIUM CHLORIDE, PRESERVATIVE FREE 10 ML: 5 INJECTION INTRAVENOUS at 12:05

## 2022-05-24 RX ADMIN — HEPARIN 500 UNITS: 100 SYRINGE at 12:06

## 2022-05-24 NOTE — PROGRESS NOTES
Pt ambulated into infusion center without difficulty. Pt asked if we received a new order for labs, and we have not. States she has it at home and will go home and get it, will be back around noon to get labs drawn and port flushed.

## 2022-05-25 ENCOUNTER — OFFICE VISIT (OUTPATIENT)
Dept: SURGERY | Age: 72
End: 2022-05-25
Payer: MEDICARE

## 2022-05-25 VITALS
OXYGEN SATURATION: 96 % | BODY MASS INDEX: 24.66 KG/M2 | TEMPERATURE: 98 F | HEART RATE: 90 BPM | RESPIRATION RATE: 16 BRPM | DIASTOLIC BLOOD PRESSURE: 76 MMHG | HEIGHT: 62 IN | SYSTOLIC BLOOD PRESSURE: 140 MMHG | WEIGHT: 134 LBS

## 2022-05-25 DIAGNOSIS — D50.9 IRON DEFICIENCY ANEMIA, UNSPECIFIED IRON DEFICIENCY ANEMIA TYPE: Primary | ICD-10-CM

## 2022-05-25 PROCEDURE — G8420 CALC BMI NORM PARAMETERS: HCPCS | Performed by: SURGERY

## 2022-05-25 PROCEDURE — 1123F ACP DISCUSS/DSCN MKR DOCD: CPT | Performed by: SURGERY

## 2022-05-25 PROCEDURE — 3017F COLORECTAL CA SCREEN DOC REV: CPT | Performed by: SURGERY

## 2022-05-25 PROCEDURE — G8399 PT W/DXA RESULTS DOCUMENT: HCPCS | Performed by: SURGERY

## 2022-05-25 PROCEDURE — 99203 OFFICE O/P NEW LOW 30 MIN: CPT | Performed by: SURGERY

## 2022-05-25 PROCEDURE — 1090F PRES/ABSN URINE INCON ASSESS: CPT | Performed by: SURGERY

## 2022-05-25 PROCEDURE — G8427 DOCREV CUR MEDS BY ELIG CLIN: HCPCS | Performed by: SURGERY

## 2022-05-25 PROCEDURE — 1036F TOBACCO NON-USER: CPT | Performed by: SURGERY

## 2022-05-25 NOTE — PROGRESS NOTES
Subjective   Roxianne Cockayne is a 81 y.o. female who presents today for further evaluation of iron deficiency anemia. 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 She is currently seeing oncology at [facility] and has been on medical therapy for this. After starting on medication she was noted to have iron deficiency anemia and was held off of the medication for short period of time and had some improvement but then how after going back on medication seems to redeveloped it. She is currently in the process of being worked up to make sure there is no other sources of her anemia and she was recommended to have colonoscopy and EGD to rule out any potential GI sources of blood loss. Last colonoscopy was approximately 6 years ago and there were no findings. She denies any recent changes in bowel movements. No blood per rectum. No melena. No nausea or emesis. Denies any abdominal pain. No known family history of colon cancer. Recent imaging obtained at [facility] did not reveal any obvious metastatic lesions in the intestinal tract. Is in today to discuss setting up EGD and colonoscopy for further evaluation of her anemia. Past Medical History:   Diagnosis Date    Atypical carcinoid lung tumor ([facility] [address] 2/2011    right upper lobe, resected at the [facility]    Breast cancer [facility]) 2005    s/p right mastectomy and chemotherapy    History of 2019 novel coronavirus disease (COVID-19) 11/30/2020    mild disease; positive test 11/26/2020    History of therapeutic radiation     Hx antineoplastic chemo     Hypertension     Left knee pain     Internal derangement versus degenerative changes. (70% better after Celestone injection on 3-). Raquel Hartmann PA-C.     Liver cancer ([address]     Lung cancer ([address]     Myopia with astigmatism and presbyopia     Osteoarthritis     Osteopenia     took Fosamax from [id number]    Pancreatitis Past Surgical History:   Procedure Laterality Date    ABDOMINAL EXPLORATION SURGERY  01/07/2019    resection and anastomosis of small bowel repair mesenteric tear with Dr Cowan Person at [address] Right 07/25/2005    lymph node mapping and biopsy     BREAST BIOPSY Right 07/07/2005    excisional biopsy of mass of right breast     BREAST CYST ASPIRATION Right 07/25/2005    BREAST RECONSTRUCTION Right 2006    nipple areolar reconstruction right breast    BREAST REDUCTION SURGERY Left 2006    BRONCHOSCOPY  02/11/2011    BRONCHOSCOPY Right 11/12/2010    video assisted thoracoscopic surgery right wedge resection    CHOLECYSTECTOMY, LAPAROSCOPIC  01/16/2017    Dr. Vasquez Valerio, [place] [address]  02/24/2014    normal, repeat recommended in 10 years, Dr. Essie Patrick, [address]  01/12/2004    supracervical, with bilateral salpingo-oophorectomy, Dr. Tressa Whitehead, [address] ARTHROSCOPY Right 07/01/2008    KNEE SURGERY Left 06/20/2016    unicompartmental knee replacement, Dr. Leonila Yan, [address]  11/04/2016    ultrasound-guided liver biopsy (metastatic atypical carcinoid), [facility] at [address] [facility] Right 02/11/2011    right upper lobectomy, mediastinal lymph node dissection for atypical carcinoid of the right upper lobe    MASTECTOMY Right 2005    breast cancer    OTHER SURGICAL HISTORY  2004    Bowman procedure    OTHER SURGICAL HISTORY Right 07/25/2005    placement of tissue expander    SMALL INTESTINE SURGERY      THORACOTOMY Right 02/11/2011    redo    TOTAL KNEE ARTHROPLASTY Right 03/17/2010    Dr ANTONIO Aguirre GASTROINTESTINAL ENDOSCOPY  01/14/2017    mild esophagitis, biopsy normal, Dr. Vasquez Valerio, [facility]       Current Outpatient Medications   Medication Sig Dispense Refill    apixaban (ELIQUIS) 5 MG TABS tablet take 1 tablet by mouth twice a day 180 tablet 1    azithromycin (ZITHROMAX Z-SANGEETA) 250 MG tablet Take 2 tablets (500 mg) on Day 1, and then take 1 tablet (250 mg) on days 2 through 5. 1 packet 0    everolimus (AFINITOR) 10 MG TABS chemo tablet Take 10 mg by mouth daily      dexamethasone 0.5 MG/5ML elixir Apply 1 mg topically 3 times daily       dilTIAZem (TIAZAC) 240 MG extended release capsule Take 1 capsule by mouth daily 90 capsule 1    lisinopril (PRINIVIL;ZESTRIL) 30 MG tablet Take 1 tablet by mouth daily 90 tablet 1    Nutritional Supplements (ENSURE) LIQD as needed       Handicap Placard MISC by Does not apply route 1 each 0    fluticasone (FLONASE) 50 MCG/ACT nasal spray 1 spray by Nasal route daily as needed for Rhinitis 1 Bottle 5    ondansetron (ZOFRAN) 4 MG tablet Take 1 tablet by mouth every 8 hours as needed for Nausea 20 tablet 0    acetaminophen (TYLENOL) 325 MG tablet Take 2 tablets by mouth every 4 hours as needed for Pain or Fever 120 tablet 0     No current facility-administered medications for this visit.        Allergies   Allergen Reactions    Effexor Xr [Venlafaxine Hcl]      Made blood pressure go high    Venlafaxine        Family History   Problem Relation Age of Onset    Heart Disease Mother     Diabetes Mother     High Cholesterol Mother     High Blood Pressure Mother     Cataracts Mother     Cancer Father         lung    Cancer Sister         leukemia    Glaucoma Sister        Social History     Socioeconomic History    Marital status:      Spouse name: Not on file    Number of children: Not on file    Years of education: Not on file    Highest education level: Not on file   Occupational History    Not on file   Tobacco Use    Smoking status: Never Smoker    Smokeless tobacco: Never Used   Substance and Sexual Activity    Alcohol use: No     Alcohol/week: 0.0 standard drinks    Drug use: No    Sexual activity: Not on file   Other Topics Concern    Not on file   Social History Narrative    Not on file     Social Determinants of Health     Financial Resource Strain: Low Risk     Difficulty of Paying Living Expenses: Not hard at all   Food Insecurity: No Food Insecurity    Worried About Running Out of Food in the Last Year: Never true    [address] in the Last Year: Never true   Transportation Needs:     Lack of Transportation (Medical): Not on file    Lack of Transportation (Non-Medical):  Not on file   Physical Activity:     Days of Exercise per Week: Not on file    Minutes of Exercise per Session: Not on file   Stress:     Feeling of Stress : Not on file   Social Connections:     Frequency of Communication with Friends and Family: Not on file    Frequency of Social Gatherings with Friends and Family: Not on file    Attends [employer] Services: Not on file    Active Member of [address] [place] [address] [employer] or Organizations: Not on file    Attends Club or Organization Meetings: Not on file    Marital Status: Not on file   Intimate Partner Violence:     Fear of Current or Ex-Partner: Not on file    Emotionally Abused: Not on file    Physically Abused: Not on file    Sexually Abused: Not on file   Housing Stability:     Unable to Pay for Housing in the Last Year: Not on file    Number of [place] in the Last Year: Not on file    Unstable Housing in the Last Year: Not on file       ROS:   Review of Systems - General ROS: negative  Psychological ROS: negative  Ophthalmic ROS: negative  ENT ROS: negative  Respiratory ROS: no cough, shortness of breath, or wheezing  Cardiovascular ROS: no chest pain or dyspnea on exertion  Gastrointestinal ROS: per HPI  Genito-Urinary ROS: no dysuria, trouble voiding, or hematuria  Musculoskeletal ROS: negative  Dermatological ROS: negative      Objective   Vitals:    05/25/22 1341   BP: (!) 140/76   Pulse: 90   Resp: 16   Temp: 98 °F (36.7 °C)   SpO2: 96%     General:in no apparent distress and well developed and well nourished  Eyes: No gross abnormalities. Ears, Nose, Throat: hearing grossly normal bilaterally  Neck: neck supple and non tender without mass  Lungs: clear to auscultation without wheezes or rales   Heart: S1S2, no mumurs, RRR  Abdomen: soft, nontender, no HSM, no guarding, no rebound, no masses  Extremity: negative  Neuro: CN II-XII grossly intact      1. Iron deficiency anemia, unspecified iron deficiency anemia type  Assessment & Plan:  Patient is being worked up for iron deficiency anemia by her oncologist and they have requested that she undergo EGD and colonoscopy to rule out any occult sources of GI blood loss. We will go ahead and get her set up for EGD and colonoscopy for further evaluation. Risks of the procedure including bleeding, infection, perforation, need for the surgery and anesthesia risks are discussed and consent is obtained. We will plan to have octreotide on hand for treatment of any carcinoid crisis if this occurs. Will have anesthesia review this case prior to the day of the procedure to ensure that they have all appropriate necessary medications on hand if necessary.          (Please note that portions of this note were completed with a voice recognition program.  Efforts were made to edit the dictations but occasionally words are mis-transcribed.)

## 2022-05-31 ENCOUNTER — HOSPITAL ENCOUNTER (OUTPATIENT)
Age: 72
Setting detail: SPECIMEN
Discharge: HOME | End: 2022-05-31
Payer: MEDICARE

## 2022-05-31 ENCOUNTER — HOSPITAL ENCOUNTER (OUTPATIENT)
Dept: INFUSION THERAPY | Age: 72
Discharge: HOME | End: 2022-05-31
Payer: MEDICARE

## 2022-05-31 DIAGNOSIS — C7B.00 METASTATIC CARCINOID TUMOR (HCC): Primary | ICD-10-CM

## 2022-05-31 LAB
ABSOLUTE EOS #: 0.14 K/UL (ref 0–0.44)
ABSOLUTE IMMATURE GRANULOCYTE: <0.03 K/UL (ref 0–0.3)
ABSOLUTE LYMPH #: 0.86 K/UL (ref 1.1–3.7)
ABSOLUTE MONO #: 0.29 K/UL (ref 0.1–1.2)
BASOPHILS # BLD: 1 % (ref 0–2)
BASOPHILS ABSOLUTE: <0.03 K/UL (ref 0–0.2)
EOSINOPHILS RELATIVE PERCENT: 4 % (ref 1–4)
HCT VFR BLD CALC: 29.5 % (ref 36.3–47.1)
HEMOGLOBIN: 8.5 G/DL (ref 11.9–15.1)
IMMATURE GRANULOCYTES: 0 %
LYMPHOCYTES # BLD: 25 % (ref 24–43)
MCH RBC QN AUTO: 21.6 PG (ref 25.2–33.5)
MCHC RBC AUTO-ENTMCNC: 28.8 G/DL (ref 25.2–33.5)
MCV RBC AUTO: 74.9 FL (ref 82.6–102.9)
MONOCYTES # BLD: 8 % (ref 3–12)
NRBC AUTOMATED: 0 PER 100 WBC
PDW BLD-RTO: 19.6 % (ref 11.8–14.4)
PLATELET # BLD: 153 K/UL (ref 138–453)
PMV BLD AUTO: 9.2 FL (ref 8.1–13.5)
RBC # BLD: 3.94 M/UL (ref 3.95–5.11)
RBC # BLD: ABNORMAL 10*6/UL
SEG NEUTROPHILS: 62 % (ref 36–65)
SEGMENTED NEUTROPHILS ABSOLUTE COUNT: 2.15 K/UL (ref 1.5–8.1)
WBC # BLD: 3.5 K/UL (ref 3.5–11.3)

## 2022-05-31 PROCEDURE — 6360000002 HC RX W HCPCS: Performed by: INTERNAL MEDICINE

## 2022-05-31 PROCEDURE — 85025 COMPLETE CBC W/AUTO DIFF WBC: CPT

## 2022-05-31 PROCEDURE — 2580000003 HC RX 258: Performed by: INTERNAL MEDICINE

## 2022-05-31 PROCEDURE — 36591 DRAW BLOOD OFF VENOUS DEVICE: CPT

## 2022-05-31 RX ORDER — HEPARIN SODIUM (PORCINE) LOCK FLUSH IV SOLN 100 UNIT/ML 100 UNIT/ML
500 SOLUTION INTRAVENOUS PRN
Status: DISCONTINUED | OUTPATIENT
Start: 2022-05-31 | End: 2022-06-01 | Stop reason: HOSPADM

## 2022-05-31 RX ORDER — HEPARIN SODIUM (PORCINE) LOCK FLUSH IV SOLN 100 UNIT/ML 100 UNIT/ML
500 SOLUTION INTRAVENOUS PRN
Status: CANCELLED | OUTPATIENT
Start: 2022-05-31

## 2022-05-31 RX ORDER — SODIUM CHLORIDE 0.9 % (FLUSH) 0.9 %
5-40 SYRINGE (ML) INJECTION PRN
Status: DISCONTINUED | OUTPATIENT
Start: 2022-05-31 | End: 2022-06-01 | Stop reason: HOSPADM

## 2022-05-31 RX ORDER — SODIUM CHLORIDE 0.9 % (FLUSH) 0.9 %
5-40 SYRINGE (ML) INJECTION PRN
Status: CANCELLED | OUTPATIENT
Start: 2022-05-31

## 2022-05-31 RX ORDER — SODIUM CHLORIDE 9 MG/ML
25 INJECTION, SOLUTION INTRAVENOUS PRN
Status: CANCELLED | OUTPATIENT
Start: 2022-05-31

## 2022-05-31 RX ADMIN — SODIUM CHLORIDE, PRESERVATIVE FREE 10 ML: 5 INJECTION INTRAVENOUS at 08:24

## 2022-05-31 RX ADMIN — SODIUM CHLORIDE, PRESERVATIVE FREE 10 ML: 5 INJECTION INTRAVENOUS at 08:26

## 2022-05-31 RX ADMIN — HEPARIN 500 UNITS: 100 SYRINGE at 08:26

## 2022-05-31 NOTE — PROGRESS NOTES
VAD accessed per protocol using 20 gauge 3/4\" hong needle. Flushes easily. Labs drawn. Flushed with 10 ml normal saline and 5 ml Heplock solution. Hong needle dc'd. Bandaid to site. Patient tolerated procedure with out difficulty. Patient to get next weeks lab draw in [facility] at her follow up appointment.

## 2022-06-15 ENCOUNTER — HOSPITAL ENCOUNTER (OUTPATIENT)
Age: 72
Setting detail: SPECIMEN
Discharge: HOME | End: 2022-06-15
Payer: MEDICARE

## 2022-06-15 ENCOUNTER — HOSPITAL ENCOUNTER (OUTPATIENT)
Dept: INFUSION THERAPY | Age: 72
Discharge: HOME | End: 2022-06-15
Payer: MEDICARE

## 2022-06-15 DIAGNOSIS — C7B.00 METASTATIC CARCINOID TUMOR (HCC): Primary | ICD-10-CM

## 2022-06-15 LAB
ABSOLUTE EOS #: 0.06 K/UL (ref 0–0.44)
ABSOLUTE IMMATURE GRANULOCYTE: 0.03 K/UL (ref 0–0.3)
ABSOLUTE LYMPH #: 0.75 K/UL (ref 1.1–3.7)
ABSOLUTE MONO #: 0.35 K/UL (ref 0.1–1.2)
BASOPHILS # BLD: 0 % (ref 0–2)
BASOPHILS ABSOLUTE: 0 K/UL (ref 0–0.2)
EOSINOPHILS RELATIVE PERCENT: 2 % (ref 1–4)
HCT VFR BLD CALC: 30 % (ref 36.3–47.1)
HEMOGLOBIN: 8.9 G/DL (ref 11.9–15.1)
IMMATURE GRANULOCYTES: 1 %
LYMPHOCYTES # BLD: 26 % (ref 24–43)
MCH RBC QN AUTO: 22 PG (ref 25.2–33.5)
MCHC RBC AUTO-ENTMCNC: 29.7 G/DL (ref 25.2–33.5)
MCV RBC AUTO: 74.3 FL (ref 82.6–102.9)
MONOCYTES # BLD: 12 % (ref 3–12)
MORPHOLOGY: ABNORMAL
NRBC AUTOMATED: 0 PER 100 WBC
PDW BLD-RTO: 21.4 % (ref 11.8–14.4)
PLATELET # BLD: 167 K/UL (ref 138–453)
PMV BLD AUTO: 9.1 FL (ref 8.1–13.5)
RBC # BLD: 4.04 M/UL (ref 3.95–5.11)
SEG NEUTROPHILS: 59 % (ref 36–65)
SEGMENTED NEUTROPHILS ABSOLUTE COUNT: 1.71 K/UL (ref 1.5–8.1)
WBC # BLD: 2.9 K/UL (ref 3.5–11.3)

## 2022-06-15 PROCEDURE — 36591 DRAW BLOOD OFF VENOUS DEVICE: CPT

## 2022-06-15 PROCEDURE — 2580000003 HC RX 258: Performed by: INTERNAL MEDICINE

## 2022-06-15 PROCEDURE — 6360000002 HC RX W HCPCS: Performed by: INTERNAL MEDICINE

## 2022-06-15 PROCEDURE — 85025 COMPLETE CBC W/AUTO DIFF WBC: CPT

## 2022-06-15 RX ORDER — SODIUM CHLORIDE 0.9 % (FLUSH) 0.9 %
5-40 SYRINGE (ML) INJECTION PRN
Status: CANCELLED | OUTPATIENT
Start: 2022-06-15

## 2022-06-15 RX ORDER — SODIUM CHLORIDE 0.9 % (FLUSH) 0.9 %
5-40 SYRINGE (ML) INJECTION PRN
Status: DISCONTINUED | OUTPATIENT
Start: 2022-06-15 | End: 2022-06-16 | Stop reason: HOSPADM

## 2022-06-15 RX ORDER — HEPARIN SODIUM (PORCINE) LOCK FLUSH IV SOLN 100 UNIT/ML 100 UNIT/ML
500 SOLUTION INTRAVENOUS PRN
Status: DISCONTINUED | OUTPATIENT
Start: 2022-06-15 | End: 2022-06-16 | Stop reason: HOSPADM

## 2022-06-15 RX ORDER — SODIUM CHLORIDE 9 MG/ML
25 INJECTION, SOLUTION INTRAVENOUS PRN
Status: CANCELLED | OUTPATIENT
Start: 2022-06-15

## 2022-06-15 RX ORDER — HEPARIN SODIUM (PORCINE) LOCK FLUSH IV SOLN 100 UNIT/ML 100 UNIT/ML
500 SOLUTION INTRAVENOUS PRN
Status: CANCELLED | OUTPATIENT
Start: 2022-06-15

## 2022-06-15 RX ADMIN — SODIUM CHLORIDE, PRESERVATIVE FREE 10 ML: 5 INJECTION INTRAVENOUS at 08:23

## 2022-06-15 RX ADMIN — HEPARIN 500 UNITS: 100 SYRINGE at 08:25

## 2022-06-15 RX ADMIN — SODIUM CHLORIDE, PRESERVATIVE FREE 10 ML: 5 INJECTION INTRAVENOUS at 08:25

## 2022-06-15 NOTE — PROGRESS NOTES
VAD accessed per protocol with 3/4 \" hong needle. Flushed easily with saline. Blood return noted and labs drawn. Flushed with 10 ml of NSS and 5 ml of Heparin flush. VAD D/C'd and Band-Aid to site. Patient stable and discharged to home.

## 2022-06-22 ENCOUNTER — HOSPITAL ENCOUNTER (OUTPATIENT)
Age: 72
Setting detail: SPECIMEN
Discharge: HOME | End: 2022-06-22
Payer: MEDICARE

## 2022-06-22 ENCOUNTER — HOSPITAL ENCOUNTER (OUTPATIENT)
Dept: INFUSION THERAPY | Age: 72
Discharge: HOME | End: 2022-06-22
Payer: MEDICARE

## 2022-06-22 DIAGNOSIS — C7B.00 METASTATIC CARCINOID TUMOR (HCC): Primary | ICD-10-CM

## 2022-06-22 LAB
ABSOLUTE EOS #: 0.06 K/UL (ref 0–0.44)
ABSOLUTE IMMATURE GRANULOCYTE: 0 K/UL (ref 0–0.3)
ABSOLUTE LYMPH #: 0.81 K/UL (ref 1.1–3.7)
ABSOLUTE MONO #: 0.39 K/UL (ref 0.1–1.2)
BASOPHILS # BLD: 0 % (ref 0–2)
BASOPHILS ABSOLUTE: 0 K/UL (ref 0–0.2)
EOSINOPHILS RELATIVE PERCENT: 2 % (ref 1–4)
HCT VFR BLD CALC: 32.4 % (ref 36.3–47.1)
HEMOGLOBIN: 9.5 G/DL (ref 11.9–15.1)
IMMATURE GRANULOCYTES: 0 %
LYMPHOCYTES # BLD: 27 % (ref 24–43)
MCH RBC QN AUTO: 21.8 PG (ref 25.2–33.5)
MCHC RBC AUTO-ENTMCNC: 29.3 G/DL (ref 25.2–33.5)
MCV RBC AUTO: 74.3 FL (ref 82.6–102.9)
MONOCYTES # BLD: 13 % (ref 3–12)
MORPHOLOGY: ABNORMAL
MORPHOLOGY: ABNORMAL
NRBC AUTOMATED: 0 PER 100 WBC
PDW BLD-RTO: 21.4 % (ref 11.8–14.4)
PLATELET # BLD: 168 K/UL (ref 138–453)
PMV BLD AUTO: 9.6 FL (ref 8.1–13.5)
RBC # BLD: 4.36 M/UL (ref 3.95–5.11)
SEG NEUTROPHILS: 58 % (ref 36–65)
SEGMENTED NEUTROPHILS ABSOLUTE COUNT: 1.74 K/UL (ref 1.5–8.1)
WBC # BLD: 3 K/UL (ref 3.5–11.3)

## 2022-06-22 PROCEDURE — 36591 DRAW BLOOD OFF VENOUS DEVICE: CPT

## 2022-06-22 PROCEDURE — 6360000002 HC RX W HCPCS: Performed by: INTERNAL MEDICINE

## 2022-06-22 PROCEDURE — 85025 COMPLETE CBC W/AUTO DIFF WBC: CPT

## 2022-06-22 PROCEDURE — 2580000003 HC RX 258: Performed by: INTERNAL MEDICINE

## 2022-06-22 RX ORDER — HEPARIN SODIUM (PORCINE) LOCK FLUSH IV SOLN 100 UNIT/ML 100 UNIT/ML
500 SOLUTION INTRAVENOUS PRN
Status: DISCONTINUED | OUTPATIENT
Start: 2022-06-22 | End: 2022-06-23 | Stop reason: HOSPADM

## 2022-06-22 RX ORDER — HEPARIN SODIUM (PORCINE) LOCK FLUSH IV SOLN 100 UNIT/ML 100 UNIT/ML
500 SOLUTION INTRAVENOUS PRN
Status: CANCELLED | OUTPATIENT
Start: 2022-06-22

## 2022-06-22 RX ORDER — SODIUM CHLORIDE 9 MG/ML
25 INJECTION, SOLUTION INTRAVENOUS PRN
Status: CANCELLED | OUTPATIENT
Start: 2022-06-22

## 2022-06-22 RX ORDER — SODIUM CHLORIDE 0.9 % (FLUSH) 0.9 %
5-40 SYRINGE (ML) INJECTION PRN
Status: CANCELLED | OUTPATIENT
Start: 2022-06-22

## 2022-06-22 RX ORDER — SODIUM CHLORIDE 0.9 % (FLUSH) 0.9 %
5-40 SYRINGE (ML) INJECTION PRN
Status: DISCONTINUED | OUTPATIENT
Start: 2022-06-22 | End: 2022-06-23 | Stop reason: HOSPADM

## 2022-06-22 RX ADMIN — SODIUM CHLORIDE, PRESERVATIVE FREE 10 ML: 5 INJECTION INTRAVENOUS at 08:30

## 2022-06-22 RX ADMIN — HEPARIN 500 UNITS: 100 SYRINGE at 08:30

## 2022-06-22 NOTE — PROGRESS NOTES
VAD accessed per protocol with 3/4 \" hong needle. Flushed easily with saline. Blood return noted and blood drawn. Flushed with 10 ml of NSS and 5 ml of Heparin flush. VAD D/C'd and Band-Aid to site. Patient stable and discharged to home.

## 2022-08-24 ENCOUNTER — HOSPITAL ENCOUNTER (OUTPATIENT)
Age: 72
Setting detail: SPECIMEN
Discharge: HOME | End: 2022-08-24
Payer: MEDICARE

## 2022-08-24 ENCOUNTER — HOSPITAL ENCOUNTER (OUTPATIENT)
Dept: INFUSION THERAPY | Age: 72
Discharge: HOME | End: 2022-08-24
Payer: MEDICARE

## 2022-08-24 DIAGNOSIS — C7B.00 METASTATIC CARCINOID TUMOR (HCC): Primary | ICD-10-CM

## 2022-08-24 LAB
ABSOLUTE EOS #: 0.04 K/UL (ref 0–0.44)
ABSOLUTE IMMATURE GRANULOCYTE: <0.03 K/UL (ref 0–0.3)
ABSOLUTE LYMPH #: 0.78 K/UL (ref 1.1–3.7)
ABSOLUTE MONO #: 0.5 K/UL (ref 0.1–1.2)
BASOPHILS # BLD: 1 % (ref 0–2)
BASOPHILS ABSOLUTE: <0.03 K/UL (ref 0–0.2)
EOSINOPHILS RELATIVE PERCENT: 1 % (ref 1–4)
HCT VFR BLD CALC: 28.8 % (ref 36.3–47.1)
HEMOGLOBIN: 8.4 G/DL (ref 11.9–15.1)
IMMATURE GRANULOCYTES: 1 %
LYMPHOCYTES # BLD: 20 % (ref 24–43)
MCH RBC QN AUTO: 23.9 PG (ref 25.2–33.5)
MCHC RBC AUTO-ENTMCNC: 29.2 G/DL (ref 25.2–33.5)
MCV RBC AUTO: 82.1 FL (ref 82.6–102.9)
MONOCYTES # BLD: 13 % (ref 3–12)
NRBC AUTOMATED: 0 PER 100 WBC
PDW BLD-RTO: 15.9 % (ref 11.8–14.4)
PLATELET # BLD: 175 K/UL (ref 138–453)
PMV BLD AUTO: 9.2 FL (ref 8.1–13.5)
RBC # BLD: 3.51 M/UL (ref 3.95–5.11)
RBC # BLD: ABNORMAL 10*6/UL
SEG NEUTROPHILS: 64 % (ref 36–65)
SEGMENTED NEUTROPHILS ABSOLUTE COUNT: 2.52 K/UL (ref 1.5–8.1)
WBC # BLD: 3.9 K/UL (ref 3.5–11.3)

## 2022-08-24 PROCEDURE — 6360000002 HC RX W HCPCS: Performed by: INTERNAL MEDICINE

## 2022-08-24 PROCEDURE — 2580000003 HC RX 258: Performed by: INTERNAL MEDICINE

## 2022-08-24 PROCEDURE — 36591 DRAW BLOOD OFF VENOUS DEVICE: CPT

## 2022-08-24 PROCEDURE — 85025 COMPLETE CBC W/AUTO DIFF WBC: CPT

## 2022-08-24 RX ORDER — SODIUM CHLORIDE 0.9 % (FLUSH) 0.9 %
5-40 SYRINGE (ML) INJECTION PRN
Status: DISCONTINUED | OUTPATIENT
Start: 2022-08-24 | End: 2022-08-25 | Stop reason: HOSPADM

## 2022-08-24 RX ORDER — HEPARIN SODIUM (PORCINE) LOCK FLUSH IV SOLN 100 UNIT/ML 100 UNIT/ML
500 SOLUTION INTRAVENOUS PRN
Status: CANCELLED | OUTPATIENT
Start: 2022-08-24

## 2022-08-24 RX ORDER — SODIUM CHLORIDE 0.9 % (FLUSH) 0.9 %
5-40 SYRINGE (ML) INJECTION PRN
Status: CANCELLED | OUTPATIENT
Start: 2022-08-24

## 2022-08-24 RX ORDER — HEPARIN SODIUM (PORCINE) LOCK FLUSH IV SOLN 100 UNIT/ML 100 UNIT/ML
500 SOLUTION INTRAVENOUS PRN
Status: DISCONTINUED | OUTPATIENT
Start: 2022-08-24 | End: 2022-08-25 | Stop reason: HOSPADM

## 2022-08-24 RX ORDER — SODIUM CHLORIDE 9 MG/ML
25 INJECTION, SOLUTION INTRAVENOUS PRN
Status: CANCELLED | OUTPATIENT
Start: 2022-08-24

## 2022-08-24 RX ADMIN — SODIUM CHLORIDE, PRESERVATIVE FREE 10 ML: 5 INJECTION INTRAVENOUS at 10:42

## 2022-08-24 RX ADMIN — HEPARIN 500 UNITS: 100 SYRINGE at 10:42

## 2022-08-24 RX ADMIN — SODIUM CHLORIDE, PRESERVATIVE FREE 10 ML: 5 INJECTION INTRAVENOUS at 10:40

## 2022-08-30 DIAGNOSIS — I10 ESSENTIAL HYPERTENSION: ICD-10-CM

## 2022-08-30 RX ORDER — DILTIAZEM HYDROCHLORIDE 240 MG/1
CAPSULE, EXTENDED RELEASE ORAL
Qty: 90 CAPSULE | Refills: 0 | Status: SHIPPED | OUTPATIENT
Start: 2022-08-30

## 2022-08-30 NOTE — TELEPHONE ENCOUNTER
Charles Sue called requesting a refill of the below medication which has been pended for you:     Requested Prescriptions     Pending Prescriptions Disp Refills    dilTIAZem (TIAZAC) 240 MG extended release capsule [Pharmacy Med Name: DILTIAZEM 24HR  MG CAP] 90 capsule 0     Sig: take 1 capsule by mouth once daily       Last Appointment Date: 1/13/2022  Next Appointment Date: 9/13/2022    Allergies   Allergen Reactions    Effexor Xr [Venlafaxine Hcl]      Made blood pressure go high    Venlafaxine

## 2022-09-13 ENCOUNTER — OFFICE VISIT (OUTPATIENT)
Dept: FAMILY MEDICINE CLINIC | Age: 72
End: 2022-09-13
Payer: MEDICARE

## 2022-09-13 ENCOUNTER — HOSPITAL ENCOUNTER (OUTPATIENT)
Dept: INFUSION THERAPY | Age: 72
Discharge: HOME | End: 2022-09-13
Payer: MEDICARE

## 2022-09-13 ENCOUNTER — OFFICE VISIT (OUTPATIENT)
Dept: CARDIOLOGY | Age: 72
End: 2022-09-13
Payer: MEDICARE

## 2022-09-13 VITALS
HEIGHT: 63 IN | RESPIRATION RATE: 17 BRPM | HEART RATE: 89 BPM | WEIGHT: 134.13 LBS | DIASTOLIC BLOOD PRESSURE: 90 MMHG | OXYGEN SATURATION: 91 % | BODY MASS INDEX: 23.77 KG/M2 | SYSTOLIC BLOOD PRESSURE: 130 MMHG

## 2022-09-13 VITALS
BODY MASS INDEX: 24.84 KG/M2 | OXYGEN SATURATION: 99 % | HEART RATE: 95 BPM | SYSTOLIC BLOOD PRESSURE: 130 MMHG | TEMPERATURE: 98 F | DIASTOLIC BLOOD PRESSURE: 84 MMHG | WEIGHT: 135 LBS | HEIGHT: 62 IN

## 2022-09-13 DIAGNOSIS — Z23 NEED FOR INFLUENZA VACCINATION: ICD-10-CM

## 2022-09-13 DIAGNOSIS — I34.0 NONRHEUMATIC MITRAL VALVE REGURGITATION: ICD-10-CM

## 2022-09-13 DIAGNOSIS — I51.89 DIASTOLIC DYSFUNCTION: ICD-10-CM

## 2022-09-13 DIAGNOSIS — C7B.00 METASTATIC CARCINOID TUMOR (HCC): ICD-10-CM

## 2022-09-13 DIAGNOSIS — I82.4Z3 DEEP VEIN THROMBOSIS (DVT) OF DISTAL VEIN OF BOTH LOWER EXTREMITIES, UNSPECIFIED CHRONICITY (HCC): ICD-10-CM

## 2022-09-13 DIAGNOSIS — I47.1 PSVT (PAROXYSMAL SUPRAVENTRICULAR TACHYCARDIA) (HCC): ICD-10-CM

## 2022-09-13 DIAGNOSIS — R73.09 ELEVATED GLUCOSE: ICD-10-CM

## 2022-09-13 DIAGNOSIS — R06.02 SOB (SHORTNESS OF BREATH): Primary | ICD-10-CM

## 2022-09-13 DIAGNOSIS — I10 ESSENTIAL HYPERTENSION: Primary | ICD-10-CM

## 2022-09-13 DIAGNOSIS — C78.7 HEPATIC METASTASIS (HCC): ICD-10-CM

## 2022-09-13 DIAGNOSIS — I82.4Y9 DEEP VEIN THROMBOSIS (DVT) OF PROXIMAL LOWER EXTREMITY, UNSPECIFIED CHRONICITY, UNSPECIFIED LATERALITY (HCC): ICD-10-CM

## 2022-09-13 DIAGNOSIS — I10 HYPERTENSION, ESSENTIAL: ICD-10-CM

## 2022-09-13 DIAGNOSIS — Z85.118 HISTORY OF LUNG CANCER: ICD-10-CM

## 2022-09-13 LAB
ALBUMIN SERPL-MCNC: 4.1 G/DL (ref 3.5–5.2)
ALBUMIN/GLOBULIN RATIO: 1.5 (ref 1–2.5)
ALP BLD-CCNC: 79 U/L (ref 35–104)
ALT SERPL-CCNC: 10 U/L (ref 5–33)
ANION GAP SERPL CALCULATED.3IONS-SCNC: 9 MMOL/L (ref 9–17)
AST SERPL-CCNC: 25 U/L
BILIRUB SERPL-MCNC: 0.2 MG/DL (ref 0.3–1.2)
BUN BLDV-MCNC: 18 MG/DL (ref 8–23)
BUN/CREAT BLD: 28 (ref 9–20)
CALCIUM SERPL-MCNC: 8.9 MG/DL (ref 8.6–10.4)
CHLORIDE BLD-SCNC: 107 MMOL/L (ref 98–107)
CHOLESTEROL/HDL RATIO: 2.5
CHOLESTEROL: 145 MG/DL
CO2: 25 MMOL/L (ref 20–31)
CREAT SERPL-MCNC: 0.65 MG/DL (ref 0.5–0.9)
ESTIMATED AVERAGE GLUCOSE: 77 MG/DL
GFR AFRICAN AMERICAN: >60 ML/MIN
GFR NON-AFRICAN AMERICAN: >60 ML/MIN
GFR SERPL CREATININE-BSD FRML MDRD: ABNORMAL ML/MIN/{1.73_M2}
GLUCOSE BLD-MCNC: 101 MG/DL (ref 70–99)
HBA1C MFR BLD: 4.3 % (ref 4–6)
HDLC SERPL-MCNC: 59 MG/DL
LDL CHOLESTEROL: 76 MG/DL (ref 0–130)
POTASSIUM SERPL-SCNC: 4.3 MMOL/L (ref 3.7–5.3)
SODIUM BLD-SCNC: 141 MMOL/L (ref 135–144)
TOTAL PROTEIN: 6.8 G/DL (ref 6.4–8.3)
TRIGL SERPL-MCNC: 51 MG/DL

## 2022-09-13 PROCEDURE — G8399 PT W/DXA RESULTS DOCUMENT: HCPCS | Performed by: FAMILY MEDICINE

## 2022-09-13 PROCEDURE — 3017F COLORECTAL CA SCREEN DOC REV: CPT | Performed by: INTERNAL MEDICINE

## 2022-09-13 PROCEDURE — 99212 OFFICE O/P EST SF 10 MIN: CPT

## 2022-09-13 PROCEDURE — 99214 OFFICE O/P EST MOD 30 MIN: CPT | Performed by: INTERNAL MEDICINE

## 2022-09-13 PROCEDURE — 1036F TOBACCO NON-USER: CPT | Performed by: INTERNAL MEDICINE

## 2022-09-13 PROCEDURE — G8427 DOCREV CUR MEDS BY ELIG CLIN: HCPCS | Performed by: FAMILY MEDICINE

## 2022-09-13 PROCEDURE — 1123F ACP DISCUSS/DSCN MKR DOCD: CPT | Performed by: INTERNAL MEDICINE

## 2022-09-13 PROCEDURE — 83036 HEMOGLOBIN GLYCOSYLATED A1C: CPT

## 2022-09-13 PROCEDURE — G8399 PT W/DXA RESULTS DOCUMENT: HCPCS | Performed by: INTERNAL MEDICINE

## 2022-09-13 PROCEDURE — G8420 CALC BMI NORM PARAMETERS: HCPCS | Performed by: INTERNAL MEDICINE

## 2022-09-13 PROCEDURE — 1123F ACP DISCUSS/DSCN MKR DOCD: CPT | Performed by: FAMILY MEDICINE

## 2022-09-13 PROCEDURE — PBSHW INFLUENZA, FLUAD, (AGE 65 Y+), IM, PF, 0.5 ML: Performed by: FAMILY MEDICINE

## 2022-09-13 PROCEDURE — 1090F PRES/ABSN URINE INCON ASSESS: CPT | Performed by: INTERNAL MEDICINE

## 2022-09-13 PROCEDURE — 1090F PRES/ABSN URINE INCON ASSESS: CPT | Performed by: FAMILY MEDICINE

## 2022-09-13 PROCEDURE — 6360000002 HC RX W HCPCS: Performed by: INTERNAL MEDICINE

## 2022-09-13 PROCEDURE — 80053 COMPREHEN METABOLIC PANEL: CPT

## 2022-09-13 PROCEDURE — G8427 DOCREV CUR MEDS BY ELIG CLIN: HCPCS | Performed by: INTERNAL MEDICINE

## 2022-09-13 PROCEDURE — 3017F COLORECTAL CA SCREEN DOC REV: CPT | Performed by: FAMILY MEDICINE

## 2022-09-13 PROCEDURE — 99214 OFFICE O/P EST MOD 30 MIN: CPT | Performed by: FAMILY MEDICINE

## 2022-09-13 PROCEDURE — G8420 CALC BMI NORM PARAMETERS: HCPCS | Performed by: FAMILY MEDICINE

## 2022-09-13 PROCEDURE — 36591 DRAW BLOOD OFF VENOUS DEVICE: CPT

## 2022-09-13 PROCEDURE — 80061 LIPID PANEL: CPT

## 2022-09-13 PROCEDURE — 1036F TOBACCO NON-USER: CPT | Performed by: FAMILY MEDICINE

## 2022-09-13 PROCEDURE — 2580000003 HC RX 258: Performed by: INTERNAL MEDICINE

## 2022-09-13 PROCEDURE — G0008 ADMIN INFLUENZA VIRUS VAC: HCPCS | Performed by: FAMILY MEDICINE

## 2022-09-13 RX ORDER — PSYLLIUM HUSK 3.4 G/7G
POWDER ORAL
COMMUNITY
Start: 2022-08-30

## 2022-09-13 RX ORDER — HEPARIN SODIUM (PORCINE) LOCK FLUSH IV SOLN 100 UNIT/ML 100 UNIT/ML
500 SOLUTION INTRAVENOUS PRN
Status: CANCELLED | OUTPATIENT
Start: 2022-09-13

## 2022-09-13 RX ORDER — FERROUS SULFATE 325(65) MG
TABLET ORAL
COMMUNITY
Start: 2022-09-12

## 2022-09-13 RX ORDER — SODIUM CHLORIDE 0.9 % (FLUSH) 0.9 %
5-40 SYRINGE (ML) INJECTION PRN
Status: DISCONTINUED | OUTPATIENT
Start: 2022-09-13 | End: 2022-09-14 | Stop reason: HOSPADM

## 2022-09-13 RX ORDER — HEPARIN SODIUM (PORCINE) LOCK FLUSH IV SOLN 100 UNIT/ML 100 UNIT/ML
500 SOLUTION INTRAVENOUS PRN
Status: DISCONTINUED | OUTPATIENT
Start: 2022-09-13 | End: 2022-09-14 | Stop reason: HOSPADM

## 2022-09-13 RX ORDER — SODIUM CHLORIDE 0.9 % (FLUSH) 0.9 %
5-40 SYRINGE (ML) INJECTION PRN
Status: CANCELLED | OUTPATIENT
Start: 2022-09-13

## 2022-09-13 RX ORDER — SODIUM CHLORIDE 9 MG/ML
25 INJECTION, SOLUTION INTRAVENOUS PRN
OUTPATIENT
Start: 2022-09-13

## 2022-09-13 RX ADMIN — SODIUM CHLORIDE, PRESERVATIVE FREE 10 ML: 5 INJECTION INTRAVENOUS at 09:25

## 2022-09-13 RX ADMIN — HEPARIN 500 UNITS: 100 SYRINGE at 09:26
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
## 2022-09-15 DIAGNOSIS — I10 ESSENTIAL HYPERTENSION: ICD-10-CM

## 2022-09-15 RX ORDER — LISINOPRIL 30 MG/1
TABLET ORAL
Qty: 90 TABLET | Refills: 1 | Status: SHIPPED | OUTPATIENT
Start: 2022-09-15

## 2022-09-15 NOTE — TELEPHONE ENCOUNTER
Gavin Saunders called requesting a refill of the below medication which has been pended for you:     Requested Prescriptions     Pending Prescriptions Disp Refills    lisinopril (PRINIVIL;ZESTRIL) 30 MG tablet [Pharmacy Med Name: LISINOPRIL 30 MG TABLET] 90 tablet 1     Sig: take 1 tablet by mouth once daily       Last Appointment Date: 9/13/2022  Next Appointment Date: 3/14/2023    Allergies   Allergen Reactions    Effexor Xr [Venlafaxine Hcl]      Made blood pressure go high    Venlafaxine

## 2022-09-21 ENCOUNTER — TELEPHONE (OUTPATIENT)
Dept: CARDIOLOGY | Age: 72
End: 2022-09-21

## 2022-09-21 NOTE — TELEPHONE ENCOUNTER
Dr Luis Page calling from [facility]. She is treating the pt and would like to discuss some issues they are having with the pt heart rate.  Please call to advise     [phone]

## 2022-09-22 NOTE — TELEPHONE ENCOUNTER
The patient called today with concerns related to this. She stated that her heart rate is at 123 today and her BP is 114/83. She would like to know if her medications can be adjusted to lower her pulse.

## 2022-09-22 NOTE — TELEPHONE ENCOUNTER
Received care notes from [facility], which are scanned to this encounter.  Please review and advise (2nd to last page describes heart rate problems)

## 2022-09-26 ENCOUNTER — PATIENT MESSAGE (OUTPATIENT)
Dept: FAMILY MEDICINE CLINIC | Age: 72
End: 2022-09-26

## 2022-09-27 RX ORDER — AMIODARONE HYDROCHLORIDE 200 MG/1
200 TABLET ORAL 2 TIMES DAILY
Qty: 180 TABLET | Refills: 3 | Status: SHIPPED | OUTPATIENT
Start: 2022-09-27

## 2022-09-27 NOTE — TELEPHONE ENCOUNTER
Patient having Afib c RVR. Cardizem is at quite a high dose. Will add amiodarone, and reassess in few weeks.

## 2022-09-27 NOTE — TELEPHONE ENCOUNTER
Nicola Beauchamp calling from Dr Farrukh Alcaraz office to ask about this pt. The pt called PCP to find out if she is going to be changing anything. Please address note or call pt cancer treatment, Dr Kayy Barbour, Laurie Terrell @ [phone] as indicated in first note.

## 2022-09-27 NOTE — TELEPHONE ENCOUNTER
Patient notified of need for amiodarone, which was sent to pharmacy per Dr Nikole Montoya. Also scheduled pt for follow up on 10/18/22 with Dr Nikole Montoya. Pt verbalized understanding and had no further questions at the time.

## 2022-09-27 NOTE — TELEPHONE ENCOUNTER
See telephone call from 9/21/22. Spoke to cardiology today and they will resend message to Dr. Johnie Cummins to review.

## 2022-09-27 NOTE — TELEPHONE ENCOUNTER
From: Eboni Phillips  To: Dr. Mandie Haq: 9/26/2022 10:53 PM EDT  Subject: Diltiazem    Dr Yanci Moise was requesting that Dr Suzanne Kwong raise the amount of this medicine. Dr Suzanne Kwong can reach Dr Yanci Moise on my chart.

## 2022-10-18 ENCOUNTER — OFFICE VISIT (OUTPATIENT)
Dept: CARDIOLOGY | Age: 72
End: 2022-10-18
Payer: MEDICARE

## 2022-10-18 ENCOUNTER — HOSPITAL ENCOUNTER (OUTPATIENT)
Dept: INFUSION THERAPY | Age: 72
Discharge: HOME | End: 2022-10-18
Payer: MEDICARE

## 2022-10-18 VITALS
HEART RATE: 81 BPM | HEIGHT: 62 IN | WEIGHT: 134.4 LBS | SYSTOLIC BLOOD PRESSURE: 126 MMHG | BODY MASS INDEX: 24.73 KG/M2 | DIASTOLIC BLOOD PRESSURE: 78 MMHG

## 2022-10-18 DIAGNOSIS — I51.89 DIASTOLIC DYSFUNCTION: ICD-10-CM

## 2022-10-18 DIAGNOSIS — R06.02 SOB (SHORTNESS OF BREATH): ICD-10-CM

## 2022-10-18 DIAGNOSIS — I47.1 PSVT (PAROXYSMAL SUPRAVENTRICULAR TACHYCARDIA) (HCC): ICD-10-CM

## 2022-10-18 DIAGNOSIS — E66.09 CLASS 1 OBESITY DUE TO EXCESS CALORIES WITH SERIOUS COMORBIDITY IN ADULT, UNSPECIFIED BMI: ICD-10-CM

## 2022-10-18 DIAGNOSIS — C7B.00 METASTATIC CARCINOID TUMOR (HCC): Primary | ICD-10-CM

## 2022-10-18 DIAGNOSIS — I47.1 PAROXYSMAL SUPRAVENTRICULAR TACHYCARDIA (HCC): Primary | ICD-10-CM

## 2022-10-18 DIAGNOSIS — I10 HYPERTENSION, ESSENTIAL: ICD-10-CM

## 2022-10-18 LAB
ABSOLUTE EOS #: 0.05 K/UL (ref 0–0.44)
ABSOLUTE IMMATURE GRANULOCYTE: <0.03 K/UL (ref 0–0.3)
ABSOLUTE LYMPH #: 0.74 K/UL (ref 1.1–3.7)
ABSOLUTE MONO #: 0.38 K/UL (ref 0.1–1.2)
BASOPHILS # BLD: 1 % (ref 0–2)
BASOPHILS ABSOLUTE: <0.03 K/UL (ref 0–0.2)
EOSINOPHILS RELATIVE PERCENT: 2 % (ref 1–4)
HCT VFR BLD CALC: 33.3 % (ref 36.3–47.1)
HEMOGLOBIN: 10.2 G/DL (ref 11.9–15.1)
IMMATURE GRANULOCYTES: 0 %
LYMPHOCYTES # BLD: 25 % (ref 24–43)
MCH RBC QN AUTO: 22.8 PG (ref 25.2–33.5)
MCHC RBC AUTO-ENTMCNC: 30.6 G/DL (ref 25.2–33.5)
MCV RBC AUTO: 74.3 FL (ref 82.6–102.9)
MONOCYTES # BLD: 13 % (ref 3–12)
NRBC AUTOMATED: 0 PER 100 WBC
PDW BLD-RTO: 15.7 % (ref 11.8–14.4)
PLATELET # BLD: 160 K/UL (ref 138–453)
PMV BLD AUTO: 9.4 FL (ref 8.1–13.5)
RBC # BLD: 4.48 M/UL (ref 3.95–5.11)
RBC # BLD: ABNORMAL 10*6/UL
SEG NEUTROPHILS: 59 % (ref 36–65)
SEGMENTED NEUTROPHILS ABSOLUTE COUNT: 1.73 K/UL (ref 1.5–8.1)
WBC # BLD: 2.9 K/UL (ref 3.5–11.3)

## 2022-10-18 PROCEDURE — 1090F PRES/ABSN URINE INCON ASSESS: CPT | Performed by: INTERNAL MEDICINE

## 2022-10-18 PROCEDURE — 85025 COMPLETE CBC W/AUTO DIFF WBC: CPT

## 2022-10-18 PROCEDURE — G8399 PT W/DXA RESULTS DOCUMENT: HCPCS | Performed by: INTERNAL MEDICINE

## 2022-10-18 PROCEDURE — G8427 DOCREV CUR MEDS BY ELIG CLIN: HCPCS | Performed by: INTERNAL MEDICINE

## 2022-10-18 PROCEDURE — 6360000002 HC RX W HCPCS: Performed by: INTERNAL MEDICINE

## 2022-10-18 PROCEDURE — G8484 FLU IMMUNIZE NO ADMIN: HCPCS | Performed by: INTERNAL MEDICINE

## 2022-10-18 PROCEDURE — 1036F TOBACCO NON-USER: CPT | Performed by: INTERNAL MEDICINE

## 2022-10-18 PROCEDURE — 93010 ELECTROCARDIOGRAM REPORT: CPT | Performed by: INTERNAL MEDICINE

## 2022-10-18 PROCEDURE — 99212 OFFICE O/P EST SF 10 MIN: CPT | Performed by: INTERNAL MEDICINE

## 2022-10-18 PROCEDURE — 93005 ELECTROCARDIOGRAM TRACING: CPT | Performed by: INTERNAL MEDICINE

## 2022-10-18 PROCEDURE — 99214 OFFICE O/P EST MOD 30 MIN: CPT | Performed by: INTERNAL MEDICINE

## 2022-10-18 PROCEDURE — 36591 DRAW BLOOD OFF VENOUS DEVICE: CPT

## 2022-10-18 PROCEDURE — 3017F COLORECTAL CA SCREEN DOC REV: CPT | Performed by: INTERNAL MEDICINE

## 2022-10-18 PROCEDURE — 2580000003 HC RX 258: Performed by: INTERNAL MEDICINE

## 2022-10-18 PROCEDURE — G8420 CALC BMI NORM PARAMETERS: HCPCS | Performed by: INTERNAL MEDICINE

## 2022-10-18 PROCEDURE — 1123F ACP DISCUSS/DSCN MKR DOCD: CPT | Performed by: INTERNAL MEDICINE

## 2022-10-18 RX ORDER — HEPARIN SODIUM (PORCINE) LOCK FLUSH IV SOLN 100 UNIT/ML 100 UNIT/ML
500 SOLUTION INTRAVENOUS PRN
Status: CANCELLED | OUTPATIENT
Start: 2022-10-18

## 2022-10-18 RX ORDER — HEPARIN SODIUM (PORCINE) LOCK FLUSH IV SOLN 100 UNIT/ML 100 UNIT/ML
500 SOLUTION INTRAVENOUS PRN
Status: DISCONTINUED | OUTPATIENT
Start: 2022-10-18 | End: 2022-10-19 | Stop reason: HOSPADM

## 2022-10-18 RX ORDER — WATER 1000 ML/1000ML
2.2 INJECTION, SOLUTION INTRAVENOUS ONCE
Status: CANCELLED | OUTPATIENT
Start: 2022-10-18 | End: 2022-10-18

## 2022-10-18 RX ORDER — SODIUM CHLORIDE 9 MG/ML
25 INJECTION, SOLUTION INTRAVENOUS PRN
Status: CANCELLED | OUTPATIENT
Start: 2022-10-18

## 2022-10-18 RX ORDER — SODIUM CHLORIDE 0.9 % (FLUSH) 0.9 %
5-40 SYRINGE (ML) INJECTION PRN
Status: CANCELLED | OUTPATIENT
Start: 2022-10-18

## 2022-10-18 RX ORDER — SODIUM CHLORIDE 0.9 % (FLUSH) 0.9 %
5-40 SYRINGE (ML) INJECTION PRN
Status: DISCONTINUED | OUTPATIENT
Start: 2022-10-18 | End: 2022-10-19 | Stop reason: HOSPADM

## 2022-10-18 RX ADMIN — SODIUM CHLORIDE, PRESERVATIVE FREE 10 ML: 5 INJECTION INTRAVENOUS at 10:38

## 2022-10-18 RX ADMIN — HEPARIN 500 UNITS: 100 SYRINGE at 10:38
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
## 2022-10-18 NOTE — PROGRESS NOTES
VAD accessed per protocol with 3/4 \" hong needle. Flushed easily with saline. Blood return noted and labs drawn Flushed with 10 ml of NSS and 5 ml of Heparin flush. VAD D/C'd and Band-Aid to site. Patient stable and discharged to home.

## 2022-11-14 ENCOUNTER — HOSPITAL ENCOUNTER (OUTPATIENT)
Dept: INFUSION THERAPY | Age: 72
Discharge: HOME | End: 2022-11-14
Payer: MEDICARE

## 2022-11-14 ENCOUNTER — HOSPITAL ENCOUNTER (OUTPATIENT)
Age: 72
Setting detail: SPECIMEN
Discharge: HOME | End: 2022-11-14
Payer: MEDICARE

## 2022-11-14 DIAGNOSIS — I82.4Z3 DEEP VEIN THROMBOSIS (DVT) OF DISTAL VEIN OF BOTH LOWER EXTREMITIES, UNSPECIFIED CHRONICITY (HCC): ICD-10-CM

## 2022-11-14 DIAGNOSIS — C7B.00 METASTATIC CARCINOID TUMOR (HCC): Primary | ICD-10-CM

## 2022-11-14 LAB
ABSOLUTE EOS #: 0.04 K/UL (ref 0–0.44)
ABSOLUTE IMMATURE GRANULOCYTE: <0.03 K/UL (ref 0–0.3)
ABSOLUTE LYMPH #: 0.91 K/UL (ref 1.1–3.7)
ABSOLUTE MONO #: 0.56 K/UL (ref 0.1–1.2)
BASOPHILS # BLD: 1 % (ref 0–2)
BASOPHILS ABSOLUTE: 0.03 K/UL (ref 0–0.2)
EOSINOPHILS RELATIVE PERCENT: 1 % (ref 1–4)
HCT VFR BLD CALC: 32.1 % (ref 36.3–47.1)
HEMOGLOBIN: 9.8 G/DL (ref 11.9–15.1)
IMMATURE GRANULOCYTES: 0 %
LYMPHOCYTES # BLD: 18 % (ref 24–43)
MCH RBC QN AUTO: 22.9 PG (ref 25.2–33.5)
MCHC RBC AUTO-ENTMCNC: 30.5 G/DL (ref 25.2–33.5)
MCV RBC AUTO: 75 FL (ref 82.6–102.9)
MONOCYTES # BLD: 11 % (ref 3–12)
PDW BLD-RTO: 19.2 % (ref 11.8–14.4)
PLATELET # BLD: 209 K/UL (ref 138–453)
PMV BLD AUTO: 9.2 FL (ref 8.1–13.5)
RBC # BLD: 4.28 M/UL (ref 3.95–5.11)
RBC # BLD: ABNORMAL 10*6/UL
SEG NEUTROPHILS: 69 % (ref 36–65)
SEGMENTED NEUTROPHILS ABSOLUTE COUNT: 3.39 K/UL (ref 1.5–8.1)
WBC # BLD: 4.9 K/UL (ref 3.5–11.3)

## 2022-11-14 PROCEDURE — 2580000003 HC RX 258: Performed by: INTERNAL MEDICINE

## 2022-11-14 PROCEDURE — 85025 COMPLETE CBC W/AUTO DIFF WBC: CPT

## 2022-11-14 PROCEDURE — 36591 DRAW BLOOD OFF VENOUS DEVICE: CPT

## 2022-11-14 PROCEDURE — 6360000002 HC RX W HCPCS: Performed by: INTERNAL MEDICINE

## 2022-11-14 RX ORDER — HEPARIN SODIUM (PORCINE) LOCK FLUSH IV SOLN 100 UNIT/ML 100 UNIT/ML
500 SOLUTION INTRAVENOUS PRN
Status: DISCONTINUED | OUTPATIENT
Start: 2022-11-14 | End: 2022-11-15 | Stop reason: HOSPADM

## 2022-11-14 RX ORDER — WATER 1000 ML/1000ML
2.2 INJECTION, SOLUTION INTRAVENOUS ONCE
OUTPATIENT
Start: 2022-11-14 | End: 2022-11-14

## 2022-11-14 RX ORDER — SODIUM CHLORIDE 0.9 % (FLUSH) 0.9 %
5-40 SYRINGE (ML) INJECTION PRN
Status: DISCONTINUED | OUTPATIENT
Start: 2022-11-14 | End: 2022-11-15 | Stop reason: HOSPADM

## 2022-11-14 RX ORDER — SODIUM CHLORIDE 9 MG/ML
25 INJECTION, SOLUTION INTRAVENOUS PRN
OUTPATIENT
Start: 2022-11-14

## 2022-11-14 RX ORDER — HEPARIN SODIUM (PORCINE) LOCK FLUSH IV SOLN 100 UNIT/ML 100 UNIT/ML
500 SOLUTION INTRAVENOUS PRN
OUTPATIENT
Start: 2022-11-14

## 2022-11-14 RX ORDER — SODIUM CHLORIDE 0.9 % (FLUSH) 0.9 %
5-40 SYRINGE (ML) INJECTION PRN
OUTPATIENT
Start: 2022-11-14

## 2022-11-14 RX ADMIN — HEPARIN 500 UNITS: 100 SYRINGE at 13:53

## 2022-11-14 RX ADMIN — SODIUM CHLORIDE, PRESERVATIVE FREE 10 ML: 5 INJECTION INTRAVENOUS at 13:52

## 2022-11-14 RX ADMIN — SODIUM CHLORIDE, PRESERVATIVE FREE 10 ML: 5 INJECTION INTRAVENOUS at 13:53

## 2022-11-14 NOTE — TELEPHONE ENCOUNTER
[facility] called requesting a refill of the below medication which has been pended for you:     Requested Prescriptions     Pending Prescriptions Disp Refills    apixaban (ELIQUIS) 5 MG TABS tablet [Pharmacy Med Name: ELIQUIS 5 MG TABLET] 180 tablet 1     Sig: take 1 tablet by mouth twice a day       Last Appointment Date: 9/13/2022  Next Appointment Date: 3/14/2023    Allergies   Allergen Reactions    Effexor Xr [Venlafaxine Hcl]      Made blood pressure go high    Venlafaxine

## 2022-11-28 ENCOUNTER — OFFICE VISIT (OUTPATIENT)
Dept: FAMILY MEDICINE CLINIC | Age: 72
End: 2022-11-28
Payer: MEDICARE

## 2022-11-28 ENCOUNTER — HOSPITAL ENCOUNTER (OUTPATIENT)
Age: 72
Setting detail: SPECIMEN
Discharge: HOME | End: 2022-11-28
Payer: MEDICARE

## 2022-11-28 VITALS
HEART RATE: 65 BPM | HEIGHT: 62 IN | DIASTOLIC BLOOD PRESSURE: 78 MMHG | BODY MASS INDEX: 24.66 KG/M2 | OXYGEN SATURATION: 97 % | WEIGHT: 134 LBS | SYSTOLIC BLOOD PRESSURE: 126 MMHG | RESPIRATION RATE: 16 BRPM

## 2022-11-28 DIAGNOSIS — R30.0 DYSURIA: ICD-10-CM

## 2022-11-28 DIAGNOSIS — N39.0 URINARY TRACT INFECTION WITH HEMATURIA, SITE UNSPECIFIED: Primary | ICD-10-CM

## 2022-11-28 DIAGNOSIS — R31.9 URINARY TRACT INFECTION WITH HEMATURIA, SITE UNSPECIFIED: Primary | ICD-10-CM

## 2022-11-28 DIAGNOSIS — N89.8 VAGINAL ITCHING: ICD-10-CM

## 2022-11-28 DIAGNOSIS — B37.31 VAGINAL CANDIDA: ICD-10-CM

## 2022-11-28 DIAGNOSIS — R30.0 DYSURIA: Primary | ICD-10-CM

## 2022-11-28 LAB
BACTERIA: ABNORMAL
BILIRUBIN URINE: ABNORMAL
EPITHELIAL CELLS UA: ABNORMAL /HPF (ref 0–5)
GLUCOSE URINE: NEGATIVE
KETONES, URINE: NEGATIVE
LEUKOCYTE ESTERASE, URINE: ABNORMAL
NITRITE, URINE: NEGATIVE
PH UA: 5.5 (ref 5–6)
PROTEIN UA: ABNORMAL
RBC UA: ABNORMAL /HPF (ref 0–4)
SPECIFIC GRAVITY UA: 1.03 (ref 1.01–1.02)
URINE HGB: ABNORMAL
UROBILINOGEN, URINE: NORMAL
WBC UA: ABNORMAL /HPF (ref 0–4)

## 2022-11-28 PROCEDURE — 99212 OFFICE O/P EST SF 10 MIN: CPT

## 2022-11-28 PROCEDURE — 3078F DIAST BP <80 MM HG: CPT | Performed by: NURSE PRACTITIONER

## 2022-11-28 PROCEDURE — 81001 URINALYSIS AUTO W/SCOPE: CPT

## 2022-11-28 PROCEDURE — G8420 CALC BMI NORM PARAMETERS: HCPCS | Performed by: NURSE PRACTITIONER

## 2022-11-28 PROCEDURE — 1090F PRES/ABSN URINE INCON ASSESS: CPT | Performed by: NURSE PRACTITIONER

## 2022-11-28 PROCEDURE — 99213 OFFICE O/P EST LOW 20 MIN: CPT | Performed by: NURSE PRACTITIONER

## 2022-11-28 PROCEDURE — G8399 PT W/DXA RESULTS DOCUMENT: HCPCS | Performed by: NURSE PRACTITIONER

## 2022-11-28 PROCEDURE — 1123F ACP DISCUSS/DSCN MKR DOCD: CPT | Performed by: NURSE PRACTITIONER

## 2022-11-28 PROCEDURE — G8427 DOCREV CUR MEDS BY ELIG CLIN: HCPCS | Performed by: NURSE PRACTITIONER

## 2022-11-28 PROCEDURE — 3017F COLORECTAL CA SCREEN DOC REV: CPT | Performed by: NURSE PRACTITIONER

## 2022-11-28 PROCEDURE — G8484 FLU IMMUNIZE NO ADMIN: HCPCS | Performed by: NURSE PRACTITIONER

## 2022-11-28 PROCEDURE — 3074F SYST BP LT 130 MM HG: CPT | Performed by: NURSE PRACTITIONER

## 2022-11-28 PROCEDURE — 1036F TOBACCO NON-USER: CPT | Performed by: NURSE PRACTITIONER

## 2022-11-28 RX ORDER — CEFDINIR 300 MG/1
300 CAPSULE ORAL 2 TIMES DAILY
Qty: 14 CAPSULE | Refills: 0 | Status: SHIPPED | OUTPATIENT
Start: 2022-11-28 | End: 2022-12-05

## 2022-11-28 RX ORDER — EVEROLIMUS 5 MG/1
TABLET ORAL
COMMUNITY
Start: 2022-11-11

## 2022-11-28 RX ORDER — FLUCONAZOLE 100 MG/1
TABLET ORAL
Qty: 3 TABLET | Refills: 0 | Status: SHIPPED | OUTPATIENT
Start: 2022-11-28

## 2022-11-28 ASSESSMENT — PATIENT HEALTH QUESTIONNAIRE - PHQ9
SUM OF ALL RESPONSES TO PHQ QUESTIONS 1-9: 0
SUM OF ALL RESPONSES TO PHQ QUESTIONS 1-9: 0
SUM OF ALL RESPONSES TO PHQ9 QUESTIONS 1 & 2: 0
1. LITTLE INTEREST OR PLEASURE IN DOING THINGS: 0
SUM OF ALL RESPONSES TO PHQ QUESTIONS 1-9: 0
2. FEELING DOWN, DEPRESSED OR HOPELESS: 0
SUM OF ALL RESPONSES TO PHQ QUESTIONS 1-9: 0

## 2022-11-28 ASSESSMENT — ENCOUNTER SYMPTOMS: ABDOMINAL PAIN: 0

## 2022-11-28 NOTE — PROGRESS NOTES
Subjective:      Patient ID: Chapincito Sofia is a 81 y.o. female coming in for   Chief Complaint   Patient presents with    Urinary Frequency     Urinary urgency. Burning with urination. Vaginal itching. Has been going on for a few days. Vaginal Itching        HPI  Presents to office for urinary complaints. Pressure prior to urinating, mild dysuria, vaginal itching, no discharge. Urgency. Symptoms ongoing for three days. Review of Systems   Constitutional:  Negative for fever. Gastrointestinal:  Negative for abdominal pain. Genitourinary:  Positive for dysuria and urgency. Negative for hematuria, vaginal bleeding and vaginal discharge. Objective:/78   Pulse 65   Resp 16   Ht 5' 2\" (1.575 m)   Wt 134 lb (60.8 kg)   SpO2 97%   BMI 24.51 kg/m²      Physical Exam  Vitals and nursing note reviewed. Constitutional:       General: She is not in acute distress. Appearance: Normal appearance. She is not ill-appearing. HENT:      Head: Normocephalic. Cardiovascular:      Rate and Rhythm: Normal rate. Rhythm irregularly irregular. Heart sounds: Normal heart sounds. Pulmonary:      Effort: Pulmonary effort is normal.      Breath sounds: Normal breath sounds. Abdominal:      General: Bowel sounds are normal.      Palpations: Abdomen is soft. Tenderness: There is no abdominal tenderness. There is no right CVA tenderness, left CVA tenderness, guarding or rebound. Musculoskeletal:      Right lower leg: No edema. Left lower leg: No edema. Skin:     General: Skin is warm and dry. Findings: No rash. Neurological:      General: No focal deficit present. Mental Status: She is alert and oriented to person, place, and time. Assessment:      1. Dysuria    2. Vaginal itching           Plan:    -will treat for candida, but obtain urine to assess for UTI as well  -will call pt with results.      Orders Placed This Encounter   Procedures    Urinalysis with Reflex to Culture     Standing Status:   Future     Number of Occurrences:   1     Standing Expiration Date:   11/28/2023     Order Specific Question:   SPECIFY(EX-CATH,MIDSTREAM,CYSTO,ETC)? Answer:   clean catch      Outpatient Encounter Medications as of 11/28/2022   Medication Sig Dispense Refill    Everolimus 5 MG TABS       apixaban (ELIQUIS) 5 MG TABS tablet take 1 tablet by mouth twice a day 180 tablet 1    amiodarone (CORDARONE) 200 MG tablet Take 1 tablet by mouth 2 times daily 180 tablet 3    lisinopril (PRINIVIL;ZESTRIL) 30 MG tablet take 1 tablet by mouth once daily 90 tablet 1    RA VITAMIN B-12 TR 1000 MCG TBCR take 1 tablet by mouth once daily      FEROSUL 325 (65 Fe) MG tablet       dilTIAZem (TIAZAC) 240 MG extended release capsule take 1 capsule by mouth once daily 90 capsule 0    everolimus (AFINITOR) 10 MG TABS chemo tablet Take 10 mg by mouth daily      Nutritional Supplements (ENSURE) LIQD as needed       Handicap Placard MISC by Does not apply route 1 each 0    fluticasone (FLONASE) 50 MCG/ACT nasal spray 1 spray by Nasal route daily as needed for Rhinitis 1 Bottle 5    ondansetron (ZOFRAN) 4 MG tablet Take 1 tablet by mouth every 8 hours as needed for Nausea 20 tablet 0    acetaminophen (TYLENOL) 325 MG tablet Take 2 tablets by mouth every 4 hours as needed for Pain or Fever 120 tablet 0     No facility-administered encounter medications on file as of 11/28/2022.             Richard Andrew, APRN - CNP

## 2022-12-19 DIAGNOSIS — I10 ESSENTIAL HYPERTENSION: ICD-10-CM

## 2022-12-19 NOTE — TELEPHONE ENCOUNTER
Shira Gregory called requesting a refill of the below medication which has been pended for you:     Requested Prescriptions     Pending Prescriptions Disp Refills    dilTIAZem (TIAZAC) 240 MG extended release capsule [Pharmacy Med Name: DILTIAZEM 24HR  MG CAP] 90 capsule 1     Sig: take 1 capsule by mouth once daily       Last Appointment Date: 9/13/2022  Next Appointment Date: 3/14/2023    Allergies   Allergen Reactions    Effexor Xr [Venlafaxine Hcl]      Made blood pressure go high    Venlafaxine

## 2022-12-20 RX ORDER — DILTIAZEM HYDROCHLORIDE 240 MG/1
CAPSULE, EXTENDED RELEASE ORAL
Qty: 90 CAPSULE | Refills: 0 | Status: SHIPPED | OUTPATIENT
Start: 2022-12-20

## 2023-01-01 DIAGNOSIS — R60.0 LOWER EXTREMITY EDEMA: ICD-10-CM

## 2023-01-01 RX ORDER — FUROSEMIDE 20 MG/1
TABLET ORAL
Qty: 30 TABLET | Refills: 3 | Status: SHIPPED | OUTPATIENT
Start: 2023-01-01 | End: 2023-11-03

## 2023-01-08 ENCOUNTER — HOSPITAL ENCOUNTER (EMERGENCY)
Age: 73
Discharge: HOME | End: 2023-01-08
Attending: EMERGENCY MEDICINE
Payer: MEDICARE

## 2023-01-08 ENCOUNTER — APPOINTMENT (OUTPATIENT)
Dept: GENERAL RADIOLOGY | Age: 73
End: 2023-01-08
Payer: MEDICARE

## 2023-01-08 VITALS
WEIGHT: 134 LBS | OXYGEN SATURATION: 99 % | HEART RATE: 90 BPM | RESPIRATION RATE: 20 BRPM | BODY MASS INDEX: 23.74 KG/M2 | SYSTOLIC BLOOD PRESSURE: 133 MMHG | HEIGHT: 63 IN | TEMPERATURE: 98.8 F | DIASTOLIC BLOOD PRESSURE: 77 MMHG

## 2023-01-08 DIAGNOSIS — J10.1 INFLUENZA A: Primary | ICD-10-CM

## 2023-01-08 LAB
FLU A ANTIGEN: POSITIVE
FLU B ANTIGEN: NEGATIVE
SARS-COV-2, RAPID: NOT DETECTED
SPECIMEN DESCRIPTION: NORMAL

## 2023-01-08 PROCEDURE — 87635 SARS-COV-2 COVID-19 AMP PRB: CPT

## 2023-01-08 PROCEDURE — 99284 EMERGENCY DEPT VISIT MOD MDM: CPT

## 2023-01-08 PROCEDURE — 71046 X-RAY EXAM CHEST 2 VIEWS: CPT

## 2023-01-08 PROCEDURE — 87804 INFLUENZA ASSAY W/OPTIC: CPT

## 2023-01-08 RX ORDER — BENZONATATE 200 MG/1
200 CAPSULE ORAL 3 TIMES DAILY PRN
Qty: 30 CAPSULE | Refills: 0 | Status: ON HOLD | OUTPATIENT
Start: 2023-01-08 | End: 2023-01-15

## 2023-01-08 ASSESSMENT — ENCOUNTER SYMPTOMS
EYE PAIN: 0
BLOOD IN STOOL: 0
CONSTIPATION: 0
NAUSEA: 0
DIARRHEA: 0
COUGH: 1
VOMITING: 1
BACK PAIN: 0
ABDOMINAL PAIN: 0
SHORTNESS OF BREATH: 0

## 2023-01-08 ASSESSMENT — PAIN SCALES - GENERAL: PAINLEVEL_OUTOF10: 5

## 2023-01-08 ASSESSMENT — PAIN - FUNCTIONAL ASSESSMENT: PAIN_FUNCTIONAL_ASSESSMENT: 0-10

## 2023-01-08 ASSESSMENT — PAIN DESCRIPTION - ORIENTATION: ORIENTATION: LOWER

## 2023-01-08 ASSESSMENT — PAIN DESCRIPTION - LOCATION: LOCATION: ABDOMEN

## 2023-01-08 NOTE — ED PROVIDER NOTES
[facility]  eMERGENCY dEPARTMENT eNCOUnter      Pt Name: Chapincito Sofia  MRN: [id number]  [place] 1950  Date of evaluation: 1/8/2023      CHIEF COMPLAINT       Chief Complaint   Patient presents with    Cough     Started 5 days ago         [address] [place] Parada is a 81 y.o. female who presents cough that started 5 days ago got better and it is gotten worse she says the cough is nonproductive she is a little bit of body aches especially both her upper thighs she has had 1 episode of posttussive spit ups or if she gag but no real vomiting no diarrhea no fever she does have a remote history of lung cancer resected on the right years ago there is been follow-up but there was no recurrence and no chemoradiation she is a non-smoker  She has had other cancers as well    REVIEW OF SYSTEMS         Review of Systems   Constitutional:  Negative for chills and fever. HENT:  Negative for congestion and ear pain. Eyes:  Negative for pain and visual disturbance. Respiratory:  Positive for cough. Negative for shortness of breath. Cardiovascular:  Negative for chest pain, palpitations and leg swelling. Gastrointestinal:  Positive for vomiting. Negative for abdominal pain, blood in stool, constipation, diarrhea and nausea. Endocrine: Negative for polydipsia and polyuria. Genitourinary:  Negative for difficulty urinating, dysuria and frequency. Musculoskeletal:  Positive for myalgias. Negative for back pain, joint swelling, neck pain and neck stiffness. Skin:  Negative for rash. Neurological:  Negative for dizziness, weakness and headaches. Hematological:  Negative for adenopathy. Does not bruise/bleed easily. Psychiatric/Behavioral:  Negative for confusion, self-injury and suicidal ideas.         PAST MEDICAL HISTORY    has a past medical history of Atrial fibrillation ([facility] [address] Atypical carcinoid lung tumor ([facility] [address] Breast cancer ([facility] [address] History of 2019 novel coronavirus disease (COVID-19), History of therapeutic radiation, Hx antineoplastic chemo, Hypertension, Left knee pain, Liver cancer ([address] Lung cancer ([address] Myopia with astigmatism and presbyopia, Neuroendocrine carcinoma ([address] Osteoarthritis, Osteopenia, and Pancreatitis. SURGICAL HISTORY      has a past surgical history that includes Breast reconstruction (Right, 2006); Knee arthroscopy (Right, 07/01/2008); other surgical history (2004); Dilation and curettage of uterus; Tubal ligation; Lung lobectomy (Right, 02/11/2011); Total knee arthroplasty (Right, 03/17/2010); Breast reduction surgery (Left, 2006); bronchoscopy (02/11/2011); thoracotomy (Right, 02/11/2011); bronchoscopy (Right, 11/12/2010); other surgical history (Right, 07/25/2005); Breast cyst aspiration (Right, 07/25/2005); knee surgery (Left, 06/20/2016); liver biopsy (11/04/2016); Cholecystectomy, laparoscopic (01/16/2017); Upper gastrointestinal endoscopy (01/14/2017); Abdominal exploration surgery (01/07/2019); Small intestine surgery; Colonoscopy (02/24/2014); Breast biopsy (Right, 07/25/2005); Breast biopsy (Right, 07/07/2005); Mastectomy (Right, 2005); Hysterectomy (01/12/2004); Colonoscopy (N/A, 06/27/2022); Esophagogastroduodenoscopy (N/A, 06/27/2022); and Esophagogastroduodenoscopy. CURRENT MEDICATIONS       Discharge Medication List as of 1/8/2023  2:52 PM        CONTINUE these medications which have NOT CHANGED    Details   dilTIAZem (TIAZAC) 240 MG extended release capsule take 1 capsule by mouth once daily, Disp-90 capsule, R-0Normal      !!  Everolimus 5 MG TABS Historical Med      fluconazole (DIFLUCAN) 100 MG tablet Take one tab daily after completion of cefdinir, Disp-3 tablet, R-0Normal      apixaban (ELIQUIS) 5 MG TABS tablet take 1 tablet by mouth twice a day, Disp-180 tablet, R-1Normal      amiodarone (CORDARONE) 200 MG tablet Take 1 tablet by mouth 2 times daily, Disp-180 tablet, R-3Normal      lisinopril (PRINIVIL;ZESTRIL) 30 MG tablet take 1 tablet by mouth once daily, Disp-90 tablet, R-1Normal      RA VITAMIN B-12 TR 1000 MCG TBCR take 1 tablet by mouth once daily, DAWHistorical Med      FEROSUL 325 (65 Fe) MG tablet DAWHistorical Med      !! everolimus (AFINITOR) 10 MG TABS chemo tablet Take 10 mg by mouth dailyHistorical Med      Nutritional Supplements (ENSURE) LIQD as needed Historical Med      Handicap Placard MISC Starting Wed 3/24/2021, Disp-1 each, R-0, Print      fluticasone (FLONASE) 50 MCG/ACT nasal spray 1 spray by Nasal route daily as needed for Rhinitis, Disp-1 Bottle, R-5Normal      ondansetron (ZOFRAN) 4 MG tablet Take 1 tablet by mouth every 8 hours as needed for Nausea, Disp-20 tablet, R-0Print      acetaminophen (TYLENOL) 325 MG tablet Take 2 tablets by mouth every 4 hours as needed for Pain or Fever, Disp-120 tablet, R-0       !! - Potential duplicate medications found. Please discuss with provider. ALLERGIES     is allergic to effexor xr [venlafaxine hcl] and venlafaxine. FAMILY HISTORY     She indicated that her mother is alive. She indicated that her father is . She indicated that one of her two sisters is . family history includes Cancer in her father and sister; Cataracts in her mother; Diabetes in her mother; Glaucoma in her sister; Heart Disease in her mother; High Blood Pressure in her mother; High Cholesterol in her mother. SOCIAL HISTORY      reports that she has never smoked. She has never used smokeless tobacco. She reports that she does not drink alcohol and does not use drugs. PHYSICAL EXAM     INITIAL VITALS:  height is 5' 2.5\" (1.588 m) and weight is 134 lb (60.8 kg). Her tympanic temperature is 98.8 °F (37.1 °C). Her blood pressure is 133/77 and her pulse is 90. Her respiration is 20 and oxygen saturation is 99%. Physical Exam  Constitutional:       General: She is not in acute distress. Appearance: Normal appearance. She is well-developed.  She is not ill-appearing, toxic-appearing or diaphoretic. Comments: Afebrile nontoxic   HENT:      Head: Normocephalic and atraumatic. Right Ear: External ear normal.      Left Ear: External ear normal.   Eyes:      Conjunctiva/sclera: Conjunctivae normal.      Pupils: Pupils are equal, round, and reactive to light. Cardiovascular:      Rate and Rhythm: Normal rate and regular rhythm. Pulmonary:      Effort: Pulmonary effort is normal.      Breath sounds: Rhonchi present. Comments: Some rhonchi and occasional rales in the right base no wheezing noted  Abdominal:      General: Bowel sounds are normal.      Palpations: Abdomen is soft. Musculoskeletal:         General: No tenderness. Normal range of motion. Cervical back: Normal range of motion. Skin:     General: Skin is warm and dry. Neurological:      Mental Status: She is alert and oriented to person, place, and time. Psychiatric:         Behavior: Behavior normal.         DIFFERENTIAL DIAGNOSIS/ MDM:   Differential diagnosis considered: Pneumonia COVID influenza PE less likely normal sats no leg swelling    Chronic Conditions affecting care (DM,HTN,CA, etc): History of prior cancer years ago including lung cancer  Social Determinants of Health affecting care (unable to care for self, lives alone, unemployed, homeless,etc): Patient is able to care for self    History source(s) (patient,spouse,parent,family,friend,EMS,etc): Patient    Review of external sources (ECF,Hospital records,EMS report, radiology reports, etc): Reviewed previous history    Tests considered but not ordered: Not applicable    Independent interpretation of tests (eg.  X-ray, CAT scan, Doppler studies, EKG): Positive influenza negative COVID chest x-ray shows chronic changes right base of her prior cancer and surgery    Discussion of x-ray results with radiology: None    Consults: None    Consideration for admission/observation (even if discharged):  No cause for admission    Prescription considerations: Cough medication prescribed Tessalon Perles    Sepsis considered: Patient nontoxic    Critical Care note written: Applicable    DIAGNOSTIC RESULTS     EKG: All EKG's are interpreted by the Emergency Department Physician who either signs or Co-signs this chart in the absence of a cardiologist.        RADIOLOGY:   I directly visualized the following  images and reviewed the radiologist interpretations:       EXAMINATION:   TWO XRAY VIEWS OF THE CHEST       1/8/2023 2:30 pm       COMPARISON:   May 2, 2022       HISTORY:   ORDERING SYSTEM PROVIDED HISTORY: Cough   TECHNOLOGIST PROVIDED HISTORY:   Cough   Reason for Exam: Cough x few days, history of right lung cancer 2011 with   surgery, right breast cancer 2005, nonsmoker       FINDINGS:   Stable chronic changes right lower lung. Postoperative change noted as well. Left lung clear. Cardiac size stable. Port device unchanged. No acute   osseous abnormality. Impression   Stable chronic changes right lung base. No definite acute findings. ED BEDSIDE ULTRASOUND:       LABS:  Labs Reviewed   RAPID INFLUENZA A/B ANTIGENS - Abnormal; Notable for the following components:       Result Value    Flu A Antigen POSITIVE (*)     All other components within normal limits   COVID-19, RAPID           EMERGENCY DEPARTMENT COURSE:   Vitals:    Vitals:    01/08/23 1331   BP: 133/77   Pulse: 90   Resp: 20   Temp: 98.8 °F (37.1 °C)   TempSrc: Tympanic   SpO2: 99%   Weight: 134 lb (60.8 kg)   Height: 5' 2.5\" (1.588 m)     -------------------------  BP: 133/77, Temp: 98.8 °F (37.1 °C), Heart Rate: 90, Resp: 20        Re-evaluation Notes    Resting comfortably    CRITICAL CARE:   None        CONSULTS:      PROCEDURES:  None    FINAL IMPRESSION      1.  Influenza A          DISPOSITION/PLAN   DISPOSITION Decision To Discharge  Discharge    Condition on Disposition    Stable    PATIENT REFERRED TO:  Remigio Saini MD  [id number] [facility]  [phone]    In 3 days      DISCHARGE MEDICATIONS:  Discharge Medication List as of 1/8/2023  2:52 PM        START taking these medications    Details   benzonatate (TESSALON) 200 MG capsule Take 1 capsule by mouth 3 times daily as needed for Cough, Disp-30 capsule, R-0Normal             (Please note that portions of this note were completed with a voice recognition program.  Efforts were made to edit the dictations but occasionally words are mis-transcribed.)    Satish Kwan MD,, MD, F.A.A.E.M.   Attending Emergency Physician                           Satish Kwan MD  01/11/23 [id number]

## 2023-01-09 ENCOUNTER — TELEPHONE (OUTPATIENT)
Dept: FAMILY MEDICINE CLINIC | Age: 73
End: 2023-01-09

## 2023-01-09 NOTE — TELEPHONE ENCOUNTER
Pt calling for a ER follow up, pt was seen yesterday and was positive for Flu A and B, told to see RR by the 11th, pt states she was given medication for cough and is feeling about the same as yesterday, do you want to see in office or do VV, please advise.

## 2023-01-12 ENCOUNTER — OFFICE VISIT (OUTPATIENT)
Dept: FAMILY MEDICINE CLINIC | Age: 73
End: 2023-01-12
Payer: MEDICARE

## 2023-01-12 VITALS
TEMPERATURE: 97.8 F | HEIGHT: 63 IN | WEIGHT: 126 LBS | DIASTOLIC BLOOD PRESSURE: 70 MMHG | OXYGEN SATURATION: 100 % | HEART RATE: 86 BPM | SYSTOLIC BLOOD PRESSURE: 104 MMHG | BODY MASS INDEX: 22.32 KG/M2

## 2023-01-12 DIAGNOSIS — R05.1 ACUTE COUGH: Primary | ICD-10-CM

## 2023-01-12 DIAGNOSIS — J10.1 INFLUENZA A: ICD-10-CM

## 2023-01-12 PROCEDURE — G8420 CALC BMI NORM PARAMETERS: HCPCS | Performed by: FAMILY MEDICINE

## 2023-01-12 PROCEDURE — G8427 DOCREV CUR MEDS BY ELIG CLIN: HCPCS | Performed by: FAMILY MEDICINE

## 2023-01-12 PROCEDURE — 1090F PRES/ABSN URINE INCON ASSESS: CPT | Performed by: FAMILY MEDICINE

## 2023-01-12 PROCEDURE — 1123F ACP DISCUSS/DSCN MKR DOCD: CPT | Performed by: FAMILY MEDICINE

## 2023-01-12 PROCEDURE — 99214 OFFICE O/P EST MOD 30 MIN: CPT | Performed by: FAMILY MEDICINE

## 2023-01-12 PROCEDURE — 3078F DIAST BP <80 MM HG: CPT | Performed by: FAMILY MEDICINE

## 2023-01-12 PROCEDURE — 1036F TOBACCO NON-USER: CPT | Performed by: FAMILY MEDICINE

## 2023-01-12 PROCEDURE — G8484 FLU IMMUNIZE NO ADMIN: HCPCS | Performed by: FAMILY MEDICINE

## 2023-01-12 PROCEDURE — G8399 PT W/DXA RESULTS DOCUMENT: HCPCS | Performed by: FAMILY MEDICINE

## 2023-01-12 PROCEDURE — 3017F COLORECTAL CA SCREEN DOC REV: CPT | Performed by: FAMILY MEDICINE

## 2023-01-12 PROCEDURE — 3074F SYST BP LT 130 MM HG: CPT | Performed by: FAMILY MEDICINE

## 2023-01-12 RX ORDER — DOXYCYCLINE HYCLATE 100 MG
100 TABLET ORAL 2 TIMES DAILY
Qty: 20 TABLET | Refills: 0 | Status: ON HOLD | OUTPATIENT
Start: 2023-01-12 | End: 2023-01-22

## 2023-01-12 RX ORDER — PREDNISONE 20 MG/1
20 TABLET ORAL 2 TIMES DAILY
Qty: 6 TABLET | Refills: 0 | Status: ON HOLD | OUTPATIENT
Start: 2023-01-12 | End: 2023-01-15

## 2023-01-12 ASSESSMENT — PATIENT HEALTH QUESTIONNAIRE - PHQ9
SUM OF ALL RESPONSES TO PHQ QUESTIONS 1-9: 0
2. FEELING DOWN, DEPRESSED OR HOPELESS: 0
SUM OF ALL RESPONSES TO PHQ QUESTIONS 1-9: 0
SUM OF ALL RESPONSES TO PHQ9 QUESTIONS 1 & 2: 0
SUM OF ALL RESPONSES TO PHQ QUESTIONS 1-9: 0
1. LITTLE INTEREST OR PLEASURE IN DOING THINGS: 0
SUM OF ALL RESPONSES TO PHQ QUESTIONS 1-9: 0

## 2023-01-15 ENCOUNTER — APPOINTMENT (OUTPATIENT)
Dept: GENERAL RADIOLOGY | Age: 73
DRG: 378 | End: 2023-01-15
Payer: MEDICARE

## 2023-01-15 ENCOUNTER — PATIENT MESSAGE (OUTPATIENT)
Dept: FAMILY MEDICINE CLINIC | Age: 73
End: 2023-01-15

## 2023-01-15 ENCOUNTER — HOSPITAL ENCOUNTER (INPATIENT)
Age: 73
LOS: 4 days | Discharge: HOME | DRG: 378 | End: 2023-01-21
Attending: EMERGENCY MEDICINE | Admitting: INTERNAL MEDICINE
Payer: MEDICARE

## 2023-01-15 DIAGNOSIS — K29.01 ACUTE GASTRITIS WITH HEMORRHAGE, UNSPECIFIED GASTRITIS TYPE: Primary | ICD-10-CM

## 2023-01-15 DIAGNOSIS — R53.83 OTHER FATIGUE: ICD-10-CM

## 2023-01-15 DIAGNOSIS — D59.9 ACQUIRED HEMOLYTIC ANEMIA (HCC): ICD-10-CM

## 2023-01-15 DIAGNOSIS — D64.9 ANEMIA, UNSPECIFIED TYPE: ICD-10-CM

## 2023-01-15 PROBLEM — R97.8 ELEVATED TUMOR MARKERS: Status: ACTIVE | Noted: 2017-03-20

## 2023-01-15 PROBLEM — I48.20 CHRONIC ATRIAL FIBRILLATION (HCC): Status: ACTIVE | Noted: 2022-09-03

## 2023-01-15 PROBLEM — L43.9 LICHEN PLANUS: Status: ACTIVE | Noted: 2019-10-21

## 2023-01-15 LAB
ALBUMIN SERPL-MCNC: 3.5 G/DL (ref 3.5–5.2)
ALBUMIN/GLOBULIN RATIO: 1.6 (ref 1–2.5)
ALP BLD-CCNC: 62 U/L (ref 35–104)
ALT SERPL-CCNC: 21 U/L (ref 5–33)
ANION GAP SERPL CALCULATED.3IONS-SCNC: 12 MMOL/L (ref 9–17)
AST SERPL-CCNC: 17 U/L
BILIRUB SERPL-MCNC: 0.2 MG/DL (ref 0.3–1.2)
BUN BLDV-MCNC: 41 MG/DL (ref 8–23)
BUN/CREAT BLD: 50 (ref 9–20)
CALCIUM SERPL-MCNC: 8.9 MG/DL (ref 8.6–10.4)
CHLORIDE BLD-SCNC: 108 MMOL/L (ref 98–107)
CO2: 21 MMOL/L (ref 20–31)
CREAT SERPL-MCNC: 0.82 MG/DL (ref 0.5–0.9)
GFR SERPL CREATININE-BSD FRML MDRD: >60 ML/MIN/1.73M2
GLUCOSE BLD-MCNC: 164 MG/DL (ref 70–99)
HCT VFR BLD CALC: 15.6 % (ref 36.3–47.1)
HEMOGLOBIN: 4.6 G/DL (ref 11.9–15.1)
LIPASE: 48 U/L (ref 13–60)
MCH RBC QN AUTO: 23 PG (ref 25.2–33.5)
MCHC RBC AUTO-ENTMCNC: 29.5 G/DL (ref 25.2–33.5)
MCV RBC AUTO: 78 FL (ref 82.6–102.9)
NRBC AUTOMATED: 1 PER 100 WBC
PDW BLD-RTO: 19.1 % (ref 11.8–14.4)
PLATELET # BLD: 346 K/UL (ref 138–453)
PMV BLD AUTO: 9.5 FL (ref 8.1–13.5)
POTASSIUM SERPL-SCNC: 4.1 MMOL/L (ref 3.7–5.3)
RBC # BLD: 2 M/UL (ref 3.95–5.11)
SARS-COV-2, RAPID: NOT DETECTED
SODIUM BLD-SCNC: 141 MMOL/L (ref 135–144)
SPECIMEN DESCRIPTION: NORMAL
TOTAL PROTEIN: 5.7 G/DL (ref 6.4–8.3)
TROPONIN, HIGH SENSITIVITY: 12 NG/L (ref 0–14)
TROPONIN, HIGH SENSITIVITY: 9 NG/L (ref 0–14)
WBC # BLD: 13 K/UL (ref 3.5–11.3)

## 2023-01-15 PROCEDURE — 99222 1ST HOSP IP/OBS MODERATE 55: CPT

## 2023-01-15 PROCEDURE — 94760 N-INVAS EAR/PLS OXIMETRY 1: CPT

## 2023-01-15 PROCEDURE — 86880 COOMBS TEST DIRECT: CPT

## 2023-01-15 PROCEDURE — 36415 COLL VENOUS BLD VENIPUNCTURE: CPT

## 2023-01-15 PROCEDURE — 71045 X-RAY EXAM CHEST 1 VIEW: CPT

## 2023-01-15 PROCEDURE — 86870 RBC ANTIBODY IDENTIFICATION: CPT

## 2023-01-15 PROCEDURE — 36430 TRANSFUSION BLD/BLD COMPNT: CPT

## 2023-01-15 PROCEDURE — P9016 RBC LEUKOCYTES REDUCED: HCPCS

## 2023-01-15 PROCEDURE — 82274 ASSAY TEST FOR BLOOD FECAL: CPT

## 2023-01-15 PROCEDURE — 2580000003 HC RX 258: Performed by: FAMILY MEDICINE

## 2023-01-15 PROCEDURE — 87635 SARS-COV-2 COVID-19 AMP PRB: CPT

## 2023-01-15 PROCEDURE — 86900 BLOOD TYPING SEROLOGIC ABO: CPT

## 2023-01-15 PROCEDURE — G0378 HOSPITAL OBSERVATION PER HR: HCPCS

## 2023-01-15 PROCEDURE — 96360 HYDRATION IV INFUSION INIT: CPT

## 2023-01-15 PROCEDURE — 93005 ELECTROCARDIOGRAM TRACING: CPT | Performed by: EMERGENCY MEDICINE

## 2023-01-15 PROCEDURE — 86920 COMPATIBILITY TEST SPIN: CPT

## 2023-01-15 PROCEDURE — 83690 ASSAY OF LIPASE: CPT

## 2023-01-15 PROCEDURE — 99285 EMERGENCY DEPT VISIT HI MDM: CPT

## 2023-01-15 PROCEDURE — 85027 COMPLETE CBC AUTOMATED: CPT

## 2023-01-15 PROCEDURE — 84484 ASSAY OF TROPONIN QUANT: CPT

## 2023-01-15 PROCEDURE — 2580000003 HC RX 258: Performed by: EMERGENCY MEDICINE

## 2023-01-15 PROCEDURE — 80053 COMPREHEN METABOLIC PANEL: CPT

## 2023-01-15 PROCEDURE — 86850 RBC ANTIBODY SCREEN: CPT

## 2023-01-15 PROCEDURE — 86901 BLOOD TYPING SEROLOGIC RH(D): CPT

## 2023-01-15 RX ORDER — SODIUM CHLORIDE 9 MG/ML
INJECTION, SOLUTION INTRAVENOUS PRN
Status: DISCONTINUED | OUTPATIENT
Start: 2023-01-15 | End: 2023-01-21 | Stop reason: HOSPADM

## 2023-01-15 RX ORDER — LANOLIN ALCOHOL/MO/W.PET/CERES
1000 CREAM (GRAM) TOPICAL DAILY
Status: DISCONTINUED | OUTPATIENT
Start: 2023-01-15 | End: 2023-01-21 | Stop reason: HOSPADM

## 2023-01-15 RX ORDER — SODIUM CHLORIDE 0.9 % (FLUSH) 0.9 %
5-40 SYRINGE (ML) INJECTION PRN
Status: DISCONTINUED | OUTPATIENT
Start: 2023-01-15 | End: 2023-01-21 | Stop reason: HOSPADM

## 2023-01-15 RX ORDER — 0.9 % SODIUM CHLORIDE 0.9 %
1000 INTRAVENOUS SOLUTION INTRAVENOUS ONCE
Status: COMPLETED | OUTPATIENT
Start: 2023-01-15 | End: 2023-01-15

## 2023-01-15 RX ORDER — ONDANSETRON 2 MG/ML
4 INJECTION INTRAMUSCULAR; INTRAVENOUS EVERY 6 HOURS PRN
Status: DISCONTINUED | OUTPATIENT
Start: 2023-01-15 | End: 2023-01-21 | Stop reason: HOSPADM

## 2023-01-15 RX ORDER — DILTIAZEM HYDROCHLORIDE 120 MG/1
240 CAPSULE, COATED, EXTENDED RELEASE ORAL DAILY
Status: DISCONTINUED | OUTPATIENT
Start: 2023-01-16 | End: 2023-01-21 | Stop reason: HOSPADM

## 2023-01-15 RX ORDER — ACETAMINOPHEN 325 MG/1
650 TABLET ORAL EVERY 6 HOURS PRN
Status: DISCONTINUED | OUTPATIENT
Start: 2023-01-15 | End: 2023-01-21 | Stop reason: HOSPADM

## 2023-01-15 RX ORDER — AMIODARONE HYDROCHLORIDE 200 MG/1
200 TABLET ORAL DAILY
Status: DISCONTINUED | OUTPATIENT
Start: 2023-01-15 | End: 2023-01-21 | Stop reason: HOSPADM

## 2023-01-15 RX ORDER — ONDANSETRON 4 MG/1
4 TABLET, ORALLY DISINTEGRATING ORAL EVERY 8 HOURS PRN
Status: DISCONTINUED | OUTPATIENT
Start: 2023-01-15 | End: 2023-01-21 | Stop reason: HOSPADM

## 2023-01-15 RX ORDER — POLYETHYLENE GLYCOL 3350 17 G/17G
17 POWDER, FOR SOLUTION ORAL DAILY PRN
Status: DISCONTINUED | OUTPATIENT
Start: 2023-01-15 | End: 2023-01-21 | Stop reason: HOSPADM

## 2023-01-15 RX ORDER — FERROUS SULFATE 325(65) MG
325 TABLET ORAL 2 TIMES DAILY WITH MEALS
Status: DISCONTINUED | OUTPATIENT
Start: 2023-01-15 | End: 2023-01-21 | Stop reason: HOSPADM

## 2023-01-15 RX ORDER — SODIUM CHLORIDE 0.9 % (FLUSH) 0.9 %
5-40 SYRINGE (ML) INJECTION EVERY 12 HOURS SCHEDULED
Status: DISCONTINUED | OUTPATIENT
Start: 2023-01-15 | End: 2023-01-21 | Stop reason: HOSPADM

## 2023-01-15 RX ORDER — ACETAMINOPHEN 650 MG/1
650 SUPPOSITORY RECTAL EVERY 6 HOURS PRN
Status: DISCONTINUED | OUTPATIENT
Start: 2023-01-15 | End: 2023-01-21 | Stop reason: HOSPADM

## 2023-01-15 RX ORDER — EVEROLIMUS 5 MG/1
5 TABLET ORAL DAILY
Status: DISCONTINUED | OUTPATIENT
Start: 2023-01-16 | End: 2023-01-21 | Stop reason: HOSPADM

## 2023-01-15 RX ADMIN — SODIUM CHLORIDE, PRESERVATIVE FREE 10 ML: 5 INJECTION INTRAVENOUS at 22:26

## 2023-01-15 RX ADMIN — SODIUM CHLORIDE 1000 ML: 9 INJECTION, SOLUTION INTRAVENOUS at 15:09

## 2023-01-15 ASSESSMENT — LIFESTYLE VARIABLES: HOW OFTEN DO YOU HAVE A DRINK CONTAINING ALCOHOL: NEVER

## 2023-01-15 ASSESSMENT — PAIN - FUNCTIONAL ASSESSMENT: PAIN_FUNCTIONAL_ASSESSMENT: NONE - DENIES PAIN

## 2023-01-15 NOTE — ED PROVIDER NOTES
[facility] ED  [address]  [place] [address] [place]  Phone: [phone]  [facility]      Pt Name: Carrie Reinoso  MRN: [id number]  [place] 1950  Date of evaluation: 1/15/2023    CHIEF COMPLAINT       Chief Complaint   Patient presents with    Fatigue     Last Wednesday started with cough, positive for flu A and B on Sunday, [place] on Thursday gave prednisone, doxycycline, and tessalon pearls. Today was weak and knees gave out but  lowered to ground and pt denies injuries. Diarrhea today and vomiting yesterday and twice today. HISTORY OF PRESENT ILLNESS    Carrie Reinoso is a 81 y.o. female who presents generalized weakness and near syncope after a week and a half of coughing from influenza. Patient states that she is generally weak and nearly passed out today after a paroxysm of coughing her  was able to catch her and set her down. She states that she has had a little bit of emesis associated with this and occasional loose stool however noted no fevers. notes increasing shortness  of breath with a loose cough that is nonproductive. Is taking Tessalon Perles and also  doxycycline for her condition. She has a history of A. fib  Also underlying lung cancer breast cancer with metastasis to liver and pancreas.   REVIEW OF SYSTEMS     Constitutional: No fevers or chills   HEENT: No sore throat, rhinorrhea, or earache   Eyes: No blurry vision or double vision no drainage   Cardiovascular: No chest pain or tachycardia   Respiratory: See above  Gastrointestinal: No  constipation, or abdominal pain see above  : No hematuria or dysuria   Musculoskeletal: No swelling or pain   Skin: No rash   Neurological: No focal neurologic complaints, paresthesias, weakness, or headache    PAST MEDICAL HISTORY    has a past medical history of Atrial fibrillation ([address] Atypical carcinoid lung tumor ([facility] [address] Breast cancer ([facility] [address] History of 2019 novel coronavirus disease (COVID-19), History of therapeutic radiation, Hx antineoplastic chemo, Hypertension, Left knee pain, Liver cancer ([address] Lung cancer ([address] Myopia with astigmatism and presbyopia, Neuroendocrine carcinoma ([address] Osteoarthritis, Osteopenia, and Pancreatitis. SURGICAL HISTORY      has a past surgical history that includes Breast reconstruction (Right, 2006); Knee arthroscopy (Right, 07/01/2008); other surgical history (2004); Dilation and curettage of uterus; Tubal ligation; Lung lobectomy (Right, 02/11/2011); Total knee arthroplasty (Right, 03/17/2010); Breast reduction surgery (Left, 2006); bronchoscopy (02/11/2011); thoracotomy (Right, 02/11/2011); bronchoscopy (Right, 11/12/2010); other surgical history (Right, 07/25/2005); Breast cyst aspiration (Right, 07/25/2005); knee surgery (Left, 06/20/2016); liver biopsy (11/04/2016); Cholecystectomy, laparoscopic (01/16/2017); Upper gastrointestinal endoscopy (01/14/2017); Abdominal exploration surgery (01/07/2019); Small intestine surgery; Colonoscopy (02/24/2014); Breast biopsy (Right, 07/25/2005); Breast biopsy (Right, 07/07/2005); Mastectomy (Right, 2005); Hysterectomy (01/12/2004); Colonoscopy (N/A, 06/27/2022); Esophagogastroduodenoscopy (N/A, 06/27/2022); and Esophagogastroduodenoscopy.     CURRENT MEDICATIONS       Previous Medications    ACETAMINOPHEN (TYLENOL) 325 MG TABLET    Take 2 tablets by mouth every 4 hours as needed for Pain or Fever    AMIODARONE (CORDARONE) 200 MG TABLET    Take 1 tablet by mouth 2 times daily    APIXABAN (ELIQUIS) 5 MG TABS TABLET    take 1 tablet by mouth twice a day    BENZONATATE (TESSALON) 200 MG CAPSULE    Take 1 capsule by mouth 3 times daily as needed for Cough    DILTIAZEM (TIAZAC) 240 MG EXTENDED RELEASE CAPSULE    take 1 capsule by mouth once daily    DOXYCYCLINE HYCLATE (VIBRA-TABS) 100 MG TABLET    Take 1 tablet by mouth 2 times daily for 10 days    EVEROLIMUS 5 MG TABS        FEROSUL 325 (65 FE) MG TABLET    every other day    FLUTICASONE (FLONASE) 50 MCG/ACT NASAL SPRAY    1 spray by Nasal route daily as needed for Rhinitis    HANDICAP PLACARD MISC    by Does not apply route    LISINOPRIL (PRINIVIL;ZESTRIL) 30 MG TABLET    take 1 tablet by mouth once daily    ONDANSETRON (ZOFRAN) 4 MG TABLET    Take 1 tablet by mouth every 8 hours as needed for Nausea    PREDNISONE (DELTASONE) 20 MG TABLET    Take 1 tablet by mouth 2 times daily for 3 days Take with food. RA VITAMIN B-12 TR 1000 MCG TBCR    take 1 tablet by mouth once daily       ALLERGIES     is allergic to effexor xr [venlafaxine hcl] and venlafaxine. FAMILY HISTORY     She indicated that her mother is alive. She indicated that her father is . She indicated that one of her two sisters is . family history includes Cancer in her father and sister; Cataracts in her mother; Diabetes in her mother; Glaucoma in her sister; Heart Disease in her mother; High Blood Pressure in her mother; High Cholesterol in her mother. SOCIAL HISTORY      reports that she has never smoked. She has never used smokeless tobacco. She reports that she does not drink alcohol and does not use drugs. PHYSICAL EXAM       ED Triage Vitals   BP Temp Temp src Pulse Resp SpO2 Height Weight   -- -- -- -- -- -- -- --   Constitutional: Alert, oriented x3, nontoxic, answering questions appropriately, acting properly for age, in no acute distress   HEENT: Extraocular muscles intact, mucus membranes moist, TMs clear bilaterally, no posterior pharyngeal erythema or exudates, Pupils equal, round, reactive to light,   Neck: Trachea midline   Cardiovascular: Regular rhythm and rate no murmurs   Respiratory: Clear to auscultation bilaterally occasional wheezes and rhonchi no rales. mild respiratory distress minimal tachypnea no retractions no hypoxia  Gastrointestinal: Soft, nontender, nondistended, positive bowel sounds. No rebound, rigidity, or guarding.    Musculoskeletal: No extremity pain or swelling   Neurologic: Moving all 4 extremities without difficulty there are no gross focal neurologic deficits   Skin: Warm and dry        DIFFERENTIAL DIAGNOSIS/ MDM:     Likely a paroxysm of coughing and near vagal vasovagal syncope. We will do a work-up hydrate her and reassess the situation. At 3:20 PM we found anemia I spoke to the patient she has had transfusions here in town and has gone to [place] for transfusions as well. At 3:27 PM I spoke with Dr. Aamir Webber he is covering he is willing to take the patient here and transfused  . bloodconsent  Differential diagnosis considered: Weakness possible sepsis or other infection    Chronic Conditions affecting care (DM,HTN,CA, etc): Lupus    Social Determinants of Health affecting care (unable to care for self, lives alone, unemployed, homeless,etc): None    History source(s) (patient,spouse,parent,family,friend,EMS,etc): Patient    Review of external sources (ECF,Hospital records,EMS report, radiology reports, etc): None    Tests considered but not ordered: Not applicable    Independent interpretation of tests (eg.  X-ray, CAT scan, Doppler studies, EKG): See report    Discussion of x-ray results with radiology: See CAT scan report    Consults: Admit to Dr. Aamir Webber    Consideration for admission/observation (even if discharged): Admit to Dr. Aamir Webber    Prescription considerations: None    Sepsis considered: See above    Critical Care note written: CRITICAL CARE: There was a high probability of clinically significant/life threatening deterioration in this patient's condition which required my urgent intervention. Total critical care time was minutes. This excludes any time for separately reportable procedures. DIAGNOSTIC RESULTS     EKG: All EKG's are interpreted by the Emergency Department Physician who either signs or Co-signs this chart in the absence of a cardiologist.    EKG shows A. fib with a ventricular rate of 83. The QRS duration is 86.   There is poor R wave progression in the anterior precordium but no acute ischemic pattern is identified and axis is leftward. Not indicated unless otherwise documented above    LABS:  Results for orders placed or performed during the hospital encounter of 01/15/23   Comprehensive Metabolic Panel   Result Value Ref Range    Glucose 164 (H) 70 - 99 mg/dL    BUN 41 (H) 8 - 23 mg/dL    Creatinine 0.82 0.50 - 0.90 mg/dL    Est, Glom Filt Rate >60 >60 mL/min/1.73m2    Bun/Cre Ratio 50 (H) 9 - 20    Calcium 8.9 8.6 - 10.4 mg/dL    Sodium 141 135 - 144 mmol/L    Potassium 4.1 3.7 - 5.3 mmol/L    Chloride 108 (H) 98 - 107 mmol/L    CO2 21 20 - 31 mmol/L    Anion Gap 12 9 - 17 mmol/L    Alkaline Phosphatase 62 35 - 104 U/L    ALT 21 5 - 33 U/L    AST 17 <32 U/L    Total Bilirubin 0.2 (L) 0.3 - 1.2 mg/dL    Total Protein 5.7 (L) 6.4 - 8.3 g/dL    Albumin 3.5 3.5 - 5.2 g/dL    Albumin/Globulin Ratio 1.6 1.0 - 2.5   Troponin   Result Value Ref Range    Troponin, High Sensitivity 12 0 - 14 ng/L   Troponin   Result Value Ref Range    Troponin, High Sensitivity 9 0 - 14 ng/L   Lipase   Result Value Ref Range    Lipase 48 13 - 60 U/L   CBC   Result Value Ref Range    WBC 13.0 (H) 3.5 - 11.3 k/uL    RBC 2.00 (L) 3.95 - 5.11 m/uL    Hemoglobin 4.6 (LL) 11.9 - 15.1 g/dL    Hematocrit 15.6 (L) 36.3 - 47.1 %    MCV 78.0 (L) 82.6 - 102.9 fL    MCH 23.0 (L) 25.2 - 33.5 pg    MCHC 29.5 25.2 - 33.5 g/dL    RDW 19.1 (H) 11.8 - 14.4 %    Platelets 260 462 - 516 k/uL    MPV 9.5 8.1 - 13.5 fL    NRBC Automated 1.0 (H) 0.0 per 100 WBC   EKG 12 Lead   Result Value Ref Range    Ventricular Rate 83 BPM    Atrial Rate 375 BPM    QRS Duration 86 ms    Q-T Interval 330 ms    QTc Calculation (Bazett) 387 ms    R Axis -28 degrees    T Axis 135 degrees       Not indicated unless otherwise documented above    RADIOLOGY:   I reviewed the radiologist interpretations:    XR CHEST PORTABLE   Final Result   No radiographic evidence of acute pulmonary disease. Not indicated unless otherwise documented above    EMERGENCY DEPARTMENT COURSE:     The patient was given the following medications:  Orders Placed This Encounter   Medications    0.9 % sodium chloride bolus    0.9 % sodium chloride infusion        Vitals:   -------------------------  BP (!) 100/56   Pulse 73   Temp 98 °F (36.7 °C) (Tympanic)   Resp 21   Ht 5' 2.5\" (1.588 m)   Wt 130 lb (59 kg)   SpO2 100%   BMI 23.40 kg/m²         I have reviewed the disposition diagnosis with the patient and or their family/guardian. I have answered their questions and given discharge instructions. They voiced understanding of these instructions and did not have any furtherquestions or complaints. CRITICAL CARE:    None    CONSULTS:    None    PROCEDURES:    None      OARRS Report if indicated             FINAL IMPRESSION      1. Other fatigue    2. Acquired hemolytic anemia ([facility] [address]          DISPOSITION/PLAN   DISPOSITION Admitted 01/15/2023 04:28:55 PM        CONDITION ON DISPOSITION: STABLE       PATIENT REFERRED TO:  No follow-up provider specified. DISCHARGE MEDICATIONS:  New Prescriptions    No medications on file       (Please note that portions of thisnote were completed with a voice recognition program.  Efforts were made to edit the dictations but occasionally words are mis-transcribed.)    Neville Aschoff, MD,, MD  Attending Emergency Physician        Neville Aschoff, MD  01/15/23 1630      I have discussed with the patient the rationale for blood component transfusion; its benefits in treating or preventing fatigue, organ damage, or death; and its risk which includes mild transfusion reactions, rare risk of blood borne infection, or more serious but rare reactions. I have discussed the alternatives to transfusion, including the risk and consequences of not receiving transfusion. The patient had an opportunity to ask questions and had agreed to proceed with transfusion of blood components.       Lydia Arellano MD [place]  01/15/23 [id number]

## 2023-01-16 PROBLEM — R53.83 OTHER FATIGUE: Status: ACTIVE | Noted: 2023-01-16

## 2023-01-16 LAB
ABSOLUTE EOS #: <0.03 K/UL (ref 0–0.44)
ABSOLUTE IMMATURE GRANULOCYTE: 0.22 K/UL (ref 0–0.3)
ABSOLUTE LYMPH #: 1.18 K/UL (ref 1.1–3.7)
ABSOLUTE MONO #: 0.88 K/UL (ref 0.1–1.2)
ANION GAP SERPL CALCULATED.3IONS-SCNC: 6 MMOL/L (ref 9–17)
BASOPHILS # BLD: 0 % (ref 0–2)
BASOPHILS ABSOLUTE: <0.03 K/UL (ref 0–0.2)
BUN BLDV-MCNC: 41 MG/DL (ref 8–23)
BUN/CREAT BLD: 42 (ref 9–20)
CALCIUM SERPL-MCNC: 8.6 MG/DL (ref 8.6–10.4)
CHLORIDE BLD-SCNC: 111 MMOL/L (ref 98–107)
CO2: 24 MMOL/L (ref 20–31)
CREAT SERPL-MCNC: 0.97 MG/DL (ref 0.5–0.9)
EKG ATRIAL RATE: 375 BPM
EKG Q-T INTERVAL: 330 MS
EKG QRS DURATION: 86 MS
EKG QTC CALCULATION (BAZETT): 387 MS
EKG R AXIS: -28 DEGREES
EKG T AXIS: 135 DEGREES
EKG VENTRICULAR RATE: 83 BPM
EOSINOPHILS RELATIVE PERCENT: 0 % (ref 1–4)
GFR SERPL CREATININE-BSD FRML MDRD: >60 ML/MIN/1.73M2
GLUCOSE BLD-MCNC: 125 MG/DL (ref 70–99)
HCT VFR BLD CALC: 26.4 % (ref 36.3–47.1)
HEMOGLOBIN: 8.5 G/DL (ref 11.9–15.1)
IMMATURE GRANULOCYTES: 3 %
LYMPHOCYTES # BLD: 14 % (ref 24–43)
MCH RBC QN AUTO: 27.2 PG (ref 25.2–33.5)
MCHC RBC AUTO-ENTMCNC: 32.2 G/DL (ref 25.2–33.5)
MCV RBC AUTO: 84.3 FL (ref 82.6–102.9)
MONOCYTES # BLD: 10 % (ref 3–12)
NRBC AUTOMATED: 3 PER 100 WBC
PDW BLD-RTO: 16.9 % (ref 11.8–14.4)
PLATELET # BLD: 189 K/UL (ref 138–453)
PMV BLD AUTO: 9.8 FL (ref 8.1–13.5)
POTASSIUM SERPL-SCNC: 4.6 MMOL/L (ref 3.7–5.3)
RBC # BLD: 3.13 M/UL (ref 3.95–5.11)
RBC # BLD: ABNORMAL 10*6/UL
SEG NEUTROPHILS: 73 % (ref 36–65)
SEGMENTED NEUTROPHILS ABSOLUTE COUNT: 6.28 K/UL (ref 1.5–8.1)
SODIUM BLD-SCNC: 141 MMOL/L (ref 135–144)
WBC # BLD: 8.6 K/UL (ref 3.5–11.3)

## 2023-01-16 PROCEDURE — 85025 COMPLETE CBC W/AUTO DIFF WBC: CPT

## 2023-01-16 PROCEDURE — 99222 1ST HOSP IP/OBS MODERATE 55: CPT | Performed by: INTERNAL MEDICINE

## 2023-01-16 PROCEDURE — 2580000003 HC RX 258: Performed by: FAMILY MEDICINE

## 2023-01-16 PROCEDURE — P9016 RBC LEUKOCYTES REDUCED: HCPCS

## 2023-01-16 PROCEDURE — G0378 HOSPITAL OBSERVATION PER HR: HCPCS

## 2023-01-16 PROCEDURE — 6370000000 HC RX 637 (ALT 250 FOR IP): Performed by: FAMILY MEDICINE

## 2023-01-16 PROCEDURE — 36415 COLL VENOUS BLD VENIPUNCTURE: CPT

## 2023-01-16 PROCEDURE — 80048 BASIC METABOLIC PNL TOTAL CA: CPT

## 2023-01-16 PROCEDURE — 36430 TRANSFUSION BLD/BLD COMPNT: CPT

## 2023-01-16 RX ADMIN — DILTIAZEM HYDROCHLORIDE 240 MG: 120 CAPSULE, EXTENDED RELEASE ORAL at 08:39

## 2023-01-16 RX ADMIN — SODIUM CHLORIDE, PRESERVATIVE FREE 10 ML: 5 INJECTION INTRAVENOUS at 20:08

## 2023-01-16 RX ADMIN — SODIUM CHLORIDE, PRESERVATIVE FREE 10 ML: 5 INJECTION INTRAVENOUS at 08:40

## 2023-01-16 RX ADMIN — LISINOPRIL 30 MG: 20 TABLET ORAL at 20:07

## 2023-01-16 RX ADMIN — EVEROLIMUS 5 MG: 5 TABLET ORAL at 12:26

## 2023-01-16 RX ADMIN — AMIODARONE HYDROCHLORIDE 200 MG: 200 TABLET ORAL at 08:39

## 2023-01-16 RX ADMIN — CYANOCOBALAMIN TAB 1000 MCG 1000 MCG: 1000 TAB at 08:39

## 2023-01-16 ASSESSMENT — PAIN SCALES - GENERAL
PAINLEVEL_OUTOF10: 0
PAINLEVEL_OUTOF10: 0

## 2023-01-16 NOTE — CONSENT
Informed Consent for Blood Component Transfusion Note    I have discussed with the patient the rationale for blood component transfusion; its benefits in treating or preventing fatigue, organ damage, or death; and its risk which includes mild transfusion reactions, rare risk of blood borne infection, or more serious but rare reactions. I have discussed the alternatives to transfusion, including the risk and consequences of not receiving transfusion. The patient had an opportunity to ask questions and had agreed to proceed with transfusion of blood components.     Electronically signed by PHONG Broussard NP on 1/15/23 at 9:17 PM EST

## 2023-01-16 NOTE — CARE COORDINATION
Case Management Assessment  Initial Evaluation    Date/Time of Evaluation: 1/16/2023 9:26 AM  Assessment Completed by: Yoli Moran RN    If patient is discharged prior to next notation, then this note serves as note for discharge by case management. Patient Name: Tan Lyons                   YOB: 1950  Diagnosis: Acquired hemolytic anemia ([facility] [address] [D59.9]  Symptomatic anemia [D64.9]  Other fatigue [R53.83]                   Date / Time: 1/15/2023  1:47 PM    Patient Admission Status: Observation   Readmission Risk (Low < 19, Mod (19-27), High > 27): No data recorded  Current PCP: Roxann Vines MD  PCP verified by CM? Yes    Chart Reviewed: Yes      History Provided by: Patient  Patient Orientation: Alert and Oriented    Patient Cognition: Alert    Hospitalization in the last 30 days (Readmission):  No    If yes, Readmission Assessment in CM Navigator will be completed. Advance Directives:      Code Status: Full Code   Patient's Primary Decision Maker is: Named in Scanned ACP Document      Discharge Planning:    Patient lives with: Spouse/Significant Other Type of Home: House  Primary Care Giver: Self  Patient Support Systems include: Spouse/Significant Other, Children   Current Financial resources: Medicare  Current community resources: None  Current services prior to admission: None            Current DME:  none            Type of Home Care services:  None    ADLS  Prior functional level: Independent in ADLs/IADLs  Current functional level: Independent in ADLs/IADLs    Family can provide assistance at DC: Yes  Would you like Case Management to discuss the discharge plan with any other family members/significant others, and if so, who?  No  Plans to Return to Present Housing: Yes  Other Identified Issues/Barriers to RETURNING to current housing: none  Potential Assistance needed at discharge: N/A            Potential DME:  none  Patient expects to discharge to: [address] [place] [address] transportation at discharge:      Financial    Payor: Lucas Gutierrez / Plan: MEDICARE PART A AND B / Product Type: *No Product type* /         Potential assistance Purchasing Medications: No      [id number] - [place], [place] - [address]  [place] [address] [facility]  Phone: [phone] Fax: [phone]      Notes:    Factors facilitating achievement of predicted outcomes: Family support, Cooperative, and Pleasant    Barriers to discharge: none    Additional Case Management Notes: patient plans on returning to current housing with no additional needs    The Plan for Transition of Care is related to the following treatment goals of Acquired hemolytic anemia ([address] [D59.9]  Symptomatic anemia [D64.9]  Other fatigue [R53.83]      The Patient and/or Patient Representative Agree with the Discharge Plan?  Yes    Cornel Llanos RN  Case Management Department

## 2023-01-16 NOTE — FLOWSHEET NOTE
Second unit PRBCS initiated. Patient re-educated on S/S of adverse reactions. Lungs remain clear. Will remain with patient for first 15 minutes of infusion.

## 2023-01-16 NOTE — PROGRESS NOTES
rounding in PCU. Assessment: Patient's sister was visiting so she requested that the  come another time. 01/16/23 1604   Encounter Summary   Encounter Overview/Reason  Attempted Encounter   Service Provided For: Patient and family together   Referral/Consult From: [facility] Family members   Last Encounter  01/16/23   Complexity of Encounter Low   Begin Time 1419   End Time  1420   Total Time Calculated 1 min   Assessment/Intervention/Outcome   Assessment Unable to assess  (Patient's sister was visiting.)   Intervention Other (Comment)  (Patient's sister was visiting.)   Outcome Other (comment)  (Patient's sister was visiting.)   Plan and Referrals   Plan/Referrals Continue to visit, (comment)       Plan: Chaplains are available on site or on call 24/7 for spiritual and emotional support.     Electronically signed by Kristen Pressley on 1/16/2023 at 4:05 PM

## 2023-01-16 NOTE — FLOWSHEET NOTE
First unit PRBCs initiated. Patient educated on S/S of transfusion reaction. States understanding and denies needs at this time. Writer to remain in room during first 15 minutes of infusion. Will continue to monitor.

## 2023-01-16 NOTE — FLOWSHEET NOTE
01/16/23 1234   Vital Signs   Orthostatic B/P and Pulse?  Yes   Blood Pressure Lying 113/69   Pulse Lying 84 PER MINUTE   Blood Pressure Sitting 107/62   Pulse Sitting 80 PER MINUTE   Blood Pressure Standing 107/81   Pulse Standing 95 PER MINUTE
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
## 2023-01-17 LAB
ABSOLUTE EOS #: 0.03 K/UL (ref 0–0.44)
ABSOLUTE IMMATURE GRANULOCYTE: 0.12 K/UL (ref 0–0.3)
ABSOLUTE LYMPH #: 1.08 K/UL (ref 1.1–3.7)
ABSOLUTE MONO #: 0.67 K/UL (ref 0.1–1.2)
ANION GAP SERPL CALCULATED.3IONS-SCNC: 9 MMOL/L (ref 9–17)
BASOPHILS # BLD: 0 % (ref 0–2)
BASOPHILS ABSOLUTE: <0.03 K/UL (ref 0–0.2)
BUN BLDV-MCNC: 35 MG/DL (ref 8–23)
BUN/CREAT BLD: 41 (ref 9–20)
CALCIUM SERPL-MCNC: 8.1 MG/DL (ref 8.6–10.4)
CHLORIDE BLD-SCNC: 112 MMOL/L (ref 98–107)
CO2: 23 MMOL/L (ref 20–31)
CREAT SERPL-MCNC: 0.85 MG/DL (ref 0.5–0.9)
DATE, STOOL #1: NORMAL
EOSINOPHILS RELATIVE PERCENT: 1 % (ref 1–4)
GFR SERPL CREATININE-BSD FRML MDRD: >60 ML/MIN/1.73M2
GLUCOSE BLD-MCNC: 109 MG/DL (ref 70–99)
HCT VFR BLD CALC: 23.5 % (ref 36.3–47.1)
HCT VFR BLD CALC: 24.8 % (ref 36.3–47.1)
HEMOCCULT SP1 STL QL: NEGATIVE
HEMOGLOBIN: 7.5 G/DL (ref 11.9–15.1)
HEMOGLOBIN: 7.9 G/DL (ref 11.9–15.1)
IMMATURE GRANULOCYTES: 2 %
LYMPHOCYTES # BLD: 19 % (ref 24–43)
MCH RBC QN AUTO: 27.1 PG (ref 25.2–33.5)
MCHC RBC AUTO-ENTMCNC: 31.9 G/DL (ref 25.2–33.5)
MCV RBC AUTO: 84.8 FL (ref 82.6–102.9)
MONOCYTES # BLD: 12 % (ref 3–12)
NRBC AUTOMATED: 2.8 PER 100 WBC
PDW BLD-RTO: 17.5 % (ref 11.8–14.4)
PLATELET # BLD: 177 K/UL (ref 138–453)
PMV BLD AUTO: 9.7 FL (ref 8.1–13.5)
POTASSIUM SERPL-SCNC: 4.1 MMOL/L (ref 3.7–5.3)
RBC # BLD: 2.77 M/UL (ref 3.95–5.11)
RBC # BLD: ABNORMAL 10*6/UL
SEG NEUTROPHILS: 66 % (ref 36–65)
SEGMENTED NEUTROPHILS ABSOLUTE COUNT: 3.91 K/UL (ref 1.5–8.1)
SODIUM BLD-SCNC: 144 MMOL/L (ref 135–144)
TIME, STOOL #1: 1620
WBC # BLD: 5.8 K/UL (ref 3.5–11.3)

## 2023-01-17 PROCEDURE — 85014 HEMATOCRIT: CPT

## 2023-01-17 PROCEDURE — 2580000003 HC RX 258: Performed by: FAMILY MEDICINE

## 2023-01-17 PROCEDURE — 99231 SBSQ HOSP IP/OBS SF/LOW 25: CPT | Performed by: INTERNAL MEDICINE

## 2023-01-17 PROCEDURE — 85025 COMPLETE CBC W/AUTO DIFF WBC: CPT

## 2023-01-17 PROCEDURE — 6370000000 HC RX 637 (ALT 250 FOR IP): Performed by: FAMILY MEDICINE

## 2023-01-17 PROCEDURE — 2060000000 HC ICU INTERMEDIATE R&B

## 2023-01-17 PROCEDURE — 85018 HEMOGLOBIN: CPT

## 2023-01-17 PROCEDURE — 36415 COLL VENOUS BLD VENIPUNCTURE: CPT

## 2023-01-17 PROCEDURE — 80048 BASIC METABOLIC PNL TOTAL CA: CPT

## 2023-01-17 RX ADMIN — AMIODARONE HYDROCHLORIDE 200 MG: 200 TABLET ORAL at 08:05

## 2023-01-17 RX ADMIN — SODIUM CHLORIDE, PRESERVATIVE FREE 10 ML: 5 INJECTION INTRAVENOUS at 08:06

## 2023-01-17 RX ADMIN — LISINOPRIL 30 MG: 20 TABLET ORAL at 20:15

## 2023-01-17 RX ADMIN — SODIUM CHLORIDE, PRESERVATIVE FREE 10 ML: 5 INJECTION INTRAVENOUS at 23:07

## 2023-01-17 RX ADMIN — DILTIAZEM HYDROCHLORIDE 240 MG: 120 CAPSULE, EXTENDED RELEASE ORAL at 08:06

## 2023-01-17 RX ADMIN — FERROUS SULFATE TAB 325 MG (65 MG ELEMENTAL FE) 325 MG: 325 (65 FE) TAB at 17:16

## 2023-01-17 RX ADMIN — EVEROLIMUS 5 MG: 5 TABLET ORAL at 08:10

## 2023-01-17 RX ADMIN — CYANOCOBALAMIN TAB 1000 MCG 1000 MCG: 1000 TAB at 08:05

## 2023-01-17 ASSESSMENT — PAIN SCALES - GENERAL: PAINLEVEL_OUTOF10: 0

## 2023-01-17 NOTE — FLOWSHEET NOTE
01/17/23 1130   Vital Signs   Orthostatic B/P and Pulse?  Yes   Blood Pressure Lying 114/59   Pulse Lying 74 PER MINUTE   Blood Pressure Sitting 106/63   Pulse Sitting 82 PER MINUTE   Blood Pressure Standing 104/70   Pulse Standing 85 PER MINUTE   Level of Consciousness 0

## 2023-01-17 NOTE — TELEPHONE ENCOUNTER
From: Jose Juan Romero  To: Dr. Ludwig Ear: 1/15/2023 8:50 AM EST  Subject: Not feeling better    Sorry my blood pressure is still low. Im still very dizzy.

## 2023-01-17 NOTE — PROGRESS NOTES
Hospitalist Progress Note    Patient:  Berta Smith     YOB: 1950    MRN: [id number]   Admit date: 1/15/2023     Acct: [de-identified]     PCP: Hillary Sosa MD    CC--Interval History:   Symptomatic anemia---> 2 units PRBCs ---> 8.5 ---> 7.5--had EGD--colonoscopy--c. 3 months PTA--[-]    Hemocult---negative--consider bone marrow as source---note multiple carcinomas    Atrial fibrillation    Seen by General Surgery    See note below          All other ROS negative except noted in HPI    Diet:  Diet NPO Exceptions are: Sips of Water with Meds, Ice Chips    Medications:  Scheduled Meds:   amiodarone  200 mg Oral Daily    [Held by provider] apixaban  5 mg Oral BID    Everolimus  5 mg Oral Daily    ferrous sulfate  325 mg Oral BID WC    vitamin B-12  1,000 mcg Oral Daily    sodium chloride flush  5-40 mL IntraVENous 2 times per day    dilTIAZem  240 mg Oral Daily    lisinopril  30 mg Oral Daily     Continuous Infusions:   sodium chloride      sodium chloride       PRN Meds:sodium chloride, sodium chloride flush, sodium chloride, ondansetron **OR** ondansetron, polyethylene glycol, acetaminophen **OR** acetaminophen    Objective:  Labs:  CBC with Differential:    Lab Results   Component Value Date/Time    WBC 5.8 01/17/2023 04:34 AM    RBC 2.77 01/17/2023 04:34 AM    HGB 7.9 01/17/2023 12:16 PM    HCT 24.8 01/17/2023 12:16 PM     01/17/2023 04:34 AM    MCV 84.8 01/17/2023 04:34 AM    MCH 27.1 01/17/2023 04:34 AM    MCHC 31.9 01/17/2023 04:34 AM    RDW 17.5 01/17/2023 04:34 AM    LYMPHOPCT 19 01/17/2023 04:34 AM    LYMPHOPCT 13.3 07/25/2019 12:00 AM    MONOPCT 12 01/17/2023 04:34 AM    MONOPCT 8.6 07/25/2019 12:00 AM    EOSPCT 2.1 07/25/2019 12:00 AM    BASOPCT 0 01/17/2023 04:34 AM    BASOPCT 0.4 07/25/2019 12:00 AM    MONOSABS 0.67 01/17/2023 04:34 AM    MONOSABS 0.41 07/25/2019 12:00 AM    LYMPHSABS 1.08 01/17/2023 04:34 AM    LYMPHSABS 0.63 07/25/2019 12:00 AM    EOSABS 0.03 01/17/2023 04:34 AM EOSABS 0.10 07/25/2019 12:00 AM    BASOSABS <0.03 01/17/2023 04:34 AM    DIFFTYPE NOT REPORTED 02/08/2022 08:47 AM     BMP:    Lab Results   Component Value Date/Time     01/17/2023 04:34 AM    K 4.1 01/17/2023 04:34 AM     01/17/2023 04:34 AM    CO2 23 01/17/2023 04:34 AM    BUN 35 01/17/2023 04:34 AM    LABALBU 3.5 01/15/2023 02:28 PM    CREATININE 0.85 01/17/2023 04:34 AM    CREATININE 0.6 01/17/2019 12:00 AM    CALCIUM 8.1 01/17/2023 04:34 AM    GFRAA >60 09/13/2022 09:21 AM    LABGLOM >60 01/17/2023 04:34 AM    GLUCOSE 109 01/17/2023 04:34 AM           Physical Exam:  Vitals: BP 97/65   Pulse 76   Temp 97.3 °F (36.3 °C) (Tympanic)   Resp 15   Ht 5' 2.5\" (1.588 m)   Wt 131 lb 9.6 oz (59.7 kg)   SpO2 98%   BMI 23.69 kg/m²   24 hour intake/output:No intake or output data in the 24 hours ending 01/17/23 0826  Last 3 weights: Wt Readings from Last 3 Encounters:   01/17/23 131 lb 9.6 oz (59.7 kg)   01/12/23 126 lb (57.2 kg)   01/08/23 134 lb (60.8 kg)     HEENT: Normocephalic and Atraumatic  Neck: Supple, No Masses, Tenderness, Nodularity, and No Lymphadenopathy  Chest/Lungs: Clear to Auscultation without Rales, Rhonchi, or Wheezes  Cardiac: Irregularly Irregular  GI/Abdomen: Bowel Sounds Present and Soft, Non-tender, without Guarding or Rebound Tenderness  : Not examined  EXT/Skin: No Edema, No Cyanosis, and No Clubbing  Neuro:  generalized weakness and Alert and Oriented      Assessment:    Principal Problem:    Symptomatic anemia  Active Problems:    Other fatigue  Resolved Problems:    * No resolved hospital problems.  AARON Plata                  81 WF [TRINA Huber; Oncology--[facility];  [employer] Friend, [place]]  FULL CODE     SCDs     ELIQUIS--held  COVID-19--NEGATIVE   INFLUENZA A--positive--1.8.2023    Anti-infectives:  ------    Symptomatic anemia----1.15.2023---HGB = 4.6 at admission---two events of near syncope  2 UNITS PRBCs---1.15.2023--1.16.2023  à HGB = 8.5    Atrial fibrillation--1.15.2023         EKG--1.15.2023--old septal infarction---on-before 5. 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--lesion to tail along colonic wall concerning for               tumor infiltration        Metastatic carcinoid--liver--10.24.2016         Lung carcinoma--atypical carcinoid--RUL--sp resection        Right breast invasive ductal carcinoma--2005--ER-[+]--                NH-[10%]--HER-2/chey negative--4/26 LN--[+]---right                modified radical mastectomy--chemotherapy--                Adriamycin--Cytoxan--then Taxol--no XRT    Coagulopathy--DVT  PMH:  pancreatitis, osteoarthritis, osteopenia, left knee pain,              myopia-astigmatism, BLE--DVT  PSH:   see above, right breast reconstruction--nipple-areolar             reconstruction, right knee arthroscopy--2008, Bowman              procedure--2004, hysterectomy--supracervical--BSO--             2002, D&C--uterus, tubal ligation, left breast reduction--             2006, bronchoscopy--2010--right wedge resection,              right breast tissue expander--2005, right breast cyst              aspiration--2005, right breast biopsy--LN mapping--             biopsy--2005, left unicompartmental knee replacement--             6.20.2016, liver biopsy--11.4.2016--metastatic atypical              carcinoid, laparoscopic cholecystectomy---1.16.2017, abdominal exploration--resection--re-anastomosis small bowel--2019 colonoscopy--2022, D&C--uterus--2022    Allergies:        NKDA   Intolerances:  Effexor XR--venlafaxine--hypertension                Plan:     Check H&H     Consider Hematology     General Surgery to see     See orders    Electronically signed by David Wayne MD on 1/17/2023 at 8:26 AM    Hospitalist

## 2023-01-17 NOTE — PROGRESS NOTES
DISCHARGE BARRIERS       Reason for Referral:  SW contacted patient to complete a Psychosocial Assessment for evaluation of patient's mental health, social status, and functional capacity within the community. Tan Lyons is a 81 y.o. female admitted due to Symptomatic anemia. Patient tested positive for influenza SW completed assessment via phone. Mental Status:  Alert, oriented, and engaging during assessment. Decision Making:  Makes own decisions. Family/Social/Home Environment: Lives with spouse in [address]    Support: Discussed a good social support network     Current Services: None    Current DMEs: None    PCP: Roxann Vines MD and repots no issues affording medication.  status:  None     ADLs and means of transportation: Independent in ADLs prior to hospitalization and able to transport self. Food insecurity or needed financial assistance: Denies any food insecurity or financial concerns at this time. ACP and Code Status:  Tan Lyons is a Full Code status and does not have an Advance Directive on file. Patient reports she has one completed and her PCP has a copy \"a few years ago\". Patient states her son Leon Clarke took care of paperwork and he would be listed on the Advance Directive. SW encouraged patient to bring copy for medical records. Collaborative List of SNF/ECF/HH were provided: offered, declined No discharge order at this time. Anticipated Needs/Discharge Plan:  Spoke with patient/family/representative about discharge plan. Patient/Family/Representative verbalizes understanding of the plan of care and denies discharge needs or further services at this time. SW provided business card. SW will continue to monitor needs and assist as appropriate.              Electronically signed by CRISTIANA Pemberton on 1/17/2023 at 8:20 AM

## 2023-01-17 NOTE — FLOWSHEET NOTE
01/16/23 4562   Vital Signs   Orthostatic B/P and Pulse?  Yes   Blood Pressure Lying 106/61   Pulse Lying 82 PER MINUTE   Blood Pressure Sitting 101/67   Pulse Sitting 92 PER MINUTE   Blood Pressure Standing 98/78   Pulse Standing 101 PER MINUTE   Level of Consciousness 0

## 2023-01-17 NOTE — PLAN OF CARE
Problem: Discharge Planning  Goal: Discharge to home or other facility with appropriate resources  Outcome: Progressing     Problem: Safety - Adult  Goal: Free from fall injury  Outcome: Progressing  Flowsheets (Taken 1/17/2023 6612)  Free From Fall Injury: Instruct family/caregiver on patient safety  Note: Patient has remained free from falls.      Problem: ABCDS Injury Assessment  Goal: Absence of physical injury  Outcome: Progressing

## 2023-01-17 NOTE — TELEPHONE ENCOUNTER
Spoke with patient this morning. She went to ER on Sunday and was admitted to PCU and is there at this time.

## 2023-01-17 NOTE — PLAN OF CARE
Problem: Discharge Planning  Goal: Discharge to home or other facility with appropriate resources  1/16/2023 2114 by Merary Hathaway RN  Outcome: Progressing  Flowsheets (Taken 1/16/2023 2114)  Discharge to home or other facility with appropriate resources:   Identify barriers to discharge with patient and caregiver   Arrange for needed discharge resources and transportation as appropriate   Identify discharge learning needs (meds, wound care, etc)   Refer to discharge planning if patient needs post-hospital services based on physician order or complex needs related to functional status, cognitive ability or social support system     Problem: Safety - Adult  Goal: Free from fall injury  1/16/2023 2114 by Merary Hathaway RN  Outcome: Progressing     Problem: ABCDS Injury Assessment  Goal: Absence of physical injury  1/16/2023 2114 by Merary Hathaway RN  Outcome: Progressing  Flowsheets (Taken 1/16/2023 2114)  Absence of Physical Injury: Implement safety measures based on patient assessment

## 2023-01-18 LAB
ABO/RH: NORMAL
ABSOLUTE EOS #: 0.08 K/UL (ref 0–0.44)
ABSOLUTE IMMATURE GRANULOCYTE: 0.14 K/UL (ref 0–0.3)
ABSOLUTE LYMPH #: 0.96 K/UL (ref 1.1–3.7)
ABSOLUTE MONO #: 0.61 K/UL (ref 0.1–1.2)
ANION GAP SERPL CALCULATED.3IONS-SCNC: 8 MMOL/L (ref 9–17)
ANTIBODY IDENTIFICATION: NORMAL
ANTIBODY SCREEN: POSITIVE
ARM BAND NUMBER: NORMAL
BASOPHILS # BLD: 0 % (ref 0–2)
BASOPHILS ABSOLUTE: <0.03 K/UL (ref 0–0.2)
BLD PROD TYP BPU: NORMAL
BLOOD BANK BLOOD PRODUCT EXPIRATION DATE: NORMAL
BLOOD BANK ISBT PRODUCT BLOOD TYPE: 9500
BLOOD BANK PRODUCT CODE: NORMAL
BLOOD BANK UNIT TYPE AND RH: NORMAL
BPU ID: NORMAL
BUN BLDV-MCNC: 27 MG/DL (ref 8–23)
BUN/CREAT BLD: 35 (ref 9–20)
CALCIUM SERPL-MCNC: 7.8 MG/DL (ref 8.6–10.4)
CHLORIDE BLD-SCNC: 110 MMOL/L (ref 98–107)
CO2: 21 MMOL/L (ref 20–31)
CREAT SERPL-MCNC: 0.77 MG/DL (ref 0.5–0.9)
CROSSMATCH RESULT: NORMAL
DISPENSE STATUS BLOOD BANK: NORMAL
EOSINOPHILS RELATIVE PERCENT: 2 % (ref 1–4)
EXPIRATION DATE: NORMAL
GFR SERPL CREATININE-BSD FRML MDRD: >60 ML/MIN/1.73M2
GLUCOSE BLD-MCNC: 103 MG/DL (ref 70–99)
HCT VFR BLD CALC: 20.2 % (ref 36.3–47.1)
HCT VFR BLD CALC: 24.5 % (ref 36.3–47.1)
HEMOGLOBIN: 6.2 G/DL (ref 11.9–15.1)
HEMOGLOBIN: 8.1 G/DL (ref 11.9–15.1)
IMMATURE GRANULOCYTES: 3 %
LYMPHOCYTES # BLD: 18 % (ref 24–43)
MCH RBC QN AUTO: 26.7 PG (ref 25.2–33.5)
MCHC RBC AUTO-ENTMCNC: 30.7 G/DL (ref 25.2–33.5)
MCV RBC AUTO: 87.1 FL (ref 82.6–102.9)
MONOCYTES # BLD: 12 % (ref 3–12)
PDW BLD-RTO: 18.3 % (ref 11.8–14.4)
PLATELET # BLD: 112 K/UL (ref 138–453)
PMV BLD AUTO: 10.7 FL (ref 8.1–13.5)
POTASSIUM SERPL-SCNC: 4.1 MMOL/L (ref 3.7–5.3)
RBC # BLD: 2.32 M/UL (ref 3.95–5.11)
RBC # BLD: ABNORMAL 10*6/UL
SEG NEUTROPHILS: 65 % (ref 36–65)
SEGMENTED NEUTROPHILS ABSOLUTE COUNT: 3.4 K/UL (ref 1.5–8.1)
SODIUM BLD-SCNC: 139 MMOL/L (ref 135–144)
TRANSFUSION STATUS: NORMAL
UNIT DIVISION: 0
UNIT ISSUE DATE/TIME: NORMAL
WBC # BLD: 5.2 K/UL (ref 3.5–11.3)

## 2023-01-18 PROCEDURE — P9016 RBC LEUKOCYTES REDUCED: HCPCS

## 2023-01-18 PROCEDURE — 6370000000 HC RX 637 (ALT 250 FOR IP): Performed by: FAMILY MEDICINE

## 2023-01-18 PROCEDURE — 6370000000 HC RX 637 (ALT 250 FOR IP): Performed by: SURGERY

## 2023-01-18 PROCEDURE — 36430 TRANSFUSION BLD/BLD COMPNT: CPT

## 2023-01-18 PROCEDURE — 85018 HEMOGLOBIN: CPT

## 2023-01-18 PROCEDURE — 85025 COMPLETE CBC W/AUTO DIFF WBC: CPT

## 2023-01-18 PROCEDURE — 2060000000 HC ICU INTERMEDIATE R&B

## 2023-01-18 PROCEDURE — 80048 BASIC METABOLIC PNL TOTAL CA: CPT

## 2023-01-18 PROCEDURE — 6360000002 HC RX W HCPCS: Performed by: INTERNAL MEDICINE

## 2023-01-18 PROCEDURE — 99231 SBSQ HOSP IP/OBS SF/LOW 25: CPT | Performed by: INTERNAL MEDICINE

## 2023-01-18 PROCEDURE — 36415 COLL VENOUS BLD VENIPUNCTURE: CPT

## 2023-01-18 PROCEDURE — 85014 HEMATOCRIT: CPT

## 2023-01-18 PROCEDURE — 2580000003 HC RX 258: Performed by: FAMILY MEDICINE

## 2023-01-18 RX ORDER — SODIUM CHLORIDE 9 MG/ML
INJECTION, SOLUTION INTRAVENOUS PRN
Status: DISCONTINUED | OUTPATIENT
Start: 2023-01-18 | End: 2023-01-21 | Stop reason: HOSPADM

## 2023-01-18 RX ORDER — METHYLPREDNISOLONE SODIUM SUCCINATE 125 MG/2ML
125 INJECTION, POWDER, LYOPHILIZED, FOR SOLUTION INTRAMUSCULAR; INTRAVENOUS ONCE
Status: COMPLETED | OUTPATIENT
Start: 2023-01-18 | End: 2023-01-18

## 2023-01-18 RX ADMIN — CYANOCOBALAMIN TAB 1000 MCG 1000 MCG: 1000 TAB at 08:46

## 2023-01-18 RX ADMIN — SODIUM CHLORIDE, PRESERVATIVE FREE 10 ML: 5 INJECTION INTRAVENOUS at 08:47

## 2023-01-18 RX ADMIN — EVEROLIMUS 5 MG: 5 TABLET ORAL at 08:50

## 2023-01-18 RX ADMIN — FERROUS SULFATE TAB 325 MG (65 MG ELEMENTAL FE) 325 MG: 325 (65 FE) TAB at 08:46

## 2023-01-18 RX ADMIN — LISINOPRIL 30 MG: 20 TABLET ORAL at 19:57

## 2023-01-18 RX ADMIN — AMIODARONE HYDROCHLORIDE 200 MG: 200 TABLET ORAL at 08:46

## 2023-01-18 RX ADMIN — DILTIAZEM HYDROCHLORIDE 240 MG: 120 CAPSULE, EXTENDED RELEASE ORAL at 08:46

## 2023-01-18 RX ADMIN — POLYETHYLENE GLYCOL-3350 AND ELECTROLYTES 4000 ML: 236; 6.74; 5.86; 2.97; 22.74 POWDER, FOR SOLUTION ORAL at 16:27

## 2023-01-18 RX ADMIN — FERROUS SULFATE TAB 325 MG (65 MG ELEMENTAL FE) 325 MG: 325 (65 FE) TAB at 17:18

## 2023-01-18 RX ADMIN — METHYLPREDNISOLONE SODIUM SUCCINATE 125 MG: 125 INJECTION, POWDER, FOR SOLUTION INTRAMUSCULAR; INTRAVENOUS at 16:50

## 2023-01-18 NOTE — PROGRESS NOTES
Blood transfusion started on patient. Writer to remain in room for 15 minutes to monitor patient. S/S transfusion reaction discussed with patient, patient aware to report to nurse right away, understanding verbalized.

## 2023-01-18 NOTE — PROGRESS NOTES
MRN:[id number]                      After Visit Summary   11/6/2018    Merissa Leung    MRN: [id number]           Thank you!     Thank you for choosing [facility] for your care. Our goal is always to provide you with excellent care. Hearing back from our patients is one way we can continue to improve our services. Please take a few minutes to complete the written survey that you may receive in the mail after you visit with us. Thank you!        Patient Information     Date Of Birth          1985        Designated Caregiver       Most Recent Value    Caregiver    Will someone help with your care after discharge? yes    Name of designated caregiver Steven    Phone number of caregiver [phone]    Caregiver address [place]      About your hospital stay     You were admitted on:  November 6, 2018 You last received care in the:  [facility]    You were discharged on:  November 8, 2018        Reason for your hospital stay       ITP                  Who to Call     For medical emergencies, please call 911.  For non-urgent questions about your medical care, please call your primary care provider or clinic, [phone]  For questions related to your surgery, please call your surgery clinic        Attending Provider     Provider Anthony Ayala, DO Surgery       Primary Care Provider Office Phone # Fax #    Mohini Mccord [phone]      After Care Instructions     Activity       Your activity upon discharge: no lifting, or strenuous exercise for 6 weeks.  No driving until off pain medications            Diet       Follow this diet upon discharge: Regular            Wound care and dressings       Instructions to care for your wound at home: as directed.                  Follow-up Appointments     Follow-up and recommended labs and tests        Follow up with me,  Anthony Jackson, within 2. to evaluate after surgery.  No follow up  Hospitalist Progress Note    Patient:  Brunilda Ireland     YOB: 1950    MRN: [id number]   Admit date: 1/15/2023     Acct: [de-identified]     PCP: Magui Pierson MD    CC--Interval History:    Cont'd drop in HGB to 6.2---antibodies--hard match--awaiting blood--will give steroids with blood    To have EGD-CS---1.19.2023--[facility]    Malignancies---on everolimus--which does not appear to have a significant impact on bone marrow function--given antibodies consider an autoimmune etiology    Atrial fibrillation--Eliquis held---if EGD-CS negative consider restart    See note below        All other ROS negative except noted in HPI    Diet:  ADULT DIET;  Clear Liquid    Medications:  Scheduled Meds:   amiodarone  200 mg Oral Daily    [Held by provider] apixaban  5 mg Oral BID    Everolimus  5 mg Oral Daily    ferrous sulfate  325 mg Oral BID WC    vitamin B-12  1,000 mcg Oral Daily    sodium chloride flush  5-40 mL IntraVENous 2 times per day    dilTIAZem  240 mg Oral Daily    lisinopril  30 mg Oral Daily     Continuous Infusions:   sodium chloride      sodium chloride      sodium chloride       PRN Meds:sodium chloride, sodium chloride, sodium chloride flush, sodium chloride, ondansetron **OR** ondansetron, polyethylene glycol, acetaminophen **OR** acetaminophen    Objective:  Labs:  CBC with Differential:    Lab Results   Component Value Date/Time    WBC 5.2 01/18/2023 05:31 AM    RBC 2.32 01/18/2023 05:31 AM    HGB 6.2 01/18/2023 05:31 AM    HCT 20.2 01/18/2023 05:31 AM     01/18/2023 05:31 AM    MCV 87.1 01/18/2023 05:31 AM    MCH 26.7 01/18/2023 05:31 AM    MCHC 30.7 01/18/2023 05:31 AM    RDW 18.3 01/18/2023 05:31 AM    LYMPHOPCT 18 01/18/2023 05:31 AM    LYMPHOPCT 13.3 07/25/2019 12:00 AM    MONOPCT 12 01/18/2023 05:31 AM    MONOPCT 8.6 07/25/2019 12:00 AM    EOSPCT 2.1 07/25/2019 12:00 AM    BASOPCT 0 01/18/2023 05:31 AM    BASOPCT 0.4 07/25/2019 12:00 AM    MONOSABS 0.61 01/18/2023 05:31 AM    MONOSABS 0.41 07/25/2019 12:00 AM    LYMPHSABS 0.96 01/18/2023 05:31 AM    LYMPHSABS 0.63 07/25/2019 12:00 AM    EOSABS 0.08 01/18/2023 05:31 AM    EOSABS 0.10 07/25/2019 12:00 AM    BASOSABS <0.03 01/18/2023 05:31 AM    DIFFTYPE NOT REPORTED 02/08/2022 08:47 AM     BMP:    Lab Results   Component Value Date/Time     01/18/2023 05:31 AM    K 4.1 01/18/2023 05:31 AM     01/18/2023 05:31 AM    CO2 21 01/18/2023 05:31 AM    BUN 27 01/18/2023 05:31 AM    LABALBU 3.5 01/15/2023 02:28 PM    CREATININE 0.77 01/18/2023 05:31 AM    CREATININE 0.6 01/17/2019 12:00 AM    CALCIUM 7.8 01/18/2023 05:31 AM    GFRAA >60 09/13/2022 09:21 AM    LABGLOM >60 01/18/2023 05:31 AM    GLUCOSE 103 01/18/2023 05:31 AM           Physical Exam:  Vitals: /74   Pulse 78   Temp 97.8 °F (36.6 °C) (Tympanic)   Resp 16   Ht 5' 2.5\" (1.588 m)   Wt 135 lb 1.6 oz (61.3 kg)   SpO2 99%   BMI 24.32 kg/m²   24 hour intake/output:  Intake/Output Summary (Last 24 hours) at 1/18/2023 0823  Last data filed at 1/18/2023 0634  Gross per 24 hour   Intake --   Output 950 ml   Net -950 ml     Last 3 weights: Wt Readings from Last 3 Encounters:   01/18/23 135 lb 1.6 oz (61.3 kg)   01/12/23 126 lb (57.2 kg)   01/08/23 134 lb (60.8 kg)     HEENT: Normocephalic and Atraumatic  Neck: Supple, No Masses, Tenderness, Nodularity, and No Lymphadenopathy  Chest/Lungs: Clear to Auscultation without Rales, Rhonchi, or Wheezes  Cardiac: Irregularly Irregular  GI/Abdomen: Bowel Sounds Present and Soft, Non-tender, without Guarding or Rebound Tenderness  : Not examined  EXT/Skin:  pale, No Edema, No Cyanosis, and No Clubbing  Neuro: Alert and Oriented, to Person, to Time, to Place, to Situation, and No Localizing Signs/Symptoms      Assessment:    Principal Problem:    Symptomatic anemia  Active Problems:    Other fatigue  Resolved Problems:    * No resolved hospital problems.  [facility],   AARON Negro  TRINA Vidal; Oncology--[facility];  Mac , labs or test are needed.                  Your next 10 appointments already scheduled     Nov 09, 2018 11:30 AM CST   Level 3 with ROOM 6 [facility] ([facility])    [facility]  [address]  [facility] [address]   [phone]            Nov 30, 2018  2:45 PM CST   PHYSICAL with Carola Sanchez MD   [facility]    [address]  [facility] [address]   [phone]            Dec 03, 2018  5:30 PM CST   LAB with [facility]   [facility]    [address] [facility] [id number] [phone]           Please do not eat 10-12 hours before your appointment if you are coming in fasting for labs on lipids, cholesterol, or glucose (sugar). This does not apply to pregnant women. Water, hot tea and black coffee (with nothing added) are okay. Do not drink other fluids, diet soda or chew gum.            Dec 06, 2018 11:30 AM CST   Level 3 with ROOM 6 [facility] ([facility])    [facility]  [address]  [facility] [address]   [phone]            Dec 07, 2018 11:30 AM CST   Level 3 with ROOM 6 [facility] ([facility])    [facility]  [address]  [facility] [address]   [phone]              Further instructions from your care team       HOME CARE FOLLOWING ABDOMINAL SURGERY    INCISIONAL CARE:  Replace the bandage over your incision (or incisions) until all drainage stops, or if more comfortable to have in place.  If present, leave the steri-strips (white paper tapes) in place for 14 days after surgery.  If you have staples in your incision at the time of discharge, they will be removed at your follow-up appointment.  If Dermabond (a type of skin glue) is present, leave in place until it wears/flakes off.      BATHING:  Avoid baths for 1 week after surgery.  Showers are okay.  You may wash your hair at any time.  Gently pat your incision dry after bathing.    ACTIVITY:  Light Activity -- you may immediately be up and about as tolerated.  Driving -- you may drive when comfortable and off narcotic pain medications (example: Norco, Percocet, Hydrocodone).  Light Work -- resume when comfortable off pain medications.  (If you can drive, you probably can work.)  Strenuous Work/Activity -- limit lifting to 20 pounds for 4 weeks.  Then, progressively increase with time.  Active Sports (running, biking, etc.) -- cautiously resume after 6 weeks.  Most importantly listen to your body.  If an activity causes pain or discomfort please stop and try in a few days or decrease your intensity.    DISCOMFORT:  Use pain medications as prescribed by your surgeon.  Take the pain medication with some food, when possible, to minimize side effects.  Expect gradual improvement.      Narcotic Pain Medications:  Do not drive, make important decisions or operate heavy machinery while on narcotic pain medications.  Narcotic pain medications can be associated with nausea, sleep disturbance, and constipation.  Unless directed otherwise, please take an over the counter stool softener (Colace 100mg twice a day) unless directed otherwise.  As soon as you are able to stop narcotic pain medications the better. Long term use of narcotics is associated with tolerance (the need for more medication for effect) and potential addiction.    DIET:  Return to diet you were on before surgery, unless you are given specific diet instructions.  Drink plenty of fluids.  While taking pain medications, increase dietary fiber or add a fiber supplementation like Metamucil or Citrucel to help prevent constipation - a possible side effect of pain medications.    NAUSEA:  If nauseated from the anesthetic/pain meds; rest in bed, get up cautiously with assistance, and drink  GS]  FULL CODE     SCDs     ELIQUIS--held  COVID-19--NEGATIVE   INFLUENZA A--positive--1.8.2023    Anti-infectives:  ------    Planned EGD---CS---1.19.2023--[facility]    Symptomatic anemia----1.15.2023---HGB = 4.6 at admission---two events of near syncope  1 UNIT PRBCs----1.18.2023  2 UNITS PRBCs---1.15.2023--1.16.2023      Atrial fibrillation--1.15.2023         EKG--1.15.2023--old septal infarction---on-before 5. 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--lesion to tail along colonic wall concerning for               tumor infiltration        Metastatic carcinoid--liver--10.24.2016         Lung carcinoma--atypical carcinoid--RUL--sp resection        Right breast invasive ductal carcinoma--2005--ER-[+]--                TX-[10%]--HER-2/chey negative--4/26 LN--[+]---right                modified radical mastectomy--chemotherapy--                Adriamycin--Cytoxan--then Taxol--no XRT    Coagulopathy--DVT  PMH:  pancreatitis, osteoarthritis, osteopenia, left knee pain,              myopia-astigmatism, BLE--DVT  PSH:   see above, right breast reconstruction--nipple-areolar             reconstruction, right knee arthroscopy--2008, Bowman              procedure--2004, hysterectomy--supracervical--BSO--             2002, D&C--uterus, tubal ligation, left breast reduction--             2006, bronchoscopy--2010--right wedge resection,              right breast tissue expander--2005, right breast cyst              aspiration--2005, right breast biopsy--LN mapping--             biopsy--2005, left unicompartmental knee replacement--             6.20.2016, liver biopsy--11.4.2016--metastatic atypical              carcinoid, laparoscopic cholecystectomy---1.16.2017, abdominal exploration--resection--re-anastomosis clear liquids (juice, tea, broth).    RETURN APPOINTMENT:  Schedule a follow-up visit 2-3 weeks after discharge from the hospital.  Office Phone: [phone]     CONTACT US IF THE FOLLOWING DEVELOPS:   1. A fever that is above 101     2. If there is a large amount of drainage, bleeding, or swelling.   3. Severe pain that is not relieved by your prescription.   4. Drainage that is thick, cloudy, yellow, green or white.   5. Any other questions not answered by  Frequently Asked Questions  sheet.      FREQUENTLY ASKED QUESTIONS:    Q:  How should my incision look?    A:  Normally your incision will appear slightly swollen with light redness directly along the incision itself as it heals.  It may feel like a bump or ridge as the healing/scarring happens, and over time (3-4 months) this bump or ridge feeling should slowly go away.  In general, clear or pink watery drainage can be normal at first as your incision heals, but should decrease over time.    Q:  How do I know if my incision is infected?  A:  Look at your incision for signs of infection, like redness around the incision spreading to surrounding skin, or drainage of cloudy or foul-smelling drainage.  If you feel warm, check your temperature to see if you are running a fever.    **If any of these things occur, please notify the nurse at our office.  We may need you to come into the office for an incision check.      Q:  How do I take care of my incision?  A:  If you have a dressing in place - Starting the day after surgery, replace the dressing 1-2 times a day until there is no further drainage from the incision.  At that time, a dressing is no longer needed.  Try to minimize tape on the skin if irritation is occurring at the tape sites.  If you have significant irritation from tape on the skin, please call the office to discuss other method of dressing your incision.    Small pieces of tape called  steri-strips  may be present directly overlying your incision;  small bowel--2019 colonoscopy--2022, D&C--uterus--2022    Allergies:        NKDA   Intolerances:  Effexor XR--venlafaxine--hypertension                  Plan:    T+C --> 1 unit RBCs with steroids    EGD--CS--1.19.2023    Check HGB post [address]    See orders    Electronically signed by Amy Cooper MD on 1/18/2023 at 8:23 AM    Hospitalist these may be removed 10 days after surgery unless otherwise specified by your surgeon.  If these tapes start to loosen at the ends, you may trim them back until they fall off or are removed.    A:  If you had  Dermabond  tissue glue used as a dressing (this causes your incision to look shiny with a clear covering over it) - This type of dressing wears off with time and does not require more dressings over the top unless it is draining around the glue as it wears off.  Do not apply ointments or lotions over the incisions until the glue has completely worn off.    Q:  There is a piece of tape or a sticky  lead  still on my skin.  Can I remove this?  A:  Sometimes the sticky  leads  used for monitoring during surgery or for evaluation in the emergency department are not all removed while you are in the hospital.  These sometimes have a tab or metal dot on them.  You can easily remove these on your own, like taking off a band-aid.  If there is a gel substance under the  lead , simply wipe/clean it off with a washcloth or paper towel.      Q:  What can I do to minimize constipation (very hard stools, or lack of stools)?  A:  Stay well hydrated.  Increase your dietary fiber intake or take a fiber supplement -with plenty of water.  Walk around frequently.  You may consider an over-the-counter stool-softener.  Your Pharmacist can assist you with choosing one that is stocked at your pharmacy.  Constipation is also one of the most common side effects of pain medication.  If you are using pain medication, be pro-active and try to PREVENT problems with constipation by taking the steps above BEFORE constipation becomes a problem.    Q:  What do I do if I need more pain medications?  A:  Call the office to receive refills.  Be aware that certain pain meds cannot be called into a pharmacy and actually require a paper prescription.  A change may be made in your pain med as you progress thru your recovery period or if you have side  effects to certain meds.    --Pain meds are NOT refilled after 5pm on weekdays, and NOT AT ALL on the weekends, so please look ahead to prevent problems.      Q:  Why am I having a hard time sleeping now that I am at home?  A:  Many medications you receive while you are in the hospital can impact your sleep for a number of days after your surgery/hospitalization.  Decreased level of activity and naps during the day may also make sleeping at night difficult.  Try to minimize day-time naps, and get up frequently during the day to walk around your home during your recovery time.  Sleep aides may be of some help, but are not recommended for long-term use.      Q:  I am having some back discomfort.  What should I do?  A:  This may be related to certain positioning that was required for your surgery, extended periods of time in bed, or other changes in your overall activity level.  You may try ice, heat, acetaminophen, or ibuprofen to treat this temporarily.  Note that many pain medications have acetaminophen in them and would state this on the prescription bottle.  Be sure not to exceed the maximum of 4000mg per day of acetaminophen.     **If the pain you are having does not resolve, is severe, or is a flare of back pain you have had on other occasions prior to surgery, please contact your primary physician for further recommendations or for an appointment to be examined at their office.    Q:  Why am I having headaches?  A:  Headaches can be caused by many things:  caffeine withdrawal, use of pain meds, dehydration, high blood pressure, lack of sleep, over-activity/exhaustion, flare-up of usual migraine headaches.  If you feel this is related to muscle tension (a band-like feeling around the head, or a pressure at the low-back of the head) you may try ice or heat to this area.  You may need to drink more fluids (try electrolyte drink like Gatorade), rest, or take your usual migraine medications.   **If your headaches do  not resolve, worsen, are accompanied by other symptoms, or if your blood pressure is high, please call your primary physician for recommendation and/or examination.    Q:  I am unable to urinate.  What do I do?  A:  A small percentage of people can have difficulty urinating initially after surgery.  This includes being able to urinate only a very small amount at a time and feeling discomfort or pressure in the very low abdomen.  This is called  urinary retention , and is actually an urgent situation.  Proceed to your nearest Emergency department for evaluation (not an Urgent Care Center).  Sometimes the bladder does not work correctly after certain medications you receive during surgery, or related to certain procedures.  You may need to have a catheter placed until your bladder recovers.  When planning to go to an Emergency department, it may help to call the ER to let them know you are coming in for this problem after a surgery.  This may help you get in quicker to be evaluated.  **If you have symptoms of a urinary tract infection, please contact your primary physician for the proper evaluation and treatment.          If you have other questions, please call the office Monday thru Friday between 8am and 5pm to discuss with the nurse.  # [phone]    There is a surgeon ON CALL on weekday evenings and over the weekend in case of urgent need only, and may be contacted at the same number.    If you are having an emergency, call 911 or proceed to your nearest emergency department.          Pending Results     Date and Time Order Name Status Description    11/6/2018 0907 Surgical pathology exam In process             Statement of Approval     Ordered          11/08/18 1006  I have reviewed and agree with all the recommendations and orders detailed in this document.  EFFECTIVE NOW     Approved and electronically signed by:  Anthony Jackson, DO             Admission Information     Date & Time Provider  Department Dept. Phone    11/6/2018 Anthony Jackson, DO [facility] Surgical [phone]      Your Vitals Were     Blood Pressure Pulse Temperature Respirations Last Period Pulse Oximetry    101/57 (BP Location: Left arm) 58 98.1  F (36.7  C) (Oral) 16 10/20/2018 (Exact Date) 100%      MyChart Information     [facility] gives you secure access to your electronic health record. If you see a primary care provider, you can also send messages to your care team and make appointments. If you have questions, please call your primary care clinic.  If you do not have a primary care provider, please call [phone] and they will assist you.        Care EveryWhere ID     This is your Care EveryWhere ID. This could be used by other organizations to access your [facility] medical records  [id number]        Equal Access to Services     ANA GARCIA : Elva Cervantes, geovanni gutiérrez, leela berman. So [facility] [phone].    ATENCIÓN: Si habla español, tiene a goldsmith disposición servicios gratuitos de asistencia lingüística. Konrad al [phone].    We comply with applicable federal civil rights laws and [place] laws. We do not discriminate on the basis of race, color, national origin, age, disability, sex, sexual orientation, or gender identity.               Review of your medicines      START taking        Dose / Directions    HYDROcodone-acetaminophen 5-325 MG per tablet   Commonly known as:  NORCO        Dose:  1-2 tablet   Take 1-2 tablets by mouth every 4 hours as needed for severe pain   Quantity:  30 tablet   Refills:  0       ibuprofen 600 MG tablet   Commonly known as:  ADVIL/MOTRIN        Dose:  600 mg   Take 1 tablet (600 mg) by mouth every 8 hours as needed for moderate pain   Quantity:  30 tablet   Refills:  0         CONTINUE these medicines which have NOT CHANGED        Dose / Directions    clonazePAM 0.5 MG tablet   Commonly  known as:  klonoPIN   Used for:  EDWARD (generalized anxiety disorder)        Dose:  0.25-0.5 mg   Take 0.5-1 tablets (0.25-0.5 mg) by mouth 2 times daily as needed for anxiety   Quantity:  20 tablet   Refills:  0       linaclotide 145 MCG capsule   Commonly known as:  LINZESS   Used for:  Irritable bowel syndrome with constipation        Dose:  145 mcg   Take 1 capsule (145 mcg) by mouth every morning (before breakfast)   Quantity:  30 capsule   Refills:  5       * lisdexamfetamine 10 MG capsule   Commonly known as:  VYVANSE   Used for:  Binge eating disorder        Dose:  10 mg   Take 1 capsule (10 mg) by mouth daily (before lunch)   Quantity:  30 capsule   Refills:  0       * lisdexamfetamine 40 MG capsule   Commonly known as:  VYVANSE   Used for:  Binge eating disorder        Dose:  40 mg   Take 1 capsule (40 mg) by mouth every morning   Quantity:  30 capsule   Refills:  0       MULTIVITAL PO        Dose:  1 chew tab   Take 1 chew tab by mouth daily Gummi   Refills:  0       polyethylene glycol powder   Commonly known as:  MIRALAX/GLYCOLAX        Dose:  17 g   Take 17 g by mouth daily as needed for constipation   Refills:  0       sennosides 8.6 MG tablet   Commonly known as:  SENOKOT        Dose:  2 tablet   Take 2 tablets by mouth 2 times daily   Refills:  0       UNABLE TO FIND        Dose:  2 teaspoonful   Take 2 teaspoonful by mouth At Bedtime Magnesium Calm   Refills:  0       * Notice:  This list has 2 medication(s) that are the same as other medications prescribed for you. Read the directions carefully, and ask your doctor or other care provider to review them with you.         Where to get your medicines      These medications were sent to [facility] [place], [place] - [address]  [address] [address]     Phone:  [phone]     ibuprofen 600 MG tablet         Some of these will need a paper prescription and others can be bought over the counter. Ask your nurse if you  have questions.     Bring a paper prescription for each of these medications     HYDROcodone-acetaminophen 5-325 MG per tablet                Protect others around you: Learn how to safely use, store and throw away your medicines at www.[email or web address].        Information about OPIOIDS     PRESCRIPTION OPIOIDS: WHAT YOU NEED TO KNOW   We gave you an opioid (narcotic) pain medicine. It is important to manage your pain, but opioids are not always the best choice. You should first try all the other options your care team gave you. Take this medicine for as short a time (and as few doses) as possible.    Some activities can increase your pain, such as bandage changes or therapy sessions. It may help to take your pain medicine 30 to 60 minutes before these activities. Reduce your stress by getting enough sleep, working on hobbies you enjoy and practicing relaxation or meditation. Talk to your care team about ways to manage your pain beyond prescription opioids.    These medicines have risks:    DO NOT drive when on new or higher doses of pain medicine. These medicines can affect your alertness and reaction times, and you could be arrested for driving under the influence (DUI). If you need to use opioids long-term, talk to your care team about driving.    DO NOT operate heavy machinery    DO NOT do any other dangerous activities while taking these medicines.    DO NOT drink any alcohol while taking these medicines.     If the opioid prescribed includes acetaminophen, DO NOT take with any other medicines that contain acetaminophen. Read all labels carefully. Look for the word  acetaminophen  or  Tylenol.  Ask your pharmacist if you have questions or are unsure.    You can get addicted to pain medicines, especially if you have a history of addiction (chemical, alcohol or substance dependence). Talk to your care team about ways to reduce this risk.    All opioids tend to cause constipation. Drink plenty of water and eat  foods that have a lot of fiber, such as fruits, vegetables, prune juice, apple juice and high-fiber cereal. Take a laxative (Miralax, milk of magnesia, Colace, Senna) if you don t move your bowels at least every other day. Other side effects include upset stomach, sleepiness, dizziness, throwing up, tolerance (needing more of the medicine to have the same effect), physical dependence and slowed breathing.    Store your pills in a secure place, locked if possible. We will not replace any lost or stolen medicine. If you don t finish your medicine, please throw away (dispose) as directed by your pharmacist. The [place] [employer] has more information about safe disposal: https://www.pca.[facility].mn.us/living-green/managing-unwanted-medications             Medication List: This is a list of all your medications and when to take them. Check marks below indicate your daily home schedule. Keep this list as a reference.      Medications           Morning Afternoon Evening Bedtime As Needed    clonazePAM 0.5 MG tablet   Commonly known as:  klonoPIN   Take 0.5-1 tablets (0.25-0.5 mg) by mouth 2 times daily as needed for anxiety                                   HYDROcodone-acetaminophen 5-325 MG per tablet   Commonly known as:  NORCO   Take 1-2 tablets by mouth every 4 hours as needed for severe pain                                   ibuprofen 600 MG tablet   Commonly known as:  ADVIL/MOTRIN   Take 1 tablet (600 mg) by mouth every 8 hours as needed for moderate pain                                   linaclotide 145 MCG capsule   Commonly known as:  LINZESS   Take 1 capsule (145 mcg) by mouth every morning (before breakfast)   Last time this was given:  145 mcg on 11/8/2018  6:35 AM                                   * lisdexamfetamine 10 MG capsule   Commonly known as:  VYVANSE   Take 1 capsule (10 mg) by mouth daily (before lunch)   Last time this was given:  40 mg on 11/8/2018  8:29 AM                                    * lisdexamfetamine 40 MG capsule   Commonly known as:  VYVANSE   Take 1 capsule (40 mg) by mouth every morning   Last time this was given:  40 mg on 11/8/2018  8:29 AM                                   MULTIVITAL PO   Take 1 chew tab by mouth daily Gummi   Last time this was given:  None given                                   polyethylene glycol powder   Commonly known as:  MIRALAX/GLYCOLAX   Take 17 g by mouth daily as needed for constipation   Last time this was given:  none given                                   sennosides 8.6 MG tablet   Commonly known as:  SENOKOT   Take 2 tablets by mouth 2 times daily   Last time this was given:  Given at 8:30 am today                                   UNABLE TO FIND   Take 2 teaspoonful by mouth At Bedtime Magnesium Calm   Last time this was given:  None given                                   * Notice:  This list has 2 medication(s) that are the same as other medications prescribed for you. Read the directions carefully, and ask your doctor or other care provider to review them with you.

## 2023-01-18 NOTE — CONSULTS
Sahil Matthew is a 81 y.o. female      CC:    Chronic anemia    HISTORY OF PRESENT ILLNESS:    Pt is 80 yo who presented with weakness. Describes a dark emesis once prior to coming in.   Hb 4.6        Review of Systems:    General:  Fever: Negative  Weight Change:Negative  Night Sweats: Negative    Eye:  Blurry Vision:Negative  Double Vision: Negative    Ent:  Headaches: Negative  Sore throat: Negative    Allergy/Immunology:  Hives: Negative  Latex allergy: Negative    Hematology/Lymphatic:  Bleeding Problems: Negative  Blood Clots: Negative  Swollen Lymph Nodes: Negative    Lungs:  Cough: Negative  SOB: Negative  Wheezing:Negative    Cardiovascular:  Chest Pain: Negative  Palpitations:Negative    GI:   Decreased Appetite: Negative  Heartburn: Negative  Dysphagia: Negative  Nausea/Vomiting: Negative  Abdominal Pain: Negative  Change in Bowels:Negative  Constipation: Negative  Diarrhea: Negative  Rectal Bleeding: Negative    :   Dysuria: Negative  Increase Urinary Frequency/Urgency: Negative    Neuro:  Seizures: Negative  Confusion: Negative        PAST MEDICAL HISTORY:      Family History   Problem Relation Age of Onset    Heart Disease Mother     Diabetes Mother     High Cholesterol Mother     High Blood Pressure Mother     Cataracts Mother     Cancer Father         lung    Cancer Sister         leukemia    Glaucoma Sister      Social History     Socioeconomic History    Marital status:      Spouse name: Not on file    Number of children: Not on file    Years of education: Not on file    Highest education level: Not on file   Occupational History    Not on file   Tobacco Use    Smoking status: Never    Smokeless tobacco: Never   Substance and Sexual Activity    Alcohol use: No     Alcohol/week: 0.0 standard drinks    Drug use: No    Sexual activity: Not on file   Other Topics Concern    Not on file   Social History Narrative    Not on file     Social Determinants of Health     Financial Resource Strain: Not on file   Food Insecurity: Not on file   Transportation Needs: Not on file   Physical Activity: Not on file   Stress: Not on file   Social Connections: Not on file   Intimate Partner Violence: Not on file   Housing Stability: Not on file     Past Surgical History:   Procedure Laterality Date    ABDOMINAL EXPLORATION SURGERY  01/07/2019    resection and anastomosis of small bowel repair mesenteric tear with Dr Dumas Money at [address] [facility] Right 07/25/2005    lymph node mapping and biopsy     BREAST BIOPSY Right 07/07/2005    excisional biopsy of mass of right breast     BREAST CYST ASPIRATION Right 07/25/2005    BREAST RECONSTRUCTION Right 2006    nipple areolar reconstruction right breast    BREAST REDUCTION SURGERY Left 2006    BRONCHOSCOPY  02/11/2011    BRONCHOSCOPY Right 11/12/2010    video assisted thoracoscopic surgery right wedge resection    CHOLECYSTECTOMY, LAPAROSCOPIC  01/16/2017    Dr. Devon Snow, [facility]  02/24/2014    normal, repeat recommended in 10 years, Dr. Radha Dougherty, [facility]    COLONOSCOPY N/A 06/27/2022    [facility] Dr. Antonio Truong      ESOPHAGOGASTRODUODENOSCOPY N/A 06/27/2022    [facility] Dr. Maricruz Stovall    ESOPHAGOGASTRODUODENOSCOPY      HYSTERECTOMY ([address])  01/12/2004    supracervical, with bilateral salpingo-oophorectomy, Dr. Karen Gonzalez ARTHROSCOPY Right 07/01/2008    KNEE SURGERY Left 06/20/2016    unicompartmental knee replacement, Dr. Jj Santana, [address]  11/04/2016    ultrasound-guided liver biopsy (metastatic atypical carcinoid), [facility] at [address] [facility] Right 02/11/2011    right upper lobectomy, mediastinal lymph node dissection for atypical carcinoid of the right upper lobe    MASTECTOMY Right 2005    breast cancer    OTHER SURGICAL HISTORY  2004    Bowman procedure OTHER SURGICAL HISTORY Right 07/25/2005    placement of tissue expander    SMALL INTESTINE SURGERY      THORACOTOMY Right 02/11/2011    redo    TOTAL KNEE ARTHROPLASTY Right 03/17/2010    Dr ANTONIO Bishop ENDOSCOPY  01/14/2017    mild esophagitis, biopsy normal, Dr. 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 (70% better after Celestone injection on 3-). Hector Coppola PA-C. Liver cancer ([facility] [address]     Lung cancer ([facility] [address]     Myopia with astigmatism and presbyopia     Neuroendocrine carcinoma ([facility] [address]     Osteoarthritis     Osteopenia     took Fosamax from [id number]    Pancreatitis      No current facility-administered medications on file prior to encounter.      Current Outpatient Medications on File Prior to Encounter   Medication Sig Dispense Refill    doxycycline hyclate (VIBRA-TABS) 100 MG tablet Take 1 tablet by mouth 2 times daily for 10 days 20 tablet 0    dilTIAZem (TIAZAC) 240 MG extended release capsule take 1 capsule by mouth once daily 90 capsule 0    Everolimus 5 MG TABS       apixaban (ELIQUIS) 5 MG TABS tablet take 1 tablet by mouth twice a day 180 tablet 1    amiodarone (CORDARONE) 200 MG tablet Take 1 tablet by mouth 2 times daily (Patient taking differently: Take 200 mg by mouth daily) 180 tablet 3    lisinopril (PRINIVIL;ZESTRIL) 30 MG tablet take 1 tablet by mouth once daily 90 tablet 1    RA VITAMIN B-12 TR 1000 MCG TBCR take 1 tablet by mouth once daily      FEROSUL 325 (65 Fe) MG tablet every other day      Handicap Placard MISC by Does not apply route 1 each 0    fluticasone (FLONASE) 50 MCG/ACT nasal spray 1 spray by Nasal route daily as needed for Rhinitis (Patient not taking: Reported on 1/15/2023) 1 Bottle 5    ondansetron (ZOFRAN) 4 MG tablet Take 1 tablet by mouth every 8 hours as needed for Nausea (Patient not taking: Reported on 1/15/2023) 20 tablet 0    acetaminophen (TYLENOL) 325 MG tablet Take 2 tablets by mouth every 4 hours as needed for Pain or Fever 120 tablet 0     Allergies as of 01/15/2023 - Fully Reviewed 01/15/2023   Allergen Reaction Noted    Effexor xr [venlafaxine hcl]  11/09/2013    Venlafaxine  04/26/2019         PHYSICAL EXAM:    Blood pressure 99/62, pulse 80, temperature 97.8 °F (36.6 °C), temperature source Tympanic, resp. rate 16, height 5' 2.5\" (1.588 m), weight 135 lb 1.6 oz (61.3 kg), SpO2 99 %. Gen:  A and O x 3, NAD, well nourished  Eyes:  Sclera non icterus, PERRL  Head:  Normocephalic, non-tender  Neck:  Supple, no adenopathy, thyroid non tender and no masses,no carotid bruits  Lungs:  CTA, symmetrical  Chest:  RRR, no murmurs  Abd:  Soft, NT, ND, no HSM, no hernias, no bruits  Ext:  No edema, no cyanosis  Psych: reveals appropriate mood, memory and judgment,  Neuro:  Reveals no gross motor or sensory deficits,   Msk:  5/5 strength all 4 extremities, no joint tenderness         ASSESS MENT:    More acute anemia on top of chronic anemia. Had dark emesis prior to admision. Should probably do EGD to rule out UGI bleed as most likely source. Pt had both EGD and CS with in the last year,  had mild gastric variceis.   CS was fairly normal.      PLAN:    Plan EGD thursday if OK with hospitalist

## 2023-01-18 NOTE — PLAN OF CARE
Problem: Discharge Planning  Goal: Discharge to home or other facility with appropriate resources  1/18/2023 1134 by Marbella Rodríguez RN  Outcome: Progressing     Problem: Safety - Adult  Goal: Free from fall injury  1/18/2023 1134 by Marbella Rodríguez RN  Outcome: Progressing     Problem: ABCDS Injury Assessment  Goal: Absence of physical injury  1/18/2023 1134 by Marbella Rodríguez RN  Outcome: Progressing  Flowsheets (Taken 1/18/2023 1134)  Absence of Physical Injury: Implement safety measures based on patient assessment  Note: Patient has remained free from falls and injury.

## 2023-01-19 ENCOUNTER — ANESTHESIA EVENT (OUTPATIENT)
Dept: OPERATING ROOM | Age: 73
End: 2023-01-19
Payer: MEDICARE

## 2023-01-19 ENCOUNTER — ANESTHESIA (OUTPATIENT)
Dept: OPERATING ROOM | Age: 73
End: 2023-01-19
Payer: MEDICARE

## 2023-01-19 LAB
ABSOLUTE EOS #: 0 K/UL (ref 0–0.4)
ABSOLUTE IMMATURE GRANULOCYTE: 0 K/UL (ref 0–0.3)
ABSOLUTE LYMPH #: 0.32 K/UL (ref 1–4.8)
ABSOLUTE MONO #: 0.03 K/UL (ref 0.1–1.2)
ABSOLUTE RETIC #: 0.22 M/UL (ref 0.03–0.08)
ANION GAP SERPL CALCULATED.3IONS-SCNC: 9 MMOL/L (ref 9–17)
BASOPHILS # BLD: 0 % (ref 0–1)
BASOPHILS ABSOLUTE: 0 K/UL (ref 0–0.2)
BILIRUB SERPL-MCNC: 0.3 MG/DL (ref 0.3–1.2)
BILIRUBIN DIRECT: 0.1 MG/DL
BUN BLDV-MCNC: 26 MG/DL (ref 8–23)
BUN/CREAT BLD: 38 (ref 9–20)
CALCIUM SERPL-MCNC: 7.9 MG/DL (ref 8.6–10.4)
CHLORIDE BLD-SCNC: 108 MMOL/L (ref 98–107)
CO2: 24 MMOL/L (ref 20–31)
CREAT SERPL-MCNC: 0.68 MG/DL (ref 0.5–0.9)
EOSINOPHILS RELATIVE PERCENT: 0 % (ref 1–7)
GFR SERPL CREATININE-BSD FRML MDRD: >60 ML/MIN/1.73M2
GLUCOSE BLD-MCNC: 180 MG/DL (ref 70–99)
HAPTOGLOBIN: 188 MG/DL (ref 30–200)
HCT VFR BLD CALC: 20.7 % (ref 36.3–47.1)
HCT VFR BLD CALC: 21.1 % (ref 36.3–47.1)
HCT VFR BLD CALC: 21.4 % (ref 36.3–47.1)
HEMOGLOBIN: 6.6 G/DL (ref 11.9–15.1)
HEMOGLOBIN: 6.8 G/DL (ref 11.9–15.1)
HEMOGLOBIN: 7 G/DL (ref 11.9–15.1)
IMMATURE GRANULOCYTES: 0 %
IMMATURE RETIC FRACT: 32.8 % (ref 2.7–18.3)
LACTATE DEHYDROGENASE: 246 U/L (ref 135–214)
LYMPHOCYTES # BLD: 20 % (ref 16–46)
MCH RBC QN AUTO: 27.1 PG (ref 25.2–33.5)
MCHC RBC AUTO-ENTMCNC: 32.7 G/DL (ref 25.2–33.5)
MCV RBC AUTO: 82.9 FL (ref 82.6–102.9)
MONOCYTES # BLD: 2 % (ref 4–11)
MORPHOLOGY: ABNORMAL
NRBC AUTOMATED: 0 PER 100 WBC
NUCLEATED RED BLOOD CELLS: 1 PER 100 WBC
PDW BLD-RTO: 18.2 % (ref 11.8–14.4)
PLATELET # BLD: 129 K/UL (ref 138–453)
PMV BLD AUTO: 10.4 FL (ref 8.1–13.5)
POTASSIUM SERPL-SCNC: 3.3 MMOL/L (ref 3.7–5.3)
POTASSIUM SERPL-SCNC: 3.8 MMOL/L (ref 3.7–5.3)
RBC # BLD: 2.58 M/UL (ref 3.95–5.11)
RETIC %: 8.8 % (ref 0.5–1.9)
RETIC HEMOGLOBIN: 23.7 PG (ref 28.2–35.7)
SEG NEUTROPHILS: 78 % (ref 43–77)
SEGMENTED NEUTROPHILS ABSOLUTE COUNT: 1.23 K/UL (ref 1.5–8.1)
SODIUM BLD-SCNC: 141 MMOL/L (ref 135–144)
WBC # BLD: 1.6 K/UL (ref 3.5–11.3)

## 2023-01-19 PROCEDURE — 6360000002 HC RX W HCPCS: Performed by: INTERNAL MEDICINE

## 2023-01-19 PROCEDURE — 80048 BASIC METABOLIC PNL TOTAL CA: CPT

## 2023-01-19 PROCEDURE — 3E0G8GC INTRODUCTION OF OTHER THERAPEUTIC SUBSTANCE INTO UPPER GI, VIA NATURAL OR ARTIFICIAL OPENING ENDOSCOPIC: ICD-10-PCS | Performed by: SURGERY

## 2023-01-19 PROCEDURE — 3700000001 HC ADD 15 MINUTES (ANESTHESIA): Performed by: SURGERY

## 2023-01-19 PROCEDURE — 82248 BILIRUBIN DIRECT: CPT

## 2023-01-19 PROCEDURE — 84132 ASSAY OF SERUM POTASSIUM: CPT

## 2023-01-19 PROCEDURE — 86870 RBC ANTIBODY IDENTIFICATION: CPT

## 2023-01-19 PROCEDURE — 0W3P8ZZ CONTROL BLEEDING IN GASTROINTESTINAL TRACT, VIA NATURAL OR ARTIFICIAL OPENING ENDOSCOPIC: ICD-10-PCS | Performed by: SURGERY

## 2023-01-19 PROCEDURE — 99231 SBSQ HOSP IP/OBS SF/LOW 25: CPT | Performed by: INTERNAL MEDICINE

## 2023-01-19 PROCEDURE — 3609012400 HC EGD TRANSORAL BIOPSY SINGLE/MULTIPLE: Performed by: SURGERY

## 2023-01-19 PROCEDURE — 82247 BILIRUBIN TOTAL: CPT

## 2023-01-19 PROCEDURE — 6370000000 HC RX 637 (ALT 250 FOR IP): Performed by: NURSE PRACTITIONER

## 2023-01-19 PROCEDURE — 86900 BLOOD TYPING SEROLOGIC ABO: CPT

## 2023-01-19 PROCEDURE — 2060000000 HC ICU INTERMEDIATE R&B

## 2023-01-19 PROCEDURE — 7100000010 HC PHASE II RECOVERY - FIRST 15 MIN: Performed by: SURGERY

## 2023-01-19 PROCEDURE — 85025 COMPLETE CBC W/AUTO DIFF WBC: CPT

## 2023-01-19 PROCEDURE — 6370000000 HC RX 637 (ALT 250 FOR IP): Performed by: SURGERY

## 2023-01-19 PROCEDURE — 6360000002 HC RX W HCPCS: Performed by: SURGERY

## 2023-01-19 PROCEDURE — 7100000011 HC PHASE II RECOVERY - ADDTL 15 MIN: Performed by: SURGERY

## 2023-01-19 PROCEDURE — 86880 COOMBS TEST DIRECT: CPT

## 2023-01-19 PROCEDURE — 85045 AUTOMATED RETICULOCYTE COUNT: CPT

## 2023-01-19 PROCEDURE — 86920 COMPATIBILITY TEST SPIN: CPT

## 2023-01-19 PROCEDURE — 2720000010 HC SURG SUPPLY STERILE: Performed by: SURGERY

## 2023-01-19 PROCEDURE — 85014 HEMATOCRIT: CPT

## 2023-01-19 PROCEDURE — 2580000003 HC RX 258: Performed by: FAMILY MEDICINE

## 2023-01-19 PROCEDURE — 6360000002 HC RX W HCPCS: Performed by: NURSE ANESTHETIST, CERTIFIED REGISTERED

## 2023-01-19 PROCEDURE — 0DJD8ZZ INSPECTION OF LOWER INTESTINAL TRACT, VIA NATURAL OR ARTIFICIAL OPENING ENDOSCOPIC: ICD-10-PCS | Performed by: SURGERY

## 2023-01-19 PROCEDURE — 2500000003 HC RX 250 WO HCPCS: Performed by: NURSE ANESTHETIST, CERTIFIED REGISTERED

## 2023-01-19 PROCEDURE — 0DB78ZX EXCISION OF STOMACH, PYLORUS, VIA NATURAL OR ARTIFICIAL OPENING ENDOSCOPIC, DIAGNOSTIC: ICD-10-PCS | Performed by: SURGERY

## 2023-01-19 PROCEDURE — 83615 LACTATE (LD) (LDH) ENZYME: CPT

## 2023-01-19 PROCEDURE — 3700000000 HC ANESTHESIA ATTENDED CARE: Performed by: SURGERY

## 2023-01-19 PROCEDURE — 86901 BLOOD TYPING SEROLOGIC RH(D): CPT

## 2023-01-19 PROCEDURE — 2709999900 HC NON-CHARGEABLE SUPPLY: Performed by: SURGERY

## 2023-01-19 PROCEDURE — 85018 HEMOGLOBIN: CPT

## 2023-01-19 PROCEDURE — 6370000000 HC RX 637 (ALT 250 FOR IP): Performed by: FAMILY MEDICINE

## 2023-01-19 PROCEDURE — C9113 INJ PANTOPRAZOLE SODIUM, VIA: HCPCS | Performed by: NURSE PRACTITIONER

## 2023-01-19 PROCEDURE — 6360000002 HC RX W HCPCS: Performed by: NURSE PRACTITIONER

## 2023-01-19 PROCEDURE — 86850 RBC ANTIBODY SCREEN: CPT

## 2023-01-19 PROCEDURE — 83010 ASSAY OF HAPTOGLOBIN QUANT: CPT

## 2023-01-19 PROCEDURE — 2580000003 HC RX 258: Performed by: NURSE PRACTITIONER

## 2023-01-19 PROCEDURE — 2580000003 HC RX 258: Performed by: SURGERY

## 2023-01-19 PROCEDURE — 3609027000 HC COLONOSCOPY: Performed by: SURGERY

## 2023-01-19 PROCEDURE — 2580000003 HC RX 258: Performed by: NURSE ANESTHETIST, CERTIFIED REGISTERED

## 2023-01-19 RX ORDER — GLYCOPYRROLATE 0.2 MG/ML
INJECTION INTRAMUSCULAR; INTRAVENOUS PRN
Status: DISCONTINUED | OUTPATIENT
Start: 2023-01-19 | End: 2023-01-19 | Stop reason: SDUPTHER

## 2023-01-19 RX ORDER — EPINEPHRINE 1 MG/ML(1)
AMPUL (ML) INJECTION PRN
Status: DISCONTINUED | OUTPATIENT
Start: 2023-01-19 | End: 2023-01-19 | Stop reason: ALTCHOICE

## 2023-01-19 RX ORDER — SODIUM CHLORIDE, SODIUM LACTATE, POTASSIUM CHLORIDE, CALCIUM CHLORIDE 600; 310; 30; 20 MG/100ML; MG/100ML; MG/100ML; MG/100ML
INJECTION, SOLUTION INTRAVENOUS CONTINUOUS PRN
Status: DISCONTINUED | OUTPATIENT
Start: 2023-01-19 | End: 2023-01-19 | Stop reason: SDUPTHER

## 2023-01-19 RX ORDER — SODIUM CHLORIDE 0.9 % (FLUSH) 0.9 %
5-40 SYRINGE (ML) INJECTION EVERY 12 HOURS SCHEDULED
Status: DISCONTINUED | OUTPATIENT
Start: 2023-01-19 | End: 2023-01-21 | Stop reason: HOSPADM

## 2023-01-19 RX ORDER — LIDOCAINE HYDROCHLORIDE 10 MG/ML
INJECTION, SOLUTION INFILTRATION; PERINEURAL PRN
Status: DISCONTINUED | OUTPATIENT
Start: 2023-01-19 | End: 2023-01-19 | Stop reason: SDUPTHER

## 2023-01-19 RX ORDER — PROPOFOL 10 MG/ML
INJECTION, EMULSION INTRAVENOUS PRN
Status: DISCONTINUED | OUTPATIENT
Start: 2023-01-19 | End: 2023-01-19 | Stop reason: SDUPTHER

## 2023-01-19 RX ORDER — ONDANSETRON 2 MG/ML
4 INJECTION INTRAMUSCULAR; INTRAVENOUS ONCE
Status: COMPLETED | OUTPATIENT
Start: 2023-01-19 | End: 2023-01-19

## 2023-01-19 RX ORDER — SODIUM CHLORIDE 9 MG/ML
INJECTION, SOLUTION INTRAVENOUS PRN
Status: DISCONTINUED | OUTPATIENT
Start: 2023-01-19 | End: 2023-01-21 | Stop reason: HOSPADM

## 2023-01-19 RX ORDER — METHYLPREDNISOLONE SODIUM SUCCINATE 125 MG/2ML
125 INJECTION, POWDER, LYOPHILIZED, FOR SOLUTION INTRAMUSCULAR; INTRAVENOUS ONCE
Status: COMPLETED | OUTPATIENT
Start: 2023-01-19 | End: 2023-01-19

## 2023-01-19 RX ORDER — SODIUM CHLORIDE 0.9 % (FLUSH) 0.9 %
5-40 SYRINGE (ML) INJECTION PRN
Status: DISCONTINUED | OUTPATIENT
Start: 2023-01-19 | End: 2023-01-20

## 2023-01-19 RX ORDER — POTASSIUM CHLORIDE 20 MEQ/1
40 TABLET, EXTENDED RELEASE ORAL ONCE
Status: COMPLETED | OUTPATIENT
Start: 2023-01-19 | End: 2023-01-19

## 2023-01-19 RX ADMIN — SODIUM CHLORIDE, PRESERVATIVE FREE 10 ML: 5 INJECTION INTRAVENOUS at 20:34

## 2023-01-19 RX ADMIN — PHENYLEPHRINE HYDROCHLORIDE 200 MCG: 10 INJECTION INTRAVENOUS at 14:22

## 2023-01-19 RX ADMIN — CYANOCOBALAMIN TAB 1000 MCG 1000 MCG: 1000 TAB at 08:48

## 2023-01-19 RX ADMIN — PROPOFOL 50 MG: 10 INJECTION, EMULSION INTRAVENOUS at 14:21

## 2023-01-19 RX ADMIN — METHYLPREDNISOLONE SODIUM SUCCINATE 125 MG: 125 INJECTION, POWDER, FOR SOLUTION INTRAMUSCULAR; INTRAVENOUS at 13:08

## 2023-01-19 RX ADMIN — LIDOCAINE HYDROCHLORIDE 40 MG: 10 INJECTION, SOLUTION INFILTRATION; PERINEURAL at 14:08

## 2023-01-19 RX ADMIN — PROPOFOL 50 MG: 10 INJECTION, EMULSION INTRAVENOUS at 14:31

## 2023-01-19 RX ADMIN — EVEROLIMUS 5 MG: 5 TABLET ORAL at 08:42

## 2023-01-19 RX ADMIN — GLYCOPYRROLATE 0.2 MG: 0.2 INJECTION, SOLUTION INTRAMUSCULAR; INTRAVENOUS at 14:08

## 2023-01-19 RX ADMIN — PROPOFOL 50 MG: 10 INJECTION, EMULSION INTRAVENOUS at 14:25

## 2023-01-19 RX ADMIN — PROPOFOL 50 MG: 10 INJECTION, EMULSION INTRAVENOUS at 14:12

## 2023-01-19 RX ADMIN — SODIUM CHLORIDE, PRESERVATIVE FREE 10 ML: 5 INJECTION INTRAVENOUS at 13:09

## 2023-01-19 RX ADMIN — AMIODARONE HYDROCHLORIDE 200 MG: 200 TABLET ORAL at 08:48

## 2023-01-19 RX ADMIN — FERROUS SULFATE TAB 325 MG (65 MG ELEMENTAL FE) 325 MG: 325 (65 FE) TAB at 18:25

## 2023-01-19 RX ADMIN — PROPOFOL 50 MG: 10 INJECTION, EMULSION INTRAVENOUS at 14:08

## 2023-01-19 RX ADMIN — SODIUM CHLORIDE, PRESERVATIVE FREE 40 MG: 5 INJECTION INTRAVENOUS at 20:33

## 2023-01-19 RX ADMIN — SODIUM CHLORIDE, PRESERVATIVE FREE 10 ML: 5 INJECTION INTRAVENOUS at 08:48

## 2023-01-19 RX ADMIN — PROPOFOL 50 MG: 10 INJECTION, EMULSION INTRAVENOUS at 14:15

## 2023-01-19 RX ADMIN — POTASSIUM CHLORIDE 40 MEQ: 1500 TABLET, EXTENDED RELEASE ORAL at 08:48

## 2023-01-19 RX ADMIN — ONDANSETRON 4 MG: 2 INJECTION INTRAMUSCULAR; INTRAVENOUS at 14:50

## 2023-01-19 RX ADMIN — SODIUM CHLORIDE, POTASSIUM CHLORIDE, SODIUM LACTATE AND CALCIUM CHLORIDE: 600; 310; 30; 20 INJECTION, SOLUTION INTRAVENOUS at 13:58

## 2023-01-19 ASSESSMENT — PAIN SCALES - GENERAL
PAINLEVEL_OUTOF10: 0
PAINLEVEL_OUTOF10: 0
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
## 2023-01-19 NOTE — OP NOTE
PROCEDURE NOTE    DATE OF PROCEDURE: 1/19/2023     SURGEON: Dr. Robby Burton    ASSISTANT: None    PREOPERATIVE DIAGNOSIS:  1. anemia    OPERATION: 1. Upper GI endoscopy with cold biopsy    2. Control of UGI bleeding from antrum with gold probe and injection of epinephrine. 3.  Flex sig    ANESTHESIA: IV sedation per CRNA.     ESTIMATED BLOOD LOSS: Less than 10 ml    COMPLICATIONS: None. PROCEDURE # 1:  EGD    PROCEDURE: The patient was given IV conscious sedation per anesthesia. The patient was given 4 L of O2 /minute by nasal cannula. The patient's SPO2 remained above 90% throughout the procedure. The gastroscope was inserted orally and advanced under direct vision through the esophagus, through the stomach, through the pylorus, and into the descending duodenum. Random biopsies were taken in the antrum,body and distal esophagus. The scope was removed and the patient tolerated the procedure well. Findings:     GE junction at 35 cm  Mild esophagitis with mild schatzki's ring  Inflammation antrum, with mild oozing of red blood with some clot when we entered antrum. No ulcer. Bleeding stopped with gold probe cautery and then injection with epi . Duodenum normal      PROCEDURE # 2:  COLONOSCOPY      PROCEDURE: The patient was given IV conscious sedation per anesthesia. The patient was given O2 by nasal cannula. The patient's SPO2 remained above 90% throughout the procedure. The colonoscope was inserted per rectum and advanced under direct vision to 60 cm. There was black tarry stool. We tried to suction it out but to hard to see safely. We decided to abort and pulled the scope out gently. Findings:    1. Black tarry stool in rectum and sigmoid colon. 2.  Scope stoppped at 60 cm due to poor visibility due to black tarry stool. PLAN:  1. It Looks like pt is bleeding from the stomach with active slow oozing from the the antrum diffusely from inflammation.   And with the fact that we saw tarry stool. Would await bx. Tx with PUD meds. Monitor H/H. We did treat by cauterizing antrum with gold probe and injecting with epi. I would continue to monitor the Hb. If still drops then may need to repeat EGD and then consider capsule edod and or repeat cS.               Electronically signed by Annalee Glaser MD  on 1/19/2023 at 2:05 PM

## 2023-01-19 NOTE — PROGRESS NOTES
The patient politely told the  she did not feel well and did not want a visit.        01/19/23 0848   Encounter Summary   Encounter Overview/Reason  Attempted Encounter   Service Provided For: Patient   Referral/Consult From: [address] [place] [address] Family members   Last Encounter  01/19/23   Complexity of Encounter Low   Begin Time 0845   End Time  0851   Total Time Calculated 6 min   Assessment/Intervention/Outcome   Assessment Unable to assess

## 2023-01-19 NOTE — ANESTHESIA POSTPROCEDURE EVALUATION
Department of Anesthesiology  Postprocedure Note    Patient: Waynard Claude  MRN: [id number]  YOB: 1950  Date of evaluation: 1/19/2023      Procedure Summary     Date: 01/19/23 Room / Location: [address]    Anesthesia Start: 1406 Anesthesia Stop: 2973    Procedures:       EGD BIOPSY WITH GOLD PROBE INJECTION      COLONOSCOPY Diagnosis: Anemia, unspecified type    Surgeons: Emmanuelle Arellano MD Responsible Provider: PHONG Garner CRNA    Anesthesia Type: MAC ASA Status: 4          Anesthesia Type: No value filed.     Castro Phase I: Castro Score: 10    Castro Phase II: Castro Score: 8      Anesthesia Post Evaluation    Patient location during evaluation: bedside  Patient participation: complete - patient participated  Level of consciousness: awake and alert  Pain score: 0  Airway patency: patent  Nausea & Vomiting: no nausea and no vomiting  Complications: no  Cardiovascular status: blood pressure returned to baseline  Respiratory status: acceptable  Hydration status: euvolemic

## 2023-01-19 NOTE — PROGRESS NOTES
Hospitalist Progress Note    Patient:  Marcus Ashley     YOB: 1950    MRN: [id number]   Admit date: 1/15/2023     Acct: [de-identified]     PCP: Vida Fry MD    CC--Interval History:    POD 0 EGD--colonoscopy--1.19.2023---antral blood oozing---black tarry stool colon consistent with UGI bleed as the etiology of acute blood loss anemia---stop anticoagulation--antiplatelets--serial H&H---PPI    See note below     All other ROS negative except noted in HPI    Diet:  Diet NPO Exceptions are: Sips of Water with Meds, Ice Chips    Medications:  Scheduled Meds:   methylPREDNISolone  125 mg IntraVENous Once    amiodarone  200 mg Oral Daily    [Held by provider] apixaban  5 mg Oral BID    Everolimus  5 mg Oral Daily    ferrous sulfate  325 mg Oral BID WC    vitamin B-12  1,000 mcg Oral Daily    sodium chloride flush  5-40 mL IntraVENous 2 times per day    dilTIAZem  240 mg Oral Daily    lisinopril  30 mg Oral Daily     Continuous Infusions:   sodium chloride      sodium chloride      sodium chloride       PRN Meds:sodium chloride, sodium chloride, sodium chloride flush, sodium chloride, ondansetron **OR** ondansetron, polyethylene glycol, acetaminophen **OR** acetaminophen    Objective:  Labs:  CBC with Differential:    Lab Results   Component Value Date/Time    WBC 1.6 01/19/2023 05:20 AM    RBC 2.58 01/19/2023 05:20 AM    HGB 6.8 01/19/2023 05:07 PM    HCT 21.1 01/19/2023 05:07 PM     01/19/2023 05:20 AM    MCV 82.9 01/19/2023 05:20 AM    MCH 27.1 01/19/2023 05:20 AM    MCHC 32.7 01/19/2023 05:20 AM    RDW 18.2 01/19/2023 05:20 AM    NRBC 1 01/19/2023 05:20 AM    LYMPHOPCT 20 01/19/2023 05:20 AM    LYMPHOPCT 13.3 07/25/2019 12:00 AM    MONOPCT 2 01/19/2023 05:20 AM    MONOPCT 8.6 07/25/2019 12:00 AM    EOSPCT 2.1 07/25/2019 12:00 AM    BASOPCT 0 01/19/2023 05:20 AM    BASOPCT 0.4 07/25/2019 12:00 AM    MONOSABS 0.03 01/19/2023 05:20 AM    MONOSABS 0.41 07/25/2019 12:00 AM    LYMPHSABS 0.32 01/19/2023 05:20 AM    LYMPHSABS 0.63 07/25/2019 12:00 AM    EOSABS 0.00 01/19/2023 05:20 AM    EOSABS 0.10 07/25/2019 12:00 AM    BASOSABS 0.00 01/19/2023 05:20 AM    DIFFTYPE NOT REPORTED 02/08/2022 08:47 AM     BMP:    Lab Results   Component Value Date/Time     01/19/2023 05:20 AM    K 3.8 01/19/2023 05:07 PM     01/19/2023 05:20 AM    CO2 24 01/19/2023 05:20 AM    BUN 26 01/19/2023 05:20 AM    LABALBU 3.5 01/15/2023 02:28 PM    CREATININE 0.68 01/19/2023 05:20 AM    CREATININE 0.6 01/17/2019 12:00 AM    CALCIUM 7.9 01/19/2023 05:20 AM    GFRAA >60 09/13/2022 09:21 AM    LABGLOM >60 01/19/2023 05:20 AM    GLUCOSE 180 01/19/2023 05:20 AM           Physical Exam:  Vitals: /61   Pulse 88   Temp 97.2 °F (36.2 °C) (Tympanic)   Resp 18   Ht 5' 2.5\" (1.588 m)   Wt 129 lb 12.8 oz (58.9 kg)   SpO2 99%   BMI 23.36 kg/m²   24 hour intake/output:  Intake/Output Summary (Last 24 hours) at 1/19/2023 1252  Last data filed at 1/19/2023 0253  Gross per 24 hour   Intake 3231.25 ml   Output 200 ml   Net 3031.25 ml     Last 3 weights: Wt Readings from Last 3 Encounters:   01/19/23 129 lb 12.8 oz (58.9 kg)   01/12/23 126 lb (57.2 kg)   01/08/23 134 lb (60.8 kg)     HEENT: Normocephalic and Atraumatic  Neck: Supple, No Masses, Tenderness, Nodularity, and No Lymphadenopathy  Chest/Lungs: Clear to Auscultation without Rales, Rhonchi, or Wheezes  Cardiac: Regular Rate and Rhythm  GI/Abdomen: Bowel Sounds Present and Soft, Non-tender, without Guarding or Rebound Tenderness  : Not examined  EXT/Skin: No Edema, No Cyanosis, and No Clubbing  Neuro: Alert and Oriented and No Localizing Signs/Symptoms      Assessment:    Principal Problem:    Symptomatic anemia  Active Problems:    Other fatigue  Resolved Problems:    * No resolved hospital problems.  [address]                  [address]; Oncology--[facility];  ELBA Camarena]  FULL CODE     SCDs     ELIQUIS--held  COVID-19--NEGATIVE   INFLUENZA A--positive--1.8.2023    Anti-infectives:  ------    POD ____ EGD---1.29.2023----GEJ at 35 cm--mild esophagitis with mild                             Schatzki's ring--inflammation antrum with mild oozing of red blood                            with some clot--no ulcer--duodenum normal--[place]  POD ____ colonoscopy---1.19.2023--black tarry stool in rectum and sigmoid colon--stopped at 60                         cm due to poor visibility due black tarry stool    Symptomatic anemia----1.15.2023---HGB = 4.6 at admission---two events of near syncope  Autoimmune--marrow  1 UNIT PRBCs----1.18.2023 + Solumedrol  2 UNITS PRBCs---1.15.2023--1.16.2023      Atrial fibrillation--1.15.2023         EKG--1.15.2023--old septal infarction---on-before 5. 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--lesion to tail along colonic wall concerning for               tumor infiltration        Metastatic carcinoid--liver--10.24.2016         Lung carcinoma--atypical carcinoid--RUL--sp resection        Right breast invasive ductal carcinoma--2005--ER-[+]--                IN-[10%]--HER-2/chey negative--4/26 LN--[+]---right                modified radical mastectomy--chemotherapy--                Adriamycin--Cytoxan--then Taxol--no XRT    Coagulopathy--DVT  Hypokalemia   Thrombocytopenia  PMH:  pancreatitis, osteoarthritis, osteopenia, left knee pain,              myopia-astigmatism, BLE--DVT  PSH:   see above, right breast reconstruction--nipple-areolar             reconstruction, right knee arthroscopy--2008, Bowman              procedure--2004, hysterectomy--supracervical--BSO--             2002, D&C--uterus, tubal ligation, left breast reduction--             2006, bronchoscopy--2010--right wedge resection,              right breast tissue expander--2005, right breast cyst              aspiration--2005, right breast biopsy--LN mapping--             biopsy--2005, left unicompartmental knee replacement--             6.20.2016, liver biopsy--11.4.2016--metastatic atypical              carcinoid, laparoscopic cholecystectomy---1.16.2017, abdominal             exploration--resection--re-anastomosis small bowel--2019             colonoscopy--2022, D&C--uterus--2022    Allergies:        NKDA   Intolerances:  Effexor XR--venlafaxine--hypertension                    Plan:    No anticoagulation or antiplatelet medications    PPI    Serial H&H    Transfuse as require    See orders     Electronically signed by Willie Benton MD on 1/19/2023 at 12:52 PM    Hospitalist

## 2023-01-20 LAB
ABO/RH: NORMAL
ABSOLUTE EOS #: 0 K/UL (ref 0–0.4)
ABSOLUTE IMMATURE GRANULOCYTE: 0 K/UL (ref 0–0.3)
ABSOLUTE LYMPH #: 0.25 K/UL (ref 1–4.8)
ABSOLUTE MONO #: 0 K/UL (ref 0.1–1.2)
ANION GAP SERPL CALCULATED.3IONS-SCNC: 8 MMOL/L (ref 9–17)
ANTIBODY IDENTIFICATION: POSITIVE
ANTIBODY SCREEN: POSITIVE
ARM BAND NUMBER: NORMAL
BASOPHILS # BLD: 0 % (ref 0–1)
BASOPHILS ABSOLUTE: 0 K/UL (ref 0–0.2)
BLD PROD TYP BPU: NORMAL
BLOOD BANK BLOOD PRODUCT EXPIRATION DATE: NORMAL
BLOOD BANK ISBT PRODUCT BLOOD TYPE: 9500
BLOOD BANK PRODUCT CODE: NORMAL
BLOOD BANK UNIT TYPE AND RH: NORMAL
BPU ID: NORMAL
BUN BLDV-MCNC: 28 MG/DL (ref 8–23)
BUN/CREAT BLD: 32 (ref 9–20)
CALCIUM SERPL-MCNC: 8 MG/DL (ref 8.6–10.4)
CHLORIDE BLD-SCNC: 110 MMOL/L (ref 98–107)
CO2: 25 MMOL/L (ref 20–31)
CREAT SERPL-MCNC: 0.88 MG/DL (ref 0.5–0.9)
CROSSMATCH RESULT: NORMAL
DISPENSE STATUS BLOOD BANK: NORMAL
EOSINOPHILS RELATIVE PERCENT: 0 % (ref 1–7)
EXPIRATION DATE: NORMAL
GFR SERPL CREATININE-BSD FRML MDRD: >60 ML/MIN/1.73M2
GLUCOSE BLD-MCNC: 164 MG/DL (ref 70–99)
HCT VFR BLD CALC: 19.5 % (ref 36.3–47.1)
HCT VFR BLD CALC: 25.7 % (ref 36.3–47.1)
HCT VFR BLD CALC: 26 % (ref 36.3–47.1)
HCT VFR BLD CALC: 29.5 % (ref 36.3–47.1)
HEMOGLOBIN: 6.2 G/DL (ref 11.9–15.1)
HEMOGLOBIN: 8.4 G/DL (ref 11.9–15.1)
HEMOGLOBIN: 8.5 G/DL (ref 11.9–15.1)
HEMOGLOBIN: 9.5 G/DL (ref 11.9–15.1)
IMMATURE GRANULOCYTES: 0 %
LYMPHOCYTES # BLD: 8 % (ref 16–46)
MCH RBC QN AUTO: 27.2 PG (ref 25.2–33.5)
MCHC RBC AUTO-ENTMCNC: 31.8 G/DL (ref 25.2–33.5)
MCV RBC AUTO: 85.5 FL (ref 82.6–102.9)
MONOCYTES # BLD: 0 % (ref 4–11)
MORPHOLOGY: ABNORMAL
NRBC AUTOMATED: 1 PER 100 WBC
PDW BLD-RTO: 18.6 % (ref 11.8–14.4)
PLATELET # BLD: 138 K/UL (ref 138–453)
PMV BLD AUTO: 10.1 FL (ref 8.1–13.5)
POTASSIUM SERPL-SCNC: 4 MMOL/L (ref 3.7–5.3)
RBC # BLD: 2.28 M/UL (ref 3.95–5.11)
SEG NEUTROPHILS: 92 % (ref 43–77)
SEGMENTED NEUTROPHILS ABSOLUTE COUNT: 2.85 K/UL (ref 1.5–8.1)
SODIUM BLD-SCNC: 143 MMOL/L (ref 135–144)
TRANSFUSION STATUS: NORMAL
UNIT DIVISION: 0
UNIT ISSUE DATE/TIME: NORMAL
WBC # BLD: 3.1 K/UL (ref 3.5–11.3)

## 2023-01-20 PROCEDURE — C9113 INJ PANTOPRAZOLE SODIUM, VIA: HCPCS | Performed by: NURSE PRACTITIONER

## 2023-01-20 PROCEDURE — P9016 RBC LEUKOCYTES REDUCED: HCPCS

## 2023-01-20 PROCEDURE — 36415 COLL VENOUS BLD VENIPUNCTURE: CPT

## 2023-01-20 PROCEDURE — 85025 COMPLETE CBC W/AUTO DIFF WBC: CPT

## 2023-01-20 PROCEDURE — 2580000003 HC RX 258: Performed by: SURGERY

## 2023-01-20 PROCEDURE — 36430 TRANSFUSION BLD/BLD COMPNT: CPT

## 2023-01-20 PROCEDURE — 85014 HEMATOCRIT: CPT

## 2023-01-20 PROCEDURE — 99231 SBSQ HOSP IP/OBS SF/LOW 25: CPT | Performed by: INTERNAL MEDICINE

## 2023-01-20 PROCEDURE — 2060000000 HC ICU INTERMEDIATE R&B

## 2023-01-20 PROCEDURE — 6360000002 HC RX W HCPCS: Performed by: NURSE PRACTITIONER

## 2023-01-20 PROCEDURE — 85018 HEMOGLOBIN: CPT

## 2023-01-20 PROCEDURE — 2580000003 HC RX 258: Performed by: NURSE PRACTITIONER

## 2023-01-20 PROCEDURE — 80048 BASIC METABOLIC PNL TOTAL CA: CPT

## 2023-01-20 PROCEDURE — 6370000000 HC RX 637 (ALT 250 FOR IP): Performed by: SURGERY

## 2023-01-20 RX ADMIN — FERROUS SULFATE TAB 325 MG (65 MG ELEMENTAL FE) 325 MG: 325 (65 FE) TAB at 09:21

## 2023-01-20 RX ADMIN — SODIUM CHLORIDE, PRESERVATIVE FREE 10 ML: 5 INJECTION INTRAVENOUS at 09:27

## 2023-01-20 RX ADMIN — SODIUM CHLORIDE, PRESERVATIVE FREE 10 ML: 5 INJECTION INTRAVENOUS at 20:56

## 2023-01-20 RX ADMIN — AMIODARONE HYDROCHLORIDE 200 MG: 200 TABLET ORAL at 09:21

## 2023-01-20 RX ADMIN — EVEROLIMUS 5 MG: 5 TABLET ORAL at 09:28

## 2023-01-20 RX ADMIN — CYANOCOBALAMIN TAB 1000 MCG 1000 MCG: 1000 TAB at 09:21

## 2023-01-20 RX ADMIN — FERROUS SULFATE TAB 325 MG (65 MG ELEMENTAL FE) 325 MG: 325 (65 FE) TAB at 16:02

## 2023-01-20 RX ADMIN — SODIUM CHLORIDE, PRESERVATIVE FREE 40 MG: 5 INJECTION INTRAVENOUS at 20:56

## 2023-01-20 RX ADMIN — SODIUM CHLORIDE, PRESERVATIVE FREE 40 MG: 5 INJECTION INTRAVENOUS at 09:21

## 2023-01-20 RX ADMIN — DILTIAZEM HYDROCHLORIDE 240 MG: 120 CAPSULE, EXTENDED RELEASE ORAL at 09:22

## 2023-01-20 NOTE — PROGRESS NOTES
Hospitalist Progress Note    Patient:  Garland Gonzales     YOB: 1950    MRN: [id number]   Admit date: 1/15/2023     Acct: [de-identified]     PCP: Yuli Ibrahim MD    CC--Interval History:    GI bleed antral oozing = site of blood loss---down to 6.2 ---> one unit PRBCs--1.20.2023 --> HG = 8.4    HTN---lower BPs--held lisinopril--diltiazem today only--reassess    Atrial fibrillation--due to GI bleed--Eliquis held    See below--re: malignancies    See note below    All other ROS negative except noted in HPI    Diet:  ADULT DIET;  Dysphagia - Soft and Bite Sized    Medications:  Scheduled Meds:   sodium chloride flush  5-40 mL IntraVENous 2 times per day    pantoprazole (PROTONIX) 40 mg injection  40 mg IntraVENous Q12H    amiodarone  200 mg Oral Daily    [Held by provider] apixaban  5 mg Oral BID    Everolimus  5 mg Oral Daily    ferrous sulfate  325 mg Oral BID WC    vitamin B-12  1,000 mcg Oral Daily    sodium chloride flush  5-40 mL IntraVENous 2 times per day    dilTIAZem  240 mg Oral Daily    lisinopril  30 mg Oral Daily     Continuous Infusions:   sodium chloride      sodium chloride      sodium chloride      sodium chloride      sodium chloride       PRN Meds:sodium chloride flush, sodium chloride, sodium chloride, sodium chloride, sodium chloride, sodium chloride flush, sodium chloride, ondansetron **OR** ondansetron, polyethylene glycol, acetaminophen **OR** acetaminophen    Objective:  Labs:  CBC with Differential:    Lab Results   Component Value Date/Time    WBC 3.1 01/20/2023 05:33 AM    RBC 2.28 01/20/2023 05:33 AM    HGB 8.5 01/20/2023 03:53 PM    HCT 25.7 01/20/2023 03:53 PM     01/20/2023 05:33 AM    MCV 85.5 01/20/2023 05:33 AM    MCH 27.2 01/20/2023 05:33 AM    MCHC 31.8 01/20/2023 05:33 AM    RDW 18.6 01/20/2023 05:33 AM    NRBC 1 01/19/2023 05:20 AM    LYMPHOPCT 8 01/20/2023 05:33 AM    LYMPHOPCT 13.3 07/25/2019 12:00 AM    MONOPCT 0 01/20/2023 05:33 AM    MONOPCT 8.6 07/25/2019 12:00 AM    EOSPCT 2.1 07/25/2019 12:00 AM    BASOPCT 0 01/20/2023 05:33 AM    BASOPCT 0.4 07/25/2019 12:00 AM    MONOSABS 0.00 01/20/2023 05:33 AM    MONOSABS 0.41 07/25/2019 12:00 AM    LYMPHSABS 0.25 01/20/2023 05:33 AM    LYMPHSABS 0.63 07/25/2019 12:00 AM    EOSABS 0.00 01/20/2023 05:33 AM    EOSABS 0.10 07/25/2019 12:00 AM    BASOSABS 0.00 01/20/2023 05:33 AM    DIFFTYPE NOT REPORTED 02/08/2022 08:47 AM     BMP:    Lab Results   Component Value Date/Time     01/20/2023 05:33 AM    K 4.0 01/20/2023 05:33 AM     01/20/2023 05:33 AM    CO2 25 01/20/2023 05:33 AM    BUN 28 01/20/2023 05:33 AM    LABALBU 3.5 01/15/2023 02:28 PM    CREATININE 0.88 01/20/2023 05:33 AM    CREATININE 0.6 01/17/2019 12:00 AM    CALCIUM 8.0 01/20/2023 05:33 AM    GFRAA >60 09/13/2022 09:21 AM    LABGLOM >60 01/20/2023 05:33 AM    GLUCOSE 164 01/20/2023 05:33 AM           Physical Exam:  Vitals: /79   Pulse 83   Temp 97.8 °F (36.6 °C) (Tympanic)   Resp 16   Ht 5' 2.5\" (1.588 m)   Wt 129 lb 12.8 oz (58.9 kg)   SpO2 100%   BMI 23.36 kg/m²   24 hour intake/output:  Intake/Output Summary (Last 24 hours) at 1/20/2023 0919  Last data filed at 1/20/2023 0615  Gross per 24 hour   Intake 1330 ml   Output --   Net 1330 ml     Last 3 weights: Wt Readings from Last 3 Encounters:   01/19/23 129 lb 12.8 oz (58.9 kg)   01/12/23 126 lb (57.2 kg)   01/08/23 134 lb (60.8 kg)     HEENT: Normocephalic and Atraumatic  Neck: Supple, No Masses, Tenderness, Nodularity, and No Lymphadenopathy  Chest/Lungs: Clear to Auscultation without Rales, Rhonchi, or Wheezes  Cardiac: Irregularly Irregular  GI/Abdomen:  Bowel Sounds Present and Soft, Non-tender, without Guarding or Rebound Tenderness  : Not examined  EXT/Skin: No Edema, No Cyanosis, and No Clubbing  Neuro: Alert and Oriented and No Localizing Signs/Symptoms      Assessment:    Principal Problem:    Symptomatic anemia  Active Problems:    Other fatigue  Resolved Problems:    * No resolved hospital problems. AARON SegundoSugar                  81 WF [jessica Cortez, FP; Oncology--[facility];  ELBA Nguyễn]  FULL CODE     SCDs     ELIQUIS--held  COVID-19--NEGATIVE   INFLUENZA A--positive--1.8.2023    Anti-infectives:  ------    POD ____ EGD---1.29.2023----GEJ at 35 cm--mild esophagitis with mild                             Schatzki's ring--inflammation antrum with mild oozing of red blood                            with some clot--no ulcer--duodenum normal--Lara  POD ____ colonoscopy---1.19.2023--black tarry stool in rectum and sigmoid colon--stopped at 60                         cm due to poor visibility due black tarry stool    Symptomatic anemia----1.15.2023---HGB = 4.6 at admission---two events of near syncope  Autoimmune--marrow  1 UNIT PRBCs----1.18.2023 + Solumedrol  2 UNITS PRBCs---1.15.2023--1.16.2023      Atrial fibrillation--1.15.2023         EKG--1.15.2023--old septal infarction---on-before 5. 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--lesion to tail along colonic wall concerning for               tumor infiltration        Metastatic carcinoid--liver--10.24.2016         Lung carcinoma--atypical carcinoid--RUL--sp resection        Right breast invasive ductal carcinoma--2005--ER-[+]--                NH-[10%]--HER-2/chey negative--4/26 LN--[+]---right                modified radical mastectomy--chemotherapy--                Adriamycin--Cytoxan--then Taxol--no XRT    Coagulopathy--DVT  Hypokalemia   Thrombocytopenia  PMH:  pancreatitis, osteoarthritis, osteopenia, left knee pain,              myopia-astigmatism, BLE--DVT  PSH:   see above, right breast reconstruction--nipple-areolar             reconstruction, right knee arthroscopy--2008, Bowman              procedure--2004, hysterectomy--supracervical--BSO--             2002, D&C--uterus, tubal ligation, left breast reduction--             2006, bronchoscopy--2010--right wedge resection,              right breast tissue expander--2005, right breast cyst              aspiration--2005, right breast biopsy--LN mapping--             biopsy--2005, left unicompartmental knee replacement--             6.20.2016, liver biopsy--11.4.2016--metastatic atypical              carcinoid, laparoscopic cholecystectomy---1.16.2017, abdominal             exploration--resection--re-anastomosis small bowel--2019             colonoscopy--2022, D&C--uterus--2022    Allergies:        NKDA   Intolerances:  Effexor XR--venlafaxine--hypertension                    Plan:    Symptomatic anemia---GI bleed--PPI--monitor HGB---PRBCs as required---[antibodies present]    Atrial fibrillation--rate control---hold anticoagulation due to GI bleed    See orders    Electronically signed by Gracie Mccartney MD on 1/20/2023 at 9:19 AM    Hospitalist

## 2023-01-20 NOTE — PLAN OF CARE
Problem: Discharge Planning  Goal: Discharge to home or other facility with appropriate resources  Outcome: Progressing     Problem: Safety - Adult  Goal: Free from fall injury  Outcome: Progressing     Problem: ABCDS Injury Assessment  Goal: Absence of physical injury  Outcome: Progressing     Problem: Neurosensory - Adult  Goal: Achieves maximal functionality and self care  Outcome: Progressing     Problem: Respiratory - Adult  Goal: Achieves optimal ventilation and oxygenation  Outcome: Progressing     Problem: Cardiovascular - Adult  Goal: Maintains optimal cardiac output and hemodynamic stability  Outcome: Progressing  Goal: Absence of cardiac dysrhythmias or at baseline  Outcome: Progressing     Problem: Skin/Tissue Integrity - Adult  Goal: Skin integrity remains intact  Outcome: Progressing     Problem: Musculoskeletal - Adult  Goal: Return ADL status to a safe level of function  Outcome: Progressing     Problem: Gastrointestinal - Adult  Goal: Minimal or absence of nausea and vomiting  Outcome: Progressing  Goal: Maintains adequate nutritional intake  Outcome: Progressing     Problem: Genitourinary - Adult  Goal: Absence of urinary retention  Outcome: Progressing     Problem: Infection - Adult  Goal: Absence of infection at discharge  Outcome: Progressing     Problem: Metabolic/Fluid and Electrolytes - Adult  Goal: Electrolytes maintained within normal limits  Outcome: Progressing  Goal: Hemodynamic stability and optimal renal function maintained  Outcome: Progressing  Goal: Glucose maintained within prescribed range  Outcome: Progressing     Problem: Hematologic - Adult  Goal: Maintains hematologic stability  Outcome: Progressing     Problem: Pain  Goal: Verbalizes/displays adequate comfort level or baseline comfort level  Outcome: Progressing

## 2023-01-20 NOTE — PROGRESS NOTES
rounding in PCU. Assessment: The patient and her  were very welcoming. They have great family and omega support. Intervention: Engaged in conversation and active listening. Prayed with patient. Outcome: Patient expressed appreciation for visit and offer of continued prayer. Plan: Chaplains are available on site or on call 24/7 for spiritual and emotional support. 01/20/23 1351   Encounter Summary   Encounter Overview/Reason  Spiritual/Emotional Needs   Service Provided For: Patient and family together   Referral/Consult From: Rounding   Support System Spouse; Children;Yarsani/omega community   Last Encounter  01/20/23   Complexity of Encounter Low   Begin Time 1329   End Time  1349   Total Time Calculated 20 min   Encounter    Type Follow up   Assessment/Intervention/Outcome   Assessment Calm; Hopeful;Peaceful   Intervention Active listening;Prayer (assurance of)/[place]   Outcome Engaged in conversation;Expressed Gratitude     Electronically signed by Candy Villavicencio on 1/20/2023 at 1:53 PM

## 2023-01-21 VITALS
DIASTOLIC BLOOD PRESSURE: 67 MMHG | BODY MASS INDEX: 24.49 KG/M2 | HEART RATE: 70 BPM | WEIGHT: 138.2 LBS | OXYGEN SATURATION: 100 % | TEMPERATURE: 98.7 F | SYSTOLIC BLOOD PRESSURE: 105 MMHG | HEIGHT: 63 IN | RESPIRATION RATE: 16 BRPM

## 2023-01-21 LAB
ABSOLUTE EOS #: <0.03 K/UL (ref 0–0.44)
ABSOLUTE IMMATURE GRANULOCYTE: 0.04 K/UL (ref 0–0.3)
ABSOLUTE LYMPH #: 0.87 K/UL (ref 1.1–3.7)
ABSOLUTE MONO #: 0.68 K/UL (ref 0.1–1.2)
ANION GAP SERPL CALCULATED.3IONS-SCNC: 8 MMOL/L (ref 9–17)
BASOPHILS # BLD: 0 % (ref 0–2)
BASOPHILS ABSOLUTE: <0.03 K/UL (ref 0–0.2)
BUN BLDV-MCNC: 30 MG/DL (ref 8–23)
BUN/CREAT BLD: 38 (ref 9–20)
CALCIUM SERPL-MCNC: 7.9 MG/DL (ref 8.6–10.4)
CHLORIDE BLD-SCNC: 110 MMOL/L (ref 98–107)
CO2: 24 MMOL/L (ref 20–31)
CREAT SERPL-MCNC: 0.79 MG/DL (ref 0.5–0.9)
EOSINOPHILS RELATIVE PERCENT: 0 % (ref 1–4)
GFR SERPL CREATININE-BSD FRML MDRD: >60 ML/MIN/1.73M2
GLUCOSE BLD-MCNC: 101 MG/DL (ref 70–99)
HCT VFR BLD CALC: 24.2 % (ref 36.3–47.1)
HCT VFR BLD CALC: 30.5 % (ref 36.3–47.1)
HEMOGLOBIN: 7.6 G/DL (ref 11.9–15.1)
HEMOGLOBIN: 9.6 G/DL (ref 11.9–15.1)
IMMATURE GRANULOCYTES: 1 %
LYMPHOCYTES # BLD: 12 % (ref 24–43)
MCH RBC QN AUTO: 26.6 PG (ref 25.2–33.5)
MCHC RBC AUTO-ENTMCNC: 31.4 G/DL (ref 25.2–33.5)
MCV RBC AUTO: 84.6 FL (ref 82.6–102.9)
MONOCYTES # BLD: 9 % (ref 3–12)
NRBC AUTOMATED: 0.3 PER 100 WBC
PDW BLD-RTO: 18 % (ref 11.8–14.4)
PLATELET # BLD: 119 K/UL (ref 138–453)
PMV BLD AUTO: 10.2 FL (ref 8.1–13.5)
POTASSIUM SERPL-SCNC: 4 MMOL/L (ref 3.7–5.3)
RBC # BLD: 2.86 M/UL (ref 3.95–5.11)
RBC # BLD: ABNORMAL 10*6/UL
SEG NEUTROPHILS: 78 % (ref 36–65)
SEGMENTED NEUTROPHILS ABSOLUTE COUNT: 5.72 K/UL (ref 1.5–8.1)
SODIUM BLD-SCNC: 142 MMOL/L (ref 135–144)
WBC # BLD: 7.3 K/UL (ref 3.5–11.3)

## 2023-01-21 PROCEDURE — 36415 COLL VENOUS BLD VENIPUNCTURE: CPT

## 2023-01-21 PROCEDURE — 6360000002 HC RX W HCPCS: Performed by: NURSE PRACTITIONER

## 2023-01-21 PROCEDURE — 80048 BASIC METABOLIC PNL TOTAL CA: CPT

## 2023-01-21 PROCEDURE — C9113 INJ PANTOPRAZOLE SODIUM, VIA: HCPCS | Performed by: NURSE PRACTITIONER

## 2023-01-21 PROCEDURE — 99238 HOSP IP/OBS DSCHRG MGMT 30/<: CPT | Performed by: INTERNAL MEDICINE

## 2023-01-21 PROCEDURE — 85025 COMPLETE CBC W/AUTO DIFF WBC: CPT

## 2023-01-21 PROCEDURE — 6370000000 HC RX 637 (ALT 250 FOR IP): Performed by: NURSE PRACTITIONER

## 2023-01-21 PROCEDURE — 6360000002 HC RX W HCPCS: Performed by: INTERNAL MEDICINE

## 2023-01-21 PROCEDURE — 6370000000 HC RX 637 (ALT 250 FOR IP): Performed by: SURGERY

## 2023-01-21 PROCEDURE — 85018 HEMOGLOBIN: CPT

## 2023-01-21 PROCEDURE — 2580000003 HC RX 258: Performed by: NURSE PRACTITIONER

## 2023-01-21 PROCEDURE — 85014 HEMATOCRIT: CPT

## 2023-01-21 PROCEDURE — 2580000003 HC RX 258: Performed by: SURGERY

## 2023-01-21 RX ORDER — HEPARIN SODIUM (PORCINE) LOCK FLUSH IV SOLN 100 UNIT/ML 100 UNIT/ML
500 SOLUTION INTRAVENOUS PRN
Status: DISCONTINUED | OUTPATIENT
Start: 2023-01-21 | End: 2023-01-21 | Stop reason: HOSPADM

## 2023-01-21 RX ORDER — FERROUS SULFATE 325(65) MG
325 TABLET ORAL 2 TIMES DAILY WITH MEALS
Qty: 60 TABLET | Refills: 0 | Status: SHIPPED | OUTPATIENT
Start: 2023-01-21

## 2023-01-21 RX ORDER — PANTOPRAZOLE SODIUM 40 MG/1
40 TABLET, DELAYED RELEASE ORAL DAILY
Qty: 30 TABLET | Refills: 0 | Status: SHIPPED | OUTPATIENT
Start: 2023-01-21

## 2023-01-21 RX ADMIN — SODIUM CHLORIDE, PRESERVATIVE FREE 10 ML: 5 INJECTION INTRAVENOUS at 09:10

## 2023-01-21 RX ADMIN — DILTIAZEM HYDROCHLORIDE 240 MG: 120 CAPSULE, EXTENDED RELEASE ORAL at 09:03

## 2023-01-21 RX ADMIN — FERROUS SULFATE TAB 325 MG (65 MG ELEMENTAL FE) 325 MG: 325 (65 FE) TAB at 09:03

## 2023-01-21 RX ADMIN — SODIUM CHLORIDE, PRESERVATIVE FREE 40 MG: 5 INJECTION INTRAVENOUS at 09:04

## 2023-01-21 RX ADMIN — CYANOCOBALAMIN TAB 1000 MCG 1000 MCG: 1000 TAB at 09:03

## 2023-01-21 RX ADMIN — EVEROLIMUS 5 MG: 5 TABLET ORAL at 09:08

## 2023-01-21 RX ADMIN — AMIODARONE HYDROCHLORIDE 200 MG: 200 TABLET ORAL at 09:03

## 2023-01-21 RX ADMIN — HEPARIN 500 UNITS: 100 SYRINGE at 17:23

## 2023-01-21 ASSESSMENT — PAIN SCALES - GENERAL: PAINLEVEL_OUTOF10: 0

## 2023-01-21 NOTE — PROGRESS NOTES
Release today. Hold lisinopril and eliquis till advised to restart by outpt provider. Start iron and protonix.   See full dictation

## 2023-01-21 NOTE — PLAN OF CARE
Problem: Discharge Planning  Goal: Discharge to home or other facility with appropriate resources  1/20/2023 2008 by Vidal Hopper RN  Outcome: Progressing  1/20/2023 1047 by Cuco Chicas RN  Outcome: Progressing     Problem: Safety - Adult  Goal: Free from fall injury  1/20/2023 2008 by Vidal Hopper RN  Outcome: Progressing  1/20/2023 1047 by Cuco Chicas RN  Outcome: Progressing     Problem: ABCDS Injury Assessment  Goal: Absence of physical injury  1/20/2023 2008 by Vidal Hopper RN  Outcome: Progressing  1/20/2023 1047 by Cuco Chicas RN  Outcome: Progressing     Problem: Neurosensory - Adult  Goal: Achieves maximal functionality and self care  1/20/2023 2008 by Vidal Hopper RN  Outcome: Progressing  1/20/2023 1047 by Cuco Chicas RN  Outcome: Progressing     Problem: Respiratory - Adult  Goal: Achieves optimal ventilation and oxygenation  1/20/2023 2008 by Vidal Hopper RN  Outcome: Progressing  1/20/2023 1047 by Cuco Chicas RN  Outcome: Progressing     Problem: Cardiovascular - Adult  Goal: Maintains optimal cardiac output and hemodynamic stability  1/20/2023 2008 by Vidal Hopper RN  Outcome: Progressing  1/20/2023 1047 by Cuco Chicas RN  Outcome: Progressing  Goal: Absence of cardiac dysrhythmias or at baseline  1/20/2023 2008 by Vidal Hopper RN  Outcome: Progressing  1/20/2023 1047 by Cuco Chicas RN  Outcome: Progressing     Problem: Skin/Tissue Integrity - Adult  Goal: Skin integrity remains intact  1/20/2023 2008 by Vidal Hopper RN  Outcome: Progressing  1/20/2023 1047 by Cuco Chicas RN  Outcome: Progressing     Problem: Musculoskeletal - Adult  Goal: Return ADL status to a safe level of function  1/20/2023 2008 by Vidal Hopper RN  Outcome: Progressing  1/20/2023 1047 by Cuco Chicas RN  Outcome: Progressing     Problem: Gastrointestinal - Adult  Goal: Minimal or absence of nausea and vomiting  1/20/2023 2008 by Alexa Kennedy Randell Rascon RN  Outcome: Progressing  1/20/2023 1047 by Josefina Paz RN  Outcome: Progressing  Goal: Maintains adequate nutritional intake  1/20/2023 2008 by Yassine Robles RN  Outcome: Progressing  1/20/2023 1047 by Josefina Paz RN  Outcome: Progressing     Problem: Genitourinary - Adult  Goal: Absence of urinary retention  1/20/2023 2008 by Yassine Robles RN  Outcome: Progressing  1/20/2023 1047 by Josefina Paz RN  Outcome: Progressing     Problem: Infection - Adult  Goal: Absence of infection at discharge  1/20/2023 2008 by Yassine Robles RN  Outcome: Progressing  1/20/2023 1047 by Josefina Paz RN  Outcome: Progressing     Problem: Metabolic/Fluid and Electrolytes - Adult  Goal: Electrolytes maintained within normal limits  1/20/2023 2008 by Yassine Robles RN  Outcome: Progressing  1/20/2023 1047 by Josefina Paz RN  Outcome: Progressing  Goal: Hemodynamic stability and optimal renal function maintained  1/20/2023 2008 by Yassine Robles RN  Outcome: Progressing  1/20/2023 1047 by Josefina Paz RN  Outcome: Progressing  Goal: Glucose maintained within prescribed range  1/20/2023 2008 by Yassine Robles RN  Outcome: Progressing  1/20/2023 1047 by Josefina Paz RN  Outcome: Progressing     Problem: Hematologic - Adult  Goal: Maintains hematologic stability  1/20/2023 2008 by Yassine Robles RN  Outcome: Progressing  1/20/2023 1047 by Josefina Paz RN  Outcome: Progressing     Problem: Pain  Goal: Verbalizes/displays adequate comfort level or baseline comfort level  1/20/2023 2008 by Yassine Robles RN  Outcome: Progressing  1/20/2023 1047 by Josefina Paz RN  Outcome: Progressing

## 2023-01-21 NOTE — DISCHARGE SUMMARY
Jessica [address]                 [address] [place] [id number]                               DISCHARGE SUMMARY    PATIENT NAME: Chuck Echavarria                      :        1950  MED REC NO:   [id number]                             ROOM:       0205  ACCOUNT NO:   [de-identified]                           ADMIT DATE: 01/15/2023  PROVIDER:     Fracisco Oliver                  DISCHARGE DATE:    DATE OF DISCHARGE:  2023    DISCHARGE DIAGNOSES:  1. Gastrointestinal bleed from the antrum visualized on EGD on the  .  2.  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 3.  Anemia. Please see H and P from 01/15/2023, for complete details of HPI, past  medical history, family history, social history, and admission physical  exam.    HOSPITAL COURSE:  The patient was admitted to [facility] on  01/15. She was under the care of Dr. Norbert Stone. Dr. Chanell Hyatt was  consulted. Although, she had had recent endoscopy, the decision was  made to repeat these. The colonoscopy was not remarkable, but the EGD  was quite remarkable and that it showed some oozing blood in the antrum. This had not been noted on the previous study just 3 months ago. The  patient was transfused while in the hospital.  She stabilized and did  quite well. She was started on proton pump inhibition. I took over the  care of the case on Saturday. The patient was markedly improved, up in  bed. She and her  were anxious for discharge at this point. She  has atrial fibrillation, we needed to hold her Eliquis. No issues with  the atrial fibrillation during the hospitalization. The patient's  hematocrit at the time of discharge was stable at 30.5. Chem-7 looked  good the morning of discharge as well. Vital signs were good. Lungs  were clear. Cardiovascular exam, regular.   Abdomen, soft, positive  bowel sounds, no masses, no organomegaly on exam.  She was felt to have  reached maximum benefit of her hospital stay on 01/21, and decision was  made for discharge for further management in the outpatient setting. She will follow up with her PCP in the next week. She will have a  followup blood count on Monday. She will start the Protonix. She will  need to hold the Eliquis until advised to restart by her outpatient  provider. Medications were not otherwise altered.         Tali Farmer    D: 01/21/2023 15:15:25       T: 01/21/2023 16:41:37     [email or web address]  Job#: [id number]     Doc#: [id number]    CC:  Pcp

## 2023-01-23 ENCOUNTER — CARE COORDINATION (OUTPATIENT)
Dept: CASE MANAGEMENT | Age: 73
End: 2023-01-23

## 2023-01-23 ENCOUNTER — HOSPITAL ENCOUNTER (OUTPATIENT)
Age: 73
Setting detail: SPECIMEN
Discharge: HOME | End: 2023-01-23
Payer: MEDICARE

## 2023-01-23 ENCOUNTER — HOSPITAL ENCOUNTER (OUTPATIENT)
Dept: INFUSION THERAPY | Age: 73
Discharge: HOME | End: 2023-01-23
Payer: MEDICARE

## 2023-01-23 DIAGNOSIS — K29.01 ACUTE GASTRITIS WITH HEMORRHAGE, UNSPECIFIED GASTRITIS TYPE: ICD-10-CM

## 2023-01-23 DIAGNOSIS — C7B.00 METASTATIC CARCINOID TUMOR (HCC): Primary | ICD-10-CM

## 2023-01-23 LAB
ABSOLUTE EOS #: 0.07 K/UL (ref 0–0.44)
ABSOLUTE IMMATURE GRANULOCYTE: 0.05 K/UL (ref 0–0.3)
ABSOLUTE LYMPH #: 0.74 K/UL (ref 1.1–3.7)
ABSOLUTE MONO #: 0.54 K/UL (ref 0.1–1.2)
BASOPHILS # BLD: 0 % (ref 0–2)
BASOPHILS ABSOLUTE: <0.03 K/UL (ref 0–0.2)
EOSINOPHILS RELATIVE PERCENT: 1 % (ref 1–4)
HCT VFR BLD CALC: 29.1 % (ref 36.3–47.1)
HEMOGLOBIN: 8.9 G/DL (ref 11.9–15.1)
IMMATURE GRANULOCYTES: 1 %
LYMPHOCYTES # BLD: 12 % (ref 24–43)
MCH RBC QN AUTO: 26.4 PG (ref 25.2–33.5)
MCHC RBC AUTO-ENTMCNC: 30.6 G/DL (ref 25.2–33.5)
MCV RBC AUTO: 86.4 FL (ref 82.6–102.9)
MONOCYTES # BLD: 9 % (ref 3–12)
NRBC AUTOMATED: 0 PER 100 WBC
PDW BLD-RTO: 16.4 % (ref 11.8–14.4)
PLATELET # BLD: ABNORMAL K/UL (ref 138–453)
PLATELET, FLUORESCENCE: 134 K/UL (ref 138–453)
PLATELET, IMMATURE FRACTION: 6.3 % (ref 1.1–10.3)
RBC # BLD: 3.37 M/UL (ref 3.95–5.11)
SEG NEUTROPHILS: 78 % (ref 36–65)
SEGMENTED NEUTROPHILS ABSOLUTE COUNT: 4.88 K/UL (ref 1.5–8.1)
SURGICAL PATHOLOGY REPORT: NORMAL
WBC # BLD: 6.3 K/UL (ref 3.5–11.3)

## 2023-01-23 PROCEDURE — 85025 COMPLETE CBC W/AUTO DIFF WBC: CPT

## 2023-01-23 PROCEDURE — 36415 COLL VENOUS BLD VENIPUNCTURE: CPT

## 2023-01-23 PROCEDURE — 2580000003 HC RX 258: Performed by: PHYSICIAN ASSISTANT

## 2023-01-23 PROCEDURE — 96523 IRRIG DRUG DELIVERY DEVICE: CPT

## 2023-01-23 PROCEDURE — 36593 DECLOT VASCULAR DEVICE: CPT

## 2023-01-23 PROCEDURE — 6360000002 HC RX W HCPCS: Performed by: PHYSICIAN ASSISTANT

## 2023-01-23 RX ORDER — HEPARIN SODIUM (PORCINE) LOCK FLUSH IV SOLN 100 UNIT/ML 100 UNIT/ML
500 SOLUTION INTRAVENOUS PRN
Status: DISCONTINUED | OUTPATIENT
Start: 2023-01-23 | End: 2023-01-24 | Stop reason: HOSPADM

## 2023-01-23 RX ORDER — SODIUM CHLORIDE 0.9 % (FLUSH) 0.9 %
5-40 SYRINGE (ML) INJECTION PRN
Status: DISCONTINUED | OUTPATIENT
Start: 2023-01-23 | End: 2023-01-24 | Stop reason: HOSPADM

## 2023-01-23 RX ORDER — WATER 1000 ML/1000ML
2.2 INJECTION, SOLUTION INTRAVENOUS ONCE
Status: DISCONTINUED | OUTPATIENT
Start: 2023-01-23 | End: 2023-01-24 | Stop reason: HOSPADM

## 2023-01-23 RX ADMIN — SODIUM CHLORIDE, PRESERVATIVE FREE 20 ML: 5 INJECTION INTRAVENOUS at 12:27

## 2023-01-23 RX ADMIN — HEPARIN 500 UNITS: 100 SYRINGE at 12:27

## 2023-01-23 RX ADMIN — SODIUM CHLORIDE, PRESERVATIVE FREE 20 ML: 5 INJECTION INTRAVENOUS at 11:17

## 2023-01-23 RX ADMIN — ALTEPLASE 2 MG: 2.2 INJECTION, POWDER, LYOPHILIZED, FOR SOLUTION INTRAVENOUS at 11:29

## 2023-01-23 NOTE — CARE COORDINATION
Care Transitions Outreach Attempt    Call within 2 business days of discharge: Yes   Attempted to reach patient for transitions of care follow up. Unable to reach patient.    Patient: Maria E Coates Patient : 1950 MRN: <[id number]>    Last Discharge [facility] Facility       Date Complaint Diagnosis Description Type Department Provider    1/15/23 Fatigue Acute gastritis with hemorrhage, unspecified gastritis type ... ED to Hosp-Admission (Discharged) (ADMITTED) [facility] Jesus Catherine MD; Trent Del Toro...          # 1 attempt-Attempted initial 24 hour hospital follow up call. Left a Hipaa compliant message with name and call back information. Requested return call to [phone].     Was this an external facility discharge? No Discharge Facility: [facility]    Noted following upcoming appointments from discharge chart review:   [facility] follow up appointment(s):   Future Appointments   Date Time Provider Department Center   3/14/2023  9:15 AM DO NANCY Azevedo [facility]   3/14/2023  9:40 AM [facility]     Non-[facility] follow up appointment(s):

## 2023-01-23 NOTE — PROGRESS NOTES
Patient on unit for port lab draw.  Mediport accessed, negative for blood return.  Cathflo injected.  After an hour of dwell mediport still negative for blood return.  Lab drawn from patients left AC, band aid to site.  Mediport flushed with 20 ml NSS and 5 ml heparin.  Lawler needle d/c'd, band aid to site.  Patient left unit in stable condition.

## 2023-01-24 ENCOUNTER — CARE COORDINATION (OUTPATIENT)
Dept: CASE MANAGEMENT | Age: 73
End: 2023-01-24

## 2023-01-24 NOTE — CARE COORDINATION
Care Transitions Outreach Attempt    Call within 2 business days of discharge: Yes   Attempted to reach patient for transitions of care follow up. Unable to reach patient. Patient: Anthony Byrd Patient : 1950 MRN: <[id number]>    Last Discharge  Street       Date Complaint Diagnosis Description Type Department Provider    1/15/23 Fatigue Acute gastritis with hemorrhage, unspecified gastritis type . .. ED to Hosp-Admission (Discharged) (ADMITTED) Tucker Calvo MD; Beatrice Werner. .. # 2 attempt-Attempted initial 24 hour hospital follow up call. Left a Hipaa compliant message with name and call back information. Requested return call to [phone].   2 unsuccessful attempts to reach patient, CT episode resolved//JU    Was this an external facility discharge? No Discharge Facility: [facility]    Noted following upcoming appointments from discharge chart review:   [facility] follow up appointment(s):   Future Appointments   Date Time Provider Peter Kaplan   2023  3:50 PM Joaquín Keene  [address] [place] [address]   3/14/2023  9:15 AM Adilia Lake DO [facility]   3/14/2023  9:40 AM [facility]     Non[facility] follow up appointment(s):      Letter sent in [facility]    Dear Anthony Byrd    My name is Cassie Nuñez and I am Care Transition Nurse} who partners with your provider  to improve patients' health. I've been trying to reach you via phone to let you know that, as a member of your care team, I will work with other providers involved in your care, offer education for your specific health conditions, and connect you with more resources as needed.  This program is designed to provide you with the opportunity to have a ([facility]/[address]) care manager partner with you for the following situations:     1) if you come home from the hospital or emergency room   2) to help manage your disease   3) when you would like assistance coordinating services or appointments    This added support is provided at no additional cost to you. My primary focus is to help you achieve specific goals and improve your health. Please call me at [phone]  to further discuss how I can support your health care needs.    Sincerely,    Essie SPICER, RN, CTN

## 2023-01-26 ENCOUNTER — TELEPHONE (OUTPATIENT)
Dept: SURGERY | Age: 73
End: 2023-01-26

## 2023-01-26 ENCOUNTER — HOSPITAL ENCOUNTER (OUTPATIENT)
Age: 73
Discharge: HOME | End: 2023-01-26
Payer: MEDICARE

## 2023-01-26 DIAGNOSIS — D50.9 IRON DEFICIENCY ANEMIA, UNSPECIFIED IRON DEFICIENCY ANEMIA TYPE: ICD-10-CM

## 2023-01-26 DIAGNOSIS — D50.9 IRON DEFICIENCY ANEMIA, UNSPECIFIED IRON DEFICIENCY ANEMIA TYPE: Primary | ICD-10-CM

## 2023-01-26 LAB
ABO/RH: NORMAL
ABO/RH: NORMAL
ABSOLUTE EOS #: 0.06 K/UL (ref 0–0.44)
ABSOLUTE IMMATURE GRANULOCYTE: 0.04 K/UL (ref 0–0.3)
ABSOLUTE LYMPH #: 0.88 K/UL (ref 1.1–3.7)
ABSOLUTE MONO #: 0.78 K/UL (ref 0.1–1.2)
ANTIBODY IDENTIFICATION: NORMAL
ANTIBODY IDENTIFICATION: POSITIVE
ANTIBODY SCREEN: POSITIVE
ANTIBODY SCREEN: POSITIVE
ARM BAND NUMBER: NORMAL
ARM BAND NUMBER: NORMAL
BASOPHILS # BLD: 0 % (ref 0–2)
BASOPHILS ABSOLUTE: <0.03 K/UL (ref 0–0.2)
BLD PROD TYP BPU: NORMAL
BLOOD BANK BLOOD PRODUCT EXPIRATION DATE: NORMAL
BLOOD BANK ISBT PRODUCT BLOOD TYPE: 9500
BLOOD BANK PRODUCT CODE: NORMAL
BLOOD BANK UNIT TYPE AND RH: NORMAL
BPU ID: NORMAL
CROSSMATCH RESULT: NORMAL
DISPENSE STATUS BLOOD BANK: NORMAL
EOSINOPHILS RELATIVE PERCENT: 1 % (ref 1–4)
EXPIRATION DATE: NORMAL
EXPIRATION DATE: NORMAL
HCT VFR BLD CALC: 29.1 % (ref 36.3–47.1)
HEMOGLOBIN: 8.6 G/DL (ref 11.9–15.1)
IMMATURE GRANULOCYTES: 1 %
LYMPHOCYTES # BLD: 14 % (ref 24–43)
MCH RBC QN AUTO: 26.4 PG (ref 25.2–33.5)
MCHC RBC AUTO-ENTMCNC: 29.6 G/DL (ref 25.2–33.5)
MCV RBC AUTO: 89.3 FL (ref 82.6–102.9)
MONOCYTES # BLD: 12 % (ref 3–12)
NRBC AUTOMATED: 0 PER 100 WBC
PDW BLD-RTO: 16 % (ref 11.8–14.4)
PLATELET # BLD: 193 K/UL (ref 138–453)
PMV BLD AUTO: 10.7 FL (ref 8.1–13.5)
RBC # BLD: 3.26 M/UL (ref 3.95–5.11)
RBC # BLD: ABNORMAL 10*6/UL
SEG NEUTROPHILS: 72 % (ref 36–65)
SEGMENTED NEUTROPHILS ABSOLUTE COUNT: 4.72 K/UL (ref 1.5–8.1)
TRANSFUSION STATUS: NORMAL
UNIT DIVISION: 0
UNIT ISSUE DATE/TIME: NORMAL
WBC # BLD: 6.5 K/UL (ref 3.5–11.3)

## 2023-01-26 PROCEDURE — 36415 COLL VENOUS BLD VENIPUNCTURE: CPT

## 2023-01-26 PROCEDURE — 85025 COMPLETE CBC W/AUTO DIFF WBC: CPT

## 2023-01-26 NOTE — TELEPHONE ENCOUNTER
Ok   Hb stable  No wbc  We will see next week  If pain increases then should probably go to [facility]

## 2023-01-26 NOTE — TELEPHONE ENCOUNTER
Patient was admitted on 1/15/2023-1/21/2023 for acute gastritis with hemorrhage, anemia. Patient had EGD and flex sigmoidoscopy on 1/19/2023 while inpatient. Findings showed for the EGD:   GE junction at 35 cm  Mild esophagitis with mild schatzki's ring  Inflammation antrum, with mild oozing of red blood with some clot when we entered antrum. No ulcer. Bleeding stopped with gold probe cautery and then injection with epi . Duodenum normal    And showed for the flex sigmoidoscopy:  1. Black tarry stool in rectum and sigmoid colon. 2.  Scope stoppped at 60 cm due to poor visibility due to black tarry stool. PLAN:  1. It Looks like pt is bleeding from the stomach with active slow oozing from the the antrum diffusely from inflammation. And with the fact that we saw tarry stool. Would await bx. Tx with PUD meds. Monitor H/H. We did treat by cauterizing antrum with gold probe and injecting with epi. I would continue to monitor the Hb. If still drops then may need to repeat EGD and then consider capsule edod and or repeat cS. Patient's recent labwork showed hemoglobin on 1/23/2023 8.9    She called in today stating she is still having black, tarry stools and having some abd discomfort. She is scheduled to see you on 2/2/2023. Please advise.

## 2023-01-26 NOTE — TELEPHONE ENCOUNTER
Contacted patient. She reports having the black tarry stools 3x since being discharged on Saturday, not having these episodes every day. Reports most recent was yesterday and only had 1 episode. She denies any bright blood stools. She reports abdominal pain is in the lower part of abdomen below her umbilicus and aches when putting pressure on abdomen. She rates pain 4/10-has not taken anything such as tylenol for pain. Denies any nausea or vomiting. States she is able to eat but not a lot. Denies any SOB or lightheadedness. She is taking the Protonix as prescribed (40 mg daily). She also does report since Sunday has noted her feet have become swollen, more the right than the left and states it is around the ankle. She does wear compression stockings but states the swelling does not seem to go down until night when her feet are up. She will be coming in today and getting CBC drawn to check her hemoglobin. I explained I will send a message to Dr. Chidi Salazar with all this information and will give her a call back with his recommendations. She verbalized aurorain. Ruthann Maynard

## 2023-01-26 NOTE — TELEPHONE ENCOUNTER
Is she taking the protonix? Can we get more on her symtptoms:  Is she nauseated or vomiting? Has she been able to eat or drink? How bad is the pain? We should have her come in and have a cbc , today or in am.  Then if pain that bad or Hb very low or cant eat then may need to come to ER.

## 2023-01-26 NOTE — TELEPHONE ENCOUNTER
Patient calling Dr Frances Malave with an update. Patient was recently in [facility].  Patient is still having black, tarry stools. Patient does have some abdominal discomfort. Follow up appointment with Dr Frances Malave is scheduled for 2/2/23. Patient's call back ph# [phone].

## 2023-01-31 ENCOUNTER — APPOINTMENT (OUTPATIENT)
Dept: CT IMAGING | Age: 73
End: 2023-01-31
Payer: MEDICARE

## 2023-01-31 ENCOUNTER — OFFICE VISIT (OUTPATIENT)
Dept: FAMILY MEDICINE CLINIC | Age: 73
End: 2023-01-31

## 2023-01-31 ENCOUNTER — APPOINTMENT (OUTPATIENT)
Dept: GENERAL RADIOLOGY | Age: 73
End: 2023-01-31
Payer: MEDICARE

## 2023-01-31 ENCOUNTER — HOSPITAL ENCOUNTER (EMERGENCY)
Age: 73
Discharge: HOME | End: 2023-01-31
Attending: EMERGENCY MEDICINE
Payer: MEDICARE

## 2023-01-31 VITALS
HEART RATE: 112 BPM | HEIGHT: 63 IN | DIASTOLIC BLOOD PRESSURE: 74 MMHG | BODY MASS INDEX: 23.74 KG/M2 | OXYGEN SATURATION: 95 % | WEIGHT: 134 LBS | SYSTOLIC BLOOD PRESSURE: 116 MMHG

## 2023-01-31 VITALS
TEMPERATURE: 98.1 F | DIASTOLIC BLOOD PRESSURE: 83 MMHG | HEIGHT: 63 IN | RESPIRATION RATE: 18 BRPM | BODY MASS INDEX: 23.57 KG/M2 | OXYGEN SATURATION: 97 % | HEART RATE: 102 BPM | SYSTOLIC BLOOD PRESSURE: 100 MMHG | WEIGHT: 133 LBS

## 2023-01-31 DIAGNOSIS — R53.1 WEAKNESS: Primary | ICD-10-CM

## 2023-01-31 DIAGNOSIS — R06.00 DYSPNEA, UNSPECIFIED TYPE: ICD-10-CM

## 2023-01-31 DIAGNOSIS — U07.1 COVID-19: Primary | ICD-10-CM

## 2023-01-31 LAB
ABSOLUTE BANDS #: 0.12 K/UL
ABSOLUTE EOS #: 0 K/UL (ref 0–0.4)
ABSOLUTE IMMATURE GRANULOCYTE: 0 K/UL (ref 0–0.3)
ABSOLUTE LYMPH #: 0.12 K/UL (ref 1–4.8)
ABSOLUTE MONO #: 0.18 K/UL (ref 0.1–1.2)
ALBUMIN SERPL-MCNC: 3.5 G/DL (ref 3.5–5.2)
ALBUMIN/GLOBULIN RATIO: 1.4 (ref 1–2.5)
ALP BLD-CCNC: 106 U/L (ref 35–104)
ALT SERPL-CCNC: 29 U/L (ref 5–33)
ANION GAP SERPL CALCULATED.3IONS-SCNC: 14 MMOL/L (ref 9–17)
AST SERPL-CCNC: 34 U/L
BACTERIA: ABNORMAL
BANDS: 2 %
BASOPHILS # BLD: 0 % (ref 0–1)
BASOPHILS ABSOLUTE: 0 K/UL (ref 0–0.2)
BILIRUB SERPL-MCNC: 0.3 MG/DL (ref 0.3–1.2)
BILIRUBIN URINE: ABNORMAL
BUN BLDV-MCNC: 14 MG/DL (ref 8–23)
BUN/CREAT BLD: 15 (ref 9–20)
CALCIUM SERPL-MCNC: 8.2 MG/DL (ref 8.6–10.4)
CASTS UA: ABNORMAL /LPF (ref 0–2)
CHLORIDE BLD-SCNC: 103 MMOL/L (ref 98–107)
CO2: 20 MMOL/L (ref 20–31)
COLOR: YELLOW
CREAT SERPL-MCNC: 0.93 MG/DL (ref 0.5–0.9)
D-DIMER QUANTITATIVE: 2.09 MG/L FEU (ref 0–0.59)
EOSINOPHILS RELATIVE PERCENT: 0 % (ref 1–7)
EPITHELIAL CELLS UA: ABNORMAL /HPF (ref 0–5)
GFR SERPL CREATININE-BSD FRML MDRD: >60 ML/MIN/1.73M2
GLUCOSE BLD-MCNC: 107 MG/DL (ref 70–99)
GLUCOSE URINE: NEGATIVE
HCT VFR BLD CALC: 29.3 % (ref 36.3–47.1)
HEMOGLOBIN: 8.6 G/DL (ref 11.9–15.1)
IMMATURE GRANULOCYTES: 0 %
KETONES, URINE: ABNORMAL
LACTIC ACID, SEPSIS: 1.4 MMOL/L (ref 0.5–1.9)
LEUKOCYTE ESTERASE, URINE: NEGATIVE
LYMPHOCYTES # BLD: 2 % (ref 16–46)
MCH RBC QN AUTO: 25.4 PG (ref 25.2–33.5)
MCHC RBC AUTO-ENTMCNC: 29.4 G/DL (ref 25.2–33.5)
MCV RBC AUTO: 86.7 FL (ref 82.6–102.9)
MONOCYTES # BLD: 3 % (ref 4–11)
MORPHOLOGY: ABNORMAL
MORPHOLOGY: ABNORMAL
MUCUS: ABNORMAL
NITRITE, URINE: NEGATIVE
NRBC AUTOMATED: 0 PER 100 WBC
PDW BLD-RTO: 15.9 % (ref 11.8–14.4)
PH UA: 5 (ref 5–8)
PLATELET # BLD: 156 K/UL (ref 138–453)
PMV BLD AUTO: 9.9 FL (ref 8.1–13.5)
POTASSIUM SERPL-SCNC: 3.8 MMOL/L (ref 3.7–5.3)
PROTEIN UA: ABNORMAL
RBC # BLD: 3.38 M/UL (ref 3.95–5.11)
RBC UA: ABNORMAL /HPF (ref 0–2)
SARS-COV-2, RAPID: DETECTED
SEG NEUTROPHILS: 93 % (ref 43–77)
SEGMENTED NEUTROPHILS ABSOLUTE COUNT: 5.68 K/UL (ref 1.5–8.1)
SODIUM BLD-SCNC: 137 MMOL/L (ref 135–144)
SPECIFIC GRAVITY UA: 1.03 (ref 1–1.03)
SPECIMEN DESCRIPTION: ABNORMAL
TOTAL PROTEIN: 6 G/DL (ref 6.4–8.3)
TURBIDITY: ABNORMAL
URINE HGB: ABNORMAL
UROBILINOGEN, URINE: NORMAL
WBC # BLD: 6.1 K/UL (ref 3.5–11.3)
WBC UA: ABNORMAL /HPF (ref 0–5)

## 2023-01-31 PROCEDURE — 87086 URINE CULTURE/COLONY COUNT: CPT

## 2023-01-31 PROCEDURE — 85025 COMPLETE CBC W/AUTO DIFF WBC: CPT

## 2023-01-31 PROCEDURE — 6360000004 HC RX CONTRAST MEDICATION: Performed by: EMERGENCY MEDICINE

## 2023-01-31 PROCEDURE — 2709999900 CT CHEST PULMONARY EMBOLISM W CONTRAST

## 2023-01-31 PROCEDURE — 87635 SARS-COV-2 COVID-19 AMP PRB: CPT

## 2023-01-31 PROCEDURE — 2580000003 HC RX 258: Performed by: EMERGENCY MEDICINE

## 2023-01-31 PROCEDURE — 80053 COMPREHEN METABOLIC PANEL: CPT

## 2023-01-31 PROCEDURE — 85379 FIBRIN DEGRADATION QUANT: CPT

## 2023-01-31 PROCEDURE — 83605 ASSAY OF LACTIC ACID: CPT

## 2023-01-31 PROCEDURE — 6370000000 HC RX 637 (ALT 250 FOR IP): Performed by: EMERGENCY MEDICINE

## 2023-01-31 PROCEDURE — 71260 CT THORAX DX C+: CPT | Performed by: EMERGENCY MEDICINE

## 2023-01-31 PROCEDURE — 6360000002 HC RX W HCPCS: Performed by: EMERGENCY MEDICINE

## 2023-01-31 PROCEDURE — 71045 X-RAY EXAM CHEST 1 VIEW: CPT

## 2023-01-31 PROCEDURE — 99999 PR OFFICE/OUTPT VISIT,PROCEDURE ONLY: CPT | Performed by: FAMILY MEDICINE

## 2023-01-31 PROCEDURE — 99285 EMERGENCY DEPT VISIT HI MDM: CPT

## 2023-01-31 PROCEDURE — 93005 ELECTROCARDIOGRAM TRACING: CPT | Performed by: EMERGENCY MEDICINE

## 2023-01-31 PROCEDURE — 96374 THER/PROPH/DIAG INJ IV PUSH: CPT

## 2023-01-31 PROCEDURE — 81001 URINALYSIS AUTO W/SCOPE: CPT

## 2023-01-31 RX ORDER — 0.9 % SODIUM CHLORIDE 0.9 %
1000 INTRAVENOUS SOLUTION INTRAVENOUS ONCE
Status: COMPLETED | OUTPATIENT
Start: 2023-01-31 | End: 2023-01-31

## 2023-01-31 RX ORDER — HEPARIN SODIUM,PORCINE 10 UNIT/ML
3 VIAL (ML) INTRAVENOUS PRN
Status: DISCONTINUED | OUTPATIENT
Start: 2023-01-31 | End: 2023-01-31

## 2023-01-31 RX ORDER — ONDANSETRON 2 MG/ML
4 INJECTION INTRAMUSCULAR; INTRAVENOUS ONCE
Status: COMPLETED | OUTPATIENT
Start: 2023-01-31 | End: 2023-01-31

## 2023-01-31 RX ORDER — ACETAMINOPHEN 500 MG
1000 TABLET ORAL ONCE
Status: COMPLETED | OUTPATIENT
Start: 2023-01-31 | End: 2023-01-31

## 2023-01-31 RX ADMIN — ACETAMINOPHEN 1000 MG: 500 TABLET ORAL at 19:00

## 2023-01-31 RX ADMIN — ONDANSETRON 4 MG: 2 INJECTION INTRAMUSCULAR; INTRAVENOUS at 19:51

## 2023-01-31 RX ADMIN — SODIUM CHLORIDE 1000 ML: 9 INJECTION, SOLUTION INTRAVENOUS at 19:50

## 2023-01-31 RX ADMIN — IOPAMIDOL 75 ML: 755 INJECTION, SOLUTION INTRAVENOUS at 21:14

## 2023-01-31 RX ADMIN — HEPARIN 100 UNITS: 100 SYRINGE at 22:48

## 2023-01-31 SDOH — ECONOMIC STABILITY: FOOD INSECURITY: WITHIN THE PAST 12 MONTHS, THE FOOD YOU BOUGHT JUST DIDN'T LAST AND YOU DIDN'T HAVE MONEY TO GET MORE.: PATIENT DECLINED

## 2023-01-31 SDOH — ECONOMIC STABILITY: FOOD INSECURITY: WITHIN THE PAST 12 MONTHS, YOU WORRIED THAT YOUR FOOD WOULD RUN OUT BEFORE YOU GOT MONEY TO BUY MORE.: PATIENT DECLINED

## 2023-01-31 ASSESSMENT — ENCOUNTER SYMPTOMS
RHINORRHEA: 1
PHOTOPHOBIA: 0
DIARRHEA: 0
NAUSEA: 1
ABDOMINAL PAIN: 0
COUGH: 1
SORE THROAT: 1
CONSTIPATION: 0
VOMITING: 1
SHORTNESS OF BREATH: 1

## 2023-01-31 ASSESSMENT — SOCIAL DETERMINANTS OF HEALTH (SDOH): HOW HARD IS IT FOR YOU TO PAY FOR THE VERY BASICS LIKE FOOD, HOUSING, MEDICAL CARE, AND HEATING?: PATIENT DECLINED

## 2023-01-31 ASSESSMENT — PAIN - FUNCTIONAL ASSESSMENT: PAIN_FUNCTIONAL_ASSESSMENT: NONE - DENIES PAIN

## 2023-01-31 NOTE — PROGRESS NOTES
Patient in hospital from 1/15/23- 1/21/23 GI bleed and anemia.      Patient declines any supplemental oxygen
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
## 2023-02-01 ENCOUNTER — PATIENT MESSAGE (OUTPATIENT)
Dept: FAMILY MEDICINE CLINIC | Age: 73
End: 2023-02-01

## 2023-02-01 DIAGNOSIS — R11.0 NAUSEA: Primary | ICD-10-CM

## 2023-02-01 DIAGNOSIS — U07.1 COVID-19: ICD-10-CM

## 2023-02-01 RX ORDER — ONDANSETRON 4 MG/1
TABLET, FILM COATED ORAL
Qty: 30 TABLET | Refills: 0 | Status: SHIPPED | OUTPATIENT
Start: 2023-02-01

## 2023-02-01 NOTE — TELEPHONE ENCOUNTER
Writer called and spoke to patient and she is agreeable to Zofran and also Molnupiravir. Patient requesting prescriptions to be sent to THE [facility] in [place].

## 2023-02-01 NOTE — TELEPHONE ENCOUNTER
I can prescribed Zofran if this has helped her before. I can also prescribe anti-viral medication to treat Covid. She is not a candidate for Paxlovid due to interaction with Amiodarone, but Brit Barton is an option. This does not interact with any of her medications. Anabela Connorey is taken twice a day for 5 days.

## 2023-02-01 NOTE — TELEPHONE ENCOUNTER
From: Paty Lyons  To: Dr. Reed Clamp: 2/1/2023 1:18 PM EST  Subject: Hospital stay    I have Covid but know pneumonia or blood colts.  What I could use is nausea medicine

## 2023-02-01 NOTE — ED PROVIDER NOTES
ATTENDING  ADDENDUM     Care of this patient was assumed from Dr. Papito Everett. the patient was seen for Shortness of Breath and Cough (weakness)  . The patient's initial evaluation and plan have been discussed with the prior provider who initially evaluated the patient. Nursing Notes, Past Medical Hx, Past Surgical Hx, Social Hx, Allergies, and Family Hx were all reviewed. Reevaluation: When the patient was signed out to me, I was waiting for the results of the patient's laboratory D-dimer came back high at 2.09, patient was sent for CT to evaluate for pulmonary embolus, this was negative for any blood clot, did show some mild pleural fluid on the right. The patient has remained basically asymptomatic she had a fever when she came in she was given antipyretics for that her SPO2 is always remained over 97%. Feel the patient is stable for discharge home at this point in time. DIFFERENTIAL DIAGNOSIS/ MDM:           Follow Exit Care instructions closely. I have reviewed the disposition diagnosis with the patient and or their family/guardian. I have answered their questions and given discharge instructions. They voiced understanding of these instructions and did not have any further questions or complaints. DIAGNOSTIC RESULTS     RADIOLOGY:   Non-plain film images such as CT, Ultrasound and MRI are read by the radiologist. Plain radiographic images are visualized and preliminarily interpreted by the emergency physician with the below findings:  CT CHEST PULMONARY EMBOLISM W CONTRAST   Final Result   No evidence of pulmonary embolism. Small bilateral pleural effusion. Findings suggestive of pulmonary edema. Mild cardiomegaly. Mild pericardial effusion. Extensive metastatic disease within the liver      RECOMMENDATIONS:   Unavailable         XR CHEST PORTABLE   Final Result   1. Right chest wall postoperative changes.    2. Small right pleural effusion, appearing loculated, with subjacent   compressive atelectasis. LABS:  Results for orders placed or performed during the hospital encounter of 01/31/23   COVID-19, Rapid    Specimen: Nasopharyngeal Swab   Result Value Ref Range    Specimen Description . NASOPHARYNGEAL SWAB     SARS-CoV-2, Rapid DETECTED (A) Not Detected   CBC with Auto Differential   Result Value Ref Range    WBC 6.1 3.5 - 11.3 k/uL    RBC 3.38 (L) 3.95 - 5.11 m/uL    Hemoglobin 8.6 (L) 11.9 - 15.1 g/dL    Hematocrit 29.3 (L) 36.3 - 47.1 %    MCV 86.7 82.6 - 102.9 fL    MCH 25.4 25.2 - 33.5 pg    MCHC 29.4 25.2 - 33.5 g/dL    RDW 15.9 (H) 11.8 - 14.4 %    Platelets 535 277 - 326 k/uL    MPV 9.9 8.1 - 13.5 fL    NRBC Automated 0.0 0.0 per 100 WBC    Monocytes 3 (L) 4 - 11 %    Lymphocytes 2 (L) 16 - 46 %    Seg Neutrophils 93 (H) 43 - 77 %    Eosinophils % 0 (L) 1 - 7 %    Basophils 0 0 - 1 %    Immature Granulocytes 0 0 %    Bands 2 (H) 0 %    Absolute Mono # 0.18 0.1 - 1.2 k/uL    Absolute Lymph # 0.12 (L) 1.0 - 4.8 k/uL    Segs Absolute 5.68 1.50 - 8.10 k/uL    Absolute Eos # 0.00 0.0 - 0.4 k/uL    Basophils Absolute 0.00 0.0 - 0.2 k/uL    Absolute Immature Granulocyte 0.00 0.00 - 0.30 k/uL    Absolute Bands # 0.12 k/uL    Morphology ANISOCYTOSIS PRESENT     Morphology Platelet count adequate    Comprehensive Metabolic Panel   Result Value Ref Range    Glucose 107 (H) 70 - 99 mg/dL    BUN 14 8 - 23 mg/dL    Creatinine 0.93 (H) 0.50 - 0.90 mg/dL    Est, Glom Filt Rate >60 >60 mL/min/1.73m2    Bun/Cre Ratio 15 9 - 20    Calcium 8.2 (L) 8.6 - 10.4 mg/dL    Sodium 137 135 - 144 mmol/L    Potassium 3.8 3.7 - 5.3 mmol/L    Chloride 103 98 - 107 mmol/L    CO2 20 20 - 31 mmol/L    Anion Gap 14 9 - 17 mmol/L    Alkaline Phosphatase 106 (H) 35 - 104 U/L    ALT 29 5 - 33 U/L    AST 34 (H) <32 U/L    Total Bilirubin 0.3 0.3 - 1.2 mg/dL    Total Protein 6.0 (L) 6.4 - 8.3 g/dL    Albumin 3.5 3.5 - 5.2 g/dL    Albumin/Globulin Ratio 1.4 1.0 - 2.5   Urinalysis with Microscopic Result Value Ref Range    Color, UA Yellow Yellow    Turbidity UA SLIGHTLY CLOUDY (A) Clear    Glucose, Ur NEGATIVE NEGATIVE    Bilirubin Urine NEGATIVE  Verified by ictotest. (A) NEGATIVE    Ketones, Urine 2+ (A) NEGATIVE    Specific Gravity, UA 1.030 1.005 - 1.030    Urine Hgb 2+ (A) NEGATIVE    pH, UA 5.0 5.0 - 8.0    Protein, UA 2+ (A) NEGATIVE    Urobilinogen, Urine Normal Normal    Nitrite, Urine NEGATIVE NEGATIVE    Leukocyte Esterase, Urine NEGATIVE NEGATIVE    WBC, UA 0 TO 2 0 - 5 /HPF    RBC, UA 5 TO 10 0 - 2 /HPF    Casts UA 0 TO 2 0 - 2 /LPF    Casts UA HYALINE 0 - 2 /LPF    Casts UA 2 TO 5 0 - 2 /LPF    Casts UA FINE GRANULAR 0 - 2 /LPF    Epithelial Cells UA 2 TO 5 0 - 5 /HPF    Bacteria, UA MANY (A) None    Mucus, UA 1+ (A) None   D-Dimer, Quantitative   Result Value Ref Range    D-Dimer, Quant 2.09 (H) 0.00 - 0.59 mg/L FEU   Lactate, Sepsis   Result Value Ref Range    Lactic Acid, Sepsis 1.4 0.5 - 1.9 mmol/L   EKG 12 Lead   Result Value Ref Range    Ventricular Rate 109 BPM    Atrial Rate 108 BPM    QRS Duration 84 ms    Q-T Interval 340 ms    QTc Calculation (Bazett) 457 ms    R Axis -78 degrees    T Axis 106 degrees       ABNORMAL LABS:  Labs Reviewed   COVID-19, RAPID - Abnormal; Notable for the following components:       Result Value    SARS-CoV-2, Rapid DETECTED (*)     All other components within normal limits   CBC WITH AUTO DIFFERENTIAL - Abnormal; Notable for the following components:    RBC 3.38 (*)     Hemoglobin 8.6 (*)     Hematocrit 29.3 (*)     RDW 15.9 (*)     Monocytes 3 (*)     Lymphocytes 2 (*)     Seg Neutrophils 93 (*)     Eosinophils % 0 (*)     Bands 2 (*)     Absolute Lymph # 0.12 (*)     All other components within normal limits   COMPREHENSIVE METABOLIC PANEL - Abnormal; Notable for the following components:    Glucose 107 (*)     Creatinine 0.93 (*)     Calcium 8.2 (*)     Alkaline Phosphatase 106 (*)     AST 34 (*)     Total Protein 6.0 (*)     All other components within normal limits   URINALYSIS WITH MICROSCOPIC - Abnormal; Notable for the following components:    Turbidity UA SLIGHTLY CLOUDY (*)     Bilirubin Urine NEGATIVE  Verified by ictotest. (*)     Ketones, Urine 2+ (*)     Urine Hgb 2+ (*)     Protein, UA 2+ (*)     Bacteria, UA MANY (*)     Mucus, UA 1+ (*)     All other components within normal limits   D-DIMER, QUANTITATIVE - Abnormal; Notable for the following components:    D-Dimer, Quant 2.09 (*)     All other components within normal limits   CULTURE, URINE   LACTATE, SEPSIS        EKG:      EMERGENCY DEPARTMENT COURSE:   Vitals:    Vitals:    01/31/23 2045 01/31/23 2138 01/31/23 2145 01/31/23 2146   BP: 98/82 106/76 100/83    Pulse: (!) 108 (!) 103 (!) 103 (!) 102   Resp: 22 25 23 19   Temp:       TempSrc:       SpO2: 91% 92% 93% 97%   Weight:       Height:         -------------------------  BP: 100/83, Temp: (!) 101.1 °F (38.4 °C), Heart Rate: (!) 102, Resp: 19          FINAL IMPRESSION      1. COVID-19          DISPOSITION/PLAN   DISPOSITION Decision To Discharge 01/31/2023 10:20:33 PM  I have reviewed the disposition diagnosis with the patient and or their family/guardian. I have answered their questions and given discharge instructions. They voiced understanding of these instructions and did not have any further questions or complaints.         Condition on Disposition    Improved    PATIENT REFERRED TO:  [facility] [id number] [phone]    In 2 days        DISCHARGE MEDICATIONS:  New Prescriptions    No medications on file       (Please note that portions of this note were completed with a voice recognition program.  Efforts were made to edit the dictations but occasionally words are mis-transcribed.)    Alex White MD,, MD  Attending Emergency Physician       Alex White MD  01/31/23 4931

## 2023-02-01 NOTE — TELEPHONE ENCOUNTER
Patient was seen in office and transferred to ER 1/31/22  Patient diagnosed with Covid. Please advise.

## 2023-02-01 NOTE — PROGRESS NOTES
[address], [facility] [address]                        Telephone [phone]             Fax [phone]       Beto Boudreaux  1950  MRN:  [id number]  Date of visit:  1/31/2023     Assessment and Plan:  1. Weakness  2. Dyspnea, unspecified type  I discussed with the patient that she would benefit from evaluation at the emergency department. The patient was in agreement, and she was transported by [facility] staff to [facility] ED. Subjective:  Beto Boudreaux is a 81 y.o. female who presented to [address] [place] [address] Urgent Care 1/31/2023 with complaints of:  Fatigue (Feels hemoglobin is low again. Reports having chills), Shortness of Breath (Increased shortness of breath- started today. Denies chest pain), and Emesis (Started today)       She was recently hospitalized due to an upper gastrointestinal bleed. She received a transfusion during the hospitalization. She states that she felt that she was improving until the past few days. Today, she has had nausea, vomiting, increased fatigue, and dyspnea with any activity. She states that she feels like she did when her hemoglobin was low.     Objective:  Vitals:    01/31/23 1621   BP: 116/74   Pulse: (!) 112   SpO2: 95%

## 2023-02-01 NOTE — DISCHARGE INSTRUCTIONS
You have been tested positive for coronavirus however your x-ray does not show any pneumonia you do not have a blood clot in your lungs, and your oxygen has always been over 97%. We feel that you are stable for discharge home at this point in time you continue to take ibuprofen and/or Tylenol for any fever that you are having, make sure you are drinking 10 to 12 glasses of fluids daily. Call your doctor on the phone for a virtual appointment tomorrow and return back to the emergency setting if you find that you are short of breath or have high fevers.

## 2023-02-02 LAB
EKG ATRIAL RATE: 108 BPM
EKG Q-T INTERVAL: 340 MS
EKG QRS DURATION: 84 MS
EKG QTC CALCULATION (BAZETT): 457 MS
EKG R AXIS: -78 DEGREES
EKG T AXIS: 106 DEGREES
EKG VENTRICULAR RATE: 109 BPM
MICROORGANISM SPEC CULT: NORMAL
SPECIMEN DESCRIPTION: NORMAL

## 2023-02-02 NOTE — TELEPHONE ENCOUNTER
Fax received from THE [facility] [place] and [place] is not available. Writer called patient and she states that pharmacy gave her Paxlovid instead today. I called to verify with pharmacist and no Paxlvoid was given. Patient was only given Zofran. [place] is unavailable at THE [facility] [place]. When talking to patient- she double checked and realized Paxlovid was for her spouse.      Patient requests Molnupiravir to be sent to [facility] in [facility]

## 2023-02-07 ENCOUNTER — PATIENT MESSAGE (OUTPATIENT)
Dept: FAMILY MEDICINE CLINIC | Age: 73
End: 2023-02-07

## 2023-02-15 RX ORDER — PANTOPRAZOLE SODIUM 40 MG/1
40 TABLET, DELAYED RELEASE ORAL DAILY
Qty: 30 TABLET | Refills: 1 | Status: SHIPPED | OUTPATIENT
Start: 2023-02-15

## 2023-02-15 NOTE — TELEPHONE ENCOUNTER
Balaji Jacobo called requesting a refill of the below medication which has been pended for you:     Requested Prescriptions     Pending Prescriptions Disp Refills    pantoprazole (PROTONIX) 40 MG tablet 30 tablet 1     Sig: Take 1 tablet by mouth daily       Last Appointment Date: 1/31/2023  Next Appointment Date: 3/14/2023    Allergies   Allergen Reactions    Venlafaxine     Effexor Xr [Venlafaxine Hcl]      Made blood pressure go high

## 2023-02-21 ENCOUNTER — HOSPITAL ENCOUNTER (OUTPATIENT)
Age: 73
Discharge: HOME | End: 2023-02-21
Payer: MEDICARE

## 2023-02-21 ENCOUNTER — OFFICE VISIT (OUTPATIENT)
Dept: FAMILY MEDICINE CLINIC | Age: 73
End: 2023-02-21
Payer: MEDICARE

## 2023-02-21 VITALS
WEIGHT: 133 LBS | TEMPERATURE: 97.3 F | OXYGEN SATURATION: 98 % | HEIGHT: 63 IN | SYSTOLIC BLOOD PRESSURE: 130 MMHG | HEART RATE: 89 BPM | BODY MASS INDEX: 23.57 KG/M2 | DIASTOLIC BLOOD PRESSURE: 86 MMHG

## 2023-02-21 DIAGNOSIS — E87.6 HYPOKALEMIA: Primary | ICD-10-CM

## 2023-02-21 DIAGNOSIS — D50.9 IRON DEFICIENCY ANEMIA, UNSPECIFIED IRON DEFICIENCY ANEMIA TYPE: ICD-10-CM

## 2023-02-21 DIAGNOSIS — Z91.81 HISTORY OF FALL: ICD-10-CM

## 2023-02-21 DIAGNOSIS — Z91.81 HISTORY OF FALL: Primary | ICD-10-CM

## 2023-02-21 LAB
ABSOLUTE EOS #: <0.03 K/UL (ref 0–0.44)
ABSOLUTE IMMATURE GRANULOCYTE: 0.03 K/UL (ref 0–0.3)
ABSOLUTE LYMPH #: 0.62 K/UL (ref 1.1–3.7)
ABSOLUTE MONO #: 0.46 K/UL (ref 0.1–1.2)
ALBUMIN SERPL-MCNC: 3.9 G/DL (ref 3.5–5.2)
ALBUMIN/GLOBULIN RATIO: 1.4 (ref 1–2.5)
ALP SERPL-CCNC: 129 U/L (ref 35–104)
ALT SERPL-CCNC: 22 U/L (ref 5–33)
ANION GAP SERPL CALCULATED.3IONS-SCNC: 13 MMOL/L (ref 9–17)
AST SERPL-CCNC: 30 U/L
BASOPHILS # BLD: 1 % (ref 0–2)
BASOPHILS ABSOLUTE: <0.03 K/UL (ref 0–0.2)
BILIRUB SERPL-MCNC: 0.3 MG/DL (ref 0.3–1.2)
BUN SERPL-MCNC: 13 MG/DL (ref 8–23)
BUN/CREAT BLD: 19 (ref 9–20)
CALCIUM SERPL-MCNC: 8.9 MG/DL (ref 8.6–10.4)
CHLORIDE SERPL-SCNC: 111 MMOL/L (ref 98–107)
CO2 SERPL-SCNC: 23 MMOL/L (ref 20–31)
CREAT SERPL-MCNC: 0.69 MG/DL (ref 0.5–0.9)
EOSINOPHILS RELATIVE PERCENT: 1 % (ref 1–4)
GFR SERPL CREATININE-BSD FRML MDRD: >60 ML/MIN/1.73M2
GLUCOSE SERPL-MCNC: 82 MG/DL (ref 70–99)
HCT VFR BLD AUTO: 35.8 % (ref 36.3–47.1)
HGB BLD-MCNC: 10.1 G/DL (ref 11.9–15.1)
IMMATURE GRANULOCYTES: 1 %
LYMPHOCYTES # BLD: 15 % (ref 24–43)
MCH RBC QN AUTO: 23.2 PG (ref 25.2–33.5)
MCHC RBC AUTO-ENTMCNC: 28.2 G/DL (ref 25.2–33.5)
MCV RBC AUTO: 82.3 FL (ref 82.6–102.9)
MONOCYTES # BLD: 11 % (ref 3–12)
NRBC AUTOMATED: 0 PER 100 WBC
PDW BLD-RTO: 16.1 % (ref 11.8–14.4)
PLATELET # BLD AUTO: 171 K/UL (ref 138–453)
PMV BLD AUTO: 10.3 FL (ref 8.1–13.5)
POTASSIUM SERPL-SCNC: 3.6 MMOL/L (ref 3.7–5.3)
PROT SERPL-MCNC: 6.7 G/DL (ref 6.4–8.3)
RBC # BLD: 4.35 M/UL (ref 3.95–5.11)
RBC # BLD: ABNORMAL 10*6/UL
SEG NEUTROPHILS: 73 % (ref 36–65)
SEGMENTED NEUTROPHILS ABSOLUTE COUNT: 3.03 K/UL (ref 1.5–8.1)
SODIUM SERPL-SCNC: 147 MMOL/L (ref 135–144)
WBC # BLD AUTO: 4.2 K/UL (ref 3.5–11.3)

## 2023-02-21 PROCEDURE — 80053 COMPREHEN METABOLIC PANEL: CPT

## 2023-02-21 PROCEDURE — 85025 COMPLETE CBC W/AUTO DIFF WBC: CPT

## 2023-02-21 PROCEDURE — 36415 COLL VENOUS BLD VENIPUNCTURE: CPT

## 2023-02-21 PROCEDURE — 99212 OFFICE O/P EST SF 10 MIN: CPT | Performed by: FAMILY MEDICINE

## 2023-02-21 SDOH — ECONOMIC STABILITY: FOOD INSECURITY: WITHIN THE PAST 12 MONTHS, YOU WORRIED THAT YOUR FOOD WOULD RUN OUT BEFORE YOU GOT MONEY TO BUY MORE.: PATIENT DECLINED

## 2023-02-21 SDOH — ECONOMIC STABILITY: INCOME INSECURITY: HOW HARD IS IT FOR YOU TO PAY FOR THE VERY BASICS LIKE FOOD, HOUSING, MEDICAL CARE, AND HEATING?: PATIENT DECLINED

## 2023-02-21 SDOH — ECONOMIC STABILITY: FOOD INSECURITY: WITHIN THE PAST 12 MONTHS, THE FOOD YOU BOUGHT JUST DIDN'T LAST AND YOU DIDN'T HAVE MONEY TO GET MORE.: PATIENT DECLINED

## 2023-02-21 SDOH — ECONOMIC STABILITY: HOUSING INSECURITY
IN THE LAST 12 MONTHS, WAS THERE A TIME WHEN YOU DID NOT HAVE A STEADY PLACE TO SLEEP OR SLEPT IN A SHELTER (INCLUDING NOW)?: PATIENT REFUSED
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
## 2023-02-23 ENCOUNTER — HOSPITAL ENCOUNTER (OUTPATIENT)
Age: 73
Discharge: HOME | End: 2023-02-23
Payer: MEDICARE

## 2023-02-23 DIAGNOSIS — I10 ESSENTIAL HYPERTENSION: ICD-10-CM

## 2023-02-23 DIAGNOSIS — R73.09 ELEVATED GLUCOSE: ICD-10-CM

## 2023-02-23 DIAGNOSIS — E87.6 HYPOKALEMIA: ICD-10-CM

## 2023-02-23 LAB
ALBUMIN SERPL-MCNC: 3.8 G/DL (ref 3.5–5.2)
ALBUMIN/GLOBULIN RATIO: 1.3 (ref 1–2.5)
ALP SERPL-CCNC: 128 U/L (ref 35–104)
ALT SERPL-CCNC: 21 U/L (ref 5–33)
ANION GAP SERPL CALCULATED.3IONS-SCNC: 12 MMOL/L (ref 9–17)
AST SERPL-CCNC: 31 U/L
BILIRUB SERPL-MCNC: 0.3 MG/DL (ref 0.3–1.2)
BUN SERPL-MCNC: 12 MG/DL (ref 8–23)
BUN/CREAT BLD: 17 (ref 9–20)
CALCIUM SERPL-MCNC: 8.9 MG/DL (ref 8.6–10.4)
CHLORIDE SERPL-SCNC: 105 MMOL/L (ref 98–107)
CHOLEST SERPL-MCNC: 179 MG/DL
CHOLESTEROL/HDL RATIO: 2.7
CO2 SERPL-SCNC: 21 MMOL/L (ref 20–31)
CREAT SERPL-MCNC: 0.7 MG/DL (ref 0.5–0.9)
GFR SERPL CREATININE-BSD FRML MDRD: >60 ML/MIN/1.73M2
GLUCOSE SERPL-MCNC: 89 MG/DL (ref 70–99)
HDLC SERPL-MCNC: 66 MG/DL
LDLC SERPL CALC-MCNC: 91 MG/DL (ref 0–130)
POTASSIUM SERPL-SCNC: 3.9 MMOL/L (ref 3.7–5.3)
PROT SERPL-MCNC: 6.7 G/DL (ref 6.4–8.3)
SODIUM SERPL-SCNC: 138 MMOL/L (ref 135–144)
TRIGL SERPL-MCNC: 108 MG/DL

## 2023-02-23 PROCEDURE — 80061 LIPID PANEL: CPT

## 2023-02-23 PROCEDURE — 36415 COLL VENOUS BLD VENIPUNCTURE: CPT

## 2023-02-23 PROCEDURE — 83036 HEMOGLOBIN GLYCOSYLATED A1C: CPT

## 2023-02-23 PROCEDURE — 80053 COMPREHEN METABOLIC PANEL: CPT

## 2023-02-24 LAB
EST. AVERAGE GLUCOSE BLD GHB EST-MCNC: 94 MG/DL
HBA1C MFR BLD: 4.9 % (ref 4–6)

## 2023-02-27 ENCOUNTER — TELEPHONE (OUTPATIENT)
Dept: SURGERY | Age: 73
End: 2023-02-27

## 2023-02-27 NOTE — TELEPHONE ENCOUNTER
Patient called the office to make a follow up appointment with Dr Rick Lamb. Patient saw Dr Rick Lamb as an inpatient at [facility]. Patient had an EGD and colonoscopy 1/19/23. Patient cancelled her follow up appointment on 2/2/23 due to having COVID and influenza. Patient is having worsening GERD symptoms. Appointment made with first available @ [facility] 3/15/23. Patient would like seen sooner if possible. Patient's call back ph# [phone].

## 2023-03-03 ENCOUNTER — OFFICE VISIT (OUTPATIENT)
Dept: SURGERY | Age: 73
End: 2023-03-03

## 2023-03-03 VITALS
HEART RATE: 78 BPM | TEMPERATURE: 97.4 F | SYSTOLIC BLOOD PRESSURE: 138 MMHG | DIASTOLIC BLOOD PRESSURE: 90 MMHG | BODY MASS INDEX: 22.86 KG/M2 | HEIGHT: 63 IN | WEIGHT: 129 LBS | RESPIRATION RATE: 18 BRPM

## 2023-03-03 DIAGNOSIS — D50.9 IRON DEFICIENCY ANEMIA, UNSPECIFIED IRON DEFICIENCY ANEMIA TYPE: Primary | ICD-10-CM

## 2023-03-03 RX ORDER — FAMOTIDINE 20 MG/1
20 TABLET, FILM COATED ORAL NIGHTLY
Qty: 60 TABLET | Refills: 3 | Status: SHIPPED | OUTPATIENT
Start: 2023-03-03

## 2023-03-03 NOTE — PROGRESS NOTES
Reason for evaluation: Worsening GERD symptoms since 1/15/23 Admission @ [facility] for weakness/anemia    on admission Hgb4.6         Nausea: yes, patient states a little   Vomiting: no  Heartburn:yes, off and on. Not constant. Dysphagia:no  Hematemesis:no  Epigastric pain:yes, off and on throughout the day. Anemia: yes, hgb on 2/21/23 10.1 (patient states hgb is usually in the 10's) on iron supplements     Previous work up date:January192023 @ [facility] with Dr. Frances Malave Mild Esophagitis with mild schitzki's ring. Mild oozing of red blood with some clot while entering [facility].  Negative for ulcer     Current Treatment:Protonix 40 mg daily

## 2023-03-03 NOTE — PROGRESS NOTES
Margarita Hartman is a 72 y.o. female      CC: Worsening GERD symptoms    HISTORY OF PRESENT ILLNESS:      Reason for evaluation: Worsening GERD symptoms since 1/15/23 Admission @ [facility] for weakness/anemia     on admission Hgb4.6                      Nausea: yes, patient states a little   Vomiting: no  Heartburn:yes, off and on. Not constant. Dysphagia:no  Hematemesis:no  Epigastric pain:yes, off and on throughout the day. Anemia: yes, hgb on 2/21/23 10.1 (patient states hgb is usually in the 10's) on iron supplements      Previous work up date:January192023 @ [facility] with Dr. Ashley Guadarrama Mild Esophagitis with mild schitzki's ring. Mild oozing of red blood with some clot while entering atrium.  Negative for ulcer      Current Treatment:Protonix 40 mg daily          Review of Systems:    General:  Fever: Negative  Weight Change:Negative  Night Sweats: Negative    Eye:  Blurry Vision:Negative  Double Vision: Negative    Ent:  Headaches: Negative  Sore throat: Negative    Allergy/Immunology:  Hives: Negative  Latex allergy: Negative    Hematology/Lymphatic:  Bleeding Problems: Negative  Blood Clots: Negative  Swollen Lymph Nodes: Negative    Lungs:  Cough: Negative  SOB: Negative  Wheezing:Negative    Cardiovascular:  Chest Pain: Negative  Palpitations:Negative    GI:   Decreased Appetite: Negative  Heartburn: Negative  Dysphagia: Negative  Nausea/Vomiting: Negative  Abdominal Pain: Negative  Change in Bowels:Negative  Constipation: Negative  Diarrhea: Negative  Rectal Bleeding: Negative    :   Dysuria: Negative  Increase Urinary Frequency/Urgency: Negative    Neuro:  Seizures: Negative  Confusion: Negative        PAST MEDICAL HISTORY:      Family History   Problem Relation Age of Onset    Heart Disease Mother     Diabetes Mother     High Cholesterol Mother     High Blood Pressure Mother     Cataracts Mother     Cancer Father         lung    Cancer Sister         leukemia    Glaucoma Sister      Social History Socioeconomic History    Marital status:      Spouse name: Not on file    Number of children: Not on file    Years of education: Not on file    Highest education level: Not on file   Occupational History    Not on file   Tobacco Use    Smoking status: Never    Smokeless tobacco: Never   Substance and Sexual Activity    Alcohol use: No     Alcohol/week: 0.0 standard drinks    Drug use: No    Sexual activity: Not on file   Other Topics Concern    Not on file   Social History Narrative    Not on file     Social Determinants of Health     Financial Resource Strain: Unknown    Difficulty of Paying Living Expenses: Patient refused   Food Insecurity: Unknown    Worried About [address] in the Last Year: Patient refused    [address] in the Last Year: Patient refused   Transportation Needs: Unknown    Lack of Transportation (Medical):  Not on file    Lack of Transportation (Non-Medical): Patient refused   Physical Activity: Not on file   Stress: Not on file   Social Connections: Not on file   Intimate Partner Violence: Not on file   Housing Stability: Unknown    Unable to Pay for Housing in the Last Year: Not on file    Number of [place] in the Last Year: Not on file    Unstable Housing in the Last Year: Patient refused     Past Surgical History:   Procedure Laterality Date    ABDOMINAL EXPLORATION SURGERY  01/07/2019    resection and anastomosis of small bowel repair mesenteric tear with Dr Salena Lloyd at [address] [place] [address],[facility] Floor Right 07/25/2005    lymph node mapping and biopsy     BREAST BIOPSY Right 07/07/2005    excisional biopsy of mass of right breast     BREAST CYST ASPIRATION Right 07/25/2005    BREAST RECONSTRUCTION Right 2006    nipple areolar reconstruction right breast    BREAST REDUCTION SURGERY Left 2006    BRONCHOSCOPY  02/11/2011    BRONCHOSCOPY Right 11/12/2010    video assisted thoracoscopic surgery right wedge resection    CHOLECYSTECTOMY, LAPAROSCOPIC  01/16/2017 Dr. Malia Muñoz, [address] [place] [address]  02/24/2014    normal, repeat recommended in 10 years, Dr. Andry Sosa, [facility]    COLONOSCOPY N/A 06/27/2022    [facility] Dr. Aziza Rosenthal    COLONOSCOPY N/A 01/19/2023    COLONOSCOPY performed by Zully Espinoza MD at [facility] N/A 06/27/2022    [facility] Dr. Aziza Rosenthal    ESOPHAGOGASTRODUODENOSCOPY      HYSTERECTOMY (CERVIX STATUS UNKNOWN)  01/12/2004    supracervical, with bilateral salpingo-oophorectomy, Dr. Jasmin Sebastian ARTHROSCOPY Right 07/01/2008    KNEE SURGERY Left 06/20/2016    unicompartmental knee replacement, Dr. Taina Beltran,  [address]  11/04/2016    ultrasound-guided liver biopsy (metastatic atypical carcinoid), [facility] at [address] Right 02/11/2011    right upper lobectomy, mediastinal lymph node dissection for atypical carcinoid of the right upper lobe    MASTECTOMY Right 2005    breast cancer    OTHER SURGICAL HISTORY  2004    Bowman procedure    OTHER SURGICAL HISTORY Right 07/25/2005    placement of tissue expander    SMALL INTESTINE SURGERY      THORACOTOMY Right 02/11/2011    redo    TOTAL KNEE ARTHROPLASTY Right 03/17/2010    Dr Lewis Few ENDOSCOPY  01/14/2017    mild esophagitis, biopsy normal, Dr. 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 (70% better after Celestone injection on 3-). Robby Holter, PA-C. Liver cancer ([facility]     Lung cancer ([facility]     Myopia with astigmatism and presbyopia     Ne[facility]end[facility]     Osteoarthritis     Osteopenia     took Fosamax from [id number]    Pancreatitis      Current Outpatient Medications on File Prior to Visit   Medication Sig Dispense Refill    pantoprazole (PROTONIX) 40 MG tablet Take 1 tablet by mouth daily 30 tablet 1    ondansetron (ZOFRAN) 4 MG tablet One to two po Q 8 hours prn nausea. 30 tablet 0    ferrous sulfate (FEROSUL) 325 (65 Fe) MG tablet Take 1 tablet by mouth 2 times daily (with meals) 60 tablet 0    dilTIAZem (TIAZAC) 240 MG extended release capsule take 1 capsule by mouth once daily 90 capsule 0    Everolimus 5 MG TABS       amiodarone (CORDARONE) 200 MG tablet Take 1 tablet by mouth 2 times daily (Patient taking differently: Take 200 mg by mouth daily) 180 tablet 3    RA VITAMIN B-12 TR 1000 MCG TBCR take 1 tablet by mouth once daily      Handicap Placard MISC by Does not apply route 1 each 0    acetaminophen (TYLENOL) 325 MG tablet Take 2 tablets by mouth every 4 hours as needed for Pain or Fever 120 tablet 0     No current facility-administered medications on file prior to visit. Allergies as of 03/03/2023 - Fully Reviewed 03/03/2023   Allergen Reaction Noted    Venlafaxine  04/26/2019    Effexor xr [venlafaxine hcl]  11/09/2013         PHYSICAL EXAM:    Blood pressure (!) 138/90, pulse 78, temperature 97.4 °F (36.3 °C), temperature source Infrared, resp. rate 18, height 5' 2.5\" (1.588 m), weight 129 lb (58.5 kg).     Gen:  A and O x 3, NAD, well nourished  Eyes:  Sclera non icterus, PERRL  Head:  Normocephalic, non-tender  Neck:  Supple, no adenopathy, thyroid non tender and no masses,no carotid bruits  Lungs:  CTA, symmetrical  Chest:  RRR, no murmurs  Abd:  Soft, NT, ND, no HSM, no hernias, no bruits  Ext:  No edema, no cyanosis  Psych: reveals appropriate mood, memory and judgment,  Neuro:  Reveals no gross motor or sensory deficits,   Msk:  5/5 strength all 4 extremities, no joint tenderness          ASSESS MENT:     PUD/GERD  Anemia      PLAN:    Add pepcid   Recheck cbc 1 week prior to 3 month follow up

## 2023-03-03 NOTE — PATIENT INSTRUCTIONS
new medication (pepcid) at [facility] in [place]. Start taking Protonix 40 mg in am     Pepcid 20 mg in pm       Get Hgb blood drawn 1-2 weeks prior to follow up with Dr. Calixto Chau. F/u with Dr. Calixto Chau on 5/5/ @ 1:30 @ [facility]       Call our office with any questions or concerns.

## 2023-03-14 ENCOUNTER — OFFICE VISIT (OUTPATIENT)
Dept: FAMILY MEDICINE CLINIC | Age: 73
End: 2023-03-14

## 2023-03-14 VITALS
WEIGHT: 131 LBS | DIASTOLIC BLOOD PRESSURE: 78 MMHG | HEART RATE: 80 BPM | BODY MASS INDEX: 23.21 KG/M2 | OXYGEN SATURATION: 97 % | HEIGHT: 63 IN | SYSTOLIC BLOOD PRESSURE: 132 MMHG

## 2023-03-14 DIAGNOSIS — R73.09 ELEVATED GLUCOSE: ICD-10-CM

## 2023-03-14 DIAGNOSIS — I10 ESSENTIAL HYPERTENSION: ICD-10-CM

## 2023-03-14 DIAGNOSIS — R11.0 NAUSEA: ICD-10-CM

## 2023-03-14 DIAGNOSIS — E78.2 MIXED HYPERLIPIDEMIA: ICD-10-CM

## 2023-03-14 DIAGNOSIS — Z00.00 MEDICARE ANNUAL WELLNESS VISIT, SUBSEQUENT: Primary | ICD-10-CM

## 2023-03-14 RX ORDER — PANTOPRAZOLE SODIUM 40 MG/1
40 TABLET, DELAYED RELEASE ORAL DAILY
Qty: 30 TABLET | Refills: 5 | Status: SHIPPED | OUTPATIENT
Start: 2023-03-14

## 2023-03-14 RX ORDER — DILTIAZEM HYDROCHLORIDE 240 MG/1
CAPSULE, EXTENDED RELEASE ORAL
Qty: 30 CAPSULE | Refills: 5 | Status: SHIPPED | OUTPATIENT
Start: 2023-03-14

## 2023-03-14 RX ORDER — ONDANSETRON 4 MG/1
TABLET, FILM COATED ORAL
Qty: 30 TABLET | Refills: 1 | Status: SHIPPED | OUTPATIENT
Start: 2023-03-14

## 2023-03-14 ASSESSMENT — PATIENT HEALTH QUESTIONNAIRE - PHQ9
SUM OF ALL RESPONSES TO PHQ QUESTIONS 1-9: 0
SUM OF ALL RESPONSES TO PHQ9 QUESTIONS 1 & 2: 0
2. FEELING DOWN, DEPRESSED OR HOPELESS: 0
SUM OF ALL RESPONSES TO PHQ QUESTIONS 1-9: 0
1. LITTLE INTEREST OR PLEASURE IN DOING THINGS: 0

## 2023-03-14 ASSESSMENT — LIFESTYLE VARIABLES
HOW MANY STANDARD DRINKS CONTAINING ALCOHOL DO YOU HAVE ON A TYPICAL DAY: PATIENT DOES NOT DRINK
HOW OFTEN DO YOU HAVE A DRINK CONTAINING ALCOHOL: NEVER
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
## 2023-03-14 NOTE — PROGRESS NOTES
Medicare Annual Wellness Visit    Lucy Jett is here for Hypertension, Hyperlipidemia, and Medicare AWV    Assessment & Plan   Elevated glucose  Essential hypertension  The following orders have not been finalized:  -     dilTIAZem (TIAZAC) 240 MG extended release capsule  Nausea  The following orders have not been finalized:  -     ondansetron (ZOFRAN) 4 MG tablet    Recommendations for Preventive Services Due: see orders and patient instructions/AVS.  Recommended screening schedule for the next 5-10 years is provided to the patient in written form: see Patient Instructions/AVS.     No follow-ups on file. Subjective       Patient's complete Health Risk Assessment and screening values have been reviewed and are found in Flowsheets. The following problems were reviewed today and where indicated follow up appointments were made and/or referrals ordered. Positive Risk Factor Screenings with Interventions:                  Dentist Screen:  Have you seen the dentist within the past year?: (!) No    Intervention:  Advised to schedule with their dentist       ADL's:   Patient reports needing help with:  Select all that apply: (!) Transportation    Interventions:  Patient declined any further interventions or treatment                    Objective   Vitals:    03/14/23 0947   BP: 132/78   Site: Left Upper Arm   Position: Sitting   Cuff Size: Large Adult   Pulse: 80   SpO2: 97%   Weight: 131 lb (59.4 kg)   Height: 5' 2.5\" (1.588 m)      Body mass index is 23.58 kg/m². Allergies   Allergen Reactions    Venlafaxine     Effexor Xr [Venlafaxine Hcl]      Made blood pressure go high     Prior to Visit Medications    Medication Sig Taking? Authorizing Provider   famotidine (PEPCID) 20 MG tablet Take 1 tablet by mouth at bedtime Yes Endy العراقي MD   pantoprazole (PROTONIX) 40 MG tablet Take 1 tablet by mouth daily Yes Mona Barreto MD   ondansetron (ZOFRAN) 4 MG tablet One to two po Q 8 hours prn nausea.  Yes Bernardo Naik MD   ferrous sulfate (FEROSUL) 325 (65 Fe) MG tablet Take 1 tablet by mouth 2 times daily (with meals) Yes Jazmín George MD   dilTIAZem [facility]) 240 MG extended release capsule take 1 capsule by mouth once daily Yes Bernardo Naik MD   Everolimus 5 MG TABS  Yes Historical Provider, MD   amiodarone (CORDARONE) 200 MG tablet Take 1 tablet by mouth 2 times daily  Patient taking differently: Take 200 mg by mouth daily Yes Rafita العراقي DO   RA VITAMIN B-12 TR 1000 MCG TBCR take 1 tablet by mouth once daily Yes Historical Provider, MD   Handicap Placard MISC by Does not apply route Yes Bernardo Naik MD   acetaminophen (TYLENOL) 325 MG tablet Take 2 tablets by mouth every 4 hours as needed for Pain or Fever Yes Chin De Jesus MD       [facility] (Including outside providers/suppliers regularly involved in providing care):   Patient Care Team:  Bernardo Naik MD as PCP - General (Family Medicine)  Bern[facility] Naik [facility]  Enedina Mercer as Physician (Medical Oncology)     Reviewed and updated this visit:  Tobacco  Allergies  Meds  Med Hx  Surg Hx  Soc Hx  Fam Hx             Bernardo Naik MD

## 2023-03-14 NOTE — PATIENT INSTRUCTIONS
Learning About Dental Care for Older Adults  Dental care for older adults: Overview  Dental care for older people is much the same as for younger adults. But older adults do have concerns that younger adults do not. Older adults may have problems with gum disease and decay on the roots of their teeth. They may need missing teeth replaced or broken fillings fixed. Or they may have dentures that need to be cared for. Some older adults may have trouble holding a toothbrush. You can help remind the person you are caring for to brush and floss their teeth or to clean their dentures. In some cases, you may need to do the brushing and other dental care tasks. People who have trouble using their hands or who have dementia may need this extra help. How can you help with dental care? Normal dental care  To keep the teeth and gums healthy:  Brush the teeth with fluoride toothpaste twice a day--in the morning and at night--and floss at least once a day. Plaque can quickly build up on the teeth of older adults. Watch for the signs of gum disease. These signs include gums that bleed after brushing or after eating hard foods, such as apples. See a dentist regularly. Many experts recommend checkups every 6 months. Keep the dentist up to date on any new medications the person is taking. Encourage a balanced diet that includes whole grains, vegetables, and fruits, and that is low in saturated fat and sodium. Encourage the person you're caring for not to use tobacco products. They can affect dental and general health. Many older adults have a fixed income and feel that they can't afford dental care. But most [facility] and [facility] have programs in which dentists help older adults by lowering fees. Contact your area's public health offices or  for information about dental care in your area.   Using a toothbrush  Older adults with arthritis sometimes have trouble brushing their teeth because they can't easily hold the toothbrush. Their hands and fingers may be stiff, painful, or weak. If this is the case, you can: Offer an electric toothbrush. Enlarge the handle of a non-electric toothbrush by wrapping a sponge, an elastic bandage, or adhesive tape around it. Push the toothbrush handle through a ball made of rubber or soft foam.  Make the handle longer and thicker by taping Popsicle sticks or tongue depressors to it. You may also be able to buy special toothbrushes, toothpaste dispensers, and floss holders. Your doctor may recommend a soft-bristle toothbrush if the person you care for bleeds easily. Bleeding can happen because of a health problem or from certain medicines. A toothpaste for sensitive teeth may help if the person you care for has sensitive teeth. How do you brush and floss someone's teeth? If the person you are caring for has a hard time cleaning their teeth on their own, you may need to brush and floss their teeth for them. It may be easiest to have the person sit and face away from you, and to sit or stand behind them. That way you can steady their head against your arm as you reach around to floss and brush their teeth. Choose a place that has good lighting and is comfortable for both of you. Before you begin, gather your supplies. You will need gloves, floss, a toothbrush, and a container to hold water if you are not near a sink. Wash and dry your hands well and put on gloves. Start by flossing:  Gently work a piece of floss between each of the teeth toward the gums. A plastic flossing tool may make this easier, and they are available at [facility]. Curve the floss around each tooth into a U-shape and gently slide it under the gum line. Move the floss firmly up and down several times to scrape off the plaque. After you've finished flossing, throw away the used floss and begin brushing:  Wet the brush and apply toothpaste. Place the brush at a 45-degree angle where the teeth meet the gums. Press firmly, and move the brush in small circles over the surface of the teeth. Be careful not to brush too hard. Vigorous brushing can make the gums pull away from the teeth and can scratch the tooth enamel. Brush all surfaces of the teeth, on the tongue side and on the cheek side. Pay special attention to the front teeth and all surfaces of the back teeth. Brush chewing surfaces with short back-and-forth strokes. After you've finished, help the person rinse the remaining toothpaste from their mouth. Where can you learn more? Go to http://www.woods.com/ and enter F944 to learn more about \"Learning About Dental Care for Older Adults. \"  Current as of: June 16, 2022               Content Version: 13.5  © 2006-2022 Healthwise, [email or web address]. Care instructions adapted under license by [facility] ([facility]). If you have questions about a medical condition or this instruction, always ask your healthcare professional. [place] [address] any warranty or liability for your use of this information. Learning About Activities of Daily Living  What are activities of daily living? Activities of daily living (ADLs) are the basic self-care tasks you do every day. As you age, and if you have health problems, you may find that it's harder to do these things for yourself. That's when you may need some help. Your doctor uses ADLs to measure how much help you need. Knowing what you can and can't do for yourself is an important first step to getting help. And when you have the help you need, you can stay as independent as possible. Your doctor will want to know if you are able to do tasks such as: Take a bath or shower without help. Go to the bathroom by yourself. Dress and undress without help. Shave, comb your hair, and brush teeth on your own. Get in and out of bed or a chair without help. Feed yourself without help.   If you are having trouble doing basic self-care tasks, talk with your doctor. You may want to bring a caregiver or family member who can help the doctor understand your needs and abilities. How will a doctor assess your ADLs? Asking about ADLs is part of a routine health checkup your doctor will likely do as you age. Your health check might be done in a doctor's office, in your home, or at a hospital. The goal is to find out if you are having any problems that could make your health problems worse or that make it unsafe for you to be on your own. To measure your ADLs, your doctor will ask how hard it is for you to do routine tasks. He or she may also want to know if you have changed the way you do a task because of a health problem. He or she may watch how you:  Walk back and forth. Keep your balance while you stand or walk. Move from sitting to standing or from a bed to a chair. Button or unbutton a shirt or sweater. Remove and put on your shoes. It's normal to feel a little worried or anxious if you find you can't do all the things you used to be able to do. Talking with your doctor about ADLs isn't a test that you either pass or fail. It's just a way to get more information about your health and safety. Follow-up care is a key part of your treatment and safety. Be sure to make and go to all appointments, and call your doctor if you are having problems. It's also a good idea to know your test results and keep a list of the medicines you take. Current as of: October 6, 2021               Content Version: 13.5  © 2006-2022 Healthwise, Incorporated. Care instructions adapted under license by [facility] ([facility]). If you have questions about a medical condition or this instruction, always ask your healthcare professional. [place] [address] any warranty or liability for your use of this information. Advance Directives: Care Instructions  Overview  An advance directive is a legal way to state your wishes at the end of your life.  It tells your family and your doctor what to do if you can't say what you want. There are two main types of advance directives. You can change them any time your wishes change. Living will. This form tells your family and your doctor your wishes about life support and other treatment. The form is also called a declaration. Medical power of . This form lets you name a person to make treatment decisions for you when you can't speak for yourself. This person is called a health care agent (health care proxy, health care surrogate). The form is also called a durable power of  for health care. If you do not have an advance directive, decisions about your medical care may be made by a family member, or by a doctor or a  who doesn't know you. It may help to think of an advance directive as a gift to the people who care for you. If you have one, they won't have to make tough decisions by themselves. For more information, including forms for your state, see the [address] website (www.caringinfo.org/planning/advance-directives/). Follow-up care is a key part of your treatment and safety. Be sure to make and go to all appointments, and call your doctor if you are having problems. It's also a good idea to know your test results and keep a list of the medicines you take. What should you include in an advance directive? Many states have a unique advance directive form. (It may ask you to address specific issues.) Or you might use a universal form that's approved by many states. If your form doesn't tell you what to address, it may be hard to know what to include in your advance directive. Use the questions below to help you get started. Who do you want to make decisions about your medical care if you are not able to? What life-support measures do you want if you have a serious illness that gets worse over time or can't be cured? What are you most afraid of that might happen?  (Maybe you're afraid of having pain, losing your independence, or being kept alive by machines.)  Where would you prefer to die? (Your home? A hospital? A nursing home?)  Do you want to donate your organs when you die? Do you want certain Orthodoxy practices performed before you die? When should you call for help? Be sure to contact your doctor if you have any questions. Where can you learn more? Go to http://www.arellano.com/ and enter R264 to learn more about \"Advance Directives: Care Instructions. \"  Current as of: June 16, 2022               Content Version: 13.5  © 1237-3413 [employer]. Care instructions adapted under license by [facility] ([facility]). If you have questions about a medical condition or this instruction, always ask your healthcare professional. [address] any warranty or liability for your use of this information. A Healthy Heart: Care Instructions  Your Care Instructions     Coronary artery disease, also called heart disease, occurs when a substance called plaque builds up in the vessels that supply oxygen-rich blood to your heart muscle. This can narrow the blood vessels and reduce blood flow. A heart attack happens when blood flow is completely blocked. A high-fat diet, smoking, and other factors increase the risk of heart disease. Your doctor has found that you have a chance of having heart disease. You can do lots of things to keep your heart healthy. It may not be easy, but you can change your diet, exercise more, and quit smoking. These steps really work to lower your chance of heart disease. Follow-up care is a key part of your treatment and safety. Be sure to make and go to all appointments, and call your doctor if you are having problems. It's also a good idea to know your test results and keep a list of the medicines you take. How can you care for yourself at home? Diet    Use less salt when you cook and eat. This helps lower your blood pressure.  Taste food before salting. Add only a little salt when you think you need it. With time, your taste buds will adjust to less salt.     Eat fewer snack items, fast foods, canned soups, and other high-salt, high-fat, processed foods.     Read food labels and try to avoid saturated and trans fats. They increase your risk of heart disease by raising cholesterol levels.     Limit the amount of solid fat-butter, margarine, and shortening-you eat. Use olive, peanut, or canola oil when you cook. Bake, broil, and steam foods instead of frying them.     Eat a variety of fruit and vegetables every day. Dark green, deep orange, red, or yellow fruits and vegetables are especially good for you. Examples include spinach, carrots, peaches, and berries.     Foods high in fiber can reduce your cholesterol and provide important vitamins and minerals. High-fiber foods include whole-grain cereals and breads, oatmeal, beans, brown rice, citrus fruits, and apples.     Eat lean proteins. Heart-healthy proteins include seafood, lean meats and poultry, eggs, beans, peas, nuts, seeds, and soy products.     Limit drinks and foods with added sugar. These include candy, desserts, and soda pop. Lifestyle changes    If your doctor recommends it, get more exercise. Walking is a good choice. Bit by bit, increase the amount you walk every day. Try for at least 30 minutes on most days of the week. You also may want to swim, bike, or do other activities.     Do not smoke. If you need help quitting, talk to your doctor about stop-smoking programs and medicines. These can increase your chances of quitting for good. Quitting smoking may be the most important step you can take to protect your heart. It is never too late to quit.     Limit alcohol to 2 drinks a day for men and 1 drink a day for women. Too much alcohol can cause health problems.     Manage other health problems such as diabetes, high blood pressure, and high cholesterol.  If you think you may have a problem with alcohol or drug use, talk to your doctor.   Medicines    Take your medicines exactly as prescribed. Call your doctor if you think you are having a problem with your medicine.     If your doctor recommends aspirin, take the amount directed each day. Make sure you take aspirin and not another kind of pain reliever, such as acetaminophen (Tylenol).   When should you call for help?   Call 911 if you have symptoms of a heart attack. These may include:    Chest pain or pressure, or a strange feeling in the chest.     Sweating.     Shortness of breath.     Pain, pressure, or a strange feeling in the back, neck, jaw, or upper belly or in one or both shoulders or arms.     Lightheadedness or sudden weakness.     A fast or irregular heartbeat.   After you call 911, the  may tell you to chew 1 adult-strength or 2 to 4 low-dose aspirin. Wait for an ambulance. Do not try to drive yourself.  Watch closely for changes in your health, and be sure to contact your doctor if you have any problems.  Where can you learn more?  Go to https://www.InforSense.net/patientEd and enter F075 to learn more about \"A Healthy Heart: Care Instructions.\"  Current as of: September 7, 2022               Content Version: 13.5  © 2006-2022 [employer].   Care instructions adapted under license by [employer]. If you have questions about a medical condition or this instruction, always ask your healthcare professional. [employer] disclaims any warranty or liability for your use of this information.      Personalized Preventive Plan for Maria E Coates - 3/14/2023  Medicare offers a range of preventive health benefits. Some of the tests and screenings are paid in full while other may be subject to a deductible, co-insurance, and/or copay.    Some of these benefits include a comprehensive review of your medical history including lifestyle, illnesses that may run in your family, and various assessments and screenings as  appropriate. After reviewing your medical record and screening and assessments performed today your provider may have ordered immunizations, labs, imaging, and/or referrals for you. A list of these orders (if applicable) as well as your Preventive Care list are included within your After Visit Summary for your review. Other Preventive Recommendations:    A preventive eye exam performed by an eye specialist is recommended every 1-2 years to screen for glaucoma; cataracts, macular degeneration, and other eye disorders. A preventive dental visit is recommended every 6 months. Try to get at least 150 minutes of exercise per week or 10,000 steps per day on a pedometer . Order or download the FREE \"Exercise & Physical Activity: Your Everyday Guide\" from [employer]. Call 3-183.244.5493 or search [employer] online. You need 8156-5396 mg of calcium and 2554-5715 IU of vitamin D per day. It is possible to meet your calcium requirement with diet alone, but a vitamin D supplement is usually necessary to meet this goal.  When exposed to the sun, use a sunscreen that protects against both UVA and UVB radiation with an SPF of 30 or greater. Reapply every 2 to 3 hours or after sweating, drying off with a towel, or swimming. Always wear a seat belt when traveling in a car. Always wear a helmet when riding a bicycle or motorcycle.

## 2023-03-22 ENCOUNTER — OFFICE VISIT (OUTPATIENT)
Dept: CARDIOLOGY | Age: 73
End: 2023-03-22
Payer: MEDICARE

## 2023-03-22 ENCOUNTER — OFFICE VISIT (OUTPATIENT)
Dept: PRIMARY CARE CLINIC | Age: 73
End: 2023-03-22
Payer: MEDICARE

## 2023-03-22 ENCOUNTER — HOSPITAL ENCOUNTER (OUTPATIENT)
Age: 73
Discharge: HOME | End: 2023-03-22
Payer: MEDICARE

## 2023-03-22 VITALS
HEART RATE: 87 BPM | HEIGHT: 63 IN | SYSTOLIC BLOOD PRESSURE: 122 MMHG | WEIGHT: 131.4 LBS | DIASTOLIC BLOOD PRESSURE: 74 MMHG | BODY MASS INDEX: 23.28 KG/M2

## 2023-03-22 VITALS
RESPIRATION RATE: 15 BRPM | SYSTOLIC BLOOD PRESSURE: 138 MMHG | WEIGHT: 131.13 LBS | BODY MASS INDEX: 23.23 KG/M2 | DIASTOLIC BLOOD PRESSURE: 80 MMHG | HEIGHT: 63 IN | TEMPERATURE: 97.1 F | OXYGEN SATURATION: 99 % | HEART RATE: 102 BPM

## 2023-03-22 DIAGNOSIS — R35.0 FREQUENT URINATION: ICD-10-CM

## 2023-03-22 DIAGNOSIS — R35.0 FREQUENT URINATION: Primary | ICD-10-CM

## 2023-03-22 DIAGNOSIS — N30.01 ACUTE CYSTITIS WITH HEMATURIA: ICD-10-CM

## 2023-03-22 DIAGNOSIS — I47.1 PAROXYSMAL SUPRAVENTRICULAR TACHYCARDIA (HCC): Primary | ICD-10-CM

## 2023-03-22 LAB
BACTERIA: ABNORMAL
BILIRUBIN URINE: NEGATIVE
COLOR: YELLOW
EPITHELIAL CELLS UA: ABNORMAL /HPF (ref 0–5)
GLUCOSE UR STRIP.AUTO-MCNC: NEGATIVE MG/DL
KETONES UR STRIP.AUTO-MCNC: NEGATIVE MG/DL
LEUKOCYTE ESTERASE UR QL STRIP.AUTO: ABNORMAL
NITRITE UR QL STRIP.AUTO: POSITIVE
OTHER OBSERVATIONS UA: ABNORMAL
PROT UR STRIP.AUTO-MCNC: 5.5 MG/DL (ref 5–6)
PROT UR STRIP.AUTO-MCNC: ABNORMAL MG/DL
RBC CLUMPS #/AREA URNS AUTO: ABNORMAL /HPF (ref 0–4)
SPECIFIC GRAVITY UA: 1.02 (ref 1.01–1.02)
TURBIDITY: ABNORMAL
URINE HGB: ABNORMAL
UROBILINOGEN, URINE: NORMAL
WBC UA: ABNORMAL /HPF (ref 0–4)

## 2023-03-22 PROCEDURE — 81001 URINALYSIS AUTO W/SCOPE: CPT

## 2023-03-22 PROCEDURE — 87186 SC STD MICRODIL/AGAR DIL: CPT

## 2023-03-22 PROCEDURE — 87086 URINE CULTURE/COLONY COUNT: CPT

## 2023-03-22 PROCEDURE — 87077 CULTURE AEROBIC IDENTIFY: CPT

## 2023-03-22 PROCEDURE — 99212 OFFICE O/P EST SF 10 MIN: CPT | Performed by: NURSE PRACTITIONER

## 2023-03-22 PROCEDURE — 99212 OFFICE O/P EST SF 10 MIN: CPT

## 2023-03-22 PROCEDURE — 93005 ELECTROCARDIOGRAM TRACING: CPT | Performed by: NURSE PRACTITIONER

## 2023-03-22 RX ORDER — FUROSEMIDE 20 MG/1
20 TABLET ORAL DAILY
Qty: 30 TABLET | Refills: 5 | Status: SHIPPED | OUTPATIENT
Start: 2023-03-22

## 2023-03-22 RX ORDER — POTASSIUM CHLORIDE 20 MEQ/1
TABLET, EXTENDED RELEASE ORAL
COMMUNITY
Start: 2023-03-16

## 2023-03-22 RX ORDER — NITROFURANTOIN 25; 75 MG/1; MG/1
100 CAPSULE ORAL 2 TIMES DAILY
Qty: 10 CAPSULE | Refills: 0 | Status: SHIPPED | OUTPATIENT
Start: 2023-03-22 | End: 2023-03-27

## 2023-03-22 ASSESSMENT — ENCOUNTER SYMPTOMS
VOMITING: 0
NAUSEA: 0
CONSTIPATION: 0
ABDOMINAL PAIN: 0
DIARRHEA: 0

## 2023-03-22 NOTE — PROGRESS NOTES
Movements: Extraocular movements intact. Pupils: Pupils are equal, round, and reactive to light. Cardiovascular:      Rate and Rhythm: Normal rate and regular rhythm. Heart sounds: No murmur heard. Pulmonary:      Effort: Pulmonary effort is normal.      Breath sounds: Normal breath sounds. Abdominal:      General: Abdomen is flat. Bowel sounds are normal.      Palpations: Abdomen is soft. Tenderness: There is abdominal tenderness in the suprapubic area. There is no right CVA tenderness, left CVA tenderness, guarding or rebound. Comments: Suprapubic pressure, no pain   Musculoskeletal:         General: No swelling. Cervical back: Neck supple. Neurological:      General: No focal deficit present. Mental Status: She is alert and oriented to person, place, and time. Psychiatric:         Mood and Affect: Mood normal.         Behavior: Behavior normal.     Hospital Outpatient Visit on 03/22/2023   Component Date Value Ref Range Status    Color, UA 03/22/2023 Yellow  Yellow Final    Turbidity UA 03/22/2023 SLIGHTLY CLOUDY (A)  Clear Final    Glucose, Ur 03/22/2023 NEGATIVE  NEGATIVE Final    Bilirubin Urine 03/22/2023 NEGATIVE  NEGATIVE Final    Ketones, Urine 03/22/2023 NEGATIVE  NEGATIVE Final    Specific Gravity, UA 03/22/2023 1.022  1.010 - 1.025 Final    Urine Hgb 03/22/2023 2+ (A)  NEGATIVE Final    pH, UA 03/22/2023 5.5  5.0 - 6.0 Final    Protein, UA 03/22/2023 2+ (A)  NEGATIVE Final    Urobilinogen, Urine 03/22/2023 Normal  Normal Final    Nitrite, Urine 03/22/2023 POSITIVE (A)  NEGATIVE Final    Leukocyte Esterase, Urine 03/22/2023 1+ (A)  NEGATIVE Final    WBC, UA 03/22/2023 50   0 - 4 /HPF Final    RBC, UA 03/22/2023 0 TO 2  0 - 4 /HPF Final    Epithelial Cells UA 03/22/2023 0 TO 2  0 - 5 /HPF Final    Bacteria, UA 03/22/2023 MANY (A)  None Final    Other Observations UA 03/22/2023 Culture ordered based on defined criteria. (A)  NOT REQ.  Final         Assessment and

## 2023-03-22 NOTE — PATIENT INSTRUCTIONS
Increase water intake  Complete full course of antibiotic  Will call you in 2-3 days with culture result  Return for continued or worsening symptoms

## 2023-03-22 NOTE — PROGRESS NOTES
Home Medications:    Prior to Admission medications    Medication Sig Start Date End Date Taking? Authorizing Provider   dilTIAZem HOLT [facility]) 240 MG extended release capsule take 1 capsule by mouth once daily 3/14/23   Sam Owens MD   pantoprazole (PROTONIX) 40 MG tablet Take 1 tablet by mouth daily 3/14/23   Sam Owens MD   ondansetron (ZOFRAN) 4 MG tablet One to two po Q 8 hours prn nausea. 3/14/23   Sam Owens MD   famotidine (PEPCID) 20 MG tablet Take 1 tablet by mouth at bedtime 3/3/23   Alexia Alfaro MD   ferrous sulfate (FEROSUL) 325 (65 Fe) MG tablet Take 1 tablet by mouth 2 times daily (with meals) 1/21/23   Deepthi Ames MD   Everolimus 5 MG TABS  11/11/22   Historical Provider, MD   amiodarone (CORDARONE) 200 MG tablet Take 1 tablet by mouth 2 times daily  Patient taking differently: Take 200 mg by mouth daily 9/27/22   Rafita العراقي DO RA VITAMIN B-12 TR 1000 MCG TBCR take 1 tablet by mouth once daily 8/30/22   Historical Provider, MD   Handicap Placard MISC by Does not apply route 3/24/21   Sam Owens MD   acetaminophen (TYLENOL) 325 MG tablet Take 2 tablets by mouth every 4 hours as needed for Pain or Fever 1/17/17   Oleksandr Durham MD     Allergies:  Venlafaxine and Effexor xr [venlafaxine hcl]    Social History:   reports that she has never smoked. She has never used smokeless tobacco. She reports that she does not drink alcohol and does not use drugs. REVIEW OF SYSTEMS:    Constitutional: there has been no unanticipated weight loss. There's been No change in energy level, No change in activity level. Eyes: No visual changes or diplopia. No scleral icterus. ENT: No Headaches, hearing loss or vertigo. No mouth sores or sore throat. Cardiovascular: No chest pain, no dyspnea, no chf like symptoms  Respiratory: No previous pulmonary problems  Gastrointestinal: No abdominal pain, appetite loss, blood in stools. No change in bowel or bladder habits.   Genitourinary: No dysuria,

## 2023-03-24 LAB
MICROORGANISM SPEC CULT: ABNORMAL
SPECIMEN DESCRIPTION: ABNORMAL

## 2023-04-03 ENCOUNTER — HOSPITAL ENCOUNTER (OUTPATIENT)
Dept: INFUSION THERAPY | Age: 73
Discharge: HOME | End: 2023-04-03

## 2023-04-03 ENCOUNTER — HOSPITAL ENCOUNTER (OUTPATIENT)
Age: 73
Discharge: HOME | End: 2023-04-03
Payer: MEDICARE

## 2023-04-03 LAB
ABSOLUTE EOS #: 0.05 K/UL (ref 0–0.44)
ABSOLUTE IMMATURE GRANULOCYTE: 0.04 K/UL (ref 0–0.3)
ABSOLUTE LYMPH #: 0.62 K/UL (ref 1.1–3.7)
ABSOLUTE MONO #: 0.58 K/UL (ref 0.1–1.2)
BASOPHILS # BLD: 0 % (ref 0–2)
BASOPHILS ABSOLUTE: <0.03 K/UL (ref 0–0.2)
EOSINOPHILS RELATIVE PERCENT: 1 % (ref 1–4)
HCT VFR BLD AUTO: 34.1 % (ref 36.3–47.1)
HGB BLD-MCNC: 9.6 G/DL (ref 11.9–15.1)
IMMATURE GRANULOCYTES: 1 %
LYMPHOCYTES # BLD: 10 % (ref 24–43)
MCH RBC QN AUTO: 21.4 PG (ref 25.2–33.5)
MCHC RBC AUTO-ENTMCNC: 28.2 G/DL (ref 25.2–33.5)
MCV RBC AUTO: 75.9 FL (ref 82.6–102.9)
MONOCYTES # BLD: 9 % (ref 3–12)
NRBC AUTOMATED: 0 PER 100 WBC
PDW BLD-RTO: 17.2 % (ref 11.8–14.4)
PLATELET # BLD AUTO: 248 K/UL (ref 138–453)
PMV BLD AUTO: 9.1 FL (ref 8.1–13.5)
RBC # BLD: 4.49 M/UL (ref 3.95–5.11)
RBC # BLD: ABNORMAL 10*6/UL
SEG NEUTROPHILS: 79 % (ref 36–65)
SEGMENTED NEUTROPHILS ABSOLUTE COUNT: 4.94 K/UL (ref 1.5–8.1)
WBC # BLD AUTO: 6.3 K/UL (ref 3.5–11.3)

## 2023-04-03 PROCEDURE — 36415 COLL VENOUS BLD VENIPUNCTURE: CPT

## 2023-04-03 PROCEDURE — 85025 COMPLETE CBC W/AUTO DIFF WBC: CPT

## 2023-04-03 NOTE — PROGRESS NOTES
Pt to infusion center to have port flushed and bloodwork drawn. Orders to use port have  and pt will call her oncologist office at [facility] to have them fax us orders for port. Pt states she will call them and will get her scheduled for port use when orders come in.

## 2023-04-17 ENCOUNTER — HOSPITAL ENCOUNTER (OUTPATIENT)
Dept: INFUSION THERAPY | Age: 73
Discharge: HOME | End: 2023-04-17
Payer: MEDICARE

## 2023-04-17 ENCOUNTER — HOSPITAL ENCOUNTER (OUTPATIENT)
Age: 73
Setting detail: SPECIMEN
Discharge: HOME | End: 2023-04-17
Payer: MEDICARE

## 2023-04-17 DIAGNOSIS — C7B.00 METASTATIC CARCINOID TUMOR (HCC): Primary | ICD-10-CM

## 2023-04-17 LAB
ABSOLUTE EOS #: 0.06 K/UL (ref 0–0.44)
ABSOLUTE IMMATURE GRANULOCYTE: 0.06 K/UL (ref 0–0.3)
ABSOLUTE LYMPH #: 0.69 K/UL (ref 1.1–3.7)
ABSOLUTE MONO #: 0.76 K/UL (ref 0.1–1.2)
BASOPHILS # BLD: 1 % (ref 0–2)
BASOPHILS ABSOLUTE: 0.06 K/UL (ref 0–0.2)
EOSINOPHILS RELATIVE PERCENT: 1 % (ref 1–4)
HCT VFR BLD AUTO: 34.7 % (ref 36.3–47.1)
HGB BLD-MCNC: 9.6 G/DL (ref 11.9–15.1)
IMMATURE GRANULOCYTES: 1 %
LYMPHOCYTES # BLD: 11 % (ref 24–43)
MCH RBC QN AUTO: 20.1 PG (ref 25.2–33.5)
MCHC RBC AUTO-ENTMCNC: 27.7 G/DL (ref 25.2–33.5)
MCV RBC AUTO: 72.6 FL (ref 82.6–102.9)
MONOCYTES # BLD: 12 % (ref 3–12)
MORPHOLOGY: ABNORMAL
MORPHOLOGY: ABNORMAL
NRBC AUTOMATED: 0.3 PER 100 WBC
PDW BLD-RTO: 17 % (ref 11.8–14.4)
PLATELET # BLD AUTO: 332 K/UL (ref 138–453)
PMV BLD AUTO: 9.6 FL (ref 8.1–13.5)
RBC # BLD: 4.78 M/UL (ref 3.95–5.11)
SEG NEUTROPHILS: 74 % (ref 36–65)
SEGMENTED NEUTROPHILS ABSOLUTE COUNT: 4.67 K/UL (ref 1.5–8.1)
WBC # BLD AUTO: 6.3 K/UL (ref 3.5–11.3)

## 2023-04-17 PROCEDURE — 2580000003 HC RX 258: Performed by: NURSE PRACTITIONER

## 2023-04-17 PROCEDURE — 6360000002 HC RX W HCPCS: Performed by: NURSE PRACTITIONER

## 2023-04-17 PROCEDURE — 85025 COMPLETE CBC W/AUTO DIFF WBC: CPT

## 2023-04-17 PROCEDURE — 36591 DRAW BLOOD OFF VENOUS DEVICE: CPT

## 2023-04-17 RX ORDER — HEPARIN SODIUM (PORCINE) LOCK FLUSH IV SOLN 100 UNIT/ML 100 UNIT/ML
500 SOLUTION INTRAVENOUS PRN
Status: DISCONTINUED | OUTPATIENT
Start: 2023-04-17 | End: 2023-04-18 | Stop reason: HOSPADM

## 2023-04-17 RX ORDER — SODIUM CHLORIDE 0.9 % (FLUSH) 0.9 %
5-40 SYRINGE (ML) INJECTION PRN
Status: DISCONTINUED | OUTPATIENT
Start: 2023-04-17 | End: 2023-04-18 | Stop reason: HOSPADM

## 2023-04-17 RX ADMIN — HEPARIN 500 UNITS: 100 SYRINGE at 13:30

## 2023-04-17 RX ADMIN — SODIUM CHLORIDE, PRESERVATIVE FREE 10 ML: 5 INJECTION INTRAVENOUS at 13:30

## 2023-04-17 RX ADMIN — SODIUM CHLORIDE, PRESERVATIVE FREE 10 ML: 5 INJECTION INTRAVENOUS at 13:29

## 2023-04-19 ENCOUNTER — OFFICE VISIT (OUTPATIENT)
Dept: SURGERY | Age: 73
End: 2023-04-19
Payer: MEDICARE

## 2023-04-19 VITALS
RESPIRATION RATE: 16 BRPM | BODY MASS INDEX: 22.47 KG/M2 | HEART RATE: 64 BPM | HEIGHT: 63 IN | DIASTOLIC BLOOD PRESSURE: 76 MMHG | TEMPERATURE: 99 F | SYSTOLIC BLOOD PRESSURE: 120 MMHG | WEIGHT: 126.8 LBS

## 2023-04-19 DIAGNOSIS — R11.0 NAUSEA: Primary | ICD-10-CM

## 2023-04-19 PROCEDURE — G8399 PT W/DXA RESULTS DOCUMENT: HCPCS | Performed by: SURGERY

## 2023-04-19 PROCEDURE — 99213 OFFICE O/P EST LOW 20 MIN: CPT | Performed by: SURGERY

## 2023-04-19 PROCEDURE — 3074F SYST BP LT 130 MM HG: CPT | Performed by: SURGERY

## 2023-04-19 PROCEDURE — G8420 CALC BMI NORM PARAMETERS: HCPCS | Performed by: SURGERY

## 2023-04-19 PROCEDURE — G8427 DOCREV CUR MEDS BY ELIG CLIN: HCPCS | Performed by: SURGERY

## 2023-04-19 PROCEDURE — 3017F COLORECTAL CA SCREEN DOC REV: CPT | Performed by: SURGERY

## 2023-04-19 PROCEDURE — 1090F PRES/ABSN URINE INCON ASSESS: CPT | Performed by: SURGERY

## 2023-04-19 PROCEDURE — 1123F ACP DISCUSS/DSCN MKR DOCD: CPT | Performed by: SURGERY

## 2023-04-19 PROCEDURE — 1036F TOBACCO NON-USER: CPT | Performed by: SURGERY

## 2023-04-19 PROCEDURE — 3078F DIAST BP <80 MM HG: CPT | Performed by: SURGERY

## 2023-04-19 RX ORDER — PROMETHAZINE HYDROCHLORIDE 12.5 MG/1
12.5 TABLET ORAL 3 TIMES DAILY PRN
Qty: 21 TABLET | Refills: 0 | Status: SHIPPED | OUTPATIENT
Start: 2023-04-19 | End: 2023-04-26

## 2023-04-19 NOTE — PATIENT INSTRUCTIONS
Phenergan has been sent to [facility] in [place]. This will help with nausea. Dr. Garfield Jackman recommends to see the Oncologist on Thursday at [place]. If Oncologist recommends to work with Dr. Garfield Jackman contact our office and we will schedule some of the tests he recommends. Contact our office at [phone].

## 2023-04-19 NOTE — PROGRESS NOTES
Patient presents today for nausea and weight loss and f/u on GERD. LOV on 3/3/2023 which Pepcid 20 mg nightly was added to the Protonix 40 mg daily. Indigestion has improved. Reports some epigastric pain intermittently. Reports nausea that has been daily and constant throughout the day, does not vomit. Has had a decreased appetite since LOV. Has lost 7 lbs in the last month. Patient had EGD on 1/19/2023 with Dr. Juarez Gates at [facility] which showed: mild esophagitis with mild schatzki ring, inflammation antrum with mild oozing of red blood with some clot, no ulcer. Patient was taken off her Eliquis while in the hospital in the end  of Jan,  but was just placed back on Eliquis this week.

## 2023-04-19 NOTE — PROGRESS NOTES
Patient presents today for nausea and weight loss and f/u on GERD. LOV on 3/3/2023 which Pepcid 20 mg nightly was added to the Protonix 40 mg daily. Indigestion has improved. Reports some epigastric pain intermittently. Reports nausea that has been daily and constant throughout the day, does not vomit. Has had a decreased appetite since LOV. Has lost 7 lbs in the last month. Patient had EGD on 1/19/2023 with Dr. Neda Bedoya at [facility] which showed: mild esophagitis with mild schatzki ring, inflammation antrum with mild oozing of red blood with some clot, no ulcer. Patient was taken off her Eliquis while in the hospital in the end  of Jan,  but was just placed back on Eliquis this week. Patient has neuroendocrine tumor and has been seen in [facility] at the [place]. She had mets to the liver and tail of the pancreas. We saw her in January of this year for hemoglobin of 4.5. We did a EGD and found some bleeding in the antrum. This was cauterized and patient was placed on H2 blocker and proton pump inhibitor. She was also taken off her Eliquis. Her hemoglobin to her baseline of 9.5. She now presents with hemoglobin back to her baseline main problem being that she gets full hypertension just a little bit. She is tremendous nausea but no emesis. /76 (Site: Left Upper Arm, Position: Sitting)   Pulse 64   Temp 99 °F (37.2 °C) (Tympanic)   Resp 16   Ht 5' 2.5\" (1.588 m)   Wt 126 lb 12.8 oz (57.5 kg)   BMI 22.82 kg/m²     Abd-  Abdomen is soft with positive bowel sounds there is no tenderness there is no distention noted today    IMP:  Appears the patient hemoglobin is stable. We will continue with PPI and H2 blocker. She will then start her Eliquis will see the changes her anemia. Her baseline is around 9.5-10 able to be Around that point at this time. She now has the big problem of getting full very quickly not been able to eat very much.   She is nauseated and very bloated and full but has had no

## 2023-04-27 ENCOUNTER — TELEPHONE (OUTPATIENT)
Dept: CARDIOLOGY | Age: 73
End: 2023-04-27

## 2023-04-27 ENCOUNTER — TELEPHONE (OUTPATIENT)
Dept: SURGERY | Age: 73
End: 2023-04-27

## 2023-04-27 DIAGNOSIS — R11.2 NAUSEA AND VOMITING, UNSPECIFIED VOMITING TYPE: ICD-10-CM

## 2023-04-27 DIAGNOSIS — R63.4 WEIGHT LOSS: ICD-10-CM

## 2023-04-27 DIAGNOSIS — R11.0 NAUSEA: Primary | ICD-10-CM

## 2023-05-01 ENCOUNTER — HOSPITAL ENCOUNTER (OUTPATIENT)
Dept: INFUSION THERAPY | Age: 73
Discharge: HOME | End: 2023-05-01
Payer: MEDICARE

## 2023-05-01 DIAGNOSIS — C7B.00 METASTATIC CARCINOID TUMOR (HCC): Primary | ICD-10-CM

## 2023-05-01 LAB
ABSOLUTE EOS #: 0.06 K/UL (ref 0–0.4)
ABSOLUTE IMMATURE GRANULOCYTE: 0 K/UL (ref 0–0.3)
ABSOLUTE LYMPH #: 0.59 K/UL (ref 1–4.8)
ABSOLUTE MONO #: 0.47 K/UL (ref 0.1–1.2)
BASOPHILS # BLD: 0 % (ref 0–1)
BASOPHILS ABSOLUTE: 0 K/UL (ref 0–0.2)
EOSINOPHILS RELATIVE PERCENT: 1 % (ref 1–7)
HCT VFR BLD AUTO: 30.4 % (ref 36.3–47.1)
HGB BLD-MCNC: 8.3 G/DL (ref 11.9–15.1)
IMMATURE GRANULOCYTES: 0 %
LYMPHOCYTES # BLD: 10 % (ref 16–46)
MCH RBC QN AUTO: 19.3 PG (ref 25.2–33.5)
MCHC RBC AUTO-ENTMCNC: 27.3 G/DL (ref 25.2–33.5)
MCV RBC AUTO: 70.5 FL (ref 82.6–102.9)
MONOCYTES # BLD: 8 % (ref 4–11)
MORPHOLOGY: ABNORMAL
NRBC AUTOMATED: 0 PER 100 WBC
PDW BLD-RTO: 17.2 % (ref 11.8–14.4)
PLATELET # BLD AUTO: 292 K/UL (ref 138–453)
PMV BLD AUTO: 9.3 FL (ref 8.1–13.5)
RBC # BLD: 4.31 M/UL (ref 3.95–5.11)
SEG NEUTROPHILS: 81 % (ref 43–77)
SEGMENTED NEUTROPHILS ABSOLUTE COUNT: 4.78 K/UL (ref 1.5–8.1)
WBC # BLD AUTO: 5.9 K/UL (ref 3.5–11.3)

## 2023-05-01 PROCEDURE — 6360000002 HC RX W HCPCS: Performed by: NURSE PRACTITIONER

## 2023-05-01 PROCEDURE — 85025 COMPLETE CBC W/AUTO DIFF WBC: CPT

## 2023-05-01 PROCEDURE — 2580000003 HC RX 258: Performed by: NURSE PRACTITIONER

## 2023-05-01 PROCEDURE — 36591 DRAW BLOOD OFF VENOUS DEVICE: CPT

## 2023-05-01 RX ORDER — SODIUM CHLORIDE 0.9 % (FLUSH) 0.9 %
5-40 SYRINGE (ML) INJECTION PRN
Status: DISCONTINUED | OUTPATIENT
Start: 2023-05-01 | End: 2023-05-02 | Stop reason: HOSPADM

## 2023-05-01 RX ORDER — HEPARIN SODIUM (PORCINE) LOCK FLUSH IV SOLN 100 UNIT/ML 100 UNIT/ML
500 SOLUTION INTRAVENOUS PRN
Status: DISCONTINUED | OUTPATIENT
Start: 2023-05-01 | End: 2023-05-02 | Stop reason: HOSPADM

## 2023-05-01 RX ADMIN — HEPARIN 500 UNITS: 100 SYRINGE at 10:39

## 2023-05-01 RX ADMIN — SODIUM CHLORIDE, PRESERVATIVE FREE 10 ML: 5 INJECTION INTRAVENOUS at 10:39

## 2023-05-02 ENCOUNTER — TELEPHONE (OUTPATIENT)
Dept: SURGERY | Age: 73
End: 2023-05-02

## 2023-05-02 NOTE — TELEPHONE ENCOUNTER
Instructions given and review with the patient. All questions answered and patient denies any further questions at this time. Patient scheduled for EGD and CS on 5/17/2023 at [facility].

## 2023-05-02 NOTE — TELEPHONE ENCOUNTER
H&P EGD   Patient:Maria E Davis                 :1950  (Yes) patient has seen Dr. Christine Leal prior to procedure  PCP: Mary David MD    CC:anemia, nausea,  weight loss        HPI:    ROS:  All 12 systems negative except the positives in the HPI. Patient has neuroendocrine tumor and has been seen in [facility] at the [place]. She had mets to the liver and tail of the pancreas. We saw her in January of this year for hemoglobin of 4.5. We did a EGD and found some bleeding in the antrum. This was cauterized and patient was placed on H2 blocker and proton pump inhibitor. She was also taken off her Eliquis. Her hemoglobin to her baseline of 9.5. LOV on 2023 presents with hemoglobin back to her baseline main problem being that she gets full hypertension just a little bit. She is tremendous nausea but no emesis. She since then has seen her Oncologist at [facility] on 2023 and checked her hgb- which dropped from 9.6 to 8.2 in 10 days. They had stopped her again off her Eliquis. Dr. Christine Leal did discuss with the patient's oncologist on the phone about Eliquis and anemia. Recommended to do EGD and SBFT. EGD  Nausea: yes, daily and has been constant throughout the day  Vomiting: no  Heartburn:yes, but has improved  Dysphagia:no  Hematemesis:no  Epigastric pain:yes, intermittently  Anemia: yes, most recent was on  hgb was 8.3  Family history of esophageal cancer: no  History of Singletary's Esophagus: no    Previous work up date:EGD on 2023 with Dr. Christine Leal at [facility] which showed: mild esophagitis with mild schatzki ring, inflammation antrum with mild oozing of red blood with some clot, no ulcer.   Current Treatment:Protonix 40 mg daily and Pepcid 20 mg at night    Colonoscopy  Abd pain: yes, RUQ pain   Anemia: yes, most recent was on  hgb was 8.3  Bloating:no  Diarrhea: no  Constipation: no  Melena: no, but does report stools are darker as she is on iron  Hematochezia:no  Rectal Bleeding:no  Rectal/Anal

## 2023-05-02 NOTE — TELEPHONE ENCOUNTER
Patient called back. Scheduled SBFT on 5/16/2023 at [facility] at 8 AM. NPO after midnight. EGD scheduled on 5/17/2023 at [facility]. Will mail preop instructions to patient.

## 2023-05-03 NOTE — TELEPHONE ENCOUNTER
LM on patient's voicemail to discuss repeating CBC and adding CS to EGD on 5/17/2023. Clinic number provided on voicemail.

## 2023-05-03 NOTE — TELEPHONE ENCOUNTER
Discussed case with chelsea:    Pt with nausea and weight loss ? Etiology. - thought maybe due to tumor load or blockage, onc at [place] did not. - could be due to GI issues, last EGD in jan 2023, mild irritation antrum, not impressive. CS in jan 2023 black tarry stool in colon and only able to get to 60 cm.   -  zofran and phenergan not helping    Pt with anemia:  ? Eitiology. - thought to be GI, but scopes in jan as stated above inconclusive. - Hb appears to fluctuate with eliquis. Goes down on it and comes up off it.   - did not get good look in colon, so could still be a lesion in colon. Also there was some mild      Bleeding in antrum on our EGD in jan, so I guess when on eliquis the mild irritation in antrum    Could cause the bleeding enough to cause the anemia. So would do both repeat EGD/CS. PLAN:  I also, discussed the issue of need for eliquis with Dr Martir Kirkland, for pts history of DVT and a. Fib. The Onc at [place] also, discussed with Dr Aspen Sun last week. Both seemed to favor staying off the eliquis until the GI work up is done. Then if no significant source for large GI bleed, then ok maybe to try one more time on eliquis , especially now that pt had and extra PUD/GERD med added to her regimen. She was on prilosec and now pepcid has been added. We will see how that works with the eliquis if the GI work up is negative. If the EGD and CS are inconclusive or normal  I would do CE to be complete.

## 2023-05-04 NOTE — TELEPHONE ENCOUNTER
Received call from patient. Discussed with her Dr. Pena Bolds is wanting to add a CS with the EGD that is scheduled on 5/17/2023. Patient agreed to adding CS. Also discussed repeating CBC again next week. Order has been placed. CS called over to [facility] to be added with EGD. Patient reports she is getting labs redrawn on Monday by oncologist in the infusion room. I explained I will wait to see what these lab show and if needed will have them draw her CBC.

## 2023-05-04 NOTE — TELEPHONE ENCOUNTER
LM on patient's voicemail requesting a call back to discuss Dr. Víctor Flores would like to add a CS to the EGD that is scheduled on 5/17/2023 as the last CS in 1/2023 he could not get all the way through due to black tarry stool. I also explained I mailed her bowel prep instructions in the mail yesterday for this Colonoscopy. Explained Dr. Víctor Flores is wanting to repeat CBC next week and we will fax the order to [facility] if she wishes and she does not need to fast. Clinic number provided on voicemail for patient to call back so our office knows she received our message about adding the CS and CBC.

## 2023-05-08 ENCOUNTER — HOSPITAL ENCOUNTER (OUTPATIENT)
Dept: INFUSION THERAPY | Age: 73
Discharge: HOME | End: 2023-05-08
Payer: MEDICARE

## 2023-05-08 DIAGNOSIS — C7B.00 METASTATIC CARCINOID TUMOR (HCC): Primary | ICD-10-CM

## 2023-05-08 LAB
ABSOLUTE EOS #: 0.08 K/UL (ref 0–0.44)
ABSOLUTE IMMATURE GRANULOCYTE: 0.04 K/UL (ref 0–0.3)
ABSOLUTE LYMPH #: 0.62 K/UL (ref 1.1–3.7)
ABSOLUTE MONO #: 0.96 K/UL (ref 0.1–1.2)
BASOPHILS # BLD: 1 % (ref 0–2)
BASOPHILS ABSOLUTE: 0.03 K/UL (ref 0–0.2)
EOSINOPHILS RELATIVE PERCENT: 1 % (ref 1–4)
HCT VFR BLD AUTO: 29.8 % (ref 36.3–47.1)
HGB BLD-MCNC: 8.4 G/DL (ref 11.9–15.1)
IMMATURE GRANULOCYTES: 1 %
LYMPHOCYTES # BLD: 10 % (ref 24–43)
MCH RBC QN AUTO: 19.4 PG (ref 25.2–33.5)
MCHC RBC AUTO-ENTMCNC: 28.2 G/DL (ref 25.2–33.5)
MCV RBC AUTO: 69 FL (ref 82.6–102.9)
MONOCYTES # BLD: 15 % (ref 3–12)
NRBC AUTOMATED: 0 PER 100 WBC
PDW BLD-RTO: 17.2 % (ref 11.8–14.4)
PLATELET # BLD AUTO: 297 K/UL (ref 138–453)
PMV BLD AUTO: 9.2 FL (ref 8.1–13.5)
RBC # BLD: 4.32 M/UL (ref 3.95–5.11)
RBC # BLD: ABNORMAL 10*6/UL
SEG NEUTROPHILS: 72 % (ref 36–65)
SEGMENTED NEUTROPHILS ABSOLUTE COUNT: 4.56 K/UL (ref 1.5–8.1)
WBC # BLD AUTO: 6.3 K/UL (ref 3.5–11.3)

## 2023-05-08 PROCEDURE — 6360000002 HC RX W HCPCS: Performed by: INTERNAL MEDICINE

## 2023-05-08 PROCEDURE — 85025 COMPLETE CBC W/AUTO DIFF WBC: CPT

## 2023-05-08 PROCEDURE — 2580000003 HC RX 258: Performed by: INTERNAL MEDICINE

## 2023-05-08 PROCEDURE — 36591 DRAW BLOOD OFF VENOUS DEVICE: CPT

## 2023-05-08 RX ORDER — HEPARIN SODIUM (PORCINE) LOCK FLUSH IV SOLN 100 UNIT/ML 100 UNIT/ML
500 SOLUTION INTRAVENOUS PRN
Status: DISCONTINUED | OUTPATIENT
Start: 2023-05-08 | End: 2023-05-09 | Stop reason: HOSPADM

## 2023-05-08 RX ORDER — SODIUM CHLORIDE 0.9 % (FLUSH) 0.9 %
5-40 SYRINGE (ML) INJECTION PRN
Status: DISCONTINUED | OUTPATIENT
Start: 2023-05-08 | End: 2023-05-09 | Stop reason: HOSPADM

## 2023-05-08 RX ADMIN — HEPARIN 500 UNITS: 100 SYRINGE at 11:40

## 2023-05-08 RX ADMIN — SODIUM CHLORIDE, PRESERVATIVE FREE 10 ML: 5 INJECTION INTRAVENOUS at 11:38

## 2023-05-08 RX ADMIN — SODIUM CHLORIDE, PRESERVATIVE FREE 10 ML: 5 INJECTION INTRAVENOUS at 11:40

## 2023-05-15 ENCOUNTER — HOSPITAL ENCOUNTER (OUTPATIENT)
Dept: INFUSION THERAPY | Age: 73
Discharge: HOME | End: 2023-05-15
Payer: MEDICARE

## 2023-05-15 DIAGNOSIS — C7B.00 METASTATIC CARCINOID TUMOR (HCC): Primary | ICD-10-CM

## 2023-05-15 LAB
ABSOLUTE EOS #: 0.11 K/UL (ref 0–0.4)
ABSOLUTE IMMATURE GRANULOCYTE: 0.06 K/UL (ref 0–0.3)
ABSOLUTE LYMPH #: 0.46 K/UL (ref 1–4.8)
ABSOLUTE MONO #: 0.63 K/UL (ref 0.1–1.2)
BASOPHILS # BLD: 0 % (ref 0–1)
BASOPHILS # BLD: 0 K/UL (ref 0–0.2)
EOSINOPHILS RELATIVE PERCENT: 2 % (ref 1–7)
HCT VFR BLD AUTO: 28.2 % (ref 36.3–47.1)
HGB BLD-MCNC: 7.8 G/DL (ref 11.9–15.1)
IMMATURE GRANULOCYTES: 1 %
LYMPHOCYTES # BLD: 8 % (ref 16–46)
MCH RBC QN AUTO: 18.8 PG (ref 25.2–33.5)
MCHC RBC AUTO-ENTMCNC: 27.7 G/DL (ref 25.2–33.5)
MCV RBC AUTO: 68.1 FL (ref 82.6–102.9)
MONOCYTES # BLD: 11 % (ref 4–11)
MORPHOLOGY: ABNORMAL
NRBC AUTOMATED: 0 PER 100 WBC
PDW BLD-RTO: 17.3 % (ref 11.8–14.4)
PLATELET # BLD AUTO: 293 K/UL (ref 138–453)
PMV BLD AUTO: 9.2 FL (ref 8.1–13.5)
RBC # BLD: 4.14 M/UL (ref 3.95–5.11)
SEG NEUTROPHILS: 78 % (ref 43–77)
SEGMENTED NEUTROPHILS ABSOLUTE COUNT: 4.44 K/UL (ref 1.5–8.1)
WBC # BLD AUTO: 5.7 K/UL (ref 3.5–11.3)

## 2023-05-15 PROCEDURE — 6360000002 HC RX W HCPCS: Performed by: NURSE PRACTITIONER

## 2023-05-15 PROCEDURE — 36591 DRAW BLOOD OFF VENOUS DEVICE: CPT

## 2023-05-15 PROCEDURE — 85025 COMPLETE CBC W/AUTO DIFF WBC: CPT

## 2023-05-15 PROCEDURE — 2580000003 HC RX 258: Performed by: NURSE PRACTITIONER

## 2023-05-15 RX ORDER — HEPARIN SODIUM (PORCINE) LOCK FLUSH IV SOLN 100 UNIT/ML 100 UNIT/ML
500 SOLUTION INTRAVENOUS PRN
Status: DISCONTINUED | OUTPATIENT
Start: 2023-05-15 | End: 2023-05-16 | Stop reason: HOSPADM

## 2023-05-15 RX ORDER — SODIUM CHLORIDE 0.9 % (FLUSH) 0.9 %
5-40 SYRINGE (ML) INJECTION PRN
Status: DISCONTINUED | OUTPATIENT
Start: 2023-05-15 | End: 2023-05-16 | Stop reason: HOSPADM

## 2023-05-15 RX ADMIN — HEPARIN 500 UNITS: 100 SYRINGE at 10:36

## 2023-05-15 RX ADMIN — SODIUM CHLORIDE, PRESERVATIVE FREE 10 ML: 5 INJECTION INTRAVENOUS at 10:36

## 2023-05-22 ENCOUNTER — HOSPITAL ENCOUNTER (OUTPATIENT)
Dept: INFUSION THERAPY | Age: 73
Discharge: HOME | End: 2023-05-22
Payer: MEDICARE

## 2023-05-22 DIAGNOSIS — C7B.00 METASTATIC CARCINOID TUMOR (HCC): Primary | ICD-10-CM

## 2023-05-22 LAB
BASOPHILS # BLD: 0.07 K/UL (ref 0–0.2)
BASOPHILS NFR BLD: 1 % (ref 0–2)
EOSINOPHIL # BLD: 0.07 K/UL (ref 0–0.44)
EOSINOPHILS RELATIVE PERCENT: 1 % (ref 1–4)
ERYTHROCYTE [DISTWIDTH] IN BLOOD BY AUTOMATED COUNT: 17.3 % (ref 11.8–14.4)
HCT VFR BLD AUTO: 27.6 % (ref 36.3–47.1)
HGB BLD-MCNC: 7.6 G/DL (ref 11.9–15.1)
IMM GRANULOCYTES # BLD AUTO: 0.07 K/UL (ref 0–0.3)
IMM GRANULOCYTES NFR BLD: 1 %
LYMPHOCYTES # BLD: 9 % (ref 24–43)
LYMPHOCYTES NFR BLD: 0.59 K/UL (ref 1.1–3.7)
MCH RBC QN AUTO: 18.3 PG (ref 25.2–33.5)
MCHC RBC AUTO-ENTMCNC: 27.5 G/DL (ref 25.2–33.5)
MCV RBC AUTO: 66.5 FL (ref 82.6–102.9)
MONOCYTES NFR BLD: 0.78 K/UL (ref 0.1–1.2)
MONOCYTES NFR BLD: 12 % (ref 3–12)
MORPHOLOGY: ABNORMAL
NEUTROPHILS NFR BLD: 76 % (ref 36–65)
NEUTS SEG NFR BLD: 4.92 K/UL (ref 1.5–8.1)
NRBC AUTOMATED: 0 PER 100 WBC
PLATELET # BLD AUTO: 289 K/UL (ref 138–453)
PMV BLD AUTO: 8.7 FL (ref 8.1–13.5)
RBC # BLD AUTO: 4.15 M/UL (ref 3.95–5.11)
WBC OTHER # BLD: 6.5 K/UL (ref 3.5–11.3)

## 2023-05-22 PROCEDURE — 36591 DRAW BLOOD OFF VENOUS DEVICE: CPT

## 2023-05-22 PROCEDURE — 85025 COMPLETE CBC W/AUTO DIFF WBC: CPT

## 2023-05-22 PROCEDURE — 6360000002 HC RX W HCPCS: Performed by: INTERNAL MEDICINE

## 2023-05-22 PROCEDURE — 2580000003 HC RX 258: Performed by: INTERNAL MEDICINE

## 2023-05-22 RX ORDER — HEPARIN SODIUM (PORCINE) LOCK FLUSH IV SOLN 100 UNIT/ML 100 UNIT/ML
500 SOLUTION INTRAVENOUS PRN
Status: DISCONTINUED | OUTPATIENT
Start: 2023-05-22 | End: 2023-05-23 | Stop reason: HOSPADM

## 2023-05-22 RX ORDER — HEPARIN SODIUM (PORCINE) LOCK FLUSH IV SOLN 100 UNIT/ML 100 UNIT/ML
500 SOLUTION INTRAVENOUS PRN
OUTPATIENT
Start: 2023-05-22

## 2023-05-22 RX ORDER — SODIUM CHLORIDE 0.9 % (FLUSH) 0.9 %
5-40 SYRINGE (ML) INJECTION PRN
OUTPATIENT
Start: 2023-05-22

## 2023-05-22 RX ORDER — SODIUM CHLORIDE 0.9 % (FLUSH) 0.9 %
5-40 SYRINGE (ML) INJECTION PRN
Status: DISCONTINUED | OUTPATIENT
Start: 2023-05-22 | End: 2023-05-23 | Stop reason: HOSPADM

## 2023-05-22 RX ADMIN — HEPARIN 500 UNITS: 100 SYRINGE at 15:15

## 2023-05-22 RX ADMIN — SODIUM CHLORIDE, PRESERVATIVE FREE 10 ML: 5 INJECTION INTRAVENOUS at 15:15

## 2023-05-31 ENCOUNTER — TELEPHONE (OUTPATIENT)
Dept: SURGERY | Age: 73
End: 2023-05-31

## 2023-05-31 NOTE — TELEPHONE ENCOUNTER
Letter created and mailed to patient with results from recent CS and EGD at [facility] with Dr. Elana Bloch on 5/17/2023. Updated history. Forwarded results to PCP.

## 2023-06-02 ENCOUNTER — OFFICE VISIT (OUTPATIENT)
Dept: CARDIOLOGY | Age: 73
End: 2023-06-02
Payer: MEDICARE

## 2023-06-02 VITALS
SYSTOLIC BLOOD PRESSURE: 118 MMHG | RESPIRATION RATE: 18 BRPM | OXYGEN SATURATION: 99 % | HEIGHT: 63 IN | WEIGHT: 115 LBS | BODY MASS INDEX: 20.38 KG/M2 | DIASTOLIC BLOOD PRESSURE: 59 MMHG | HEART RATE: 64 BPM

## 2023-06-02 DIAGNOSIS — I10 HYPERTENSION, ESSENTIAL: Primary | ICD-10-CM

## 2023-06-02 DIAGNOSIS — I48.21 PERMANENT ATRIAL FIBRILLATION (HCC): ICD-10-CM

## 2023-06-02 DIAGNOSIS — I82.4Y9 DEEP VEIN THROMBOSIS (DVT) OF PROXIMAL LOWER EXTREMITY, UNSPECIFIED CHRONICITY, UNSPECIFIED LATERALITY (HCC): ICD-10-CM

## 2023-06-02 PROCEDURE — 99215 OFFICE O/P EST HI 40 MIN: CPT | Performed by: INTERNAL MEDICINE

## 2023-06-02 PROCEDURE — 93005 ELECTROCARDIOGRAM TRACING: CPT | Performed by: INTERNAL MEDICINE

## 2023-06-02 RX ORDER — OLANZAPINE 5 MG/1
5 TABLET ORAL NIGHTLY
COMMUNITY
Start: 2023-06-01

## 2023-06-02 RX ORDER — HEPARIN SODIUM (PORCINE) LOCK FLUSH IV SOLN 100 UNIT/ML 100 UNIT/ML
5 SOLUTION INTRAVENOUS
COMMUNITY
Start: 2022-12-05

## 2023-06-02 RX ORDER — DEXAMETHASONE 0.5 MG/5ML
ELIXIR ORAL
COMMUNITY
Start: 2022-01-24 | End: 2023-06-02 | Stop reason: ALTCHOICE

## 2023-06-02 RX ORDER — BETAMETHASONE DIPROPIONATE 0.5 MG/ML
1 LOTION, AUGMENTED TOPICAL
COMMUNITY
Start: 2019-12-26

## 2023-06-02 NOTE — PROGRESS NOTES
Date:   6/2/2023  Patient name:  Aleksandra Choudhary  MRN:   [id number]  YOB: 1950  PCP:    Royce Duran MD    Reason for visit: had concerns including Shortness of Breath and Dizziness. Subjective:       Clinical Changes / Abnormalities:Denies exertional chest pain or SOB, no dizziness or syncope. Had frequent falls before. ROS:   General: No dizziness or syncope and no fever or chills, no fatigue. Chest: No cough or phlegm, no SOB  Heart: No chest pain, no SOB, no palpitations. GI: No abdominal pain, no nausea or vomiting. No diarrhea or constipation. Lower extremity: No claudication, no myalgia      Medications:     Current Outpatient Medications   Medication Sig Dispense Refill    OLANZapine (ZYPREXA) 5 MG tablet Take 1 tablet by mouth nightly      heparin flush 100 UNIT/ML injection 5 mLs      betamethasone, augmented, (DIPROLENE) 0.05 % lotion Apply 1 application topically      furosemide (LASIX) 20 MG tablet Take 1 tablet by mouth daily 30 tablet 5    dilTIAZem (TIAZAC) 240 MG extended release capsule take 1 capsule by mouth once daily 30 capsule 5    pantoprazole (PROTONIX) 40 MG tablet Take 1 tablet by mouth daily 30 tablet 5    ondansetron (ZOFRAN) 4 MG tablet One to two po Q 8 hours prn nausea. 30 tablet 1    famotidine (PEPCID) 20 MG tablet Take 1 tablet by mouth at bedtime 60 tablet 3    ferrous sulfate (FEROSUL) 325 (65 Fe) MG tablet Take 1 tablet by mouth 2 times daily (with meals) (Patient taking differently: Take 1 tablet by mouth every 48 hours) 60 tablet 0    Everolimus 5 MG TABS       RA VITAMIN B-12 TR 1000 MCG TBCR take 1 tablet by mouth once daily      Handicap Placard MISC by Does not apply route 1 each 0    acetaminophen (TYLENOL) 325 MG tablet Take 2 tablets by mouth every 4 hours as needed for Pain or Fever 120 tablet 0     No current facility-administered medications for this visit.           Objective:   Vitals: BP (!) 121/56   Pulse 64   Resp

## 2023-06-12 ENCOUNTER — HOSPITAL ENCOUNTER (OUTPATIENT)
Dept: INFUSION THERAPY | Age: 73
Discharge: HOME | End: 2023-06-12
Payer: MEDICARE

## 2023-06-12 DIAGNOSIS — C7B.00 METASTATIC CARCINOID TUMOR (HCC): Primary | ICD-10-CM

## 2023-06-12 LAB
BASOPHILS # BLD: 0 K/UL (ref 0–0.2)
BASOPHILS NFR BLD: 0 % (ref 0–2)
EOSINOPHIL # BLD: 0.13 K/UL (ref 0–0.44)
EOSINOPHILS RELATIVE PERCENT: 1 % (ref 1–4)
ERYTHROCYTE [DISTWIDTH] IN BLOOD BY AUTOMATED COUNT: 21.8 % (ref 11.8–14.4)
HCT VFR BLD AUTO: 31.6 % (ref 36.3–47.1)
HGB BLD-MCNC: 8.6 G/DL (ref 11.9–15.1)
IMM GRANULOCYTES # BLD AUTO: 0.27 K/UL (ref 0–0.3)
IMM GRANULOCYTES NFR BLD: 2 %
LYMPHOCYTES # BLD: 6 % (ref 24–43)
LYMPHOCYTES NFR BLD: 0.8 K/UL (ref 1.1–3.7)
MCH RBC QN AUTO: 17.9 PG (ref 25.2–33.5)
MCHC RBC AUTO-ENTMCNC: 27.2 G/DL (ref 25.2–33.5)
MCV RBC AUTO: 65.8 FL (ref 82.6–102.9)
MONOCYTES NFR BLD: 1.21 K/UL (ref 0.1–1.2)
MONOCYTES NFR BLD: 9 % (ref 3–12)
MORPHOLOGY: ABNORMAL
NEUTROPHILS NFR BLD: 82 % (ref 36–65)
NEUTS SEG NFR BLD: 10.99 K/UL (ref 1.5–8.1)
NRBC AUTOMATED: 0.2 PER 100 WBC
PLATELET # BLD AUTO: 323 K/UL (ref 138–453)
PMV BLD AUTO: 9.2 FL (ref 8.1–13.5)
RBC # BLD AUTO: 4.8 M/UL (ref 3.95–5.11)
WBC OTHER # BLD: 13.4 K/UL (ref 3.5–11.3)

## 2023-06-12 PROCEDURE — 36593 DECLOT VASCULAR DEVICE: CPT

## 2023-06-12 PROCEDURE — 6360000002 HC RX W HCPCS: Performed by: NURSE PRACTITIONER

## 2023-06-12 PROCEDURE — 6360000002 HC RX W HCPCS: Performed by: INTERNAL MEDICINE

## 2023-06-12 PROCEDURE — 2580000003 HC RX 258: Performed by: INTERNAL MEDICINE

## 2023-06-12 PROCEDURE — 36415 COLL VENOUS BLD VENIPUNCTURE: CPT

## 2023-06-12 PROCEDURE — 85027 COMPLETE CBC AUTOMATED: CPT

## 2023-06-12 RX ORDER — HEPARIN SODIUM 100 [USP'U]/ML
500 INJECTION, SOLUTION INTRAVENOUS PRN
OUTPATIENT
Start: 2023-06-12

## 2023-06-12 RX ORDER — HEPARIN SODIUM 100 [USP'U]/ML
500 INJECTION, SOLUTION INTRAVENOUS PRN
Status: DISCONTINUED | OUTPATIENT
Start: 2023-06-12 | End: 2023-06-13 | Stop reason: HOSPADM

## 2023-06-12 RX ORDER — SODIUM CHLORIDE 0.9 % (FLUSH) 0.9 %
5-40 SYRINGE (ML) INJECTION PRN
Status: DISCONTINUED | OUTPATIENT
Start: 2023-06-12 | End: 2023-06-13 | Stop reason: HOSPADM

## 2023-06-12 RX ORDER — SODIUM CHLORIDE 0.9 % (FLUSH) 0.9 %
5-40 SYRINGE (ML) INJECTION PRN
OUTPATIENT
Start: 2023-06-12

## 2023-06-12 RX ADMIN — ALTEPLASE 2 MG: 2.2 INJECTION, POWDER, LYOPHILIZED, FOR SOLUTION INTRAVENOUS at 11:10

## 2023-06-12 RX ADMIN — SODIUM CHLORIDE, PRESERVATIVE FREE 10 ML: 5 INJECTION INTRAVENOUS at 15:34

## 2023-06-12 RX ADMIN — ALTEPLASE 2 MG: 2.2 INJECTION, POWDER, LYOPHILIZED, FOR SOLUTION INTRAVENOUS at 12:24

## 2023-06-12 RX ADMIN — HEPARIN 500 UNITS: 100 SYRINGE at 15:34

## 2023-06-12 NOTE — TELEPHONE ENCOUNTER
From: Margaret Meredith  To: Dr. Garay Scale: 2/7/2023 3:10 PM EST  Subject: Last letter    Am I still contagious?
12-Jun-2023 19:04

## 2023-06-12 NOTE — PROGRESS NOTES
Pt here for lab draw per port. Unable to get blood return on port. Gave 2 rounds of cathflo and continued to have no blood return. Phone call made to [facility] Dr. Bella Back office regarding how they would like us to proceed with port. Page sent out to on call Physician per Nurse Damon Parra at Dr. Bella Back office and gave them our phone number for return call. 1 hour and 45 min later another call was made to Dr. Bella Back office as we have not heard back from them.  spoke with Nurse Damon Parra and they have not gotten a response back and that she will follow up with it and call me back. Five min later she responded and instructed us to deaccess her port and they will call her directly for follow up, they are deciding if she needs to see interventional radiology to assess port. Pt discharged to home with steady gait. Pt instructed that [facility] will be calling her regarding follow up with port.

## 2023-07-21 ENCOUNTER — HOSPITAL ENCOUNTER (EMERGENCY)
Age: 73
Discharge: ANOTHER HOSPITAL | End: 2023-07-22
Attending: EMERGENCY MEDICINE
Payer: MEDICARE

## 2023-07-21 ENCOUNTER — APPOINTMENT (OUTPATIENT)
Dept: CT IMAGING | Age: 73
End: 2023-07-21
Payer: MEDICARE

## 2023-07-21 DIAGNOSIS — K75.0 LIVER ABSCESS: ICD-10-CM

## 2023-07-21 DIAGNOSIS — C7A.00 MALIGNANT CARCINOID TUMOR, UNSPECIFIED SITE (HCC): ICD-10-CM

## 2023-07-21 DIAGNOSIS — A41.9 SEPTICEMIA (HCC): Primary | ICD-10-CM

## 2023-07-21 LAB
ALBUMIN SERPL-MCNC: 3.4 G/DL (ref 3.5–5.2)
ALBUMIN/GLOB SERPL: 0.9 {RATIO} (ref 1–2.5)
ALP SERPL-CCNC: 321 U/L (ref 35–104)
ALT SERPL-CCNC: 175 U/L (ref 5–33)
AMORPH SED URNS QL MICRO: ABNORMAL
ANION GAP SERPL CALCULATED.3IONS-SCNC: 15 MMOL/L (ref 9–17)
AST SERPL-CCNC: 201 U/L
BACTERIA URNS QL MICRO: ABNORMAL
BASOPHILS # BLD: 0 K/UL (ref 0–0.2)
BASOPHILS NFR BLD: 0 % (ref 0–1)
BILIRUB SERPL-MCNC: 1 MG/DL (ref 0.3–1.2)
BILIRUB UR QL STRIP: NEGATIVE
BUN SERPL-MCNC: 21 MG/DL (ref 8–23)
BUN/CREAT SERPL: 26 (ref 9–20)
CALCIUM SERPL-MCNC: 9 MG/DL (ref 8.6–10.4)
CASTS #/AREA URNS LPF: ABNORMAL /LPF (ref 0–2)
CHARACTER UR: ABNORMAL
CHLORIDE SERPL-SCNC: 98 MMOL/L (ref 98–107)
CLARITY UR: ABNORMAL
CO2 SERPL-SCNC: 22 MMOL/L (ref 20–31)
COLOR UR: YELLOW
CREAT SERPL-MCNC: 0.8 MG/DL (ref 0.5–0.9)
D DIMER PPP FEU-MCNC: 2.96 UG/ML FEU (ref 0–0.59)
EKG ATRIAL RATE: 29 BPM
EKG P AXIS: 32 DEGREES
EKG Q-T INTERVAL: 372 MS
EKG QRS DURATION: 84 MS
EKG QTC CALCULATION (BAZETT): 377 MS
EKG R AXIS: -38 DEGREES
EKG T AXIS: 142 DEGREES
EKG VENTRICULAR RATE: 62 BPM
EOSINOPHIL # BLD: 0 K/UL (ref 0–0.4)
EOSINOPHILS RELATIVE PERCENT: 0 % (ref 1–7)
EPI CELLS #/AREA URNS HPF: ABNORMAL /HPF (ref 0–5)
ERYTHROCYTE [DISTWIDTH] IN BLOOD BY AUTOMATED COUNT: 27.5 % (ref 11.8–14.4)
GFR SERPL CREATININE-BSD FRML MDRD: >60 ML/MIN/1.73M2
GLUCOSE SERPL-MCNC: 136 MG/DL (ref 70–99)
GLUCOSE UR STRIP-MCNC: NEGATIVE MG/DL
HCT VFR BLD AUTO: 41.4 % (ref 36.3–47.1)
HGB BLD-MCNC: 11.6 G/DL (ref 11.9–15.1)
HGB UR QL STRIP.AUTO: ABNORMAL
IMM GRANULOCYTES # BLD AUTO: 0 K/UL (ref 0–0.3)
IMM GRANULOCYTES NFR BLD: 0 %
KETONES UR STRIP-MCNC: NEGATIVE MG/DL
LACTATE BLDV-SCNC: 1.7 MMOL/L (ref 0.5–2.2)
LACTATE BLDV-SCNC: 3.1 MMOL/L (ref 0.5–2.2)
LEUKOCYTE ESTERASE UR QL STRIP: NEGATIVE
LYMPHOCYTES NFR BLD: 0.88 K/UL (ref 1–4.8)
LYMPHOCYTES RELATIVE PERCENT: 3 % (ref 16–46)
MAGNESIUM SERPL-MCNC: 2.2 MG/DL (ref 1.6–2.6)
MCH RBC QN AUTO: 21 PG (ref 25.2–33.5)
MCHC RBC AUTO-ENTMCNC: 28 G/DL (ref 25.2–33.5)
MCV RBC AUTO: 74.9 FL (ref 82.6–102.9)
MONOCYTES NFR BLD: 2.64 K/UL (ref 0.1–1.2)
MONOCYTES NFR BLD: 9 % (ref 4–11)
MORPHOLOGY: ABNORMAL
MORPHOLOGY: ABNORMAL
NEUTROPHILS NFR BLD: 88 % (ref 43–77)
NEUTS SEG NFR BLD: 25.78 K/UL (ref 1.5–8.1)
NITRITE UR QL STRIP: NEGATIVE
NRBC BLD-RTO: 0 PER 100 WBC
PH UR STRIP: 5.5 [PH] (ref 5–6)
PLATELET # BLD AUTO: 264 K/UL (ref 138–453)
PMV BLD AUTO: 9.2 FL (ref 8.1–13.5)
POTASSIUM SERPL-SCNC: 3.6 MMOL/L (ref 3.7–5.3)
PROT SERPL-MCNC: 7.3 G/DL (ref 6.4–8.3)
PROT UR STRIP-MCNC: ABNORMAL MG/DL
RBC # BLD AUTO: 5.53 M/UL (ref 3.95–5.11)
RBC #/AREA URNS HPF: ABNORMAL /HPF (ref 0–4)
SARS-COV-2 RDRP RESP QL NAA+PROBE: NOT DETECTED
SODIUM SERPL-SCNC: 135 MMOL/L (ref 135–144)
SP GR UR STRIP: 1.02 (ref 1.01–1.02)
SPECIMEN DESCRIPTION: NORMAL
TROPONIN I SERPL HS-MCNC: 25 NG/L (ref 0–14)
UROBILINOGEN UR STRIP-ACNC: NORMAL EU/DL (ref 0–1)
WBC #/AREA URNS HPF: ABNORMAL /HPF (ref 0–4)
WBC OTHER # BLD: 29.3 K/UL (ref 3.5–11.3)

## 2023-07-21 PROCEDURE — 96365 THER/PROPH/DIAG IV INF INIT: CPT

## 2023-07-21 PROCEDURE — 85379 FIBRIN DEGRADATION QUANT: CPT

## 2023-07-21 PROCEDURE — 96366 THER/PROPH/DIAG IV INF ADDON: CPT

## 2023-07-21 PROCEDURE — 93005 ELECTROCARDIOGRAM TRACING: CPT | Performed by: EMERGENCY MEDICINE

## 2023-07-21 PROCEDURE — 2709999900 CT CHEST PULMONARY EMBOLISM W CONTRAST

## 2023-07-21 PROCEDURE — 83605 ASSAY OF LACTIC ACID: CPT

## 2023-07-21 PROCEDURE — 81001 URINALYSIS AUTO W/SCOPE: CPT

## 2023-07-21 PROCEDURE — 6370000000 HC RX 637 (ALT 250 FOR IP): Performed by: EMERGENCY MEDICINE

## 2023-07-21 PROCEDURE — 6360000002 HC RX W HCPCS: Performed by: EMERGENCY MEDICINE

## 2023-07-21 PROCEDURE — 83735 ASSAY OF MAGNESIUM: CPT

## 2023-07-21 PROCEDURE — 2580000003 HC RX 258: Performed by: EMERGENCY MEDICINE

## 2023-07-21 PROCEDURE — 6360000004 HC RX CONTRAST MEDICATION: Performed by: EMERGENCY MEDICINE

## 2023-07-21 PROCEDURE — 96367 TX/PROPH/DG ADDL SEQ IV INF: CPT

## 2023-07-21 PROCEDURE — 87635 SARS-COV-2 COVID-19 AMP PRB: CPT

## 2023-07-21 PROCEDURE — 85027 COMPLETE CBC AUTOMATED: CPT

## 2023-07-21 PROCEDURE — 80053 COMPREHEN METABOLIC PANEL: CPT

## 2023-07-21 PROCEDURE — 99285 EMERGENCY DEPT VISIT HI MDM: CPT

## 2023-07-21 PROCEDURE — 36415 COLL VENOUS BLD VENIPUNCTURE: CPT

## 2023-07-21 PROCEDURE — 84484 ASSAY OF TROPONIN QUANT: CPT

## 2023-07-21 PROCEDURE — 87040 BLOOD CULTURE FOR BACTERIA: CPT

## 2023-07-21 RX ORDER — OXYCODONE HYDROCHLORIDE 5 MG/1
TABLET ORAL
COMMUNITY
Start: 2023-07-19

## 2023-07-21 RX ORDER — ACETAMINOPHEN 500 MG
1000 TABLET ORAL ONCE
Status: COMPLETED | OUTPATIENT
Start: 2023-07-21 | End: 2023-07-21

## 2023-07-21 RX ORDER — PREDNISONE 10 MG/1
TABLET ORAL
COMMUNITY
Start: 2023-06-01

## 2023-07-21 RX ORDER — 0.9 % SODIUM CHLORIDE 0.9 %
1000 INTRAVENOUS SOLUTION INTRAVENOUS ONCE
Status: COMPLETED | OUTPATIENT
Start: 2023-07-21 | End: 2023-07-21

## 2023-07-21 RX ORDER — AMIODARONE HYDROCHLORIDE 200 MG/1
200 TABLET ORAL 2 TIMES DAILY
COMMUNITY
Start: 2022-09-27

## 2023-07-21 RX ORDER — PROMETHAZINE HYDROCHLORIDE 25 MG/1
TABLET ORAL
COMMUNITY
Start: 2023-07-19

## 2023-07-21 RX ORDER — ALLOPURINOL 300 MG/1
TABLET ORAL
COMMUNITY
Start: 2023-06-29

## 2023-07-21 RX ADMIN — ACETAMINOPHEN 1000 MG: 500 TABLET ORAL at 12:42

## 2023-07-21 RX ADMIN — CEFEPIME 2000 MG: 2 INJECTION, POWDER, FOR SOLUTION INTRAVENOUS at 14:10

## 2023-07-21 RX ADMIN — PIPERACILLIN AND TAZOBACTAM 3375 MG: 3; .375 INJECTION, POWDER, LYOPHILIZED, FOR SOLUTION INTRAVENOUS at 23:08

## 2023-07-21 RX ADMIN — SODIUM CHLORIDE 1000 ML: 9 INJECTION, SOLUTION INTRAVENOUS at 13:01

## 2023-07-21 RX ADMIN — SODIUM CHLORIDE 1000 ML: 9 INJECTION, SOLUTION INTRAVENOUS at 14:30

## 2023-07-21 RX ADMIN — PIPERACILLIN AND TAZOBACTAM 3375 MG: 3; .375 INJECTION, POWDER, LYOPHILIZED, FOR SOLUTION INTRAVENOUS at 15:11

## 2023-07-21 RX ADMIN — IOPAMIDOL 80 ML: 755 INJECTION, SOLUTION INTRAVENOUS at 13:54

## 2023-07-21 ASSESSMENT — PAIN SCALES - GENERAL
PAINLEVEL_OUTOF10: 2
PAINLEVEL_OUTOF10: 4

## 2023-07-21 ASSESSMENT — ENCOUNTER SYMPTOMS
SHORTNESS OF BREATH: 0
DIARRHEA: 0
VOMITING: 0
SORE THROAT: 0

## 2023-07-21 ASSESSMENT — PAIN DESCRIPTION - ONSET: ONSET: ON-GOING

## 2023-07-21 ASSESSMENT — PAIN DESCRIPTION - FREQUENCY: FREQUENCY: CONTINUOUS

## 2023-07-21 ASSESSMENT — PAIN DESCRIPTION - DESCRIPTORS: DESCRIPTORS: ACHING

## 2023-07-21 ASSESSMENT — PAIN DESCRIPTION - PAIN TYPE: TYPE: ACUTE PAIN

## 2023-07-21 ASSESSMENT — PAIN DESCRIPTION - LOCATION: LOCATION: ABDOMEN;BACK

## 2023-07-21 NOTE — ED PROVIDER NOTES
[facility] ED  [address] [facility] [address]  Phone: [facility] ED  EMERGENCY DEPARTMENT ENCOUNTER      Pt Name: Day Myrick  MRN: [id number]  [address] [facility] 1950  Date of evaluation: 7/21/2023  Provider: Palak Segovia       Chief Complaint   Patient presents with    Fatigue    Back Pain    Fall         HISTORY OF PRESENT ILLNESS   (Location/Symptom, Timing/Onset,Context/Setting, Quality, Duration, Modifying Factors, Severity)  Note limiting factors. Day Myrick is a 72 y.o. female who presents to the emergency department []     Nursing Notes were reviewed. REVIEW OF SYSTEMS    (2-9systems for level 4, 10 or more for level 5)     Review of Systems   Constitutional:  Positive for appetite change and fatigue. Negative for fever. HENT:  Negative for sore throat. Respiratory:  Negative for shortness of breath. Cardiovascular:  Negative for chest pain. Gastrointestinal:  Negative for diarrhea and vomiting. Genitourinary:  Negative for dysuria. Skin:  Negative for rash. Neurological:  Positive for weakness. All other systems reviewed and are negative. Except asnoted above the remainder of the review of systems was reviewed and negative. 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 (70% better after Celestone injection on 3-). Josie Tatum PA-C.     Liver cancer ([address])     Lung cancer ([address])     Myopia (!) 85 % 5' 2.5\" (1.588 m) 129 lb 12.8 oz (58.9 kg)       Physical Exam  Vitals and nursing note reviewed. Constitutional:       General: She is not in acute distress. Appearance: Normal appearance. She is not ill-appearing or toxic-appearing. HENT:      Head: Normocephalic and atraumatic. Nose: Nose normal. No congestion. Mouth/Throat:      Mouth: Mucous membranes are moist.   Eyes:      General:         Right eye: No discharge. Left eye: No discharge. Conjunctiva/sclera: Conjunctivae normal.   Cardiovascular:      Rate and Rhythm: Normal rate and regular rhythm. Pulses: Normal pulses. Heart sounds: Normal heart sounds. No murmur heard. Pulmonary:      Effort: Pulmonary effort is normal. No respiratory distress. Breath sounds: Normal breath sounds. No wheezing. Abdominal:      General: Abdomen is flat. There is no distension. Palpations: Abdomen is soft. There is no pulsatile mass. Tenderness: There is no abdominal tenderness. There is no guarding or rebound. Comments: No pulsatile mass   Musculoskeletal:         General: No deformity or signs of injury. Normal range of motion. Cervical back: Normal range of motion. Skin:     General: Skin is warm and dry. Capillary Refill: Capillary refill takes less than 2 seconds. Findings: No rash. Neurological:      General: No focal deficit present. Mental Status: She is alert and oriented to person, place, and time. Mental status is at baseline. Sensory: No sensory deficit. Motor: No weakness. Comments: Speaking normally. No facial asymmetry. Moving all 4 extremities. Normal gait.     Psychiatric:         Mood and Affect: Mood normal.       EMERGENCY DEPARTMENT COURSE and DIFFERENTIAL DIAGNOSIS/MDM:   Vitals:    Vitals:    07/21/23 1600 07/21/23 1645 07/21/23 1715 07/21/23 1800   BP: (!) 100/51   (!) 109/46   Pulse: 55 51 54 (!) 49   Resp: 20 22 20 22   Temp:       TempSrc:

## 2023-07-22 VITALS
DIASTOLIC BLOOD PRESSURE: 56 MMHG | TEMPERATURE: 98.6 F | SYSTOLIC BLOOD PRESSURE: 115 MMHG | BODY MASS INDEX: 23 KG/M2 | OXYGEN SATURATION: 97 % | WEIGHT: 129.8 LBS | RESPIRATION RATE: 23 BRPM | HEIGHT: 63 IN | HEART RATE: 80 BPM

## 2023-07-22 PROCEDURE — 2580000003 HC RX 258: Performed by: EMERGENCY MEDICINE

## 2023-07-22 PROCEDURE — 96366 THER/PROPH/DIAG IV INF ADDON: CPT

## 2023-07-22 PROCEDURE — 6360000002 HC RX W HCPCS: Performed by: EMERGENCY MEDICINE

## 2023-07-22 RX ADMIN — PIPERACILLIN AND TAZOBACTAM 3375 MG: 3; .375 INJECTION, POWDER, LYOPHILIZED, FOR SOLUTION INTRAVENOUS at 05:05

## 2023-07-22 NOTE — ED NOTES
Call from [facility] no longer able to transport patient due to contract runs.       Deon Goldberg RN  07/22/23 9614

## 2023-07-22 NOTE — ED PROVIDER NOTES
Case signed out to me by Dr. Stanley Marin. Patient is awaiting transport to [facility] for concerns of developing liver abscess after TAE procedure on Wednesday. Patient had no complaints overnight. Specifically for me she denies chest pain or difficulty breathing. Zosyn ordered every 6 hours.   Transportation is scheduled for 9 AM.     Neelima Zazueta MD  07/22/23 8856

## 2023-07-22 NOTE — ED NOTES
Call to [facility] about transport. States they are able to transport patient BLS.   Dylan Fabian, RN  07/22/23 0982

## 2023-07-23 LAB
MICROORGANISM SPEC CULT: NORMAL
MICROORGANISM SPEC CULT: NORMAL
SERVICE CMNT-IMP: NORMAL
SERVICE CMNT-IMP: NORMAL
SPECIMEN DESCRIPTION: NORMAL
SPECIMEN DESCRIPTION: NORMAL

## 2023-07-24 LAB
EKG ATRIAL RATE: 29 BPM
EKG P AXIS: 32 DEGREES
EKG Q-T INTERVAL: 372 MS
EKG QRS DURATION: 84 MS
EKG QTC CALCULATION (BAZETT): 377 MS
EKG R AXIS: -38 DEGREES
EKG T AXIS: 142 DEGREES
EKG VENTRICULAR RATE: 62 BPM

## 2023-08-08 RX ORDER — FAMOTIDINE 20 MG/1
20 TABLET, FILM COATED ORAL NIGHTLY
Qty: 90 TABLET | Refills: 3 | Status: SHIPPED | OUTPATIENT
Start: 2023-08-08

## 2023-08-14 ENCOUNTER — OFFICE VISIT (OUTPATIENT)
Dept: CARDIOLOGY | Age: 73
End: 2023-08-14
Payer: MEDICARE

## 2023-08-14 VITALS
HEIGHT: 61 IN | BODY MASS INDEX: 22.84 KG/M2 | WEIGHT: 121 LBS | SYSTOLIC BLOOD PRESSURE: 137 MMHG | HEART RATE: 68 BPM | DIASTOLIC BLOOD PRESSURE: 66 MMHG

## 2023-08-14 DIAGNOSIS — I48.21 PERMANENT ATRIAL FIBRILLATION (HCC): ICD-10-CM

## 2023-08-14 DIAGNOSIS — I07.1 TRICUSPID VALVE INSUFFICIENCY, UNSPECIFIED ETIOLOGY: ICD-10-CM

## 2023-08-14 DIAGNOSIS — I35.1 AORTIC VALVE INSUFFICIENCY, ETIOLOGY OF CARDIAC VALVE DISEASE UNSPECIFIED: ICD-10-CM

## 2023-08-14 DIAGNOSIS — R94.31 ABNORMAL ECG: ICD-10-CM

## 2023-08-14 DIAGNOSIS — R29.6 FREQUENT FALLS: ICD-10-CM

## 2023-08-14 DIAGNOSIS — Z79.01 ANTICOAGULATED: ICD-10-CM

## 2023-08-14 DIAGNOSIS — I10 HYPERTENSION, ESSENTIAL: Primary | ICD-10-CM

## 2023-08-14 PROCEDURE — 1123F ACP DISCUSS/DSCN MKR DOCD: CPT | Performed by: INTERNAL MEDICINE

## 2023-08-14 PROCEDURE — 93005 ELECTROCARDIOGRAM TRACING: CPT | Performed by: INTERNAL MEDICINE

## 2023-08-14 PROCEDURE — G8399 PT W/DXA RESULTS DOCUMENT: HCPCS | Performed by: INTERNAL MEDICINE

## 2023-08-14 PROCEDURE — 3078F DIAST BP <80 MM HG: CPT | Performed by: INTERNAL MEDICINE

## 2023-08-14 PROCEDURE — 99214 OFFICE O/P EST MOD 30 MIN: CPT | Performed by: INTERNAL MEDICINE

## 2023-08-14 PROCEDURE — 1090F PRES/ABSN URINE INCON ASSESS: CPT | Performed by: INTERNAL MEDICINE

## 2023-08-14 PROCEDURE — G8420 CALC BMI NORM PARAMETERS: HCPCS | Performed by: INTERNAL MEDICINE

## 2023-08-14 PROCEDURE — 3075F SYST BP GE 130 - 139MM HG: CPT | Performed by: INTERNAL MEDICINE

## 2023-08-14 PROCEDURE — 93010 ELECTROCARDIOGRAM REPORT: CPT | Performed by: INTERNAL MEDICINE

## 2023-08-14 PROCEDURE — 3017F COLORECTAL CA SCREEN DOC REV: CPT | Performed by: INTERNAL MEDICINE

## 2023-08-14 PROCEDURE — G8427 DOCREV CUR MEDS BY ELIG CLIN: HCPCS | Performed by: INTERNAL MEDICINE

## 2023-08-14 PROCEDURE — 1036F TOBACCO NON-USER: CPT | Performed by: INTERNAL MEDICINE

## 2023-08-14 NOTE — PROGRESS NOTES
Smoking Status  Never      Smokeless Tobacco Use  Never              Alcohol History       Alcohol Use Status  No              Drug Use       Drug Use Status  No              Sexual Activity       Sexually Active  Not Asked                   ROS:   REVIEW OF SYSTEMS:    Constitutional: there has been no unanticipated weight loss. There's been No change in energy level, No change in activity level. Eyes: No visual changes or diplopia. No scleral icterus. ENT: No Headaches, hearing loss or vertigo. No mouth sores or sore throat. Cardiovascular: AS HPI  Respiratory: AS HPI  Gastrointestinal: No abdominal pain, appetite loss, blood in stools. No change in bowel or bladder habits. Genitourinary: No dysuria, trouble voiding, or hematuria. Musculoskeletal:  No gait disturbance, No weakness or joint complaints. Integumentary: No rash or pruritis. Neurological: No headache, diplopia, change in muscle strength, numbness or tingling. No change in gait, balance, coordination, mood, affect, memory, mentation, behavior. Psychiatric: No new anxiety or depression. Endocrine: No temperature intolerance. No excessive thirst, fluid intake, or urination. No tremor. Hematologic/Lymphatic: No abnormal bruising or bleeding, blood clots or swollen lymph nodes. Allergic/Immunologic: No nasal congestion or hives. Medications:     Current Outpatient Medications   Medication Sig Dispense Refill    famotidine (PEPCID) 20 MG tablet take 1 tablet by mouth at bedtime 90 tablet 3    promethazine (PHENERGAN) 25 MG tablet       predniSONE (DELTASONE) 10 MG tablet       oxyCODONE (ROXICODONE) 5 MG immediate release tablet       amiodarone (CORDARONE) 200 MG tablet Take 1 tablet by mouth 2 times daily      allopurinol (ZYLOPRIM) 300 MG tablet take 1 tablet by mouth for 5 days before [facility]. ..  (REFER TO PRESCRIPTION NOTES).       OLANZapine (ZYPREXA) 5 MG tablet Take 1 tablet by mouth nightly      heparin flush 100

## 2023-08-17 ENCOUNTER — TELEPHONE (OUTPATIENT)
Dept: FAMILY MEDICINE CLINIC | Age: 73
End: 2023-08-17

## 2023-08-17 NOTE — TELEPHONE ENCOUNTER
Maryann Colunga PT calling stating pt reported to him today that she fell walking out of her bathroom with her cane, pt felt weak and unsteady, fell on her R side but reports no injury, any questions call Maryann Colunga at [phone]

## 2023-08-17 NOTE — TELEPHONE ENCOUNTER
Please clarify symptoms with patient. If she is feeling weak and dizzy, I recommend evaluation at the emergency department.

## 2023-08-24 ENCOUNTER — TELEPHONE (OUTPATIENT)
Dept: FAMILY MEDICINE CLINIC | Age: 73
End: 2023-08-24

## 2023-08-24 NOTE — TELEPHONE ENCOUNTER
Fran Hogue from [facility] is calling wanting to know if PCP will follow patient with home care. Please call her back at [phone] to confirm.

## 2023-09-02 DIAGNOSIS — I10 ESSENTIAL HYPERTENSION: ICD-10-CM

## 2023-09-05 RX ORDER — DILTIAZEM HYDROCHLORIDE 240 MG/1
CAPSULE, EXTENDED RELEASE ORAL
Qty: 90 CAPSULE | OUTPATIENT
Start: 2023-09-05

## 2023-09-20 ENCOUNTER — TELEPHONE (OUTPATIENT)
Dept: FAMILY MEDICINE CLINIC | Age: 73
End: 2023-09-20
Payer: MEDICARE

## 2023-09-20 DIAGNOSIS — C78.89 SECONDARY MALIGNANT NEOPLASM OF OTHER DIGESTIVE ORGANS (HCC): ICD-10-CM

## 2023-09-20 DIAGNOSIS — C34.91 MALIGNANT NEOPLASM OF RIGHT LUNG, UNSPECIFIED PART OF LUNG (HCC): Primary | ICD-10-CM

## 2023-09-20 DIAGNOSIS — C78.7 SECONDARY MALIGNANT NEOPLASM OF LIVER (HCC): ICD-10-CM

## 2023-09-20 PROCEDURE — G0180 MD CERTIFICATION HHA PATIENT: HCPCS | Performed by: FAMILY MEDICINE

## 2023-09-20 NOTE — TELEPHONE ENCOUNTER
Home health certification and plan of care done 09/20/203 on patient for date services 07/21/2023-09/28/2023. Verified medications. Physician time spent is 15 minutes for activities to coordinate services, documenting, medical decision making, and review of reports, treatment plans, and test results.

## 2023-09-20 NOTE — TELEPHONE ENCOUNTER
I agree with the documentation of Jaja Allen LPN. Electronically signed by Jayshree Tariq MD on 9/20/23 at 12:15 PM EDT.

## 2023-09-21 ENCOUNTER — HOSPITAL ENCOUNTER (OUTPATIENT)
Age: 73
Setting detail: SPECIMEN
Discharge: HOME | End: 2023-09-21
Payer: MEDICARE

## 2023-09-21 ENCOUNTER — OFFICE VISIT (OUTPATIENT)
Dept: FAMILY MEDICINE CLINIC | Age: 73
End: 2023-09-21
Payer: MEDICARE

## 2023-09-21 VITALS
BODY MASS INDEX: 22.15 KG/M2 | HEIGHT: 63 IN | SYSTOLIC BLOOD PRESSURE: 114 MMHG | DIASTOLIC BLOOD PRESSURE: 72 MMHG | HEART RATE: 61 BPM | WEIGHT: 125 LBS | TEMPERATURE: 97.6 F | OXYGEN SATURATION: 98 %

## 2023-09-21 DIAGNOSIS — R60.0 LOWER EXTREMITY EDEMA: ICD-10-CM

## 2023-09-21 DIAGNOSIS — R82.71 BACTERIA IN URINE: Primary | ICD-10-CM

## 2023-09-21 DIAGNOSIS — D50.9 IRON DEFICIENCY ANEMIA, UNSPECIFIED IRON DEFICIENCY ANEMIA TYPE: ICD-10-CM

## 2023-09-21 DIAGNOSIS — R82.71 BACTERIA IN URINE: ICD-10-CM

## 2023-09-21 DIAGNOSIS — C78.7 MALIGNANT NEOPLASM METASTATIC TO LIVER (HCC): ICD-10-CM

## 2023-09-21 DIAGNOSIS — R10.30 LOWER ABDOMINAL PAIN: Primary | ICD-10-CM

## 2023-09-21 DIAGNOSIS — Z87.19 HISTORY OF UPPER GASTROINTESTINAL BLEEDING: ICD-10-CM

## 2023-09-21 LAB
BACTERIA URNS QL MICRO: ABNORMAL
BILIRUB UR QL STRIP: NEGATIVE
CHARACTER UR: ABNORMAL
CLARITY UR: CLEAR
COLOR UR: YELLOW
EPI CELLS #/AREA URNS HPF: ABNORMAL /HPF (ref 0–5)
GLUCOSE UR STRIP-MCNC: NEGATIVE MG/DL
HGB UR QL STRIP.AUTO: ABNORMAL
KETONES UR STRIP-MCNC: NEGATIVE MG/DL
LEUKOCYTE ESTERASE UR QL STRIP: NEGATIVE
NITRITE UR QL STRIP: NEGATIVE
PH UR STRIP: 6 [PH] (ref 5–6)
PROT UR STRIP-MCNC: ABNORMAL MG/DL
RBC #/AREA URNS HPF: ABNORMAL /HPF (ref 0–4)
SP GR UR STRIP: 1.03 (ref 1.01–1.02)
UROBILINOGEN UR STRIP-ACNC: NORMAL EU/DL (ref 0–1)
WBC #/AREA URNS HPF: ABNORMAL /HPF (ref 0–4)

## 2023-09-21 PROCEDURE — 3017F COLORECTAL CA SCREEN DOC REV: CPT | Performed by: FAMILY MEDICINE

## 2023-09-21 PROCEDURE — 3078F DIAST BP <80 MM HG: CPT | Performed by: FAMILY MEDICINE

## 2023-09-21 PROCEDURE — 1036F TOBACCO NON-USER: CPT | Performed by: FAMILY MEDICINE

## 2023-09-21 PROCEDURE — 87086 URINE CULTURE/COLONY COUNT: CPT

## 2023-09-21 PROCEDURE — G8427 DOCREV CUR MEDS BY ELIG CLIN: HCPCS | Performed by: FAMILY MEDICINE

## 2023-09-21 PROCEDURE — 1123F ACP DISCUSS/DSCN MKR DOCD: CPT | Performed by: FAMILY MEDICINE

## 2023-09-21 PROCEDURE — 1090F PRES/ABSN URINE INCON ASSESS: CPT | Performed by: FAMILY MEDICINE

## 2023-09-21 PROCEDURE — G8420 CALC BMI NORM PARAMETERS: HCPCS | Performed by: FAMILY MEDICINE

## 2023-09-21 PROCEDURE — 99214 OFFICE O/P EST MOD 30 MIN: CPT | Performed by: FAMILY MEDICINE

## 2023-09-21 PROCEDURE — G8399 PT W/DXA RESULTS DOCUMENT: HCPCS | Performed by: FAMILY MEDICINE

## 2023-09-21 PROCEDURE — 3074F SYST BP LT 130 MM HG: CPT | Performed by: FAMILY MEDICINE

## 2023-09-21 PROCEDURE — 81001 URINALYSIS AUTO W/SCOPE: CPT

## 2023-09-21 RX ORDER — FUROSEMIDE 20 MG/1
20 TABLET ORAL PRN
Qty: 30 TABLET | Refills: 0 | Status: SHIPPED | OUTPATIENT
Start: 2023-09-21

## 2023-09-21 RX ORDER — PANTOPRAZOLE SODIUM 40 MG/1
40 TABLET, DELAYED RELEASE ORAL DAILY
Qty: 30 TABLET | Refills: 3 | Status: SHIPPED | OUTPATIENT
Start: 2023-09-21

## 2023-09-21 RX ORDER — POTASSIUM CHLORIDE 20 MEQ/1
TABLET, EXTENDED RELEASE ORAL
COMMUNITY
Start: 2023-08-27

## 2023-09-21 RX ORDER — FERROUS SULFATE 325(65) MG
325 TABLET ORAL
Qty: 15 TABLET | Refills: 1 | Status: SHIPPED | OUTPATIENT
Start: 2023-09-21

## 2023-09-21 RX ORDER — FAMOTIDINE 20 MG/1
20 TABLET, FILM COATED ORAL NIGHTLY
Qty: 30 TABLET | Refills: 3 | Status: SHIPPED | OUTPATIENT
Start: 2023-09-21

## 2023-09-21 RX ORDER — SCOLOPAMINE TRANSDERMAL SYSTEM 1 MG/1
1 PATCH, EXTENDED RELEASE TRANSDERMAL
COMMUNITY
Start: 2023-07-19

## 2023-09-21 NOTE — PROGRESS NOTES
[facility] ([facility])             [address] [facility], [address] [facility] [facility]                        Telephone [phone]             Fax [phone]       Jim Cho  :  1950  Age:  72 y.o. MRN:  [id number]  Date of visit:  2023       Assessment and Plan:    1. Lower abdominal pain  2. Malignant neoplasm metastatic to liver [facility])  Labs and imaging were ordered:  - CT ABDOMEN PELVIS W IV CONTRAST Additional Contrast? Radiologist Recommendation; Future  - CBC with Auto Differential; Future  - Comprehensive Metabolic Panel; Future  - Urinalysis with Reflex to Culture; Future    She will be contacted when the results are available. 3. Iron deficiency anemia, unspecified iron deficiency anemia type  Ferrous sulfate was refilled:  - ferrous sulfate (FEROSUL) 325 (65 Fe) MG tablet; Take 1 tablet by mouth every 48 hours  Dispense: 15 tablet; Refill: 1    4. History of upper gastrointestinal bleeding  Protonix and Pepcid were refilled:  - pantoprazole (PROTONIX) 40 MG tablet; Take 1 tablet by mouth daily  Dispense: 30 tablet; Refill: 3  - famotidine (PEPCID) 20 MG tablet; Take 1 tablet by mouth at bedtime  Dispense: 30 tablet; Refill: 3    5. Lower extremity edema  Lasix was refilled:  - furosemide (LASIX) 20 MG tablet; Take 1 tablet by mouth as needed (edema)  Dispense: 30 tablet; Refill: 0         Subjective:    Jim Cho is a 72 y.o. female who presents to [facility] ([facility]) today (2023) for follow up/evaluation of:  Hyperlipidemia (6 month follow up), Hypertension (6 month follow up), and Abdominal Pain (Started a few days ago. Feels full after eating small meals. No change in stools)      She is here today for 6 month follow up of hyperlipidemia and hypertension. She reports discomfort in the lower abdomen for the past few days. She reports nausea. She denies vomiting. She reports a decreased appetite.    She states that

## 2023-09-22 ENCOUNTER — HOSPITAL ENCOUNTER (OUTPATIENT)
Dept: INFUSION THERAPY | Age: 73
Discharge: HOME | End: 2023-09-22
Payer: MEDICARE

## 2023-09-22 DIAGNOSIS — C7B.00 METASTATIC CARCINOID TUMOR (HCC): ICD-10-CM

## 2023-09-22 DIAGNOSIS — R10.30 LOWER ABDOMINAL PAIN: Primary | ICD-10-CM

## 2023-09-22 LAB
ALBUMIN SERPL-MCNC: 3.3 G/DL (ref 3.5–5.2)
ALBUMIN/GLOB SERPL: 1 {RATIO} (ref 1–2.5)
ALP SERPL-CCNC: 283 U/L (ref 35–104)
ALT SERPL-CCNC: 29 U/L (ref 5–33)
ANION GAP SERPL CALCULATED.3IONS-SCNC: 10 MMOL/L (ref 9–17)
AST SERPL-CCNC: 54 U/L
BASOPHILS # BLD: 0.03 K/UL (ref 0–0.2)
BASOPHILS NFR BLD: 0 % (ref 0–2)
BILIRUB SERPL-MCNC: 0.4 MG/DL (ref 0.3–1.2)
BUN SERPL-MCNC: 13 MG/DL (ref 8–23)
BUN/CREAT SERPL: 22 (ref 9–20)
CALCIUM SERPL-MCNC: 8.8 MG/DL (ref 8.6–10.4)
CHLORIDE SERPL-SCNC: 105 MMOL/L (ref 98–107)
CO2 SERPL-SCNC: 26 MMOL/L (ref 20–31)
CREAT SERPL-MCNC: 0.6 MG/DL (ref 0.5–0.9)
EOSINOPHIL # BLD: 0.03 K/UL (ref 0–0.44)
EOSINOPHILS RELATIVE PERCENT: 0 % (ref 1–4)
ERYTHROCYTE [DISTWIDTH] IN BLOOD BY AUTOMATED COUNT: 17.8 % (ref 11.8–14.4)
GFR SERPL CREATININE-BSD FRML MDRD: >60 ML/MIN/1.73M2
GLUCOSE SERPL-MCNC: 106 MG/DL (ref 70–99)
HCT VFR BLD AUTO: 33.7 % (ref 36.3–47.1)
HGB BLD-MCNC: 10 G/DL (ref 11.9–15.1)
IMM GRANULOCYTES # BLD AUTO: 0.04 K/UL (ref 0–0.3)
IMM GRANULOCYTES NFR BLD: 1 %
LYMPHOCYTES NFR BLD: 1.13 K/UL (ref 1.1–3.7)
LYMPHOCYTES RELATIVE PERCENT: 15 % (ref 24–43)
MCH RBC QN AUTO: 20.8 PG (ref 25.2–33.5)
MCHC RBC AUTO-ENTMCNC: 29.7 G/DL (ref 25.2–33.5)
MCV RBC AUTO: 70.2 FL (ref 82.6–102.9)
MONOCYTES NFR BLD: 0.93 K/UL (ref 0.1–1.2)
MONOCYTES NFR BLD: 12 % (ref 3–12)
NEUTROPHILS NFR BLD: 72 % (ref 36–65)
NEUTS SEG NFR BLD: 5.62 K/UL (ref 1.5–8.1)
NRBC BLD-RTO: 0 PER 100 WBC
PLATELET # BLD AUTO: 306 K/UL (ref 138–453)
PMV BLD AUTO: 9.1 FL (ref 8.1–13.5)
POTASSIUM SERPL-SCNC: 3.8 MMOL/L (ref 3.7–5.3)
PROT SERPL-MCNC: 6.6 G/DL (ref 6.4–8.3)
RBC # BLD AUTO: 4.8 M/UL (ref 3.95–5.11)
RBC # BLD: ABNORMAL 10*6/UL
SODIUM SERPL-SCNC: 141 MMOL/L (ref 135–144)
WBC OTHER # BLD: 7.8 K/UL (ref 3.5–11.3)

## 2023-09-22 PROCEDURE — 36591 DRAW BLOOD OFF VENOUS DEVICE: CPT

## 2023-09-22 PROCEDURE — 2580000003 HC RX 258: Performed by: INTERNAL MEDICINE

## 2023-09-22 PROCEDURE — 80053 COMPREHEN METABOLIC PANEL: CPT

## 2023-09-22 PROCEDURE — 85025 COMPLETE CBC W/AUTO DIFF WBC: CPT

## 2023-09-22 PROCEDURE — 6360000002 HC RX W HCPCS: Performed by: INTERNAL MEDICINE

## 2023-09-22 RX ORDER — SODIUM CHLORIDE 0.9 % (FLUSH) 0.9 %
5-40 SYRINGE (ML) INJECTION PRN
Status: DISCONTINUED | OUTPATIENT
Start: 2023-09-22 | End: 2023-09-23 | Stop reason: HOSPADM

## 2023-09-22 RX ORDER — SODIUM CHLORIDE 0.9 % (FLUSH) 0.9 %
5-40 SYRINGE (ML) INJECTION PRN
OUTPATIENT
Start: 2023-09-22

## 2023-09-22 RX ORDER — HEPARIN 100 UNIT/ML
500 SYRINGE INTRAVENOUS PRN
OUTPATIENT
Start: 2023-09-22

## 2023-09-22 RX ORDER — HEPARIN 100 UNIT/ML
500 SYRINGE INTRAVENOUS PRN
Status: DISCONTINUED | OUTPATIENT
Start: 2023-09-22 | End: 2023-09-23 | Stop reason: HOSPADM

## 2023-09-22 RX ADMIN — HEPARIN 500 UNITS: 100 SYRINGE at 09:15

## 2023-09-22 RX ADMIN — SODIUM CHLORIDE, PRESERVATIVE FREE 10 ML: 5 INJECTION INTRAVENOUS at 09:15

## 2023-09-23 LAB
MICROORGANISM SPEC CULT: NORMAL
SPECIMEN DESCRIPTION: NORMAL

## 2023-09-24 NOTE — RESULT ENCOUNTER NOTE
I called the patient and discussed the results with her. She plans to call tomorrow to schedule the CT that was ordered. She states that she is still having some abdominal pain, but it is no worse than when she was seen on 9/21/2023.

## 2023-09-27 ENCOUNTER — HOSPITAL ENCOUNTER (OUTPATIENT)
Dept: CT IMAGING | Age: 73
Discharge: HOME | End: 2023-09-29
Attending: FAMILY MEDICINE
Payer: MEDICARE

## 2023-09-27 DIAGNOSIS — R10.30 LOWER ABDOMINAL PAIN: ICD-10-CM

## 2023-09-27 PROCEDURE — 74177 CT ABD & PELVIS W/CONTRAST: CPT

## 2023-09-27 PROCEDURE — 6360000004 HC RX CONTRAST MEDICATION: Performed by: FAMILY MEDICINE

## 2023-09-27 RX ADMIN — IOPAMIDOL 100 ML: 755 INJECTION, SOLUTION INTRAVENOUS at 08:27

## 2023-10-02 ENCOUNTER — PATIENT MESSAGE (OUTPATIENT)
Dept: FAMILY MEDICINE CLINIC | Age: 73
End: 2023-10-02

## 2023-10-02 DIAGNOSIS — R10.84 GENERALIZED ABDOMINAL PAIN: Primary | ICD-10-CM

## 2023-10-03 ENCOUNTER — PATIENT MESSAGE (OUTPATIENT)
Dept: FAMILY MEDICINE CLINIC | Age: 73
End: 2023-10-03

## 2023-10-03 DIAGNOSIS — R11.0 NAUSEA: ICD-10-CM

## 2023-10-03 RX ORDER — ONDANSETRON 4 MG/1
TABLET, FILM COATED ORAL
Qty: 30 TABLET | Refills: 1 | Status: SHIPPED | OUTPATIENT
Start: 2023-10-03

## 2023-10-03 RX ORDER — HYDROCODONE BITARTRATE AND ACETAMINOPHEN 5; 325 MG/1; MG/1
1 TABLET ORAL EVERY 4 HOURS PRN
Qty: 30 TABLET | Refills: 0 | Status: SHIPPED | OUTPATIENT
Start: 2023-10-03 | End: 2023-10-08

## 2023-10-03 NOTE — TELEPHONE ENCOUNTER
Kacie Myers called requesting a refill of the below medication which has been pended for you:     Requested Prescriptions     Pending Prescriptions Disp Refills    ondansetron (ZOFRAN) 4 MG tablet 30 tablet 1     Sig: One to two po Q 8 hours prn nausea.        Last Appointment Date: 9/21/2023  Next Appointment Date: Visit date not found    Allergies   Allergen Reactions    Venlafaxine     Effexor Xr [Venlafaxine Hcl]      Made blood pressure go high

## 2023-10-03 NOTE — TELEPHONE ENCOUNTER
From: Walt Godfrey  To: Dr. Chaudhary Bending: 10/3/2023 3:55 PM EDT  Subject: Med. Can I get some nausea pills.  Thank you

## 2023-10-03 NOTE — TELEPHONE ENCOUNTER
I called the patient and discussed the report with her. She states that her symptoms are about the same. I sent a prescription for Norco to [facility] on [address]. She has an appointment scheduled with her oncologist in approximately two weeks. I advised her to follow up sooner if her symptoms worsen.

## 2023-10-05 ENCOUNTER — HOSPITAL ENCOUNTER (EMERGENCY)
Age: 73
Discharge: HOME | End: 2023-10-05
Attending: EMERGENCY MEDICINE
Payer: MEDICARE

## 2023-10-05 ENCOUNTER — APPOINTMENT (OUTPATIENT)
Dept: CT IMAGING | Age: 73
End: 2023-10-05
Payer: MEDICARE

## 2023-10-05 VITALS
RESPIRATION RATE: 16 BRPM | TEMPERATURE: 96.9 F | OXYGEN SATURATION: 91 % | HEART RATE: 71 BPM | SYSTOLIC BLOOD PRESSURE: 110 MMHG | BODY MASS INDEX: 21.97 KG/M2 | WEIGHT: 124 LBS | HEIGHT: 63 IN | DIASTOLIC BLOOD PRESSURE: 64 MMHG

## 2023-10-05 DIAGNOSIS — R10.9 ABDOMINAL PAIN, UNSPECIFIED ABDOMINAL LOCATION: Primary | ICD-10-CM

## 2023-10-05 LAB
ALBUMIN SERPL-MCNC: 2.9 G/DL (ref 3.5–5.2)
ALBUMIN/GLOB SERPL: 0.9 {RATIO} (ref 1–2.5)
ALP SERPL-CCNC: 262 U/L (ref 35–104)
ALT SERPL-CCNC: 21 U/L (ref 5–33)
AMYLASE SERPL-CCNC: 113 U/L (ref 28–100)
ANION GAP SERPL CALCULATED.3IONS-SCNC: 14 MMOL/L (ref 9–17)
AST SERPL-CCNC: 49 U/L
BACTERIA URNS QL MICRO: ABNORMAL
BASOPHILS # BLD: 0 K/UL (ref 0–0.2)
BASOPHILS NFR BLD: 0 % (ref 0–1)
BILIRUB DIRECT SERPL-MCNC: 0.6 MG/DL
BILIRUB INDIRECT SERPL-MCNC: 0.4 MG/DL (ref 0–1)
BILIRUB SERPL-MCNC: 1 MG/DL (ref 0.3–1.2)
BILIRUB UR QL STRIP: ABNORMAL
BUN SERPL-MCNC: 15 MG/DL (ref 8–23)
BUN/CREAT SERPL: 19 (ref 9–20)
CALCIUM SERPL-MCNC: 8.9 MG/DL (ref 8.6–10.4)
CHARACTER UR: ABNORMAL
CHLORIDE SERPL-SCNC: 100 MMOL/L (ref 98–107)
CLARITY UR: CLEAR
CO2 SERPL-SCNC: 23 MMOL/L (ref 20–31)
COLOR UR: YELLOW
CREAT SERPL-MCNC: 0.8 MG/DL (ref 0.5–0.9)
CRYSTALS URNS MICRO: ABNORMAL /HPF
EOSINOPHIL # BLD: 0 K/UL (ref 0–0.4)
EOSINOPHILS RELATIVE PERCENT: 0 % (ref 1–7)
EPI CELLS #/AREA URNS HPF: ABNORMAL /HPF (ref 0–5)
ERYTHROCYTE [DISTWIDTH] IN BLOOD BY AUTOMATED COUNT: 19.1 % (ref 11.8–14.4)
GFR SERPL CREATININE-BSD FRML MDRD: >60 ML/MIN/1.73M2
GLOBULIN SER CALC-MCNC: 3.4 G/DL (ref 1.5–3.8)
GLUCOSE SERPL-MCNC: 107 MG/DL (ref 70–99)
GLUCOSE UR STRIP-MCNC: NEGATIVE MG/DL
HCT VFR BLD AUTO: 39.4 % (ref 36.3–47.1)
HGB BLD-MCNC: 11.3 G/DL (ref 11.9–15.1)
HGB UR QL STRIP.AUTO: NEGATIVE
IMM GRANULOCYTES # BLD AUTO: 0 K/UL (ref 0–0.3)
IMM GRANULOCYTES NFR BLD: 0 %
KETONES UR STRIP-MCNC: NEGATIVE MG/DL
LEUKOCYTE ESTERASE UR QL STRIP: NEGATIVE
LIPASE SERPL-CCNC: 55 U/L (ref 13–60)
LYMPHOCYTES NFR BLD: 0.83 K/UL (ref 1–4.8)
LYMPHOCYTES RELATIVE PERCENT: 9 % (ref 16–46)
MCH RBC QN AUTO: 19.9 PG (ref 25.2–33.5)
MCHC RBC AUTO-ENTMCNC: 28.7 G/DL (ref 25.2–33.5)
MCV RBC AUTO: 69.4 FL (ref 82.6–102.9)
MONOCYTES NFR BLD: 0.46 K/UL (ref 0.1–1.2)
MONOCYTES NFR BLD: 5 % (ref 4–11)
MORPHOLOGY: ABNORMAL
NEUTROPHILS NFR BLD: 86 % (ref 43–77)
NEUTS SEG NFR BLD: 7.91 K/UL (ref 1.5–8.1)
NITRITE UR QL STRIP: NEGATIVE
NRBC BLD-RTO: 0 PER 100 WBC
PH UR STRIP: 5.5 [PH] (ref 5–6)
PLATELET # BLD AUTO: 386 K/UL (ref 138–453)
PMV BLD AUTO: 8.8 FL (ref 8.1–13.5)
POTASSIUM SERPL-SCNC: 3.6 MMOL/L (ref 3.7–5.3)
PROT SERPL-MCNC: 6.3 G/DL (ref 6.4–8.3)
PROT UR STRIP-MCNC: ABNORMAL MG/DL
RBC # BLD AUTO: 5.68 M/UL (ref 3.95–5.11)
RBC #/AREA URNS HPF: ABNORMAL /HPF (ref 0–4)
SODIUM SERPL-SCNC: 137 MMOL/L (ref 135–144)
SP GR UR STRIP: >1.03 (ref 1.01–1.02)
UROBILINOGEN UR STRIP-ACNC: ABNORMAL EU/DL (ref 0–1)
WBC #/AREA URNS HPF: ABNORMAL /HPF (ref 0–4)
WBC OTHER # BLD: 9.2 K/UL (ref 3.5–11.3)

## 2023-10-05 PROCEDURE — 81001 URINALYSIS AUTO W/SCOPE: CPT

## 2023-10-05 PROCEDURE — 80048 BASIC METABOLIC PNL TOTAL CA: CPT

## 2023-10-05 PROCEDURE — 2709999900 CT ABDOMEN PELVIS W IV CONTRAST

## 2023-10-05 PROCEDURE — 99285 EMERGENCY DEPT VISIT HI MDM: CPT

## 2023-10-05 PROCEDURE — 85025 COMPLETE CBC W/AUTO DIFF WBC: CPT

## 2023-10-05 PROCEDURE — 96376 TX/PRO/DX INJ SAME DRUG ADON: CPT

## 2023-10-05 PROCEDURE — 2580000003 HC RX 258: Performed by: EMERGENCY MEDICINE

## 2023-10-05 PROCEDURE — 96375 TX/PRO/DX INJ NEW DRUG ADDON: CPT

## 2023-10-05 PROCEDURE — 83690 ASSAY OF LIPASE: CPT

## 2023-10-05 PROCEDURE — 6360000002 HC RX W HCPCS: Performed by: EMERGENCY MEDICINE

## 2023-10-05 PROCEDURE — 82150 ASSAY OF AMYLASE: CPT

## 2023-10-05 PROCEDURE — 80076 HEPATIC FUNCTION PANEL: CPT

## 2023-10-05 PROCEDURE — 96374 THER/PROPH/DIAG INJ IV PUSH: CPT

## 2023-10-05 PROCEDURE — 6360000004 HC RX CONTRAST MEDICATION: Performed by: EMERGENCY MEDICINE

## 2023-10-05 RX ORDER — MORPHINE SULFATE 4 MG/ML
4 INJECTION, SOLUTION INTRAMUSCULAR; INTRAVENOUS ONCE
Status: COMPLETED | OUTPATIENT
Start: 2023-10-05 | End: 2023-10-05

## 2023-10-05 RX ORDER — MORPHINE SULFATE 2 MG/ML
2 INJECTION, SOLUTION INTRAMUSCULAR; INTRAVENOUS ONCE
Status: COMPLETED | OUTPATIENT
Start: 2023-10-05 | End: 2023-10-05

## 2023-10-05 RX ORDER — ONDANSETRON 2 MG/ML
4 INJECTION INTRAMUSCULAR; INTRAVENOUS ONCE
Status: COMPLETED | OUTPATIENT
Start: 2023-10-05 | End: 2023-10-05

## 2023-10-05 RX ORDER — 0.9 % SODIUM CHLORIDE 0.9 %
500 INTRAVENOUS SOLUTION INTRAVENOUS ONCE
Status: COMPLETED | OUTPATIENT
Start: 2023-10-05 | End: 2023-10-05

## 2023-10-05 RX ADMIN — ONDANSETRON 4 MG: 2 INJECTION INTRAMUSCULAR; INTRAVENOUS at 11:22

## 2023-10-05 RX ADMIN — MORPHINE SULFATE 4 MG: 4 INJECTION, SOLUTION INTRAMUSCULAR; INTRAVENOUS at 12:24

## 2023-10-05 RX ADMIN — SODIUM CHLORIDE 500 ML: 9 INJECTION, SOLUTION INTRAVENOUS at 12:24

## 2023-10-05 RX ADMIN — IOPAMIDOL 100 ML: 755 INJECTION, SOLUTION INTRAVENOUS at 12:43

## 2023-10-05 RX ADMIN — MORPHINE SULFATE 2 MG: 2 INJECTION, SOLUTION INTRAMUSCULAR; INTRAVENOUS at 11:23

## 2023-10-05 RX ADMIN — ONDANSETRON 4 MG: 2 INJECTION INTRAMUSCULAR; INTRAVENOUS at 12:24

## 2023-10-05 ASSESSMENT — PAIN DESCRIPTION - DESCRIPTORS: DESCRIPTORS: ACHING

## 2023-10-05 ASSESSMENT — PAIN - FUNCTIONAL ASSESSMENT
PAIN_FUNCTIONAL_ASSESSMENT: 0-10
PAIN_FUNCTIONAL_ASSESSMENT: 0-10

## 2023-10-05 ASSESSMENT — PAIN SCALES - GENERAL
PAINLEVEL_OUTOF10: 9
PAINLEVEL_OUTOF10: 7
PAINLEVEL_OUTOF10: 10
PAINLEVEL_OUTOF10: 3

## 2023-10-05 ASSESSMENT — PAIN DESCRIPTION - FREQUENCY: FREQUENCY: CONTINUOUS

## 2023-10-05 ASSESSMENT — PAIN DESCRIPTION - LOCATION: LOCATION: ABDOMEN

## 2023-10-05 ASSESSMENT — LIFESTYLE VARIABLES
HOW OFTEN DO YOU HAVE A DRINK CONTAINING ALCOHOL: NEVER
HOW MANY STANDARD DRINKS CONTAINING ALCOHOL DO YOU HAVE ON A TYPICAL DAY: PATIENT DOES NOT DRINK

## 2023-10-05 ASSESSMENT — PAIN DESCRIPTION - PAIN TYPE: TYPE: ACUTE PAIN

## 2023-10-05 ASSESSMENT — PAIN DESCRIPTION - ONSET: ONSET: ON-GOING

## 2023-10-05 NOTE — DISCHARGE INSTRUCTIONS
May take your Vicodin 1 to 2 tablets every 4-6 hours as needed  Follow-up with family physician    Return immediately if any worsening symptoms or any other concerns    Tell us how we did visit: [email or web address]   and let us know about your experience

## 2023-10-05 NOTE — ED PROVIDER NOTES
[facility] ED  [address]  [place] [address] [place] [address]  Phone: [phone] [address]      Pt Name: Olivia Johnson  MRN: [id number]  [address] [facility] 1950  Date of evaluation: 10/5/2023    CHIEF COMPLAINT       Chief Complaint   Patient presents with    Abdominal Pain    Nausea       HISTORY OF PRESENT ILLNESS    Olivia Johnson is a 72 y.o. female with a history of metastatic cancer who presents to the emergency room complaining of suprapubic abdominal pain/groin pain for the past 2 weeks constant and progressively worsening. Denies fevers or chills admits to nausea without vomiting. No chest pain or shortness of breath. Pain nonradiating no back pain. Denies dysuria but admits to frequency especially at night. He is complaining of 10 out of 10 sharp pain lower abdomen. She was seen by her doctor a week and a half ago and had a CAT scan done as well as blood work which showed progressive metastatic disease. REVIEW OF SYSTEMS       Constitutional: No fevers or chills   HENT: No sore throat, rhinorrhea, or earache   Eyes: No blurry vision or double vision no drainage   Cardiovascular: No chest pain or tachycardia   Respiratory: No wheezing or shortness of breath no cough   Gastrointestinal: Positive nausea no vomiting no diarrhea or constipation positive abdominal pain  : No hematuria or dysuria   Musculoskeletal: No swelling or pain   Skin: No rash   Neurological: No focal neurologic complaints, paresthesias, weakness, or headache     PAST MEDICAL HISTORY    has a past medical history of Atrial fibrillation ([address]), Atypical carcinoid lung tumor ([address]), Breast cancer ([address]), History of 2019 novel coronavirus disease (COVID-19), History of therapeutic radiation, Hx antineoplastic chemo, Hypertension, Left knee pain, Liver cancer ([address]), Lung cancer ([address]), Myopia with astigmatism and presbyopia, Neuroendocrine carcinoma ([address]), Osteoarthritis, Osteopenia, and Pancreatitis.     SURGICAL

## 2023-10-07 ENCOUNTER — HOSPITAL ENCOUNTER (EMERGENCY)
Age: 73
Discharge: ANOTHER HOSPITAL | End: 2023-10-07
Attending: EMERGENCY MEDICINE
Payer: MEDICARE

## 2023-10-07 ENCOUNTER — APPOINTMENT (OUTPATIENT)
Dept: CT IMAGING | Age: 73
End: 2023-10-07
Attending: EMERGENCY MEDICINE
Payer: MEDICARE

## 2023-10-07 VITALS
WEIGHT: 132 LBS | OXYGEN SATURATION: 99 % | DIASTOLIC BLOOD PRESSURE: 72 MMHG | HEIGHT: 63 IN | TEMPERATURE: 97.3 F | BODY MASS INDEX: 23.39 KG/M2 | HEART RATE: 72 BPM | RESPIRATION RATE: 16 BRPM | SYSTOLIC BLOOD PRESSURE: 144 MMHG

## 2023-10-07 DIAGNOSIS — C79.9 METASTATIC ADENOCARCINOMA (HCC): ICD-10-CM

## 2023-10-07 DIAGNOSIS — K65.2 SPONTANEOUS BACTERIAL PERITONITIS (HCC): Primary | ICD-10-CM

## 2023-10-07 LAB
ALBUMIN SERPL-MCNC: 2.5 G/DL (ref 3.5–5.2)
ALBUMIN/GLOB SERPL: 0.7 {RATIO} (ref 1–2.5)
ALP SERPL-CCNC: 215 U/L (ref 35–104)
ALT SERPL-CCNC: 22 U/L (ref 5–33)
AMORPH SED URNS QL MICRO: ABNORMAL
ANION GAP SERPL CALCULATED.3IONS-SCNC: 13 MMOL/L (ref 9–17)
AST SERPL-CCNC: 55 U/L
BACTERIA URNS QL MICRO: ABNORMAL
BASOPHILS # BLD: 0 K/UL (ref 0–0.2)
BASOPHILS NFR BLD: 0 % (ref 0–1)
BILIRUB SERPL-MCNC: 1.1 MG/DL (ref 0.3–1.2)
BILIRUB UR QL STRIP: ABNORMAL
BUN SERPL-MCNC: 32 MG/DL (ref 8–23)
BUN/CREAT SERPL: 29 (ref 9–20)
CALCIUM SERPL-MCNC: 9.1 MG/DL (ref 8.6–10.4)
CASTS #/AREA URNS LPF: ABNORMAL /LPF (ref 0–2)
CHARACTER UR: ABNORMAL
CHLORIDE SERPL-SCNC: 104 MMOL/L (ref 98–107)
CLARITY UR: CLEAR
CO2 SERPL-SCNC: 24 MMOL/L (ref 20–31)
COLOR UR: YELLOW
CREAT SERPL-MCNC: 1.1 MG/DL (ref 0.5–0.9)
EOSINOPHIL # BLD: 0 K/UL (ref 0–0.4)
EOSINOPHILS RELATIVE PERCENT: 0 % (ref 1–7)
EPI CELLS #/AREA URNS HPF: ABNORMAL /HPF (ref 0–5)
ERYTHROCYTE [DISTWIDTH] IN BLOOD BY AUTOMATED COUNT: 18.9 % (ref 11.8–14.4)
GFR SERPL CREATININE-BSD FRML MDRD: 54 ML/MIN/1.73M2
GLUCOSE SERPL-MCNC: 114 MG/DL (ref 70–99)
GLUCOSE UR STRIP-MCNC: NEGATIVE MG/DL
HCT VFR BLD AUTO: 35.7 % (ref 36.3–47.1)
HGB BLD-MCNC: 10.4 G/DL (ref 11.9–15.1)
HGB UR QL STRIP.AUTO: NEGATIVE
IMM GRANULOCYTES # BLD AUTO: 0 K/UL (ref 0–0.3)
IMM GRANULOCYTES NFR BLD: 0 %
INR PPP: 1.2
KETONES UR STRIP-MCNC: NEGATIVE MG/DL
LACTATE BLDV-SCNC: 2 MMOL/L (ref 0.5–2.2)
LEUKOCYTE ESTERASE UR QL STRIP: NEGATIVE
LIPASE SERPL-CCNC: 38 U/L (ref 13–60)
LYMPHOCYTES NFR BLD: 0.17 K/UL (ref 1–4.8)
LYMPHOCYTES RELATIVE PERCENT: 1 % (ref 16–46)
MCH RBC QN AUTO: 19.8 PG (ref 25.2–33.5)
MCHC RBC AUTO-ENTMCNC: 29.1 G/DL (ref 25.2–33.5)
MCV RBC AUTO: 67.9 FL (ref 82.6–102.9)
MONOCYTES NFR BLD: 1.34 K/UL (ref 0.1–1.2)
MONOCYTES NFR BLD: 8 % (ref 4–11)
MORPHOLOGY: ABNORMAL
MORPHOLOGY: ABNORMAL
NEUTROPHILS NFR BLD: 91 % (ref 43–77)
NEUTS SEG NFR BLD: 15.19 K/UL (ref 1.5–8.1)
NITRITE UR QL STRIP: NEGATIVE
NRBC BLD-RTO: 0.1 PER 100 WBC
PARTIAL THROMBOPLASTIN TIME: 35.5 SEC (ref 23.9–33.8)
PH UR STRIP: 5.5 [PH] (ref 5–6)
PLATELET # BLD AUTO: 338 K/UL (ref 138–453)
PMV BLD AUTO: 9.1 FL (ref 8.1–13.5)
POTASSIUM SERPL-SCNC: 3.9 MMOL/L (ref 3.7–5.3)
PROT SERPL-MCNC: 5.9 G/DL (ref 6.4–8.3)
PROT UR STRIP-MCNC: ABNORMAL MG/DL
PROTHROMBIN TIME: 15.2 SEC (ref 11.5–14.2)
RBC # BLD AUTO: 5.26 M/UL (ref 3.95–5.11)
RBC #/AREA URNS HPF: ABNORMAL /HPF (ref 0–4)
SODIUM SERPL-SCNC: 141 MMOL/L (ref 135–144)
SP GR UR STRIP: 1.03 (ref 1.01–1.02)
UROBILINOGEN UR STRIP-ACNC: NORMAL EU/DL (ref 0–1)
WBC #/AREA URNS HPF: ABNORMAL /HPF (ref 0–4)
WBC OTHER # BLD: 16.7 K/UL (ref 3.5–11.3)

## 2023-10-07 PROCEDURE — 96376 TX/PRO/DX INJ SAME DRUG ADON: CPT

## 2023-10-07 PROCEDURE — 80053 COMPREHEN METABOLIC PANEL: CPT

## 2023-10-07 PROCEDURE — 83690 ASSAY OF LIPASE: CPT

## 2023-10-07 PROCEDURE — 83605 ASSAY OF LACTIC ACID: CPT

## 2023-10-07 PROCEDURE — 85610 PROTHROMBIN TIME: CPT

## 2023-10-07 PROCEDURE — 85025 COMPLETE CBC W/AUTO DIFF WBC: CPT

## 2023-10-07 PROCEDURE — 96375 TX/PRO/DX INJ NEW DRUG ADDON: CPT

## 2023-10-07 PROCEDURE — 36415 COLL VENOUS BLD VENIPUNCTURE: CPT

## 2023-10-07 PROCEDURE — 2580000003 HC RX 258: Performed by: EMERGENCY MEDICINE

## 2023-10-07 PROCEDURE — 6360000002 HC RX W HCPCS: Performed by: EMERGENCY MEDICINE

## 2023-10-07 PROCEDURE — 99285 EMERGENCY DEPT VISIT HI MDM: CPT

## 2023-10-07 PROCEDURE — 85730 THROMBOPLASTIN TIME PARTIAL: CPT

## 2023-10-07 PROCEDURE — 74176 CT ABD & PELVIS W/O CONTRAST: CPT

## 2023-10-07 PROCEDURE — 96365 THER/PROPH/DIAG IV INF INIT: CPT

## 2023-10-07 PROCEDURE — 81001 URINALYSIS AUTO W/SCOPE: CPT

## 2023-10-07 PROCEDURE — 87040 BLOOD CULTURE FOR BACTERIA: CPT

## 2023-10-07 RX ORDER — ONDANSETRON 2 MG/ML
4 INJECTION INTRAMUSCULAR; INTRAVENOUS ONCE
Status: COMPLETED | OUTPATIENT
Start: 2023-10-07 | End: 2023-10-07

## 2023-10-07 RX ORDER — 0.9 % SODIUM CHLORIDE 0.9 %
500 INTRAVENOUS SOLUTION INTRAVENOUS ONCE
Status: COMPLETED | OUTPATIENT
Start: 2023-10-07 | End: 2023-10-07

## 2023-10-07 RX ORDER — SODIUM CHLORIDE 9 MG/ML
INJECTION, SOLUTION INTRAVENOUS CONTINUOUS
Status: DISCONTINUED | OUTPATIENT
Start: 2023-10-07 | End: 2023-10-07 | Stop reason: HOSPADM

## 2023-10-07 RX ADMIN — HYDROMORPHONE HYDROCHLORIDE 0.5 MG: 1 INJECTION, SOLUTION INTRAMUSCULAR; INTRAVENOUS; SUBCUTANEOUS at 19:11

## 2023-10-07 RX ADMIN — ONDANSETRON 4 MG: 2 INJECTION INTRAMUSCULAR; INTRAVENOUS at 09:50

## 2023-10-07 RX ADMIN — HYDROMORPHONE HYDROCHLORIDE 0.5 MG: 1 INJECTION, SOLUTION INTRAMUSCULAR; INTRAVENOUS; SUBCUTANEOUS at 13:51

## 2023-10-07 RX ADMIN — HYDROMORPHONE HYDROCHLORIDE 0.5 MG: 1 INJECTION, SOLUTION INTRAMUSCULAR; INTRAVENOUS; SUBCUTANEOUS at 09:50

## 2023-10-07 RX ADMIN — SODIUM CHLORIDE: 9 INJECTION, SOLUTION INTRAVENOUS at 11:33

## 2023-10-07 RX ADMIN — SODIUM CHLORIDE: 9 INJECTION, SOLUTION INTRAVENOUS at 16:18

## 2023-10-07 RX ADMIN — PIPERACILLIN AND TAZOBACTAM 3375 MG: 3; .375 INJECTION, POWDER, LYOPHILIZED, FOR SOLUTION INTRAVENOUS at 11:03

## 2023-10-07 RX ADMIN — SODIUM CHLORIDE 500 ML: 9 INJECTION, SOLUTION INTRAVENOUS at 09:46

## 2023-10-07 ASSESSMENT — PAIN DESCRIPTION - LOCATION
LOCATION: ABDOMEN

## 2023-10-07 ASSESSMENT — PAIN DESCRIPTION - DESCRIPTORS
DESCRIPTORS: ACHING
DESCRIPTORS: SHARP
DESCRIPTORS: SHARP

## 2023-10-07 ASSESSMENT — PAIN DESCRIPTION - ORIENTATION
ORIENTATION: LOWER
ORIENTATION: LOWER

## 2023-10-07 ASSESSMENT — ENCOUNTER SYMPTOMS
SORE THROAT: 0
SHORTNESS OF BREATH: 0
ABDOMINAL PAIN: 1
DIARRHEA: 0
VOMITING: 0

## 2023-10-07 ASSESSMENT — PAIN SCALES - GENERAL
PAINLEVEL_OUTOF10: 10
PAINLEVEL_OUTOF10: 7
PAINLEVEL_OUTOF10: 7
PAINLEVEL_OUTOF10: 8
PAINLEVEL_OUTOF10: 7
PAINLEVEL_OUTOF10: 7

## 2023-10-07 ASSESSMENT — PAIN - FUNCTIONAL ASSESSMENT
PAIN_FUNCTIONAL_ASSESSMENT: ACTIVITIES ARE NOT PREVENTED
PAIN_FUNCTIONAL_ASSESSMENT: ACTIVITIES ARE NOT PREVENTED
PAIN_FUNCTIONAL_ASSESSMENT: 0-10

## 2023-10-07 NOTE — ED PROVIDER NOTES
[facility] ED  [address] [facility] [address]  Phone: [facility] ED  EMERGENCY DEPARTMENT ENCOUNTER      Pt Name: Kimberlyn Green  MRN: [id number]  [address] [facility] 1950  Date of evaluation: 10/7/2023  Provider: Db Holguin DO    CHIEF COMPLAINT       Chief Complaint   Patient presents with    Abdominal Pain     Ongoing for about 4 days. States she is unable to walk now. Reports took some norco yesterday and that moctezuma her vomit. HISTORY OF PRESENT ILLNESS   (Location/Symptom, Timing/Onset,Context/Setting, Quality, Duration, Modifying Factors, Severity)  Note limiting factors. Kimberlyn Green is a 72 y.o. female who presents to the emergency department for the evaluation of abdominal pain. It has been getting much worse over the past few days. She also feels very weak now, like she can hardly get up and walk around and her  reports that she had a fever last night. She did try to take pain medicine for the abdominal pain but she just threw it up. This was a couple of days ago. She has not been taking ondansetron. She states she has been eating or drinking just a little bit. The pain is sharp in nature, its in the suprapubic region, radiates to her back at times. She does not feel well. 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 She does follow at [facility]. She takes a daily oral chemotherapeutic drug. She denies any other vomiting, no diarrhea, normal bowel movements. No dysuria or hematuria    Nursing Notes were reviewed. REVIEW OF SYSTEMS    (2-9systems for level 4, 10 or more for level 5)     Review of Systems   Constitutional:  Positive for appetite change and fever. HENT:  Negative for sore throat.     Respiratory:  [facility]    COLONOSCOPY  02/24/2014    normal, repeat recommended in 10 years, Dr. Javad Olivia, [facility]    COLONOSCOPY N/A 06/27/2022    [facility] Dr. Michel Montiel    COLONOSCOPY N/A 01/19/2023    COLONOSCOPY performed by Jaziel Morel MD at [address]; black tarry stool in colon, scope stopped at 60 cm due to black stool    COLONOSCOPY  05/17/2023    moderate sigmoid diverticulosis, otherwise normal; Dr. Ross Larson at [address] N/A 06/27/2022    [facility] Dr. Michel Montiel    ESOPHAGOGASTRODUODENOSCOPY      HYSTERECTOMY ([address])  01/12/2004    supracervical, with bilateral salpingo-oophorectomy, Dr. Nathan Yo ARTHROSCOPY Right 07/01/2008    KNEE SURGERY Left 06/20/2016    unicompartmental knee replacement, Dr. Tomas Maria, [facility]  11/04/2016    ultrasound-guided liver biopsy (metastatic atypical carcinoid), [facility] at [address] [facility] Right 02/11/2011    right upper lobectomy, mediastinal lymph node dissection for atypical carcinoid of the right upper lobe    MASTECTOMY Right 2005    breast cancer    OTHER SURGICAL HISTORY  2004    Bowman procedure    OTHER SURGICAL HISTORY Right 07/25/2005    placement of tissue expander    SMALL INTESTINE SURGERY      THORACOTOMY Right 02/11/2011    redo    TOTAL KNEE ARTHROPLASTY Right 03/17/2010    Dr Watkins Lower ENDOSCOPY  01/14/2017    mild esophagitis, biopsy normal, Dr. Jaden Rodrigues, [address] N/A 01/19/2023    EGD BIOPSY WITH GOLD PROBE INJECTION performed by Jaziel Morel MD at [address], pathmild esophagitis with mild schatzki ring, mild oozing of red blood no ulcer in atrum    UPPER GASTROINTESTINAL ENDOSCOPY  05/17/2023    mild gastritis, neg h.pylori; [facility] [address]

## 2023-10-13 NOTE — TELEPHONE ENCOUNTER
Elham Chopra called requesting a refill of the below medication which has been pended for you:     Requested Prescriptions     Pending Prescriptions Disp Refills    furosemide (LASIX) 20 MG tablet [Pharmacy Med Name: FUROSEMIDE 20 MG TABLET] 30 tablet 0     Sig: take 1 tablet by mouth once daily if needed for edema       Last Appointment Date: 9/21/2023  Next Appointment Date: Visit date not found    Allergies   Allergen Reactions    Venlafaxine     Effexor Xr [Venlafaxine Hcl]      Made blood pressure go high
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
